# Patient Record
Sex: MALE | Race: WHITE | URBAN - METROPOLITAN AREA
[De-identification: names, ages, dates, MRNs, and addresses within clinical notes are randomized per-mention and may not be internally consistent; named-entity substitution may affect disease eponyms.]

---

## 2017-02-24 ENCOUNTER — HOSPITAL ENCOUNTER (OUTPATIENT)
Dept: NON INVASIVE DIAGNOSTICS | Age: 62
Discharge: HOME | End: 2017-02-24
Attending: INTERNAL MEDICINE | Admitting: INTERNAL MEDICINE

## 2017-02-24 DIAGNOSIS — R94.31 ABNORMAL ELECTROCARDIOGRAM: ICD-10-CM

## 2017-02-24 LAB
LEFT VENTRICULAR EJECTION FRACTION HIGH VALUE: 55 %
LEFT VENTRICULAR EJECTION FRACTION MODE: NORMAL
LV EF: 55 %
LVEF MODALITY: NORMAL

## 2023-02-04 ENCOUNTER — APPOINTMENT (OUTPATIENT)
Dept: CT IMAGING | Age: 68
DRG: 065 | End: 2023-02-04
Payer: MEDICARE

## 2023-02-04 ENCOUNTER — APPOINTMENT (OUTPATIENT)
Dept: GENERAL RADIOLOGY | Age: 68
DRG: 065 | End: 2023-02-04
Payer: MEDICARE

## 2023-02-04 ENCOUNTER — HOSPITAL ENCOUNTER (INPATIENT)
Age: 68
LOS: 3 days | Discharge: NURSING, REHAB OR LONG-TERM CARE | DRG: 065 | End: 2023-02-07
Attending: EMERGENCY MEDICINE | Admitting: INTERNAL MEDICINE
Payer: MEDICARE

## 2023-02-04 DIAGNOSIS — I63.9 ACUTE CVA (CEREBROVASCULAR ACCIDENT) (HCC): Primary | ICD-10-CM

## 2023-02-04 LAB
A/G RATIO: 1.6 (ref 1.1–2.2)
ALBUMIN SERPL-MCNC: 4.4 G/DL (ref 3.4–5)
ALP BLD-CCNC: 95 U/L (ref 40–129)
ALT SERPL-CCNC: 18 U/L (ref 10–40)
ANION GAP SERPL CALCULATED.3IONS-SCNC: 6 MMOL/L (ref 3–16)
APTT: 27.6 SEC (ref 23–34.3)
AST SERPL-CCNC: 24 U/L (ref 15–37)
BASOPHILS ABSOLUTE: 0.1 K/UL (ref 0–0.2)
BASOPHILS RELATIVE PERCENT: 0.6 %
BILIRUB SERPL-MCNC: 0.6 MG/DL (ref 0–1)
BILIRUBIN URINE: NEGATIVE
BLOOD, URINE: NEGATIVE
BUN BLDV-MCNC: 23 MG/DL (ref 7–20)
CALCIUM SERPL-MCNC: 9.8 MG/DL (ref 8.3–10.6)
CHLORIDE BLD-SCNC: 103 MMOL/L (ref 99–110)
CLARITY: CLEAR
CO2: 28 MMOL/L (ref 21–32)
COLOR: YELLOW
CREAT SERPL-MCNC: 0.8 MG/DL (ref 0.8–1.3)
EKG ATRIAL RATE: 62 BPM
EKG DIAGNOSIS: NORMAL
EKG P AXIS: 38 DEGREES
EKG P-R INTERVAL: 202 MS
EKG Q-T INTERVAL: 386 MS
EKG QRS DURATION: 94 MS
EKG QTC CALCULATION (BAZETT): 391 MS
EKG R AXIS: -48 DEGREES
EKG T AXIS: 27 DEGREES
EKG VENTRICULAR RATE: 62 BPM
EOSINOPHILS ABSOLUTE: 0.1 K/UL (ref 0–0.6)
EOSINOPHILS RELATIVE PERCENT: 0.5 %
GFR SERPL CREATININE-BSD FRML MDRD: >60 ML/MIN/{1.73_M2}
GLUCOSE BLD-MCNC: 119 MG/DL (ref 70–99)
GLUCOSE BLD-MCNC: 94 MG/DL (ref 70–99)
GLUCOSE URINE: NEGATIVE MG/DL
HCT VFR BLD CALC: 46.4 % (ref 40.5–52.5)
HEMOGLOBIN: 15.8 G/DL (ref 13.5–17.5)
INR BLD: 0.95 (ref 0.87–1.14)
KETONES, URINE: NEGATIVE MG/DL
LEUKOCYTE ESTERASE, URINE: NEGATIVE
LYMPHOCYTES ABSOLUTE: 2.3 K/UL (ref 1–5.1)
LYMPHOCYTES RELATIVE PERCENT: 23.6 %
MCH RBC QN AUTO: 34.3 PG (ref 26–34)
MCHC RBC AUTO-ENTMCNC: 34.1 G/DL (ref 31–36)
MCV RBC AUTO: 100.7 FL (ref 80–100)
MICROSCOPIC EXAMINATION: NORMAL
MONOCYTES ABSOLUTE: 0.7 K/UL (ref 0–1.3)
MONOCYTES RELATIVE PERCENT: 7.7 %
NEUTROPHILS ABSOLUTE: 6.6 K/UL (ref 1.7–7.7)
NEUTROPHILS RELATIVE PERCENT: 67.6 %
NITRITE, URINE: NEGATIVE
PDW BLD-RTO: 13.8 % (ref 12.4–15.4)
PERFORMED ON: NORMAL
PH UA: 7 (ref 5–8)
PLATELET # BLD: 273 K/UL (ref 135–450)
PMV BLD AUTO: 8.2 FL (ref 5–10.5)
POTASSIUM SERPL-SCNC: 4.3 MMOL/L (ref 3.5–5.1)
PRO-BNP: 65 PG/ML (ref 0–124)
PROTEIN UA: NEGATIVE MG/DL
PROTHROMBIN TIME: 12.6 SEC (ref 11.7–14.5)
RBC # BLD: 4.61 M/UL (ref 4.2–5.9)
SODIUM BLD-SCNC: 137 MMOL/L (ref 136–145)
SPECIFIC GRAVITY UA: >=1.03 (ref 1–1.03)
TOTAL PROTEIN: 7.1 G/DL (ref 6.4–8.2)
TROPONIN: <0.01 NG/ML
URINE REFLEX TO CULTURE: NORMAL
URINE TYPE: NORMAL
UROBILINOGEN, URINE: 0.2 E.U./DL
WBC # BLD: 9.7 K/UL (ref 4–11)

## 2023-02-04 PROCEDURE — 93010 ELECTROCARDIOGRAM REPORT: CPT | Performed by: INTERNAL MEDICINE

## 2023-02-04 PROCEDURE — 85025 COMPLETE CBC W/AUTO DIFF WBC: CPT

## 2023-02-04 PROCEDURE — 85610 PROTHROMBIN TIME: CPT

## 2023-02-04 PROCEDURE — 2060000000 HC ICU INTERMEDIATE R&B

## 2023-02-04 PROCEDURE — 71045 X-RAY EXAM CHEST 1 VIEW: CPT

## 2023-02-04 PROCEDURE — 84484 ASSAY OF TROPONIN QUANT: CPT

## 2023-02-04 PROCEDURE — 6360000004 HC RX CONTRAST MEDICATION: Performed by: PHYSICIAN ASSISTANT

## 2023-02-04 PROCEDURE — 93005 ELECTROCARDIOGRAM TRACING: CPT | Performed by: PHYSICIAN ASSISTANT

## 2023-02-04 PROCEDURE — 36415 COLL VENOUS BLD VENIPUNCTURE: CPT

## 2023-02-04 PROCEDURE — 70498 CT ANGIOGRAPHY NECK: CPT

## 2023-02-04 PROCEDURE — 85730 THROMBOPLASTIN TIME PARTIAL: CPT

## 2023-02-04 PROCEDURE — 6370000000 HC RX 637 (ALT 250 FOR IP): Performed by: INTERNAL MEDICINE

## 2023-02-04 PROCEDURE — 70450 CT HEAD/BRAIN W/O DYE: CPT

## 2023-02-04 PROCEDURE — 99285 EMERGENCY DEPT VISIT HI MDM: CPT

## 2023-02-04 PROCEDURE — 4A03X5D MEASUREMENT OF ARTERIAL FLOW, INTRACRANIAL, EXTERNAL APPROACH: ICD-10-PCS | Performed by: EMERGENCY MEDICINE

## 2023-02-04 PROCEDURE — 80053 COMPREHEN METABOLIC PANEL: CPT

## 2023-02-04 PROCEDURE — 83880 ASSAY OF NATRIURETIC PEPTIDE: CPT

## 2023-02-04 PROCEDURE — 81003 URINALYSIS AUTO W/O SCOPE: CPT

## 2023-02-04 PROCEDURE — 6360000002 HC RX W HCPCS: Performed by: INTERNAL MEDICINE

## 2023-02-04 RX ORDER — ASPIRIN 81 MG/1
81 TABLET, CHEWABLE ORAL DAILY
Status: DISCONTINUED | OUTPATIENT
Start: 2023-02-04 | End: 2023-02-07 | Stop reason: HOSPADM

## 2023-02-04 RX ORDER — ENOXAPARIN SODIUM 100 MG/ML
40 INJECTION SUBCUTANEOUS DAILY
Status: DISCONTINUED | OUTPATIENT
Start: 2023-02-04 | End: 2023-02-07 | Stop reason: HOSPADM

## 2023-02-04 RX ORDER — ATORVASTATIN CALCIUM 40 MG/1
40 TABLET, FILM COATED ORAL NIGHTLY
Status: DISCONTINUED | OUTPATIENT
Start: 2023-02-04 | End: 2023-02-07 | Stop reason: HOSPADM

## 2023-02-04 RX ADMIN — IOPAMIDOL 75 ML: 755 INJECTION, SOLUTION INTRAVENOUS at 09:21

## 2023-02-04 RX ADMIN — ENOXAPARIN SODIUM 40 MG: 100 INJECTION SUBCUTANEOUS at 16:20

## 2023-02-04 RX ADMIN — ATORVASTATIN CALCIUM 40 MG: 40 TABLET, FILM COATED ORAL at 21:49

## 2023-02-04 RX ADMIN — ASPIRIN 81 MG: 81 TABLET, CHEWABLE ORAL at 16:20

## 2023-02-04 SDOH — ECONOMIC STABILITY: TRANSPORTATION INSECURITY
IN THE PAST 12 MONTHS, HAS THE LACK OF TRANSPORTATION KEPT YOU FROM MEDICAL APPOINTMENTS OR FROM GETTING MEDICATIONS?: NO

## 2023-02-04 SDOH — HEALTH STABILITY: PHYSICAL HEALTH: ON AVERAGE, HOW MANY DAYS PER WEEK DO YOU ENGAGE IN MODERATE TO STRENUOUS EXERCISE (LIKE A BRISK WALK)?: 0 DAYS

## 2023-02-04 SDOH — ECONOMIC STABILITY: TRANSPORTATION INSECURITY
IN THE PAST 12 MONTHS, HAS LACK OF TRANSPORTATION KEPT YOU FROM MEETINGS, WORK, OR FROM GETTING THINGS NEEDED FOR DAILY LIVING?: NO

## 2023-02-04 SDOH — ECONOMIC STABILITY: INCOME INSECURITY: IN THE LAST 12 MONTHS, WAS THERE A TIME WHEN YOU WERE NOT ABLE TO PAY THE MORTGAGE OR RENT ON TIME?: NO

## 2023-02-04 SDOH — ECONOMIC STABILITY: HOUSING INSECURITY: IN THE LAST 12 MONTHS, HOW MANY PLACES HAVE YOU LIVED?: 1

## 2023-02-04 SDOH — HEALTH STABILITY: PHYSICAL HEALTH: ON AVERAGE, HOW MANY MINUTES DO YOU ENGAGE IN EXERCISE AT THIS LEVEL?: 0 MIN

## 2023-02-04 SDOH — ECONOMIC STABILITY: FOOD INSECURITY: WITHIN THE PAST 12 MONTHS, THE FOOD YOU BOUGHT JUST DIDN'T LAST AND YOU DIDN'T HAVE MONEY TO GET MORE.: NEVER TRUE

## 2023-02-04 SDOH — ECONOMIC STABILITY: FOOD INSECURITY: WITHIN THE PAST 12 MONTHS, YOU WORRIED THAT YOUR FOOD WOULD RUN OUT BEFORE YOU GOT MONEY TO BUY MORE.: NEVER TRUE

## 2023-02-04 SDOH — ECONOMIC STABILITY: HOUSING INSECURITY
IN THE LAST 12 MONTHS, WAS THERE A TIME WHEN YOU DID NOT HAVE A STEADY PLACE TO SLEEP OR SLEPT IN A SHELTER (INCLUDING NOW)?: NO

## 2023-02-04 ASSESSMENT — PATIENT HEALTH QUESTIONNAIRE - PHQ9
2. FEELING DOWN, DEPRESSED OR HOPELESS: NOT AT ALL
SUM OF ALL RESPONSES TO PHQ9 QUESTIONS 1 & 2: 0
1. LITTLE INTEREST OR PLEASURE IN DOING THINGS: NOT AT ALL

## 2023-02-04 ASSESSMENT — ENCOUNTER SYMPTOMS
BACK PAIN: 0
NAUSEA: 0
COUGH: 0
DIARRHEA: 0
ABDOMINAL PAIN: 0
VOMITING: 0
SHORTNESS OF BREATH: 0
CHEST TIGHTNESS: 0

## 2023-02-04 ASSESSMENT — PAIN SCALES - GENERAL
PAINLEVEL_OUTOF10: 0
PAINLEVEL_OUTOF10: 0

## 2023-02-04 ASSESSMENT — LIFESTYLE VARIABLES
HOW OFTEN DO YOU HAVE A DRINK CONTAINING ALCOHOL: 4 OR MORE TIMES A WEEK
HOW MANY STANDARD DRINKS CONTAINING ALCOHOL DO YOU HAVE ON A TYPICAL DAY: 1 OR 2

## 2023-02-04 ASSESSMENT — SOCIAL DETERMINANTS OF HEALTH (SDOH)
WITHIN THE LAST YEAR, HAVE TO BEEN RAPED OR FORCED TO HAVE ANY KIND OF SEXUAL ACTIVITY BY YOUR PARTNER OR EX-PARTNER?: NO
HOW OFTEN DO YOU ATTEND CHURCH OR RELIGIOUS SERVICES?: NEVER
WITHIN THE LAST YEAR, HAVE YOU BEEN KICKED, HIT, SLAPPED, OR OTHERWISE PHYSICALLY HURT BY YOUR PARTNER OR EX-PARTNER?: NO
HOW HARD IS IT FOR YOU TO PAY FOR THE VERY BASICS LIKE FOOD, HOUSING, MEDICAL CARE, AND HEATING?: NOT HARD AT ALL
WITHIN THE LAST YEAR, HAVE YOU BEEN HUMILIATED OR EMOTIONALLY ABUSED IN OTHER WAYS BY YOUR PARTNER OR EX-PARTNER?: NO
HOW OFTEN DO YOU ATTENT MEETINGS OF THE CLUB OR ORGANIZATION YOU BELONG TO?: NEVER
DO YOU BELONG TO ANY CLUBS OR ORGANIZATIONS SUCH AS CHURCH GROUPS UNIONS, FRATERNAL OR ATHLETIC GROUPS, OR SCHOOL GROUPS?: NO
HOW OFTEN DO YOU GET TOGETHER WITH FRIENDS OR RELATIVES?: MORE THAN THREE TIMES A WEEK
IN A TYPICAL WEEK, HOW MANY TIMES DO YOU TALK ON THE PHONE WITH FAMILY, FRIENDS, OR NEIGHBORS?: MORE THAN THREE TIMES A WEEK
ARE YOU MARRIED, WIDOWED, DIVORCED, SEPARATED, NEVER MARRIED, OR LIVING WITH A PARTNER?: NEVER MARRIED
WITHIN THE LAST YEAR, HAVE YOU BEEN AFRAID OF YOUR PARTNER OR EX-PARTNER?: NO

## 2023-02-04 NOTE — ED PROVIDER NOTES
In addition to the advanced practice provider, I personally saw Justo Galeas and performed a substantive portion of the visit including all aspects of the medical decision making. Medical Decision Making  Patient presented to the ED with slurred speech and left facial droop of sudden onset. Symptoms started yesterday at 7 AM and have been persistent ever since. In addition to the weakness in his left arm and face, patient has had word finding difficulty. We called a code stroke and discussed this with  stroke team.  However, as the patient is well out of the window for treatment with thrombectomy or thrombolysis, neither was indciated. CTA showed 50% stenosis in the right cervical ICA, but this is not clinically significant to his current presentation. It showed no evidence of LVO. Noncontrast CT of the head showed no evidence of hemorrhage or other abnormality. We admitted him to the hospital for further evaluation, including MRI as well as evaluation by PT and OT. I personally saw the patient and independently provided 10 minutes of non-concurrent critical care out of the total shared critical care time provided. EKG  The Ekg interpreted by me in the absence of a cardiologist shows. normal sinus rhythm with a rate of 62  Axis is   Left axis deviation  QTc is  normal  LAFB   No specific ST-T wave changes appreciated. No evidence of acute ischemia. No previous EKGs available for comparison      Screenings  NIH Stroke Scale  Interval: Baseline  Level of Consciousness (1a): Alert  LOC Questions (1b): Answers both correctly  LOC Commands (1c): Performs both tasks correctly  Best Gaze (2): Normal  Visual (3): No visual loss  Facial Palsy (4): (!) Minor paralysis (L side)  Motor Arm, Left (5a): Drift, but does not hit bed  Motor Arm, Right (5b): No drift  Motor Leg, Left (6a): Drift, but does not hit bed  Motor Leg, Right (6b):  No drift  Limb Ataxia (7): (!) Present in one limb (L side)  Sensory (8): Normal  Best Language (9): No aphasia  Dysarthria (10): Mild to moderate, slurs some words  Extinction and Inattention (11): No abnormality  Total: 5      FINAL IMPRESSION  1. Acute CVA (cerebrovascular accident) ([facility] [address]        Blood pressure (!) 156/76, pulse 78, temperature 97.6 °F (36.4 °C), temperature source Oral, resp. rate 23, height 5' 9\" (1.753 m), weight 155 lb (70.3 kg), SpO2 96 %.      For further details of [facility] emergency department encounter, please see documentation by advanced practice provider, NI Aguillon.        Satinder Larose MD  02/08/23 3384

## 2023-02-04 NOTE — ED NOTES
Bedside report given to 3T RN, verbalized understanding. This RN offered to do bedside NIH handoff, receiving 3T RN assuming care stated \"don't worry, I'll do one upstairs\".       Kevin Darling, RN  02/04/23 80175 Luis Wright, RODY  02/04/23 5533

## 2023-02-04 NOTE — ACP (ADVANCE CARE PLANNING)
Advance Care Planning     Advance Care Planning Inpatient Note  [facility] Department    Today's Date: 2/4/2023  Unit: [facility]    Received request from IDT Member, Yumiko Son RN. Upon review of chart and communication with care team, patient's decision making abilities are not in question. . Patient was/were present in the room during visit. Goals of ACP Conversation:  Discuss advance care planning documents    Health Care Decision Makers:       Primary Decision Maker: Stefan Leong - Brother/Sister - [phone]    Secondary Decision Maker: Shlomo Salcedo - [phone]  Summary:  No Decision Maker named by patient at this time    Advance Care Planning Documents (Patient Wishes):  None     Assessment:  Patient requested to review AD documents. Pt was given blank copies of POA and LW documents to reviw and complete when appropriate. Pt was instructed to notify the nurse to contact [address] to witness pt sign completed POA/LW documents. Provided spiritual and emotional support through active listen, affirmed feelings, thoughts, and concerns. Patient expressed hope and gratitude. Interventions:  Provided education on documents for clarity and greater understanding  Discussed and provided education on state decision maker hierarchy  Encouraged ongoing ACP conversation with future decision makers and loved ones    Care Preferences Communicated:   No    Outcomes/Plan:  ACP Discussion: Postponed. Pt will notify the nurse when appropriate.     Electronically signed by Ginny Montes De Oca on 2/4/2023 at 4:07 PM

## 2023-02-04 NOTE — ED PROVIDER NOTES
[facility]        Pt Name: Emilio Romero  MRN: [id number]  [place] 1955  Date of evaluation: 2/4/2023  Provider: Alyce Jones PA-C  PCP: Nataliia Prince III, DO  Note Started: 10:42 AM EST 2/4/23       I have seen and evaluated this patient with my supervising physician Anu Gutierrez MD.      CHIEF COMPLAINT       Chief Complaint   Patient presents with    Extremity Weakness     Patient brought in by squad from home with complaints of slurred speech, left sided facial droop since yesterday, 7am.     Facial Droop    Aphasia           Fall     Yesterday        HISTORY OF PRESENT ILLNESS: 1 or more Elements     History from : Patient    Limitations to history : None    Emilio Romero is a 69 y.o. male with a history of current tobacco abuse who presents to the emergency department today via ambulance from home with patient stating, \"I believe I have had a stroke\". Patient states he does not have a family doctor and states he has never been to a doctor. He is unsure what medical problems he may have. He does still currently smoke. He lives at home with his brother. Patient states yesterday he feels like he began having left-sided facial drooping. He states he began feeling very unstable when walking and actually fell several times at home yesterday. He also feels he began having weakness in his left arm yesterday. He thought the symptoms would pass and therefore did not seek any medical treatment. He is here with his brother who states he came downstairs this morning and noticed the patient sitting in front of the TV. The patient had change the input on the TV and did not remember how to fix it. Brother states this is very abnormal.  Brother also states that patient has had difficulty word finding. Patient is alert and oriented x3 with GCS 15 on arrival.  He is noted to have left facial drooping with smiling. He also has a pronator drift on the left side. A code stroke was called and he was taken for CT scan. Patient denies having headache, lightheadedness or dizziness. He denies chest pain or shortness of breath. He has no GI complaints    Nursing Notes were all reviewed and agreed with or any disagreements were addressed in the HPI. REVIEW OF SYSTEMS :      Review of Systems   Constitutional:  Negative for chills and fever. Respiratory:  Negative for cough, chest tightness and shortness of breath. Cardiovascular:  Negative for chest pain, palpitations and leg swelling. Gastrointestinal:  Negative for abdominal pain, diarrhea, nausea and vomiting. Genitourinary:  Negative for difficulty urinating, dysuria, flank pain, frequency, hematuria and urgency. Musculoskeletal:  Negative for arthralgias, back pain, neck pain and neck stiffness. Neurological:  Positive for tremors, facial asymmetry, speech difficulty and weakness. Negative for dizziness, seizures, syncope, light-headedness, numbness and headaches. Psychiatric/Behavioral:  Positive for confusion. All other systems reviewed and are negative. Positives and Pertinent negatives as per HPI. SURGICAL HISTORY     Past Surgical History:   Procedure Laterality Date    FINGER SURGERY Right     HERNIA REPAIR         CURRENTMEDICATIONS       Previous Medications    No medications on file       ALLERGIES     Patient has no known allergies. FAMILYHISTORY     History reviewed. No pertinent family history. SOCIAL HISTORY       Social History     Tobacco Use    Smoking status: Every Day     Packs/day: 1.00     Types: Cigarettes    Smokeless tobacco: Never   Substance Use Topics    Alcohol use:  Yes     Alcohol/week: 14.0 standard drinks     Types: 14 Standard drinks or equivalent per week     Comment: 2 oz bourban/day    Drug use: Not Currently       SCREENINGS   NIH Stroke Scale  Interval: Baseline  Level of Consciousness (1a): Alert  LOC Questions (1b): Answers both correctly  LOC Commands (1c): Performs both tasks correctly  Best Gaze (2): Normal  Visual (3): No visual loss  Facial Palsy (4): (!) Partial paralysis  Motor Arm, Left (5a): Drift, but does not hit bed  Motor Arm, Right (5b): No drift  Motor Leg, Left (6a): No drift  Motor Leg, Right (6b): No drift  Limb Ataxia (7): (!) Present in two limbs  Sensory (8): Normal  Best Language (9): Mild to moderate aphasia  Dysarthria (10): Mild to moderate, slurs some words  Extinction and Inattention (11): No abnormality  Total: 7    Deb Coma Scale  Eye Opening: Spontaneous  Best Verbal Response: Oriented  Best Motor Response: Obeys commands  [place] Coma Scale Score: 15                CIWA Assessment  BP: (!) 155/81  Heart Rate: 60           PHYSICAL EXAM  1 or more Elements     ED Triage Vitals   BP Temp Temp Source Heart Rate Resp SpO2 Height Weight   02/04/23 0930 02/04/23 0930 02/04/23 0930 02/04/23 0930 02/04/23 0930 02/04/23 0930 02/04/23 0944 02/04/23 0930   (!) 159/70 97.6 °F (36.4 °C) Oral 73 14 98 % 5' 9\" (1.753 m) 155 lb (70.3 kg)       Physical Exam  Vitals and nursing note reviewed. Constitutional:       Appearance: He is well-developed. He is not diaphoretic. HENT:      Head: Normocephalic and atraumatic. Right Ear: Tympanic membrane, ear canal and external ear normal.      Left Ear: Tympanic membrane, ear canal and external ear normal.      Nose: Nose normal.      Mouth/Throat:      Mouth: Mucous membranes are moist.   Eyes:      General:         Right eye: No discharge. Left eye: No discharge. Extraocular Movements: Extraocular movements intact. Conjunctiva/sclera: Conjunctivae normal.      Pupils: Pupils are equal, round, and reactive to light. Cardiovascular:      Rate and Rhythm: Normal rate and regular rhythm. Pulses: Normal pulses. Heart sounds: Normal heart sounds.    Pulmonary:      Effort: Pulmonary effort is normal. No respiratory distress. Breath sounds: Normal breath sounds. Abdominal:      General: Abdomen is flat. Bowel sounds are normal.      Palpations: Abdomen is soft. Musculoskeletal:         General: Normal range of motion. Cervical back: Normal range of motion and neck supple. Skin:     General: Skin is warm and dry. Capillary Refill: Capillary refill takes less than 2 seconds. Coloration: Skin is not pale. Neurological:      Mental Status: He is alert and oriented to person, place, and time. GCS: GCS eye subscore is 4. GCS verbal subscore is 5. GCS motor subscore is 6. Cranial Nerves: Facial asymmetry present. Sensory: Sensation is intact. Motor: Weakness and tremor present. Coordination: Finger-Nose-Finger Test abnormal.   Psychiatric:         Behavior: Behavior normal.           DIAGNOSTIC RESULTS   LABS:    Labs Reviewed   CBC WITH AUTO DIFFERENTIAL - Abnormal; Notable for the following components:       Result Value    .7 (*)     MCH 34.3 (*)     All other components within normal limits   COMPREHENSIVE METABOLIC PANEL - Abnormal; Notable for the following components:    Glucose 119 (*)     BUN 23 (*)     All other components within normal limits   URINALYSIS WITH REFLEX TO CULTURE   TROPONIN   APTT   PROTIME-INR   BRAIN NATRIURETIC PEPTIDE       When ordered only abnormal lab results are displayed. All other labs were within normal range or not returned as of this dictation. EKG: When ordered, EKG's are interpreted by the Emergency Department Physician in the absence of a cardiologist.  Please see their note for interpretation of EKG.     RADIOLOGY:   Non-plain film images such as CT, Ultrasound and MRI are read by the radiologist. Plain radiographic images are visualized and preliminarily interpreted by the ED Provider with the below findings:        Interpretation per the Radiologist below, if available at the time of this note:    XR CHEST PORTABLE   Final Result No acute process. CTA HEAD NECK W CONTRAST   Final Result   1. There is predominately noncalcified atherosclerosis, which contributes to   less than 50% stenosis of the proximal right cervical ICA by NASCET criteria. No flow limiting stenosis seen of the cervical carotid/vertebral arteries. 2. No significant stenosis or large vessel occlusion of the [id number]. CT HEAD WO CONTRAST   Preliminary Result   1. No acute intracranial process identified. 2. Ventriculomegaly out of proportion to the degree of volume loss suggesting   normal pressure hydrocephalus in the appropriate clinical setting. The findings were sent to the Radiology Results [address] at 9:37   am on 2/4/2023 to be communicated to a licensed caregiver. CT HEAD WO CONTRAST    Result Date: 2/4/2023  1. No acute intracranial process identified. 2. Ventriculomegaly out of proportion to the degree of volume loss suggesting normal pressure hydrocephalus in the appropriate clinical setting. The findings were sent to the Radiology Results [address] at 9:37 am on 2/4/2023 to be communicated to a licensed caregiver. XR CHEST PORTABLE    Result Date: 2/4/2023  EXAMINATION: ONE XRAY VIEW OF THE CHEST 2/4/2023 9:21 am COMPARISON: None. HISTORY: ORDERING SYSTEM PROVIDED HISTORY: Weakness TECHNOLOGIST PROVIDED HISTORY: Reason for exam:->Weakness Reason for Exam: sroke alert FINDINGS: The lungs are without acute focal process. There is no effusion or pneumothorax. The cardiomediastinal silhouette is without acute process. The osseous structures are without acute process. No acute process. CTA HEAD NECK W CONTRAST    Result Date: 2/4/2023  EXAMINATION: CTA OF THE HEAD AND NECK WITH CONTRAST 2/4/2023 9:21 am: TECHNIQUE: CTA of the head and neck was performed with the administration of intravenous contrast. Multiplanar reformatted images are provided for review.   MIP images are provided for review. Stenosis of the internal carotid arteries measured using NASCET criteria. Automated exposure control, iterative reconstruction, and/or weight based adjustment of the mA/kV was utilized to reduce the radiation dose to as low as reasonably achievable. 3D reconstructed images were performed on a separate workstation and provided for review. This scan was analyzed using Viz. ai contact LVO. Identification of suspected findings is not for diagnostic use beyond notification. Viz LVO is limited to analysis of imaging data and should not be used in-lieu of full patient evaluation or relied upon to make or confirm diagnosis. COMPARISON: None. HISTORY: ORDERING SYSTEM PROVIDED HISTORY: Weakness TECHNOLOGIST PROVIDED HISTORY: Reason for exam:->Weakness Has a \"code stroke\" or \"stroke alert\" been called? ->No Decision Support Exception - unselect if not a suspected or confirmed emergency medical condition->Emergency Medical Condition (MA) Reason for Exam: stroke Initial evaluation. FINDINGS: CTA NECK: AORTIC ARCH/ARCH VESSELS: Minimal atherosclerosis of the aortic arch. No evidence of a flow limiting stenosis or dissection of the innominate or subclavian arteries. CAROTID ARTERIES: No significant stenosis of the common carotid arteries. There appears to be atherosclerosis involving the proximal cervical ICAs bilaterally including noncalcified atherosclerosis involving the proximal right cervical ICA. There is less than 50% stenosis of the proximal right cervical ICA by NASCET criteria. No focal stenosis of the left cervical ICA by NASCET criteria. No evidence of a dissection VERTEBRAL ARTERIES: There is a slightly right dominant vertebral artery. No significant stenosis or dissection is seen of the vertebral arteries. SOFT TISSUES: No focal consolidation within the visualized lung apices. No acute abnormality within the visualized superior mediastinum. BONES: The osseous structures appear osteopenic.   No convincing acute osseous abnormality. Degenerative change of the cervical spine most prominent at C5-C6 where there is loss of disc space height, endplate irregularity and sclerosis. CTA HEAD: ANTERIOR CIRCULATION: No significant stenosis of the intracranial internal carotid, anterior cerebral, or middle cerebral arteries. No aneurysm. Scattered atherosclerosis of the internal carotid arteries. POSTERIOR CIRCULATION: No significant stenosis of the vertebral, basilar, or posterior cerebral arteries. No aneurysm. OTHER: No dural venous sinus thrombosis on this non-dedicated study. BRAIN: No evidence of mass effect or midline shift. The size of the ventricles appears prominent out of proportion to the cortical sulci. 1. There is predominately noncalcified atherosclerosis, which contributes to less than 50% stenosis of the proximal right cervical ICA by NASCET criteria. No flow limiting stenosis seen of the cervical carotid/vertebral arteries. 2. No significant stenosis or large vessel occlusion of the etynph-ck-Ypagai. PROCEDURES   Unless otherwise noted below, none     Procedures    CRITICAL CARE TIME (.cctime)   There was a high probability of life-threatening clinical deterioration in the patient's condition requiring my urgent intervention. I personally saw the patient and independently provided 45 minutes of non-concurrent critical care out of the total shared critical care time provided, excluding separately reportable procedures. PAST MEDICAL HISTORY      has no past medical history on file.      Chronic Conditions affecting Care: None    EMERGENCY DEPARTMENT COURSE and DIFFERENTIAL DIAGNOSIS/MDM:   Vitals:    Vitals:    02/04/23 1100 02/04/23 1131 02/04/23 1145 02/04/23 1200   BP: (!) 140/77 (!) 156/99 (!) 141/100 (!) 155/81   Pulse: 62 66 61 60   Resp: 16 16 17 17   Temp:       TempSrc:       SpO2: 94% 96% 96% 95%   Weight:       Height:           Patient was given the following medications:  Medications   iopamidol (ISOVUE-370) 76 % injection 75 mL (75 mLs IntraVENous Given 2/4/23 0921)             Is this patient to be included in the SEP-1 Core Measure due to severe sepsis or septic shock? No   Exclusion criteria - the patient is NOT to be included for SEP-1 Core Measure due to: Infection is not suspected    CONSULTS: (Who and What was discussed)  IP CONSULT TO NEUROSURGERY  IP CONSULT TO INTERNAL MEDICINE  Discussion with Other Profesionals : Consultant . Stroke team as well as neurology from [address] [place] [address] Determinants : None    Records Reviewed : None    CC/HPI Summary, DDx, ED Course, and Reassessment: Patient presented to the emergency department today with his brother, whom he lives with. He came in via ambulance. He states, \"I feel like I have had a stroke\". He states starting yesterday around 7 AM he started feeling like he was having left-sided facial drooping as well as some weakness on his left side. He states he has felt very unstable with walking and has actually fallen several times. His brother states he first noticed it this morning as patient did seem confused. At the time of my initial exam, he does have left-sided facial drooping, decreased coordination with the left arm, left pronator drift, and some difficulty word finding. Code stroke was called. CT and CTA showed no acute intracranial process however there is ventriculomegaly suggesting normal pressure hydrocephalus. Patient denies having any chest pain or shortness of breath. Troponin is normal.  proBNP is only 65 and chest x-ray shows no acute cardiopulmonary disease. CBC, BMP, LFTs and urine analysis are unremarkable. Coags normal.    Disposition Considerations (include 1 Tests not done, Shared Decision Making, Pt Expectation of Test or Tx.): I had a lengthy discussion with the patient regarding testing completed in the emergency department today as well as the results.   After speaking with neurology at [facility] it was recommended he be admitted here at [facility]. Patient is agreeable with this. Dr. Karel Regalado is consulted who is agreeable with admission at this time. Admission      I am the Primary Clinician of Record. FINAL IMPRESSION      1. Acute CVA (cerebrovascular accident) [facility])          DISPOSITION/PLAN     DISPOSITION Decision To Admit 02/04/2023 12:16:07 PM      PATIENT REFERRED TO:  No follow-up provider specified.     DISCHARGE MEDICATIONS:  New Prescriptions    No medications on file       DISCONTINUED MEDICATIONS:  Discontinued Medications    No medications on file              (Please note that portions of this note were completed with a voice recognition program.  Efforts were made to edit the dictations but occasionally words are mis-transcribed.)    Rocael Chen PA-C (electronically signed)           Rocael Chen PA-C  02/04/23 [id number]

## 2023-02-05 ENCOUNTER — APPOINTMENT (OUTPATIENT)
Dept: CT IMAGING | Age: 68
DRG: 065 | End: 2023-02-05
Payer: MEDICARE

## 2023-02-05 ENCOUNTER — APPOINTMENT (OUTPATIENT)
Dept: MRI IMAGING | Age: 68
DRG: 065 | End: 2023-02-05
Payer: MEDICARE

## 2023-02-05 PROBLEM — R47.01 EXPRESSIVE APHASIA: Status: ACTIVE | Noted: 2023-02-05

## 2023-02-05 PROBLEM — R47.1 DYSARTHRIA: Status: ACTIVE | Noted: 2023-02-05

## 2023-02-05 PROBLEM — G81.94 LEFT HEMIPARESIS (HCC): Status: ACTIVE | Noted: 2023-02-05

## 2023-02-05 PROBLEM — I10 PRIMARY HYPERTENSION: Status: ACTIVE | Noted: 2023-02-05

## 2023-02-05 PROBLEM — R27.0 ATAXIA: Status: ACTIVE | Noted: 2023-02-05

## 2023-02-05 PROBLEM — Z91.199 NONCOMPLIANCE: Status: ACTIVE | Noted: 2023-02-05

## 2023-02-05 PROBLEM — Z72.0 TOBACCO ABUSE: Status: ACTIVE | Noted: 2023-02-05

## 2023-02-05 LAB
GLUCOSE BLD-MCNC: 105 MG/DL (ref 70–99)
PERFORMED ON: ABNORMAL

## 2023-02-05 PROCEDURE — 6360000002 HC RX W HCPCS: Performed by: INTERNAL MEDICINE

## 2023-02-05 PROCEDURE — 97112 NEUROMUSCULAR REEDUCATION: CPT

## 2023-02-05 PROCEDURE — 97163 PT EVAL HIGH COMPLEX 45 MIN: CPT

## 2023-02-05 PROCEDURE — 6370000000 HC RX 637 (ALT 250 FOR IP): Performed by: NEUROMUSCULOSKELETAL MEDICINE & OMM

## 2023-02-05 PROCEDURE — 97167 OT EVAL HIGH COMPLEX 60 MIN: CPT

## 2023-02-05 PROCEDURE — 97116 GAIT TRAINING THERAPY: CPT

## 2023-02-05 PROCEDURE — 6360000002 HC RX W HCPCS: Performed by: NEUROMUSCULOSKELETAL MEDICINE & OMM

## 2023-02-05 PROCEDURE — 2060000000 HC ICU INTERMEDIATE R&B

## 2023-02-05 PROCEDURE — 92610 EVALUATE SWALLOWING FUNCTION: CPT

## 2023-02-05 PROCEDURE — 92523 SPEECH SOUND LANG COMPREHEN: CPT

## 2023-02-05 PROCEDURE — 70450 CT HEAD/BRAIN W/O DYE: CPT

## 2023-02-05 PROCEDURE — 99222 1ST HOSP IP/OBS MODERATE 55: CPT | Performed by: NEUROMUSCULOSKELETAL MEDICINE & OMM

## 2023-02-05 PROCEDURE — 6370000000 HC RX 637 (ALT 250 FOR IP): Performed by: INTERNAL MEDICINE

## 2023-02-05 PROCEDURE — 97535 SELF CARE MNGMENT TRAINING: CPT

## 2023-02-05 PROCEDURE — 97530 THERAPEUTIC ACTIVITIES: CPT

## 2023-02-05 PROCEDURE — 97129 THER IVNTJ 1ST 15 MIN: CPT

## 2023-02-05 PROCEDURE — 70551 MRI BRAIN STEM W/O DYE: CPT

## 2023-02-05 RX ORDER — CLOPIDOGREL BISULFATE 75 MG/1
75 TABLET ORAL DAILY
Status: DISCONTINUED | OUTPATIENT
Start: 2023-02-05 | End: 2023-02-07 | Stop reason: HOSPADM

## 2023-02-05 RX ORDER — THIAMINE HYDROCHLORIDE 100 MG/ML
100 INJECTION, SOLUTION INTRAMUSCULAR; INTRAVENOUS DAILY
Status: COMPLETED | OUTPATIENT
Start: 2023-02-05 | End: 2023-02-07

## 2023-02-05 RX ADMIN — ATORVASTATIN CALCIUM 40 MG: 40 TABLET, FILM COATED ORAL at 22:06

## 2023-02-05 RX ADMIN — CLOPIDOGREL BISULFATE 75 MG: 75 TABLET ORAL at 14:08

## 2023-02-05 RX ADMIN — ASPIRIN 81 MG: 81 TABLET, CHEWABLE ORAL at 09:28

## 2023-02-05 RX ADMIN — THIAMINE HYDROCHLORIDE 100 MG: 100 INJECTION, SOLUTION INTRAMUSCULAR; INTRAVENOUS at 14:08

## 2023-02-05 RX ADMIN — ENOXAPARIN SODIUM 40 MG: 100 INJECTION SUBCUTANEOUS at 09:28

## 2023-02-05 ASSESSMENT — PAIN SCALES - GENERAL
PAINLEVEL_OUTOF10: 0
PAINLEVEL_OUTOF10: 0

## 2023-02-05 NOTE — H&P
[facility] [address]                     [facility], [address]                              HISTORY AND PHYSICAL    PATIENT NAME: Liane Dakin                :        1955  MED REC NO:   [id number]                          ROOM:       [id number]  ACCOUNT NO:   [de-identified]                           ADMIT DATE: 2023  PROVIDER:     Meghan Barcenas MD    HISTORY OF PRESENT ILLNESS:  The patient is a 73-year-old man, who came  to the emergency room with some confusion, slurred speech, and  left-sided weakness, especially upper extremity with a left-sided facial  droop. The patient was earlier aphasic and now his speech has been  improving. The patient has obviously stroke. He ignored those symptoms  for two and half days. Before he decided to come to the emergency room,  he was definitely out of any range for thrombolytic therapy. The  patient stated that he does not have a family doctor and stated he has  never been to a doctor. He is unsure what the medical problem he may  have. He is a moderately heavy smoker. Apparently, there is a doctor  listed, Dr. Ramiro Ibanez and Dr. Gonzalez Fairly to admit for, and I was asked to  take care of this patient. The patient is feeling very unstable while  walking and actually fell several times at home. He also has been  having weakness in his left arm. No loss of consciousness. No  convulsions. No genitourinary complaint. The patient had changed the  input on his TV and did not remember how to fix it, brother states this  is very abnormal according to the brother who lives with him. The  patient also had difficulty finding words. His [place] coma scale was  15 on arrival.  The patient also had a pronator drift on the left side. A code stroke was called. The patient was taken to CT scan. There is  no chest pain or shortness of breath. PAST MEDICAL HISTORY: Pertinent for hypertension.     PAST SURGICAL HISTORY: Pertinent for finger surgery, hernia repair,  fracture surgery. FAMILY HISTORY:  Both his parents are . Mother had stroke and  colon cancer. Father had atherosclerotic heart disease and prostate  cancer. MEDICATIONS:  The patient is on no medications. SOCIAL HISTORY:  The patient is a single man without any children. He  smokes three fourth of a pack per day, has been smoking all his adult  life. The patient also had moderate alcohol use. 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 He worked as a   there, but he is retired now. No history of substance abuse. REVIEW OF SYSTEM:  Negative for loss of consciousness. No convulsions. The patient does have speech disturbance, dysarthria, expressive  aphasia. There is no dysphagia. Overall average nourished man with a  BMI of 20.9. Denies angina pectoris. Does have exertional shortness of  breath. Does have left-sided weakness. No abdominal pain. No  hematemesis. No melena, no genitourinary complaint. Does have  unsteadiness while ambulating. No chronic pain. PHYSICAL EXAMINATION:  GENERAL:  Alert, awake, oriented x2. Moderately confused 64-year-old  white man, who also has disorientation and difficulty finding words. VITAL SIGNS:  His temperature is 97.8, blood pressure 176/80,  respirations 18, heart rate 63. O2 sat 97% on room air. Oral mucosa  dry. Skin is warm and dry. HEENT:  Neck is supple. Faint carotid bruit. No jugular venous  distention. No lymphadenopathy. No thyromegaly. LUNGS:  Bronchovesicular breathing pattern. Prolonged exhalation. No  wheezing. HEART:  Regular rate and rhythm. S1, S2 without any S3-S4 gallop. ABDOMEN:  Soft, nontender. Bowel sounds present. EXTREMITIES:  Shows left upper extremity somewhat spastic and having  pronator drift and general weakness.   Babinski is present on the left  side. LABORATORY EVALUATION:  Sodium 137, potassium 4.3, chloride 103, CO2 28,  BUN 23, creatinine 0.8, blood glucose is 105. Calcium is 9.8. ProBNP  level 65. Troponin less than 0.01. Blood glucose is 119. White blood  cell count 9.7, hemoglobin/hematocrit is 15.8 and 46.4, platelet count  is 677,604. Urinalysis negative for UTI. Head CT shows no acute  intracranial process. Ventriculomegaly out of proportion to the degree  of volume loss suggesting normal pressure hydrocephalus. While I am  dictating this, even an MRI of the brain has been done that shows  subacute ischemia in the right basal ganglia and adjacent  periventricular white matter disease. Extensive underlying chronic  microvascular disease with _____ prominence of the ventricular system  that is nonspecific in correlation with symptomatology of normal  pressure hydrocephalus is needed. EKG normal sinus rhythm, left  anterior fascicular block, moderate voltage criteria for LVH, maybe  normal variant. ASSESSMENT:  Acute cerebral infarction, left-sided hemiparesis,  dysarthria, expressive aphasia, hypertension, tobacco abuse,  noncompliance with medical care. Plan:  Get him admitted to neuro checks. Transthoracic echocardiogram.   The patient also had a CTA of the head and neck that did not reveal any  critical intra or extracranial blockages. No significant stenosis or  large vessel occlusion of the [place]. We will also get a  Neurology consultation per  _____.         Ochoa Rodas MD    D: 02/05/2023 11:28:22       T: 02/05/2023 11:31:37     [email or web address]  Job#: [id number]     Doc#: [id number]    CC:  Eddy Conroy Iii, DO

## 2023-02-05 NOTE — PLAN OF CARE
Problem: Discharge Planning  Goal: Discharge to home or other facility with appropriate resources  Outcome: Progressing  Flowsheets (Taken 2/5/2023 0857)  Discharge to home or other facility with appropriate resources:   Identify barriers to discharge with patient and caregiver   Arrange for needed discharge resources and transportation as appropriate   Identify discharge learning needs (meds, wound care, etc)   Refer to discharge planning if patient needs post-hospital services based on physician order or complex needs related to functional status, cognitive ability or social support system     Problem: Safety - Adult  Goal: Free from fall injury  Outcome: Progressing     Problem: Pain  Goal: Verbalizes/displays adequate comfort level or baseline comfort level  Outcome: Progressing     Problem: Neurosensory - Adult  Goal: Achieves stable or improved neurological status  Outcome: Progressing     Problem: Cardiovascular - Adult  Goal: Absence of cardiac dysrhythmias or at baseline  Outcome: Progressing     Problem: Musculoskeletal - Adult  Goal: Return mobility to safest level of function  Outcome: Progressing  Flowsheets (Taken 2/5/2023 0857)  Return Mobility to Safest Level of Function:   Assess patient stability and activity tolerance for standing, transferring and ambulating with or without assistive devices   Assist with transfers and ambulation using safe patient handling equipment as needed   Ensure adequate protection for wounds/incisions during mobilization   Obtain physical therapy/occupational therapy consults as needed   Instruct patient/family in ordered activity level   Apply continuous passive motion per provider or physical therapy orders to increase flexion toward goal     Problem: Chronic Conditions and Co-morbidities  Goal: Patient's chronic conditions and co-morbidity symptoms are monitored and maintained or improved  Outcome: Progressing

## 2023-02-05 NOTE — PROGRESS NOTES
PT report of patient with worsening symptoms. Assessment done. NIHSS at shift change was 4. NIHSS increased to 10. Code stroke called. MRI team called in for stat MRI. Attending physician notified.

## 2023-02-05 NOTE — PROGRESS NOTES
Speech Language Pathology  Facility/Department: [address]  SLP Clinical Swallow Evaluation and Speech Language Cognitive Assessment     Patient: Mita Leal   : 1955   MRN: [id number]      Evaluation Date: 2023      Admitting Dx: Acute ischemic stroke [facility]) [I63.9]  Acute CVA (cerebrovascular accident) [facility]) [I63.9]   has no past medical history on Epic chart file. Onset 2023    Pain: Denies                                  ED H&P:   Mita Leal is a 69 y.o. male with a history of current tobacco abuse who presents to the emergency department today via ambulance from home with patient stating, \"I believe I have had a stroke\". Patient states he does not have a family doctor and states he has never been to a doctor. He is unsure what medical problems he may have. He does still currently smoke. He lives at home with his brother. Patient states yesterday he feels like he began having left-sided facial drooping. He states he began feeling very unstable when walking and actually fell several times at home yesterday. He also feels he began having weakness in his left arm yesterday. He thought the symptoms would pass and therefore did not seek any medical treatment. He is here with his brother who states he came downstairs this morning and noticed the patient sitting in front of the TV. The patient had change the input on the TV and did not remember how to fix it. Brother states this is very abnormal.  Brother also states that patient has had difficulty word finding. Patient is alert and oriented x3 with GCS 15 on arrival.  He is noted to have left facial drooping with smiling. He also has a pronator drift on the left side. A code stroke was called and he was taken for CT scan. Patient denies having headache, lightheadedness or dizziness. He denies chest pain or shortness of breath.   He has no GI complaints    Rapid Response/ Code [place] 2023 ~9:00 to 930 am     Imaging:  Chest X-ray:  2/4/2023  Impression   No acute process. MRI: 2/5/2023  Impression   Subacute ischemia in the right basal ganglia and adjacent periventricular   white matter. Extensive underlying chronic microvascular disease. Symmetric prominence of ventricular system that is nonspecific and   correlation with symptomatology for normal pressure hydrocephalus is needed. History/Prior Level of Function:   Living Status: Lives with Brother  Prior Dysphagia History: Pt denies swallowing problems. Pt reports chewing problems due to missing teeth/gaps in teeth  Prior Speech History: Pt self reports problems with reading and speech while in school . Reports he went to speech and reading therapy. He report Dyslexia. Left hand dominance. Reason for referral: SLP evaluation orders received due to Stroke workup for left sided weakness; left facial droop; slurred speech and speech disturbance-aphasia. DYSPHAGIA BEDSIDE SWALLOW EVALUATION   Dysphagia Impressions/Dysphagia Diagnosis: Oropharyngeal Dysphagia   1. Pt was awake in bed. Pt was receptive to assessment. Pt oriented to place/month/year and partially oriented to situation    2. Oropharyngeal Dysphagia characterized by left om muscular weakness impacting mastication, bolus formation/control and rate of A-P oral transit; delayed initiation of the swallow and potential reduced laryngeal rom/pharyngeal clearance  No anterior loss of any liquid or food this date/time  Prolonged munching mastication pattern of textured foods.  Pt reports more due to gaps in teeth than any status change  No post swallow overt clinical s/s of aspiration   Post swallow oral pocketing left ; which pt was able to clear with clearance swallow  Recommend   Downgrade to easy to chew food consistencies with close monitor  Continue thin liquids with close monitor  Meds with johanne  SLP therapy for Oropharyngeal Dysphagia  ARU referral consideration  Discussed with RN    Recommended Diet and Intervention:  Diet Solids Recommendation:  Easy to chew with close monitor; discontinue if any status changes Liquid Consistency Recommendation: Thin liquids with close monitor; discontinue or downgrade to thickened liquids if any status change Recommended form of Meds: Meds in puree         Dysphagia Therapeutic Intervention:  Bolus Control Exercise, Oral Care, Laryngeal Exercises , Diet Tolerance Monitoring , Oral Motor Exercises , Patient/Family Education , Therapeutic Trials with SLP , To be determined     Compensatory Swallowing Strategies:  Check for pocketing of food L, Upright as possible with all PO intake , Small bites/sips , Swallow 2 times per bite , Lingual Sweeps , Eat/feed slowly, Remain upright 30-45 min     Oral Mechanism Exam:  Left labial/buccal weakness; reduced rom and strength. Reduced lingual coordination. Slight asym velum during phonation of /a/. Diminished gag. Adequate labial seal for tsp/cup/straw         SHORT TERM DYSPHAGIA GOALS  Pt will functionally tolerate recommended diet with no overt clinical s/s of aspiration   Pt will demonstrate understanding of aspiration risk and precautions via education/demonstration with occasional prompting  Pt will advance to least restrictive diet as indicated   Any clinical status change or new s/s of aspiration/penetration , Pt will participate in Modified Barium Swallow Study     Patient Positioning: Upright in bed . Total assist warranted to achieve upright /midline positioning (use of pillows supporting left side)      SPEECH LANGUAGE COGNITIVE ASSESSMENT:     Speech Diagnosis: MS change; left facial droop with slurred speech; speech difficulties-aphasia      Assessment Impressions:   1. Pt was awake in bed and was receptive to assessment. Pt reports baseline of Dyslexia and problems with orientation and memory. Pt reports some changes recently in balance; speech with more memory issues and left weakness    2. Moderate to marked Dysarthria characterized by reduced intelligibility in connected speech 2/2 to phonemic distortions/blended words/articulatory imprecision 2/2 to left om motor muscular weakness. Will need SLP therapy    3. Aphasia characterized by verbal expressive language deficits during naming tasks; [address]? And concept explanation with episodes of telegraphic speech and word retrieval and though organization deficits. Ongoing assessment /treatment as oral motor speech improves. 4. Cognitive - Linguistic deficits in domains of spatial concepts and attention left side of body; working memory and STM; insight and VPS/PS and reasoning; attention; and numeric reasoning. Therapy recommended. COMPREHENSION  Auditory Comprehension: Breakdown with spatial body scheme and psychomotor commands  Functional Ability to Comprehend Basic Commands/Questions   Impaired Complex/Abstract commands  Impaired L/R discrimination     EXPRESSION  Verbal Expression: Dysphasia features; ongoing assessment as intelligibility of oral motor speech improves  Impaired Confrontational Naming  Impaired Convergent Naming  Impaired Divergent Naming  Impaired Conversation  Impaired Thought Organization      Pragmatics/Social Functioning: Labile  Emotionally labile      MOTOR SPEECH  Motor Speech:  Moderate  and Severe   Dysarthria: Blended word boundaries  and Imprecise articulation    Decreased speech intelligibility     VOICE  Voice:   Weak   Vocal Intensity:     COGNITION    Overall Orientation :  Impaired  Disoriented to situation   Oriented to self  Oriented to place  Oriented to time     Attention: Impaired   Impaired Alternating Attention  Impaired Divided Attention  Impaired attention left    Memory: Impaired   Impaired Short-term Memory  Impaired Recall of New Learning   Impaired Immediate/working Memory    Problem Solving: Impaired   Impaired Simple Functional Tasks  Impaired Verbal Reasoning    Safety/Judgement: Impaired   Unable to self-monitor and self correct consistently   Impaired Insight  Impulsive   Impaired Flexibility of thought    GOALS:  Short Term Speech/Language/Cognitive Goals:   Pt will improve speech intelligibility in connected speech via graded tasks to 80%   Pt will improve orientation and short term recall via graded tasks to 80%  Pt will improve verbal expression for functional expression via graded tasks to 80%  Pt will participate in ongoing cognitive assessment with goals to be established as indicated   Pt with improve functional verbal problem solving via graded tasks to 80% min cues     Plan of care: 3-5 times per week during acute care stay. Discharge Recommendations:  Recommend ongoing SLP for speech and dysphagia therapy upon discharge from hospital   Recommend ARU referral    EDUCATION:   Provided education regarding role of SLP, results of assessment, recommendations and general speech pathology plan of care. [] Pt verbalized understanding and agreement   [x] Pt requires ongoing learning   [] No evidence of comprehension     If patient discharges prior to next visit, this note will serve as discharge. Treatment time:  Timed Code Treatment Minutes: 10  Total Treatment time: 55 minutes    Electronically signed by: Joao Kelley MS,CCC,SLP [id number]  Speech and Language Pathologist  2/5/2023 at 1215 pm

## 2023-02-05 NOTE — PROGRESS NOTES
[address] [facility],6[address] Msb Department   Phone: [phone]    Occupational Therapy    [x] Initial Evaluation            [] Daily Treatment Note         [] Discharge Summary      Patient: Pino Angel   : 1955   MRN: [id number]   Date of Service:  2023      Patient stated \"I am worse\", at end of session spoke to nurse who went into room for nursing assessment and called a STROKE ALERT. Returned to room and assisted patient back to bed with stroke team in room. Admitting Diagnosis:  Acute ischemic stroke [facility])    Current Admission Summary: Pino Angel is a 69 y.o. male with a history of current tobacco abuse who presents to the emergency department today via ambulance from home with patient stating, \"I believe I have had a stroke\". Patient states he does not have a family doctor and states he has never been to a doctor. He is unsure what medical problems he may have. He does still currently smoke. He lives at home with his brother. Patient states yesterday he feels like he began having left-sided facial drooping. He states he began feeling very unstable when walking and actually fell several times at home yesterday. He also feels he began having weakness in his left arm yesterday. He thought the symptoms would pass and therefore did not seek any medical treatment. He is here with his brother who states he came downstairs this morning and noticed the patient sitting in front of the TV. The patient had change the input on the TV and did not remember how to fix it. Brother states this is very abnormal.  Brother also states that patient has had difficulty word finding. Patient is alert and oriented x3 with GCS 15 on arrival.  He is noted to have left facial drooping with smiling. He also has a pronator drift on the left side. A code stroke was called and he was taken for CT scan.   Patient denies having headache, lightheadedness or dizziness. He denies chest pain or shortness of breath. He has no GI complaints     Head CT--   1. No acute intracranial process identified. 2. Ventriculomegaly out of proportion to the degree of volume loss suggesting   normal pressure hydrocephalus in the appropriate clinical setting. Past Medical History:  has no past medical history on file. Past Surgical History:  has a past surgical history that includes Finger surgery (Right); Hernia repair; fracture surgery; and hernia repair. Discharge Recommendations: Pino Angel scored a 13/24 on the AM-PAC ADL Inpatient form. Current research shows that an AM-PAC score of 17 or less is typically not associated with a discharge to the patient's home setting. Based on the patient's AM-PAC score and their current ADL deficits, it is recommended that the patient have 5-7 sessions per week of Occupational Therapy at d/c to increase the patient's independence. At this time, this patient demonstrates complex nursing, medical, and rehabilitative needs, and would benefit from intensive rehabilitation services upon discharge from the Inpatient setting. This patient demonstrates the ability to participate in and benefit from an intensive therapy program with a coordinated interdisciplinary team approach to foster frequent, structured, and documented communication among disciplines, who will work together to establish, prioritize, and achieve treatment goals. Please see assessment section for further patient specific details. If patient discharges prior to next session this note will serve as a discharge summary. Please see below for the latest assessment towards goals.       DME Required For Discharge: DME to be determined at next level of care, DME to be determined pending patient progress    Precautions/Restrictions: high fall risk  Weight Bearing Restrictions: no restrictions  [] Right Upper Extremity  [] Left Upper Extremity [] Right Lower Extremity  [] Left Lower Extremity     Required Braces/Orthotics: no braces required   [] Right  [] Left  Positional Restrictions:no positional restrictions    Pre-Admission Information   Lives With:  brother      Type of Home: house  Home Layout: two level, bedroom in the basement, 10 steps with 1 handrail   Home Access:  2 step to enter without rails   Bathroom Layout: tub/shower unit  Bathroom Equipment: grab bars in shower  Toilet Height: elevated height  Home Equipment: no prior equipment  Transfer Assistance: Independent without use of device  Ambulation Assistance:Independent without use of device  ADL Assistance: independent with all ADL's   IADL Assistance: independent with homemaking tasks  Active :        [x] Yes  [] 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 Occupation: sales  Hobbies: [employer]   Recent Falls: chart reports had several falls that caused this admission (about 5 falls)     Examination   Vision:   Vision Corrective Device: wears glasses at all times  Demo good peripheral vision but appears to have decreased scanning to left. To continue to assess   Hearing:   [facility]  Perception:   Overall Perceptual Status: Impaired on Left  Unilateral Attention: cues to attend (L) visual field, cues to attend (L) side of body  Initiation: cues to initiate tasks  Motor Planning: appears intact  Perseveration: not present  Observation:   General Observation:  left facial droop   Posture:   Significant left leaning sitting EOB and on commode-- min assist and cues   Sensation:   denies numbness and tingling however appears to have mild left inattention on left.    Proprioception:    diminished proprioception in (L) UE, (L) LE  Tone:   Normotonic  Coordination Testing:   Coordination and Movement Description: (L) UE  Finger to Nose: Impaired on Left  Alternating Pronation/Supination: Impaired on Left  Unable to formally test secondary to left hemiplegia      ROM: Right UE WNL  Left UE-- shoulder flexion @ 1/2 range, elbow flexion/extension @ 1/2 range, wrist flexion/extension @ 3/4 range, 4-/5   Strength:   (L) Shoulder: +2     (R) Shoulder: 5  (L) Elbow: +2     (R) Elbow: 5  (L) Wrist: 3     (R) Wrist: 5  (L) Hand: -4      (R) Hand: 5  (L)  Strength: good-    (R)  Strength: 5    Therapist Clinical Decision Making (Complexity): high complexity  Clinical Presentation: evolving      Subjective  General: Patient supine in bed, increased time to answer questions, easily frustrated with questions and with left hand weakness. Patient with expressive aphasia   Pain: 0/10  Pain Interventions: repositioned         Activities of Daily Living  Basic Activities of Daily Living  Feeding: NPO  Grooming: minimal assistance  Upper Extremity Bathing: maximum assistance  Lower Extremity Bathing: dependent   Bathing Comments: max/dep due to impaired sitting balance on commode   Upper Extremity Dressing: maximum assistance  Lower Extremity Dressing: dependent  Toileting: maximum assistance. Toileting Equipment: none  Toileting Comments: max assist to don new pullup due to balance issues on commode   General Comments: increased assist due to balance issues   Instrumental Activities of Daily Living  No IADL completed on this date. Functional Mobility  Bed Mobility  Supine to Sit: moderate assistance  Scooting: minimal assistance  Comments:  Transfers  Sit to stand transfer:minimal assistance  Stand to sit transfer: minimal assistance  Bed / Chair comments: once in standing needed mod assist to walk due to midline balance issues.  See PT for ambulation issues  Toilet transfer: moderate assistance  Toilet transfer equipment: standard toilet, walker  Toilet transfer comments: patient needing multiple cues for midline awareness, kept falling to left whenever not concentrating on just sitting (while bathing and changing gown)  Comments:  Functional Mobility:  Sitting Balance: minimal assistance. Sitting Balance Comment: patient falling to left   Standing Balance: moderate assistance. Standing Balance Comment: see PT for gait analysis   Functional Mobility: .  moderate assistance, due to midline deficits   Functional Mobility Comment: patient needing multiple cues to     Other Therapeutic Interventions-- 10 feet from bed to commode, 15 feet from commode to recliner chair, mod assist with RW. Difficulty with midline standing    Functional Outcomes  AM-PAC Inpatient Daily Activity Raw Score: 13    Cognition  Overall Cognitive Status: Impaired  Arousal/Alterness: appropriate responses to stimuli  Following Commands: follows one step commands with repetition  Attention Span: appears intact  Memory: decreased recall of recent events  Safety Judgement: decreased awareness of need for assistance  Problem Solving: assistance required to generate solutions  Insights: decreased awareness of deficits  Initiation: requires cues for some  Sequencing: requires cues for some  Comments: patient with increased time to answer questions, slurred speech, easily frustrated   Orientation:    alert and oriented x 4  Command Following:   accurately follows one step commands           Education  Barriers To Learning: cognition, emotional, and physical  Patient Education: patient educated on goals, OT role and benefits, plan of care, discharge recommendations  Learning Assessment:  patient verbalizes understanding, would benefit from continued reinforcement    Assessment  Activity Tolerance: Patient admitted for acute stroke/ possible NPH per CT scan. Stroke alert called several minutes after speaking to nurse at end of session when NIHSS scale performed.    Impairments Requiring Therapeutic Intervention: decreased functional mobility, decreased ADL status, decreased ROM, decreased strength, decreased fine motor control, decreased coordination  Prognosis: good and fair  (stroke alert called)  Clinical Assessment: Patient with evolving stroke symptoms.  Please see MD notes and stroke team notes  Safety Interventions: patient left in bed-- placed in chair with chair alarm on, then returned to room to assist patient back to bed, left patient with stroke team.     Plan  Frequency: 5-7 x/week  Current Treatment Recommendations: strengthening, ROM, balance training, functional mobility training, transfer training, endurance training, neuromuscular re-education, ADL/self-care training, IADL training, home exercise program, safety education, equipment evaluation/education, and positioning    Goals  Patient Goals: open for more rehab   Short Term Goals:  Time Frame: until discharge  Patient will complete lower body ADL at moderate assistance   Patient will complete toileting at minimal assistance   Patient will complete functional transfers at minimal assistance   Patient will increase [facility] ADL score = to or > than 16/24    Therapy Session Time     Individual Group Co-treatment   Time In       Time Out    0855   Minutes    41        Timed Code Treatment Minutes:  26    Total Treatment Minutes:  41       Electronically Signed By: OLGA Hughes/MIKE Duff

## 2023-02-05 NOTE — CONSULTS
Neurology Consult      Patient Identification    Pt Name: Jh Saucedo  MRN: [id number]  [place] 1955  Date of evaluation: 2/5/2023  Provider: Jose Lau MD  Requesting provider:  Raz Gar  Reason: CVA        C/C:  left-sided weakness     HPI:  Patient was seen this AM in his room with brother at bedside  A stroke alert was called earlier this AM for worsening left sided weakness and NIHSS score from 4 to 10. He is heading for mri scan at this time    His older brother reports the patient developed weakness of left side for the past 24 hours. It began having left-sided facial drooping. He states he began feeling very unstable when walking and actually fell several times at home yesterday. He also feels he began having weakness in his left arm yesterday  difficulty word finding. Patient denies having headache, lightheadedness or dizziness. He denies chest pain or shortness of breath. He has no GI complaints    There has been some recent changes of his mental status and increasing forgetfulness lately  with occasional mild confusion per brother. No change of gait or urinary incontinence reported. He has been ambulating without problems and driving although the brother has come concerns about his driving abilities    He does not takes any meds and does not follow up with doctors   He has been smoking for the past 50 years , one pack a day and drinks a shot of bourbon  2 oz daily plus a beer or two during the day or when having guests. BP in /70, found to have macrocytosis (.7),     CT and CTA showed no acute intracranial process however there is ventriculomegaly suggesting normal pressure hydrocephalus. Patient denies having any chest pain or shortness of breath. Troponin is normal.  proBNP is only 63 and chest x-ray shows no acute cardiopulmonary disease. CBC, BMP, LFTs and urine analysis are unremarkable.   Coags normal.    PMH;   Patient states he does not have a family doctor and states he has never been to a doctor  tobacco abuse    Meds: None    Allergies:Patient has no known allergies. Investigations:         CTA HEAD NECK W CONTRAST   Final Result   1. There is predominately noncalcified atherosclerosis, which contributes to   less than 50% stenosis of the proximal right cervical ICA by NASCET criteria. No flow limiting stenosis seen of the cervical carotid/vertebral arteries. 2. No significant stenosis or large vessel occlusion of the [id number]. CT HEAD WO CONTRAST   Preliminary Result   1. No acute intracranial process identified. 2. Ventriculomegaly out of proportion to the degree of volume loss suggesting   normal pressure hydrocephalus in the appropriate clinical setting. The findings were sent to the Radiology Results [address] at 9:37   am on 2/4/2023 to be communicated to a licensed caregiver. XR CHEST PORTABLE  No acute process. Brain MRI      MRI OF THE BRAIN WITHOUT CONTRAST  2/5/2023 10:25 am       TECHNIQUE:   Multiplanar multisequence MRI of the brain was performed without the   administration of intravenous contrast.       COMPARISON:   CT brain performed 02/04/2023. HISTORY:   ORDERING SYSTEM PROVIDED HISTORY: stroke   TECHNOLOGIST PROVIDED HISTORY:   Reason for exam:->stroke   What is the sedation requirement?->None   Decision Support Exception - unselect if not a suspected or confirmed   emergency medical condition->Emergency Medical Condition (MA)   Reason for Exam: Left side weakness, slurred speech. Stroke alert earlier   today. FINDINGS:   INTRACRANIAL STRUCTURES/VENTRICLES: The sellar and suprasellar structures,   optic chiasm, corpus callosum, pineal gland, tectum, and midline brainstem   structures are unremarkable. The craniocervical junction is unremarkable. There is no acute hemorrhage, mass effect, or midline shift.   There is   satisfactory overall gray-white matter differentiation. There is chronic   microvascular disease. The ventricular structures are symmetrically   prominent. The infratentorial structures including the cerebellopontine   angles and internal auditory canals are unremarkable. There is restricted   diffusion in the right basal ganglia and adjacent periventricular white   matter. There is no abnormal blooming artifact on susceptibility weighted   imaging. ORBITS: The visualized portion of the orbits demonstrate no acute abnormality. SINUSES: There is fluid in the mastoid air cells. The paranasal sinuses are   normally aerated. BONES/SOFT TISSUES: The bone marrow signal intensity appears normal. The soft   tissues demonstrate no acute abnormality. Impression   Subacute ischemia in the right basal ganglia and adjacent periventricular   white matter. Extensive underlying chronic microvascular disease. Symmetric prominence of ventricular system that is nonspecific and   correlation with symptomatology for normal pressure hydrocephalus is needed. Examination:      He is awake and alert, in NAD, mild dysarthria with some aphasia and word finding difficulty at times. Seem to have poor insight into his neuro deficit  There is left visual field defect and left sided weakness 4/5  Involving the face, arm and leg  Impaired finger to nose on left. Tongue in midline  No ptosis or nystagmus  Left babinski  Gait not test  No carotid bruits  Regular s1 s2  Ext. No edema    A+P:    Acute CVA  Baseline of hydrocephalus r/o NPH  Smoking  ?  ETOH abuse      Urgent mri scan of brain done and reviewed  DATP; Clopidogrel 75 mg a day with baby ASA 81 mg od for 21 days, then continue with ASA only  Statin 40 mg od  2 D echo  B12, Vit D and TSH  Alcohol withdrawal precautions  PT/OT and Speech consults    Once stable he Needs to be evaluated for NPH    Advised brother that patient needs assessment for safe driving  Abilities upon discharge      Maye Hair MD  Neurology  [phone]     Thank you for the consultation

## 2023-02-05 NOTE — PLAN OF CARE
Problem: Safety - Adult  Goal: Free from fall injury  Outcome: Progressing  Note: Safety precautions in place. Bed locked, alarmed, lowest position. Call light in reach, gripper socks on. No falls or physical injury. Problem: Neurosensory - Adult  Goal: Achieves stable or improved neurological status  Outcome: Progressing     Problem: Cardiovascular - Adult  Goal: Absence of cardiac dysrhythmias or at baseline  Outcome: Progressing  Note: Pt sinus rhythm on the telemetry monitor. Problem: Musculoskeletal - Adult  Goal: Return mobility to safest level of function  Outcome: Progressing  Note: Pt weak in LUE and LLE. Using urinal in bed.

## 2023-02-05 NOTE — PROGRESS NOTES
Hospital Problems             Last Modified POA    * (Principal) Acute ischemic stroke ([address] 2/4/2023 Yes    Expressive aphasia 2/5/2023 Yes    Dysarthria 2/5/2023 Yes    Ataxia 2/5/2023 Yes    Primary hypertension 2/5/2023 Yes    Tobacco abuse 2/5/2023 Yes    Noncompliance 2/5/2023 Yes    Left hemiparesis ([address] 2/5/2023 Yes   H&P dictated

## 2023-02-05 NOTE — PROGRESS NOTES
[facility] [address]   Phone: [phone]    Physical Therapy    [x] Initial Evaluation            [] Daily Treatment Note         [] Discharge Summary      Patient: Yash Lockett   : 1955   MRN: [id number]   Date of Service:  2023  Admitting Diagnosis: Acute ischemic stroke [facility])  Current Admission Summary: Yash Lockett is a 69 y.o. male with a history of current tobacco abuse who presents to the emergency department today via ambulance from home with patient stating, \"I believe I have had a stroke\". Patient states he does not have a family doctor and states he has never been to a doctor. He is unsure what medical problems he may have. He does still currently smoke. He lives at home with his brother. Patient states yesterday he feels like he began having left-sided facial drooping. He states he began feeling very unstable when walking and actually fell several times at home yesterday. He also feels he began having weakness in his left arm yesterday. He thought the symptoms would pass and therefore did not seek any medical treatment. He is here with his brother who states he came downstairs this morning and noticed the patient sitting in front of the TV. The patient had change the input on the TV and did not remember how to fix it. Brother states this is very abnormal.  Brother also states that patient has had difficulty word finding. Patient is alert and oriented x3 with GCS 15 on arrival.  He is noted to have left facial drooping with smiling. He also has a pronator drift on the left side. A code stroke was called and he was taken for CT scan. Patient denies having headache, lightheadedness or dizziness. He denies chest pain or shortness of breath. He has no GI complaints     Nursing Notes were all reviewed and agreed with or any disagreements were addressed in the HPI.   Past Medical History:  has no past medical history on file.  Past Surgical History:  has a past surgical history that includes Finger surgery (Right); Hernia repair; fracture surgery; and hernia repair. Discharge Recommendations:Ananda Sheppard scored a 12/24 on the AM-PAC short mobility form. Current research shows that an AM-PAC score of 17 or less is typically not associated with a discharge to the patient's home setting. Based on the patient's AM-PAC score and their current functional mobility deficits, it is recommended that the patient have 5-7 sessions per week of Physical Therapy at d/c to increase the patient's independence. At this time, this patient demonstrates complex nursing, medical, and rehabilitative needs, and would benefit from intensive rehabilitation services upon discharge from the Inpatient setting. This patient demonstrates the ability to participate in and benefit from an intensive therapy program with a coordinated interdisciplinary team approach to foster frequent, structured, and documented communication among disciplines, who will work together to establish, prioritize, and achieve treatment goals. Please see assessment section for further patient specific details. If patient discharges prior to next session this note will serve as a discharge summary. Please see below for the latest assessment towards goals.        DME Required For Discharge: DME to be determined at next level of care  Precautions/Restrictions: high fall risk, currently NPO awaiting speech eval  Weight Bearing Restrictions: no restrictions  [] Right Upper Extremity  [] Left Upper Extremity [] Right Lower Extremity  [] Left Lower Extremity     Required Braces/Orthotics: no braces required   [] Right  [] Left  Positional Restrictions:no positional restrictions    Pre-Admission Information   Lives With:  brother                 Type of Home: house  Home Layout: two level, bedroom in the basement, 10 steps with 1 handrail   Home Access:  2 step to enter without rails Bathroom Layout: tub/shower unit  Bathroom Equipment: grab bars in shower  Toilet Height: elevated height  Home Equipment: no prior equipment  Transfer Assistance: Independent without use of device  Ambulation Assistance:Independent without use of device  ADL Assistance: independent with all ADL's   IADL Assistance: independent with homemaking tasks  Active :        [x] Yes                 [] 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 Occupation: sales  Hobbies: fishing   Recent Falls: chart reports had several falls that caused this admission (about 5 falls)     Examination   Vision:   Vision Corrective Device: wears glasses at all times  Hearing:   [facility]  Observation:   General Observation:  Pt supine in bed, right directed gaze, IV in right UE, telemetry on  Posture: In seated pt with strong left lean, slightly rounded shoulders   Sensation:   WFL  Proprioception:    Unable to formally test secondary to pt tolerance to testing. Pt getting frustrated with questions due to word finding issues. Suspect some impairment due to pt foot positioning in gait  Tone:   Normotonic  Coordination Testing:   Heel to Shin: Impaired on Left    ROM:   (B) LE AROM WFL  Strength:   (B) LE strength grossly WFL Right 5/5, left 4-/5 some difficulty with following MMT  Therapist Clinical Decision Making (Complexity): high complexity  Clinical Presentation: evolving      Subjective  General: Pt agreeable to PT tx. Reports he feels he is worse today than yesterday. States his arm worked better and he wasn't leaning yesterday.  RN made aware of pt's complaints of feeling worse, when she went in to assess pt she determined he was worse and called stroke team. PT/OT assisted pt back to bed for safety while stroke team in to evaluate  Pain: 0/10  Pain Interventions: not applicable       Functional Mobility  Bed Mobility  Supine to Sit: moderate assistance  Scooting: minimal assistance  Comments: Pt getting out of bed to left, requiring moderate assist to complete bed mobility to left. Min assist to keep balance while scooting forward to edge of bed  Transfers  Sit to stand transfer: minimal assistance  Stand to sit transfer: minimal assistance  Comments: Pt min assist for sit<>stand, needs steadying for left cruz lean  Ambulation  Assistive Device: rolling walker  Assistance: moderate assistance  Distance: 10', 15'  Gait Mechanics: Pt with varying foot placement on left LE. On initial gait to bathroom pt with very narrow THERESE, left LE scissor stepping at times. Struggles to keep left UE on RW in place. On second trial pt with wider THERESE but struggled more with coordination of DF to clear foot, left knee buckling slightly during stance. Comments:    Stair Mobility  Stair mobility not completed on this date. Comments:  Wheelchair Mobility:  No w/c mobility completed on this date. Comments:  Balance  Static Sitting Balance: poor (+): requires min (A) to maintain balance  Dynamic Sitting Balance: poor (+): requires min (A) to maintain balance  Static Standing Balance: poor: requires mod (A) to maintain balance  Dynamic Standing Balance: poor: requires mod (A) to maintain balance  Comments: Sitting on EOB  and on toilet pt with lean to left requiring min assist to maintain balance. Pt moderate assist to keep balance due to left lean in stand. Pt able to correct lean with cues but only maintains for a few seconds before collapsing back to left side. Other Therapeutic Interventions  Pt performing ADL tasks seated on toilet and standing at sink.    Functional Outcomes  AM-PAC Inpatient Mobility Raw Score : 15              Cognition  WFL, pt with some communication deficits making it difficult to assess but appears intact  Orientation:    alert and oriented x 4  Command Following:   [facility]    Education  Barriers To Learning: language  Patient Education: patient educated on goals, PT role and benefits, plan of care, general safety, functional mobility training, adaptive device training, orientation, transfer training, discharge recommendations  Learning Assessment:  patient verbalizes understanding, would benefit from continued reinforcement    Assessment  Activity Tolerance: good  Impairments Requiring Therapeutic Intervention: decreased functional mobility, decreased ADL status, decreased strength, decreased safety awareness, decreased endurance, decreased balance, decreased fine motor control, decreased coordination, decreased posture  Prognosis: good  Clinical Assessment: Pt presents with hemiparesis and decreased motor control on left side, dec attention to left side and decreased balance which put him at a significant risk for falls and a decreased ability to return to home at current level. Pt would benefit from further inpatient skilled therapy upon discharge from hospital. Pt is highly motivated and had a high prior level of functional, feel he would be best served at a more intensive therapy setting such as an ARU. Will continue to work with pt while in hospital to progress his mobility as he tolerates.    Safety Interventions: patient left in bed, gait belt, patient at risk for falls, and nurse notified originally in chair with alarm but returned to bed once RN did assessment    Plan  Frequency: 5-7 x/week  Current Treatment Recommendations: strengthening, balance training, functional mobility training, transfer training, gait training, stair training, endurance training, neuromuscular re-education, modalities, patient/caregiver education, ADL/self-care training, home management training, home exercise program, safety education, equipment evaluation/education, and positioning    Goals  Patient Goals: to move better   Short Term Goals:  Time Frame: upon discharge  Patient will complete bed mobility at minimal assistance   Patient will complete transfers at minimal assistance   Patient will ambulate 50 ft with use of LRAD at minimal assistance  Patient will ascend/descend 4 stairs with (R) ascending handrail at minimal assistance    Therapy Session Time      Individual Group Co-treatment   Time In     0814   Time Out     0855   Minutes     41     Timed Code Treatment Minutes:  26 Minutes  Total Treatment Minutes:  41       Electronically Signed By: Chaim Rodriguez, [address] [facility]

## 2023-02-05 NOTE — SIGNIFICANT EVENT
SIGNIFICANT EVENT:  RAPID RESPONSE    Name:  Bakari Kim    /Age/Sex: 1955  (69 y.o. male)  MRN & CSN:  [id number] & [id number]    SUBJECTIVE:     RAPID RESPONSE was called for Bakari Kim in regards to stroke alert. Patient is a 69 y.o. male admitted at [facility] for Acute ischemic stroke [facility]). Patient seen and examined in [id number]. Patient exhibiting worsening signs of stroke. Came in with a stroke and was determined to have NIH score of 4. Patient reexamined by nursing and found to have NIH score of 10-12. Patient exhibiting some signs of aphasia. Significant left-sided weakness and sensation loss. Patient denied pain. OBJECTIVE:     VITALS  Vitals:    23 2149 23 0437 23 0655 23 0912   BP: 134/85 (!) 149/77 132/75 (!) 140/84   Pulse:  61 59 68   Resp:  16 17 18   Temp:  99.1 °F (37.3 °C) 97.9 °F (36.6 °C) 98 °F (36.7 °C)   TempSrc:  Oral Oral Oral   SpO2:  96% 95% 95%   Weight:       Height:                     PHYSICAL EXAM   GEN Awake male, no apparent distress. EYES PERRLA. No scleral erythema or discharge. HENT Mucous membranes are moist. Pharynx without exudates or thrush. NECK Supple, no apparent thyromegaly or masses. RESP CTAB. Symmetric chest movement. CARDIO/VASC S1/S2. RRR. No JVD. Pulses equal & b/l. No peripheral edema. GI Soft. No TTP in all quadrants. BS +. Rectal exam deferred.  No costovertebral tenderness. HEME/LYMPH No bruising, lymphadenopathy, or hepatosplenomagly. MSK Strength 5/5 right upper and lower extremities. Strength 3/5 left upper and lower extremities. SKIN Normal coloration, warm, & dry. NEURO CNs grossly intact. Slight aphasia. Diminished sensation left upper and lower extremity. PSYCH Awake, alert, oriented x 4. Affect appropriate. ASSESSMENT/PLAN:    1.   Stroke  -Urgent MRI head ordered and performed      Dr. Aly Pineda, attending physician, notified by nursing      Patient has a high probability of imminent or life-threatening deterioration requiring close monitoring, and highly complex decision-making and/or interventions of high intensity to assess, manipulate, and support his critical organ systems to prevent decline. I personally spent 33 minutes in providing critical care. Total critical care time includes caring for direct patient contact, management of life support systems, review of data including imaging and labs, discussions with other team members and physicians, but excludes procedures.       Flor Roach DO 2/5/2023 9:29 AM

## 2023-02-06 LAB
LV EF: 58 %
LVEF MODALITY: NORMAL

## 2023-02-06 PROCEDURE — 2060000000 HC ICU INTERMEDIATE R&B

## 2023-02-06 PROCEDURE — 93306 TTE W/DOPPLER COMPLETE: CPT

## 2023-02-06 PROCEDURE — 6370000000 HC RX 637 (ALT 250 FOR IP): Performed by: INTERNAL MEDICINE

## 2023-02-06 PROCEDURE — 97530 THERAPEUTIC ACTIVITIES: CPT

## 2023-02-06 PROCEDURE — 6370000000 HC RX 637 (ALT 250 FOR IP): Performed by: NEUROMUSCULOSKELETAL MEDICINE & OMM

## 2023-02-06 PROCEDURE — 99233 SBSQ HOSP IP/OBS HIGH 50: CPT | Performed by: PSYCHIATRY & NEUROLOGY

## 2023-02-06 PROCEDURE — 6360000002 HC RX W HCPCS: Performed by: NEUROMUSCULOSKELETAL MEDICINE & OMM

## 2023-02-06 PROCEDURE — 6360000002 HC RX W HCPCS: Performed by: INTERNAL MEDICINE

## 2023-02-06 RX ORDER — 0.9 % SODIUM CHLORIDE 0.9 %
500 INTRAVENOUS SOLUTION INTRAVENOUS PRN
Status: DISCONTINUED | OUTPATIENT
Start: 2023-02-06 | End: 2023-02-07 | Stop reason: HOSPADM

## 2023-02-06 RX ORDER — POTASSIUM CHLORIDE 7.45 MG/ML
10 INJECTION INTRAVENOUS PRN
Status: DISCONTINUED | OUTPATIENT
Start: 2023-02-06 | End: 2023-02-07 | Stop reason: HOSPADM

## 2023-02-06 RX ORDER — POTASSIUM CHLORIDE 20 MEQ/1
40 TABLET, EXTENDED RELEASE ORAL PRN
Status: DISCONTINUED | OUTPATIENT
Start: 2023-02-06 | End: 2023-02-07 | Stop reason: HOSPADM

## 2023-02-06 RX ORDER — HYDRALAZINE HYDROCHLORIDE 20 MG/ML
10 INJECTION INTRAMUSCULAR; INTRAVENOUS EVERY 6 HOURS PRN
Status: DISCONTINUED | OUTPATIENT
Start: 2023-02-06 | End: 2023-02-07 | Stop reason: HOSPADM

## 2023-02-06 RX ADMIN — ENOXAPARIN SODIUM 40 MG: 100 INJECTION SUBCUTANEOUS at 10:11

## 2023-02-06 RX ADMIN — ATORVASTATIN CALCIUM 40 MG: 40 TABLET, FILM COATED ORAL at 20:05

## 2023-02-06 RX ADMIN — ASPIRIN 81 MG: 81 TABLET, CHEWABLE ORAL at 10:10

## 2023-02-06 RX ADMIN — CLOPIDOGREL BISULFATE 75 MG: 75 TABLET ORAL at 10:10

## 2023-02-06 RX ADMIN — THIAMINE HYDROCHLORIDE 100 MG: 100 INJECTION, SOLUTION INTRAMUSCULAR; INTRAVENOUS at 10:10

## 2023-02-06 NOTE — CONSULTS
Patient: Andrea Owens  [id number]  Date: 2/6/2023      Chief Complaint: Left-sided weakness    History of Present Illness/Hospital Course:  20-year-old male who was admitted on 2/4 with left-sided weakness for greater than 2 days. CT of the head with suggestion of possible NPH. CTA with less than 50% stenosis of the right ICA. MRI of the brain revealed subacute ischemia on the right basal ganglia. Echo is currently pending. Neurology evaluated and suggested aspirin and Plavix for 21 days then aspirin only in addition to Lipitor. He was evaluated by therapy and suggested to continue in an inpatient setting prior to returning home. He and his brother are requesting we sign a notebook piece of paper for his advanced directives. They are wanting to discuss with neurology again today his prognosis and recovery potential prior to selecting ARU vs SNF disposition. Prior Level of Function:  Independent    Current Level of Function: Mod A     has a past medical history of CVA (cerebral vascular accident) ([facility] [address] has a past surgical history that includes Finger surgery (Right); Hernia repair; fracture surgery; and hernia repair. reports that he has been smoking cigarettes. He has a 50.00 pack-year smoking history. He has never used smokeless tobacco. He reports current alcohol use of about 14.0 standard drinks per week. He reports that he does not currently use drugs. family history includes Colon Cancer in his mother; Diabetes Type 1  in his brother; Heart Attack in his father; Prostate Cancer in his father; Stroke in his mother. REVIEW OF SYSTEMS:   CONSTITUTIONAL: negative for fevers, chills, diaphoresis, appetite change, night sweats and unexpected weight change. HEENT: negative for hearing loss, tinnitus, ear drainage, sinus pressure, nasal congestion, epistaxis and snoring. RESPIRATORY: Negative for hemoptysis, cough, sputum production.    CARDIOVASCULAR: negative for chest pain, palpitations, exertional chest pressure/discomfort, edema, syncope. GASTROINTESTINAL: negative for nausea, vomiting, diarrhea, constipation, blood in stool and abdominal pain. GENITOURINARY: negative for frequency, dysuria, urinary incontinence, decreased urine volume, and hematuria. HEMATOLOGIC/LYMPHATIC: negative for easy bruising, bleeding and lymphadenopathy. ALLERGIC/IMMUNOLOGIC: negative for recurrent infections, angioedema, anaphylaxis and drug reactions. ENDOCRINE: negative for weight changes and diabetic symptoms including polyuria, polydipsia and polyphagia. MUSCULOSKELETAL: negative for pain, joint swelling, decreased range of motion and muscle weakness. NEUROLOGICAL: negative for headaches. Positive slurred speech, unilateral weakness. PSYCHIATRIC/BEHAVIORAL: negative for hallucinations, behavioral problems, confusion and agitation. All pertinent positives are noted in the HPI. Physical Examination:  Vitals: Patient Vitals for the past 24 hrs:   BP Temp Temp src Pulse Resp SpO2 Weight   02/06/23 0652 (!) 155/99 97.4 °F (36.3 °C) Oral 75 18 95 % --   02/06/23 0403 (!) 145/78 97.8 °F (36.6 °C) Oral 74 20 97 % 152 lb 3.2 oz (69 kg)   02/06/23 0205 -- -- -- 70 -- -- --   02/05/23 2319 (!) 161/92 98.1 °F (36.7 °C) Oral 61 20 95 % --   02/05/23 2156 (!) 169/80 98 °F (36.7 °C) Oral 65 20 94 % --   02/05/23 2037 -- -- -- 72 -- -- --   02/05/23 1944 (!) 158/82 98.1 °F (36.7 °C) Oral 64 19 95 % --   02/05/23 1600 (!) 150/79 97.9 °F (36.6 °C) Oral 70 18 94 % --   02/05/23 1200 (!) 141/80 98 °F (36.7 °C) Oral 68 18 98 % --     Psych: Stable mood, normal judgement, normal affect. Const: No distress  Eyes: Conjunctiva noninjected, no icterus noted; pupils equal, round. HENT: Atraumatic, normocephalic; Oral mucosa moist  Neck: Trachea midline, neck supple. No thyromegaly noted.   CV: No audible murmurs  Resp: No increased WOB, no audible wheezing   GI: Nondistended   Neuro: Alert, oriented, appropriate. Left sided facial droop. Mild flaccid dysarthria with aphasia  Skin: No visible abnormalities  MSK: No joint abnormalities noted. Ext: No significant edema appreciated. No varicosities. Lab Results   Component Value Date    WBC 9.7 02/04/2023    HGB 15.8 02/04/2023    HCT 46.4 02/04/2023    .7 (H) 02/04/2023     02/04/2023     Lab Results   Component Value Date    INR 0.95 02/04/2023    PROTIME 12.6 02/04/2023     Lab Results   Component Value Date    CREATININE 0.8 02/04/2023    BUN 23 (H) 02/04/2023     02/04/2023    K 4.3 02/04/2023     02/04/2023    CO2 28 02/04/2023     Lab Results   Component Value Date    ALT 18 02/04/2023    AST 24 02/04/2023    ALKPHOS 95 02/04/2023    BILITOT 0.6 02/04/2023       Most recent imaging studies revealed   EXAMINATION:   MRI OF THE BRAIN WITHOUT CONTRAST  2/5/2023 10:25 am       TECHNIQUE:   Multiplanar multisequence MRI of the brain was performed without the   administration of intravenous contrast.       COMPARISON:   CT brain performed 02/04/2023. HISTORY:   ORDERING SYSTEM PROVIDED HISTORY: stroke   TECHNOLOGIST PROVIDED HISTORY:   Reason for exam:->stroke   What is the sedation requirement?->None   Decision Support Exception - unselect if not a suspected or confirmed   emergency medical condition->Emergency Medical Condition (MA)   Reason for Exam: Left side weakness, slurred speech. Stroke alert earlier   today. FINDINGS:   INTRACRANIAL STRUCTURES/VENTRICLES: The sellar and suprasellar structures,   optic chiasm, corpus callosum, pineal gland, tectum, and midline brainstem   structures are unremarkable. The craniocervical junction is unremarkable. There is no acute hemorrhage, mass effect, or midline shift. There is   satisfactory overall gray-white matter differentiation. There is chronic   microvascular disease. The ventricular structures are symmetrically   prominent.   The infratentorial structures including the cerebellopontine   angles and internal auditory canals are unremarkable. There is restricted   diffusion in the right basal ganglia and adjacent periventricular white   matter. There is no abnormal blooming artifact on susceptibility weighted   imaging. ORBITS: The visualized portion of the orbits demonstrate no acute abnormality. SINUSES: There is fluid in the mastoid air cells. The paranasal sinuses are   normally aerated. BONES/SOFT TISSUES: The bone marrow signal intensity appears normal. The soft   tissues demonstrate no acute abnormality. Impression   Subacute ischemia in the right basal ganglia and adjacent periventricular   white matter. Extensive underlying chronic microvascular disease. Symmetric prominence of ventricular system that is nonspecific and   correlation with symptomatology for normal pressure hydrocephalus is needed. The above laboratory data have been reviewed. The above imaging data have been reviewed. The above medical testing have been reviewed. Body mass index is 22.48 kg/m². Assessment and Plan:  Acute right basal ganglia infarct: DAPT for 21 days, Lipitor. PT/OT/SLP. - echo pending  Dysphagia: Dysphagia diet, SLP  HTN  Tobacco use    Dispo: Patient is an appropriate ARU candidate. Able to tolerate the required 3 hours of therapy in an ARU setting. Patient wanting to discuss prognosis for recovery with Neurology again today prior to making a decision on ARU vs SNF. Spiritual care consult for advance directives. He will require precert for ARU. We will continue to follow. Thank you for the consultation. Mehul Galvan MD 2/6/2023, 10:30 AM     * This document was created using dictation software. While all precautions were taken to ensure accuracy, errors may have occurred. Please disregard any typographical errors.

## 2023-02-06 NOTE — PROGRESS NOTES
Department of Internal Medicine  General Internal Medicine   Progress Note      SUBJECTIVE: confused  speech slurred but appears reasonably oriented  and follow commands had a rapid response on stroke recurrence this am     History obtained from chart review, the patient, and nursing staff   General ROS: positive for  - fatigue, malaise, and weight loss  negative for - fatigue or fever  Psychological ROS: positive for - disorientation, irritability, and memory difficulties  negative for - behavioral disorder, hallucinations, or hostility  Ophthalmic ROS: negative  Respiratory ROS: no cough, shortness of breath, or wheezing  Cardiovascular ROS: positive for - dyspnea on exertion  negative for - chest pain  Gastrointestinal ROS: no abdominal pain, change in bowel habits, or black or bloody stools  Genito-Urinary ROS: no dysuria, trouble voiding, or hematuria  Musculoskeletal ROS: negative  Neurological ROS: no TIA or stroke symptoms  Dermatological ROS: negative    OBJECTIVE      Medications      Current Facility-Administered Medications: clopidogrel (PLAVIX) tablet 75 mg, 75 mg, Oral, Daily  thiamine (B-1) injection 100 mg, 100 mg, IntraVENous, Daily  aspirin chewable tablet 81 mg, 81 mg, Oral, Daily  atorvastatin (LIPITOR) tablet 40 mg, 40 mg, Oral, Nightly  enoxaparin (LOVENOX) injection 40 mg, 40 mg, SubCUTAneous, Daily  perflutren lipid microspheres (DEFINITY) injection 1.5 mL, 1.5 mL, IntraVENous, ONCE PRN    Physical      Vitals: BP (!) 161/92   Pulse 61   Temp 98.1 °F (36.7 °C) (Oral)   Resp 20   Ht 5' 9\" (1.753 m)   Wt 155 lb (70.3 kg)   SpO2 95%   BMI 22.89 kg/m²   Temp: Temp: 98.1 °F (36.7 °C)  Max: Temp  Av.1 °F (36.7 °C)  Min: 97.9 °F (36.6 °C)  Max: 99.1 °F (37.3 °C)  Respiration range:  Resp  Av  Min: 16  Max: 20  Pulse Range:  Pulse  Av.3  Min: 59  Max: 72  Blood pressure range:  Systolic (63FKB), ORV:290 , Min:132 , EFA:678   , Diastolic (17IZL), JXM:79, Min:75, Max:92    SpO2 Av.3 %  Min: 94 %  Max: 98 %    Intake/Output Summary (Last 24 hours) at 2023 0019  Last data filed at 2023 1300  Gross per 24 hour   Intake 240 ml   Output 300 ml   Net -60 ml       Vent settings:  Pulse  Av.7  Min: 61  Max: 78  Resp  Av  Min: 13  Max: 23  SpO2  Av.6 %  Min: 93 %  Max: 99 %    CONSTITUTIONAL:  fatigued, somnolent, cooperative, mild distress, appears stated age, and thin  EYES:  lids and lashes normal, pupils equal, round and reactive to light, extra-ocular muscles intact, sclera clear, and conjunctiva normal  NECK:  no JVD faint bruit   BACK:  symmetric and no curvature  LUNGS:  No increased work of breathing, good air exchange, clear to auscultation bilaterally, no crackles or wheezing  CARDIOVASCULAR:  Normal apical impulse, regular rate and rhythm, normal S1 and S2, no S3 or S4, and no murmur noted  ABDOMEN:  No scars, normal bowel sounds, soft, non-distended, non-tender, no masses palpated, no hepatosplenomegally  MUSCULOSKELETAL:  no distal edema   NEUROLOGIC:  left hemiparesis  positive babinski   SKIN:  warm dry  and no bruising or bleeding    Data      Recent Results (from the past 96 hour(s))   CBC with Auto Differential    Collection Time: 23  9:22 AM   Result Value Ref Range    WBC 9.7 4.0 - 11.0 K/uL    RBC 4.61 4.20 - 5.90 M/uL    Hemoglobin 15.8 13.5 - 17.5 g/dL    Hematocrit 46.4 40.5 - 52.5 %    .7 (H) 80.0 - 100.0 fL    MCH 34.3 (H) 26.0 - 34.0 pg    MCHC 34.1 31.0 - 36.0 g/dL    RDW 13.8 12.4 - 15.4 %    Platelets 397 938 - 451 K/uL    MPV 8.2 5.0 - 10.5 fL    Neutrophils % 67.6 %    Lymphocytes % 23.6 %    Monocytes % 7.7 %    Eosinophils % 0.5 %    Basophils % 0.6 %    Neutrophils Absolute 6.6 1.7 - 7.7 K/uL    Lymphocytes Absolute 2.3 1.0 - 5.1 K/uL    Monocytes Absolute 0.7 0.0 - 1.3 K/uL    Eosinophils Absolute 0.1 0.0 - 0.6 K/uL    Basophils Absolute 0.1 0.0 - 0.2 K/uL   Comprehensive Metabolic Panel    Collection Time: 23  9:22 AM   Result Value Ref Range    Sodium 137 136 - 145 mmol/L    Potassium 4.3 3.5 - 5.1 mmol/L    Chloride 103 99 - 110 mmol/L    CO2 28 21 - 32 mmol/L    Anion Gap 6 3 - 16    Glucose 119 (H) 70 - 99 mg/dL    BUN 23 (H) 7 - 20 mg/dL    Creatinine 0.8 0.8 - 1.3 mg/dL    Est, Glom Filt Rate >60 >60    Calcium 9.8 8.3 - 10.6 mg/dL    Total Protein 7.1 6.4 - 8.2 g/dL    Albumin 4.4 3.4 - 5.0 g/dL    Albumin/Globulin Ratio 1.6 1.1 - 2.2    Total Bilirubin 0.6 0.0 - 1.0 mg/dL    Alkaline Phosphatase 95 40 - 129 U/L    ALT 18 10 - 40 U/L    AST 24 15 - 37 U/L   Troponin    Collection Time: 02/04/23  9:22 AM   Result Value Ref Range    Troponin <0.01 <0.01 ng/mL   APTT    Collection Time: 02/04/23  9:22 AM   Result Value Ref Range    aPTT 27.6 23.0 - 34.3 sec   Protime-INR    Collection Time: 02/04/23  9:22 AM   Result Value Ref Range    Protime 12.6 11.7 - 14.5 sec    INR 0.95 0.87 - 1.14   Brain Natriuretic Peptide    Collection Time: 02/04/23  9:22 AM   Result Value Ref Range    Pro-BNP 65 0 - 124 pg/mL   EKG 12 Lead    Collection Time: 02/04/23 10:23 AM   Result Value Ref Range    Ventricular Rate 62 BPM    Atrial Rate 62 BPM    P-R Interval 202 ms    QRS Duration 94 ms    Q-T Interval 386 ms    QTc Calculation (Bazett) 391 ms    P Axis 38 degrees    R Axis -48 degrees    T Axis 27 degrees    Diagnosis       Normal sinus rhythmLeft anterior fascicular blockModerate voltage criteria for LVH, may be normal variantAbnormal ECGConfirmed by Rondell Kawasaki ([id number]) on 2/4/2023 2:04:35 PM   Urinalysis with Reflex to Culture    Collection Time: 02/04/23 11:21 AM    Specimen: Urine   Result Value Ref Range    Color, UA Yellow Straw/Yellow    Clarity, UA Clear Clear    Glucose, Ur Negative Negative mg/dL    Bilirubin Urine Negative Negative    Ketones, Urine Negative Negative mg/dL    Specific Gravity, UA >=1.030 1.005 - 1.030    Blood, Urine Negative Negative    pH, UA 7.0 5.0 - 8.0    Protein, UA Negative Negative mg/dL Urobilinogen, Urine 0.2 <2.0 E.U./dL    Nitrite, Urine Negative Negative    Leukocyte Esterase, Urine Negative Negative    Microscopic Examination Not Indicated     Urine Type NotGiven     Urine Reflex to Culture Not Indicated    POCT Glucose    Collection Time: 02/04/23  8:33 PM   Result Value Ref Range    POC Glucose 94 70 - 99 mg/dl    Performed on ACCU-CHEK    POCT Glucose    Collection Time: 02/05/23  9:14 AM   Result Value Ref Range    POC Glucose 105 (H) 70 - 99 mg/dl    Performed on [facility] Problems             Last Modified POA    * (Principal) Acute ischemic stroke ([address] 2/4/2023 Yes    Expressive aphasia 2/5/2023 Yes    Dysarthria 2/5/2023 Yes    Ataxia 2/5/2023 Yes    Primary hypertension 2/5/2023 Yes    Tobacco abuse 2/5/2023 Yes    Noncompliance 2/5/2023 Yes    Left hemiparesis ([address] 2/5/2023 Yes     PT OT SLP    MRI brain  reviewed and it did confirm acute right hemispheric basal ganglia infarction    Smoking cessation emphasized   Optimize BP control

## 2023-02-06 NOTE — PROGRESS NOTES
Progress Note - Dr. Ana Cristina Easton - Internal Medicine  PCP: Duane Cramp, DO [place] / [place] [id number] Day: 2  Code Status: Full Code  Current Diet: ADULT DIET; Easy to Chew        CC: follow up on medical issues    Subjective:   Danny Tolbert is a 69 y.o. male. Pt seen and examined  Chart reviewed since last visit, labs and imaging below      Notes reviewed  Case d/w dr Ravinder Chase  Some expressive aphasia      Review of Systems: (1 system for EPF, 2-9 for detailed, 10+ for comprehensive)  Constitutional: Negative for chills and fever. HENT: Negative for dental problem, nosebleeds and rhinorrhea. Eyes: Negative for photophobia and visual disturbance. Respiratory: Negative for cough, chest tightness and shortness of breath. Cardiovascular: Negative for chest pain and leg swelling. Gastrointestinal: Negative for diarrhea, nausea and vomiting. Endocrine: Negative for polydipsia and polyphagia. Genitourinary: Negative for frequency, hematuria and urgency. Musculoskeletal: Negative for back pain and myalgias. Skin: Negative for rash. Allergic/Immunologic: Negative for food allergies. Neurological: Negative for dizziness, seizures, syncope and facial asymmetry. Hematological: Negative for adenopathy. Psychiatric/Behavioral: Negative for dysphoric mood. The patient is not nervous/anxious. I have reviewed the patient's medical and social history in detail and updated the computerized patient record. To recap: He  has a past medical history of CVA (cerebral vascular accident) ([address] . He  has a past surgical history that includes Finger surgery (Right); Hernia repair; fracture surgery; and hernia repair. Gab Francisco He  reports that he has been smoking cigarettes. He has a 50.00 pack-year smoking history. He has never used smokeless tobacco. He reports current alcohol use of about 14.0 standard drinks per week.  He reports that he does not currently use drugs. .        Active Hospital Problems    Diagnosis Date Noted    Expressive aphasia [R47.01] 02/05/2023     Priority: Medium    Dysarthria [R47.1] 02/05/2023     Priority: Medium    Ataxia [R27.0] 02/05/2023     Priority: Medium    Primary hypertension [I10] 02/05/2023     Priority: Medium    Tobacco abuse [Z72.0] 02/05/2023     Priority: Medium    Noncompliance [Z91.199] 02/05/2023     Priority: Medium    Left hemiparesis ([facility] [address] [G81.94] 02/05/2023     Priority: Medium    Acute ischemic stroke ([facility] [address] [I63.9] 02/04/2023     Priority: Medium       Current Facility-Administered Medications: clopidogrel (PLAVIX) tablet 75 mg, 75 mg, Oral, Daily  thiamine (B-1) injection 100 mg, 100 mg, IntraVENous, Daily  aspirin chewable tablet 81 mg, 81 mg, Oral, Daily  atorvastatin (LIPITOR) tablet 40 mg, 40 mg, Oral, Nightly  enoxaparin (LOVENOX) injection 40 mg, 40 mg, SubCUTAneous, Daily  perflutren lipid microspheres (DEFINITY) injection 1.5 mL, 1.5 mL, IntraVENous, ONCE PRN         Objective:  BP (!) 155/99   Pulse 75   Temp 97.4 °F (36.3 °C) (Oral)   Resp 18   Ht 5' 9\" (1.753 m)   Wt 152 lb 3.2 oz (69 kg)   SpO2 95%   BMI 22.48 kg/m²      Patient Vitals for the past 24 hrs:   BP Temp Temp src Pulse Resp SpO2 Weight   02/06/23 0652 (!) 155/99 97.4 °F (36.3 °C) Oral 75 18 95 % --   02/06/23 0403 (!) 145/78 97.8 °F (36.6 °C) Oral 74 20 97 % 152 lb 3.2 oz (69 kg)   02/06/23 0205 -- -- -- 70 -- -- --   02/05/23 2319 (!) 161/92 98.1 °F (36.7 °C) Oral 61 20 95 % --   02/05/23 2156 (!) 169/80 98 °F (36.7 °C) Oral 65 20 94 % --   02/05/23 2037 -- -- -- 72 -- -- --   02/05/23 1944 (!) 158/82 98.1 °F (36.7 °C) Oral 64 19 95 % --   02/05/23 1600 (!) 150/79 97.9 °F (36.6 °C) Oral 70 18 94 % --   02/05/23 1200 (!) 141/80 98 °F (36.7 °C) Oral 68 18 98 % --   02/05/23 0912 (!) 140/84 98 °F (36.7 °C) Oral 68 18 95 % --     Patient Vitals for the past 96 hrs (Last 3 readings):   Weight   02/06/23 0403 152 lb 3.2 oz (69 kg) 02/04/23 0930 155 lb (70.3 kg)           Intake/Output Summary (Last 24 hours) at 2/6/2023 0756  Last data filed at 2/5/2023 1300  Gross per 24 hour   Intake 240 ml   Output 300 ml   Net -60 ml         Physical Exam: (2-7 system for EPF/Detailed, ?8 for Comprehensive)  BP (!) 155/99   Pulse 75   Temp 97.4 °F (36.3 °C) (Oral)   Resp 18   Ht 5' 9\" (1.753 m)   Wt 152 lb 3.2 oz (69 kg)   SpO2 95%   BMI 22.48 kg/m²   Constitutional: vitals as above: alert, appears stated age and cooperative    Psychiatric: normal insight and judgment, oriented to person, place, time, and general circumstances    Head: Normocephalic, without obvious abnormality, atraumatic    Eyes:lids and lashes normal, conjunctivae and sclerae normal and pupils equal, round, reactive to light and accomodation    EMNT: external ears normal, nares midline    Neck: no carotid bruit, supple, symmetrical, trachea midline and thyroid not enlarged, symmetric, no tenderness/mass/nodules     Respiratory: clear to auscultation and percussion bilaterally with normal respiratory effort    Cardiovascular: normal rate, regular rhythm, normal S1 and S2 and no murmurs    Gastrointestinal: soft, non-tender, non-distended, normal bowel sounds, no masses or organomegaly    Extremities: no clubbing, no edema    Skin:No rashes or nodules noted.      Neurologic: +aphasia       Labs:  Lab Results   Component Value Date    WBC 9.7 02/04/2023    HGB 15.8 02/04/2023    HCT 46.4 02/04/2023     02/04/2023    ALT 18 02/04/2023    AST 24 02/04/2023     02/04/2023    K 4.3 02/04/2023     02/04/2023    CREATININE 0.8 02/04/2023    BUN 23 (H) 02/04/2023    CO2 28 02/04/2023    INR 0.95 02/04/2023    LABMICR Not Indicated 02/04/2023     Lab Results   Component Value Date    TROPONINI <0.01 02/04/2023       Recent Imaging Results are Reviewed:  CT HEAD WO CONTRAST    Result Date: 2/6/2023  EXAMINATION: CT OF THE HEAD WITHOUT CONTRAST  2/5/2023 9:21 pm TECHNIQUE: CT of the head was performed without the administration of intravenous contrast. Automated exposure control, iterative reconstruction, and/or weight based adjustment of the mA/kV was utilized to reduce the radiation dose to as low as reasonably achievable. COMPARISON: MRI of brain on 02/05/2023 at 1033 hours. CT scan of brain without contrast on 02/04/2023 at 0930 hours. HISTORY: ORDERING SYSTEM PROVIDED HISTORY: fall TECHNOLOGIST PROVIDED HISTORY: Reason for exam:->fall Has a \"code stroke\" or \"stroke alert\" been called? ->No FINDINGS: BRAIN/VENTRICLES: On MRI of brain on 02/05/2023 at 1033 hours, there is evidence of acute infarction with restricted diffusion in the lower central portion of the right corona radiata, extended to the upper aspect of the posterior part of right lentiform nucleus. On the current CT scan there is focal hypodensity corresponding to the acute infarction in the lower central portion of the right corona radiata and extended to the upper aspect of right lentiform nucleus. In the rest of brain, there is no other new or acute abnormality identified. Evidence of mild cortical atrophy changes in bilateral frontal lobes and parietal lobes. Again note is made of moderate to markedly enlarged cerebral lateral ventricles and moderately enlarged cerebral 3rd ventricle without midline shift. Evidence of moderate to marked chronic microvascular ischemic/aging changes in the periventricular and central white matter and to some extent subcortical white matter of both cerebral hemispheres. Redemonstration of small cortical-subcortical infarction in the upper lateral portion of the left frontal lobe, as also noted on the MRI study. No evidence of intracranial hemorrhage. No evidence of intracranial mass, subdural or epidural hematoma or subdural hygroma. No focal abnormality in bilateral thalami, brainstem or in cerebellum.  ORBITS: The visualized portion of the orbits demonstrate no acute abnormality. SINUSES: The visualized paranasal sinuses and mastoid air cells demonstrate no acute abnormality. SOFT TISSUES/SKULL:  No acute abnormality of the visualized skull or soft tissues. There is no demonstrable interval change on CT scan of brain as compared to previous MRI of brain on 02/05/2023 at 1033 hours. Redemonstration of focal acute infarction with mild hypodensity involving lower central portion of the right corona radiata extended to upper aspect of right lentiform nucleus. There is no new or acute additional infarction in brain identified on this noncontrast CT scan. No evidence of intracranial hemorrhage. Redemonstration of mild cortical atrophy changes and disproportionate enlargement of cerebral lateral ventricles. Normal pressure hydrocephalus may not be excluded. No evidence of intracranial mass, subdural or epidural hematoma or subdural hygroma. CT HEAD WO CONTRAST    Result Date: 2/4/2023  EXAMINATION: CT OF THE HEAD WITHOUT CONTRAST,  2/4/2023 9:20 am TECHNIQUE: CT of the head was performed without the administration of intravenous contrast. Automated exposure control, iterative reconstruction, and/or weight based adjustment of the mA/kV was utilized to reduce the radiation dose to as low as reasonably achievable. COMPARISON: None HISTORY: ORDERING SYSTEM PROVIDED HISTORY: Weakness TECHNOLOGIST PROVIDED HISTORY: If patient is on cardiac monitor and/or pulse ox, they may be taken off cardiac monitor and pulse ox, left on O2 if currently on. All monitors reattached when patient returns to room. Has a \"code stroke\" or \"stroke alert\" been called? No Reason for exam:->Weakness Reason for Exam: Stroke FINDINGS: BRAIN/VENTRICLES: There is no acute infarct or acute intracranial hemorrhage present. There is no mass effect or midline shift present. There is ventriculomegaly out of proportion to the degree of volume loss. There is no abnormal extra-axial fluid collection identified. Periventricular hypoattenuation is present. ORBITS: Limited evaluation of the orbits is unremarkable. SINUSES: There is trace fluid within the left mastoid air cells. The paranasal sinuses and right mastoid air cells are otherwise clear. SOFT TISSUES/SKULL:  No lytic or blastic osseous lesions are identified. 1. No acute intracranial process identified. 2. Ventriculomegaly out of proportion to the degree of volume loss suggesting normal pressure hydrocephalus in the appropriate clinical setting. The findings were sent to the Radiology Results [address] at 9:37 am on 2/4/2023 to be communicated to a licensed caregiver. XR CHEST PORTABLE    Result Date: 2/4/2023  EXAMINATION: ONE XRAY VIEW OF THE CHEST 2/4/2023 9:21 am COMPARISON: None. HISTORY: ORDERING SYSTEM PROVIDED HISTORY: Weakness TECHNOLOGIST PROVIDED HISTORY: Reason for exam:->Weakness Reason for Exam: sroke alert FINDINGS: The lungs are without acute focal process. There is no effusion or pneumothorax. The cardiomediastinal silhouette is without acute process. The osseous structures are without acute process. No acute process. CTA HEAD NECK W CONTRAST    Result Date: 2/4/2023  EXAMINATION: CTA OF THE HEAD AND NECK WITH CONTRAST 2/4/2023 9:21 am: TECHNIQUE: CTA of the head and neck was performed with the administration of intravenous contrast. Multiplanar reformatted images are provided for review. MIP images are provided for review. Stenosis of the internal carotid arteries measured using NASCET criteria. Automated exposure control, iterative reconstruction, and/or weight based adjustment of the mA/kV was utilized to reduce the radiation dose to as low as reasonably achievable. 3D reconstructed images were performed on a separate workstation and provided for review. This scan was analyzed using Viz. ai contact LVO. Identification of suspected findings is not for diagnostic use beyond notification.  Viz LVO is limited to analysis of imaging data and should not be used in-lieu of full patient evaluation or relied upon to make or confirm diagnosis. COMPARISON: None. HISTORY: ORDERING SYSTEM PROVIDED HISTORY: Weakness TECHNOLOGIST PROVIDED HISTORY: Reason for exam:->Weakness Has a \"code stroke\" or \"stroke alert\" been called? ->No Decision Support Exception - unselect if not a suspected or confirmed emergency medical condition->Emergency Medical Condition (MA) Reason for Exam: stroke Initial evaluation. FINDINGS: CTA NECK: AORTIC ARCH/ARCH VESSELS: Minimal atherosclerosis of the aortic arch. No evidence of a flow limiting stenosis or dissection of the innominate or subclavian arteries. CAROTID ARTERIES: No significant stenosis of the common carotid arteries. There appears to be atherosclerosis involving the proximal cervical ICAs bilaterally including noncalcified atherosclerosis involving the proximal right cervical ICA. There is less than 50% stenosis of the proximal right cervical ICA by NASCET criteria. No focal stenosis of the left cervical ICA by NASCET criteria. No evidence of a dissection VERTEBRAL ARTERIES: There is a slightly right dominant vertebral artery. No significant stenosis or dissection is seen of the vertebral arteries. SOFT TISSUES: No focal consolidation within the visualized lung apices. No acute abnormality within the visualized superior mediastinum. BONES: The osseous structures appear osteopenic. No convincing acute osseous abnormality. Degenerative change of the cervical spine most prominent at C5-C6 where there is loss of disc space height, endplate irregularity and sclerosis. CTA HEAD: ANTERIOR CIRCULATION: No significant stenosis of the intracranial internal carotid, anterior cerebral, or middle cerebral arteries. No aneurysm. Scattered atherosclerosis of the internal carotid arteries. POSTERIOR CIRCULATION: No significant stenosis of the vertebral, basilar, or posterior cerebral arteries. No aneurysm.  OTHER: No dural venous sinus thrombosis on this non-dedicated study. BRAIN: No evidence of mass effect or midline shift. The size of the ventricles appears prominent out of proportion to the cortical sulci. 1. There is predominately noncalcified atherosclerosis, which contributes to less than 50% stenosis of the proximal right cervical ICA by NASCET criteria. No flow limiting stenosis seen of the cervical carotid/vertebral arteries. 2. No significant stenosis or large vessel occlusion of the [id number]. MRI BRAIN WO CONTRAST    Result Date: 2/5/2023  EXAMINATION: MRI OF THE BRAIN WITHOUT CONTRAST  2/5/2023 10:25 am TECHNIQUE: Multiplanar multisequence MRI of the brain was performed without the administration of intravenous contrast. COMPARISON: CT brain performed 02/04/2023. HISTORY: ORDERING SYSTEM PROVIDED HISTORY: stroke TECHNOLOGIST PROVIDED HISTORY: Reason for exam:->stroke What is the sedation requirement?->None Decision Support Exception - unselect if not a suspected or confirmed emergency medical condition->Emergency Medical Condition (MA) Reason for Exam: Left side weakness, slurred speech. Stroke alert earlier today. FINDINGS: INTRACRANIAL STRUCTURES/VENTRICLES: The sellar and suprasellar structures, optic chiasm, corpus callosum, pineal gland, tectum, and midline brainstem structures are unremarkable. The craniocervical junction is unremarkable. There is no acute hemorrhage, mass effect, or midline shift. There is satisfactory overall gray-white matter differentiation. There is chronic microvascular disease. The ventricular structures are symmetrically prominent. The infratentorial structures including the cerebellopontine angles and internal auditory canals are unremarkable. There is restricted diffusion in the right basal ganglia and adjacent periventricular white matter. There is no abnormal blooming artifact on susceptibility weighted imaging.  ORBITS: The visualized portion of the orbits demonstrate no acute abnormality. SINUSES: There is fluid in the mastoid air cells. The paranasal sinuses are normally aerated. BONES/SOFT TISSUES: The bone marrow signal intensity appears normal. The soft tissues demonstrate no acute abnormality. Subacute ischemia in the right basal ganglia and adjacent periventricular white matter. Extensive underlying chronic microvascular disease. Symmetric prominence of ventricular system that is nonspecific and correlation with symptomatology for normal pressure hydrocephalus is needed. Lab Results   Component Value Date/Time    GLUCOSE 119 02/04/2023 09:22 AM     Lab Results   Component Value Date/Time    POCGLU 105 02/05/2023 09:14 AM     BP (!) 155/99   Pulse 75   Temp 97.4 °F (36.3 °C) (Oral)   Resp 18   Ht 5' 9\" (1.753 m)   Wt 152 lb 3.2 oz (69 kg)   SpO2 95%   BMI 22.48 kg/m²     Assessment and Plan:  Principal Problem:    Acute ischemic stroke ([address] -.Established problem. Stable. Plan: workup in process. Aru eval in process  Active Problems:    Expressive aphasia - Established problem. Stable. Plan: for speech tx to see    Dysarthria    Ataxia  Plan: will likely need ARU    Primary hypertension -Established problem. Stable. 155/99  Plan: Continue present orders/plan. Tobacco abuse    Noncompliance  Plan: Continue present orders/plan. Left hemiparesis ([address] -Established problem. Stable.           Case discussed with: neuro  Tests ordered/reviewed: cbc, bmp, ct head          (Please note that portions of this note were completed with a voice recognition program.  Efforts were made to edit the dictations but occasionally words are mis-transcribed.)        Giselle Jeffery MD  2/6/2023

## 2023-02-06 NOTE — PROGRESS NOTES
[facility] [address] Department   Phone: [phone]    Physical Therapy    [] Initial Evaluation            [x] Daily Treatment Note         [] Discharge Summary      Patient: Rabia Victoria   : 1955   MRN: [id number]   Date of Service:  2023  Admitting Diagnosis: Acute ischemic stroke [facility])  Current Admission Summary: Rabia Victoria is a 69 y.o. male with a history of current tobacco abuse who presents to the emergency department today via ambulance from home with patient stating, \"I believe I have had a stroke\". Patient states he does not have a family doctor and states he has never been to a doctor. He is unsure what medical problems he may have. He does still currently smoke. He lives at home with his brother. Patient states yesterday he feels like he began having left-sided facial drooping. He states he began feeling very unstable when walking and actually fell several times at home yesterday. He also feels he began having weakness in his left arm yesterday. He thought the symptoms would pass and therefore did not seek any medical treatment. He is here with his brother who states he came downstairs this morning and noticed the patient sitting in front of the TV. The patient had change the input on the TV and did not remember how to fix it. Brother states this is very abnormal.  Brother also states that patient has had difficulty word finding. Patient is alert and oriented x3 with GCS 15 on arrival.  He is noted to have left facial drooping with smiling. He also has a pronator drift on the left side. A code stroke was called and he was taken for CT scan. Patient denies having headache, lightheadedness or dizziness. He denies chest pain or shortness of breath. He has no GI complaints     Nursing Notes were all reviewed and agreed with or any disagreements were addressed in the HPI.   Past Medical History:  has a past medical history of CVA (cerebral vascular accident) ([facility] [address] Past Surgical History:  has a past surgical history that includes Finger surgery (Right); Hernia repair; fracture surgery; and hernia repair. Discharge Recommendations:Ananda Sheppard scored a 12/24 on the AM-PAC short mobility form. Current research shows that an AM-PAC score of 17 or less is typically not associated with a discharge to the patient's home setting. Based on the patient's AM-PAC score and their current functional mobility deficits, it is recommended that the patient have 5-7 sessions per week of Physical Therapy at d/c to increase the patient's independence. At this time, this patient demonstrates complex nursing, medical, and rehabilitative needs, and would benefit from intensive rehabilitation services upon discharge from the Inpatient setting. This patient demonstrates the ability to participate in and benefit from an intensive therapy program with a coordinated interdisciplinary team approach to foster frequent, structured, and documented communication among disciplines, who will work together to establish, prioritize, and achieve treatment goals. Please see assessment section for further patient specific details. If patient discharges prior to next session this note will serve as a discharge summary. Please see below for the latest assessment towards goals.        DME Required For Discharge: DME to be determined at next level of care  Precautions/Restrictions: high fall risk, currently NPO awaiting speech eval  Weight Bearing Restrictions: no restrictions  [] Right Upper Extremity  [] Left Upper Extremity [] Right Lower Extremity  [] Left Lower Extremity     Required Braces/Orthotics: no braces required   [] Right  [] Left  Positional Restrictions:no positional restrictions    Pre-Admission Information   Lives With:  brother                 Type of Home: house  Home Layout: two level, bedroom in the basement, 10 steps with 1 handrail   Home Access: 2 step to enter without rails   Bathroom Layout: tub/shower unit  Bathroom Equipment: grab bars in shower  Toilet Height: elevated height  Home Equipment: no prior equipment  Transfer Assistance: Independent without use of device  Ambulation Assistance:Independent without use of device  ADL Assistance: independent with all ADL's   IADL Assistance: independent with homemaking tasks  Active :        [x] Yes                 [] 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 Occupation: sales  Hobbies: [employer]   Recent Falls: chart reports had several falls that caused this admission (about 5 falls)       Subjective  General: Pt agreeable to PT tx. Pt supine in bed upon arrival. Appears mildly frustrated at continued difficulty completing mobility and current expressive aphasia. Pt continues with R-side directed gaze. Pain: 0/10  Pain Interventions: not applicable       Functional Mobility  Bed Mobility  Supine to Sit: moderate assistance  Scooting: minimal assistance  Comments: Pt getting out of bed to left, requiring moderate assist to complete bed mobility to left. Pulling self up on handrail with RUE, no active   Transfers  Sit to stand transfer: minimal assistance  Stand to sit transfer: moderate assistance  Bed to chair transfer: moderate assistance  Comments: Pt coming to stand with moderate posterior lean, requiring mod A to max A to maintain upright, no significant lateral lean this date, though decreased weightbearing noted through LLE. Decreased LLE foot clearance, no LOB. Ambulation  Ambulation not tested on this date secondary to heavy posterior lean. Distance:   Gait Mechanics:  Comments:    Stair Mobility  Stair mobility not completed on this date. Comments:  Wheelchair Mobility:  No w/c mobility completed on this date.   Comments:  Balance  Static Sitting Balance: poor (+): requires min (A) to maintain balance  Dynamic Sitting Balance: poor (+): requires min (A) to maintain balance  Static Standing Balance: poor: requires mod (A) to maintain balance  Dynamic Standing Balance: poor: requires mod (A) to maintain balance  Comments: While sitting at EOB, noting moderate posterior lean while seated at EOB. Pt able to correct with cues, but unable to maintain >5 seconds before returning to posterior positioning. Other Therapeutic Interventions  Pt performing ADL tasks seated on toilet and standing at sink. Second Session:   Nursing requesting assistance with transferring pt back to bed. Pt sitting in recliner upon arrival, denies pain, agreeable to PT/OT assistance back to bed. Max A x1 stand pivot transfer from recliner>EOB. Min A x1 for sit>supine. Max A x2 for scooting pt towards [address]. Pt performs supine (B) LE exercises of SLR x10, heel slides x10, abduction slides x10, ankle pumps x15. Difficulty with initiation of L LE with exercise. Pt left supine in bed at EOS, bed alarm on, call light within reach, nursing notified.      Functional Outcomes  AM-PAC Inpatient Mobility Raw Score : 15              Cognition  WFL, pt with some communication deficits making it difficult to assess but appears intact  Orientation:    alert and oriented x 4  Command Following:   [facility]    Education  Barriers To Learning: language  Patient Education: patient educated on goals, PT role and benefits, plan of care, general safety, functional mobility training, adaptive device training, orientation, transfer training, discharge recommendations  Learning Assessment:  patient verbalizes understanding, would benefit from continued reinforcement    Assessment  Activity Tolerance: good  Impairments Requiring Therapeutic Intervention: decreased functional mobility, decreased ADL status, decreased strength, decreased safety awareness, decreased endurance, decreased balance, decreased fine motor control, decreased coordination, decreased posture  Prognosis: good  Clinical Assessment: Pt presents with hemiparesis and decreased motor control on left side, dec attention to left side and decreased balance which put him at a significant risk for falls and a decreased ability to return to home at current level. Pt would benefit from further inpatient skilled therapy upon discharge from hospital. Pt is highly motivated and had a high prior level of functional, feel he would be best served at a more intensive therapy setting such as an ARU. Pt demonstrates decreased awareness of overall posture, requiring frequent cues for posture and maintaining balance. Will continue to work with pt while in hospital to progress his mobility as he tolerates.    Safety Interventions: patient left in bed, gait belt, patient at risk for falls, and nurse notified originally in chair with alarm but returned to bed once RN did assessment    Plan  Frequency: 5-7 x/week  Current Treatment Recommendations: strengthening, balance training, functional mobility training, transfer training, gait training, stair training, endurance training, neuromuscular re-education, modalities, patient/caregiver education, ADL/self-care training, home management training, home exercise program, safety education, equipment evaluation/education, and positioning    Goals  Patient Goals: to move better   Short Term Goals:  Time Frame: upon discharge  Patient will complete bed mobility at minimal assistance   Patient will complete transfers at minimal assistance   Patient will ambulate 50 ft with use of LRAD at minimal assistance  Patient will ascend/descend 4 stairs with (R) ascending handrail at minimal assistance  **none met    Therapy Session Time      Individual Group Co-treatment   Time In 1149       Time Out 1228       Minutes 39          Individual Group Co-treatment   Time In     1330   Time Out     1349   Minutes     19       Timed Code Treatment Minutes:   39+19  Total Treatment Minutes:  58       Electronically Signed By: Kailey Terrell, SANDRA Terrell, [place], DPT, [id number]      Second Session completed by Nitin Roa, DPT [id number]

## 2023-02-06 NOTE — PROGRESS NOTES
Shanda Meza  Neurology Follow-up  [facility] Neurology    Date of Service: 2/6/2023    Subjective:   CC: Follow up today regarding: Acute ischemic stroke    This is my first encounter with the patient. The patient was seen by  [facility]. I was able to review the patient's record, imaging, notes from different physicians and recent events. The patient is a 69y.o.  years old male who was admitted over the weekend with acute slurred speech and left-sided weakness. Degree was severe persistent. He lives with his son but has not been seen by physician for quite some time. He does smoke. Over the weekend and yesterday, his symptoms worsened and became more weaker on his left side more on the leg. MRI of the brain showed right basal ganglia stroke with extensive leukoencephalopathy and concern for hydrocephalus. Today denies any other new symptoms. On review of system was unremarkable. He is on aspirin and Plavix. Family denies any shuffling feet at home or difficulties with walking or recurrent falling. Does have intermittent bladder incontinence and mild cognitive impairment. ROS:   A 10-12 system obtained and updated today and is unremarkable except as mentioned in my HPI    family history includes Colon Cancer in his mother; Diabetes Type 1  in his brother; Heart Attack in his father; Prostate Cancer in his father; Stroke in his mother.   Past Medical History:   Diagnosis Date    CVA (cerebral vascular accident) ([facility] [address] 02/04/2023     Current Facility-Administered Medications   Medication Dose Route Frequency Provider Last Rate Last Admin    hydrALAZINE (APRESOLINE) injection 10 mg  10 mg IntraVENous Q6H PRN Giselle Jeffery MD        0.9 % sodium chloride bolus  500 mL IntraVENous PRN Giselle Jeffery MD        potassium chloride (KLOR-CON M) extended release tablet 40 mEq  40 mEq Oral PRN Giselle Jeffery MD        Or    potassium bicarb-citric acid (EFFER-K) effervescent tablet 40 mEq 40 mEq Oral PRN Larry Carr MD        Or    potassium chloride 10 mEq/100 mL IVPB (Peripheral Line)  10 mEq IntraVENous PRN Larry Carr MD        clopidogrel (PLAVIX) tablet 75 mg  75 mg Oral Daily Clementina Stiles MD   75 mg at 02/06/23 1010    thiamine (B-1) injection 100 mg  100 mg IntraVENous Daily Clementina Stiles MD   100 mg at 02/06/23 1010    aspirin chewable tablet 81 mg  81 mg Oral Daily Irma Solano MD   81 mg at 02/06/23 1010    atorvastatin (LIPITOR) tablet 40 mg  40 mg Oral Nightly Irma Solano MD   40 mg at 02/05/23 2206    enoxaparin (LOVENOX) injection 40 mg  40 mg SubCUTAneous Daily Irma Solano MD   40 mg at 02/06/23 1011    perflutren lipid microspheres (DEFINITY) injection 1.5 mL  1.5 mL IntraVENous ONCE PRN Irma Solano MD         No Known Allergies   reports that he has been smoking cigarettes. He has a 50.00 pack-year smoking history. He has never used smokeless tobacco. He reports current alcohol use of about 14.0 standard drinks per week. He reports that he does not currently use drugs. Objective:  Exam:  Constitutional:   Vitals:    02/05/23 2319 02/06/23 0205 02/06/23 0403 02/06/23 0652   BP: (!) 161/92  (!) 145/78 (!) 155/99   Pulse: 61 70 74 75   Resp: 20  20 18   Temp: 98.1 °F (36.7 °C)  97.8 °F (36.6 °C) 97.4 °F (36.3 °C)   TempSrc: Oral  Oral Oral   SpO2: 95%  97% 95%   Weight:   152 lb 3.2 oz (69 kg)    Height:         General appearance:  Normal development and appear in no acute distress. Eye: No icterus. Fundus: Cannot be examined due to recent COVID-19 restrictions  Neck: supple  Cardiovascular:  No lower leg edema with good pulsation. Mental Status:   Oriented to person, place, problem, and time. Memory: Good immediate recall. Intact remote memory  Normal attention span and concentration. Language: Dysarthric speech  Good fund of Knowledge. Cranial Nerves:   II: Visual fields: Full.    Pupils: equal, round, reactive to light  III,IV,VI: Extra Ocular Movements are intact. No nystagmus  V: Facial sensation is intact  VII: Facial strength and movements: Left facial asymmetry  IX: Palate elevation is symmetric  XI: Shoulder shrug is intact  XII: Tongue movements are normal  Musculoskeletal: 5/5 in right side. Left arm flaccid and left leg 3/5 . Extensor plantar on the left  Tone: Normal tone. Reflexes: Symmetric 2+ in both arms and legs on the left and 2 on the right  Coordination: No tremors or dysmetria  Sensation: normal to all modalities in both arms and legs. Gait/Posture: unsteady gait      Data:  LABS:   Lab Results   Component Value Date/Time     02/04/2023 09:22 AM    K 4.3 02/04/2023 09:22 AM     02/04/2023 09:22 AM    CO2 28 02/04/2023 09:22 AM    BUN 23 02/04/2023 09:22 AM    CREATININE 0.8 02/04/2023 09:22 AM    LABGLOM >60 02/04/2023 09:22 AM    GLUCOSE 119 02/04/2023 09:22 AM    CALCIUM 9.8 02/04/2023 09:22 AM     Lab Results   Component Value Date/Time    WBC 9.7 02/04/2023 09:22 AM    RBC 4.61 02/04/2023 09:22 AM    HGB 15.8 02/04/2023 09:22 AM    HCT 46.4 02/04/2023 09:22 AM    .7 02/04/2023 09:22 AM    RDW 13.8 02/04/2023 09:22 AM     02/04/2023 09:22 AM     Lab Results   Component Value Date    INR 0.95 02/04/2023    PROTIME 12.6 02/04/2023     Neuroimaging was independently reviewed by me and discussed results with the patient   I reviewed blood testing and other test results and discussed results with the patient  Reviewed notes from different physicians. Impression:  Acute right ischemic MCA stroke, severe. Thromboembolic versus cardioembolic. Poor compliance  Hypertension  Hyperlipidemia  Smoker  Hydrocephalus secondary to atrophy and less likely NPH. Further work-up can be performed in outpatient setting if clinically indicated.     Recommendation  Stroke education and second prevention discussed today with the patient and family  Agree with DAPT for 21 days and then monotherapy  Statin  DT precautions  PT and OT  Speech  Nicotine patch  Blood pressure monitor and likely would need blood pressure medication  Inpatient rehab  Echo  Follow-up with PCP after discharge for further stroke prevention  Follow A1c and lipid panel  Follow-up with neurology outpatient in 2 months if concern for hydrocephalus although less likely NPH. Yovanny Flores MD   [phone]      This dictation was generated by voice recognition computer software. Although all attempts are made to edit the dictation for accuracy, there may be errors in the transcription that are not intended.

## 2023-02-06 NOTE — PROGRESS NOTES
[address] [facility],6[address] Msb Department   Phone: [phone]    Occupational Therapy    [] Initial Evaluation            [x] Daily Treatment Note         [] Discharge Summary      Patient: Justo Galeas   : 1955   MRN: [id number]   Date of Service:  2023-- Patient stated \"I am worse\", at end of session spoke to nurse who went into room for nursing assessment and called a STROKE ALERT. Returned to room and assisted patient back to bed with stroke team in room. 23-- patient is positive for confirmed stroke   Impression:       Subacute ischemia in the right basal ganglia and adjacent periventricular   white matter. Extensive underlying chronic microvascular disease. Symmetric prominence of ventricular system that is nonspecific and   correlation with symptomatology for normal pressure hydrocephalus is needed. Admitting Diagnosis:  Acute ischemic stroke [facility])    Current Admission Summary: Justo Galeas is a 69 y.o. male with a history of current tobacco abuse who presents to the emergency department today via ambulance from home with patient stating, \"I believe I have had a stroke\". Patient states he does not have a family doctor and states he has never been to a doctor. He is unsure what medical problems he may have. He does still currently smoke. He lives at home with his brother. Patient states yesterday he feels like he began having left-sided facial drooping. He states he began feeling very unstable when walking and actually fell several times at home yesterday. He also feels he began having weakness in his left arm yesterday. He thought the symptoms would pass and therefore did not seek any medical treatment. He is here with his brother who states he came downstairs this morning and noticed the patient sitting in front of the TV.   The patient had change the input on the TV and did not remember how to fix it.  Brother states this is very abnormal.  Brother also states that patient has had difficulty word finding. Patient is alert and oriented x3 with GCS 15 on arrival.  He is noted to have left facial drooping with smiling. He also has a pronator drift on the left side. A code stroke was called and he was taken for CT scan. Patient denies having headache, lightheadedness or dizziness. He denies chest pain or shortness of breath. He has no GI complaints     Head CT--   1. No acute intracranial process identified. 2. Ventriculomegaly out of proportion to the degree of volume loss suggesting   normal pressure hydrocephalus in the appropriate clinical setting. CC: Follow up today regarding: Acute ischemic stroke     2/6/23-- The patient is a 69y.o.  years old male who was admitted over the weekend with acute slurred speech and left-sided weakness. Degree was severe persistent. He lives with his son but has not been seen by physician for quite some time. He does smoke. Over the weekend and yesterday, his symptoms worsened and became more weaker on his left side more on the leg. MRI of the brain showed right basal ganglia stroke with extensive leukoencephalopathy and concern for hydrocephalus. Today denies any other new symptoms. On review of system was unremarkable. He is on aspirin and Plavix. Family denies any shuffling feet at home or difficulties with walking or recurrent falling. Does have intermittent bladder incontinence and mild cognitive impairment. Past Medical History:  has a past medical history of CVA (cerebral vascular accident) ([facility] [address] Past Surgical History:  has a past surgical history that includes Finger surgery (Right); Hernia repair; fracture surgery; and hernia repair. Discharge Recommendations: Michelle Terrell scored a 13/24 on the AM-PAC ADL Inpatient form.  Current research shows that an AM-PAC score of 17 or less is typically not associated with a discharge to the patient's home setting. Based on the patient's AM-PAC score and their current ADL deficits, it is recommended that the patient have 5-7 sessions per week of Occupational Therapy at d/c to increase the patient's independence. At this time, this patient demonstrates complex nursing, medical, and rehabilitative needs, and would benefit from intensive rehabilitation services upon discharge from the Inpatient setting. This patient demonstrates the ability to participate in and benefit from an intensive therapy program with a coordinated interdisciplinary team approach to foster frequent, structured, and documented communication among disciplines, who will work together to establish, prioritize, and achieve treatment goals. Please see assessment section for further patient specific details. If patient discharges prior to next session this note will serve as a discharge summary. Please see below for the latest assessment towards goals.       DME Required For Discharge: DME to be determined at next level of care, DME to be determined pending patient progress    Precautions/Restrictions: high fall risk  Weight Bearing Restrictions: no restrictions  [] Right Upper Extremity  [] Left Upper Extremity [] Right Lower Extremity  [] Left Lower Extremity     Required Braces/Orthotics: no braces required   [] Right  [] Left  Positional Restrictions:no positional restrictions    Pre-Admission Information   Lives With:  brother      Type of Home: house  Home Layout: two level, bedroom in the basement, 10 steps with 1 handrail   Home Access:  2 step to enter without rails   Bathroom Layout: tub/shower unit  Bathroom Equipment: grab bars in shower  Toilet Height: elevated height  Home Equipment: no prior equipment  Transfer Assistance: Independent without use of device  Ambulation Assistance:Independent without use of device  ADL Assistance: independent with all ADL's   IADL Assistance: independent with homemaking tasks  Active :        [x] Yes  [] 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 Occupation: sales  Hobbies: fishing   Recent Falls: chart reports had several falls that caused this admission (about 5 falls)     Examination   Vision:   Vision Corrective Device: wears glasses at all times  Demo good peripheral vision but appears to have decreased scanning to left. To continue to assess   Hearing:   [facility]  Perception:   Overall Perceptual Status: Impaired on Left  Unilateral Attention: cues to attend (L) visual field, cues to attend (L) side of body  Initiation: cues to initiate tasks  Motor Planning: appears intact  Perseveration: not present  Observation:   General Observation:  left facial droop, slurred speech    Posture:   Cues for upright posture when standing   Sensation:   denies numbness and tingling however appears to have mild left inattention on left. Proprioception:    diminished proprioception in (L) UE, (L) LE  Tone:   Normotonic  Coordination Testing:   Coordination and Movement Description: (L) UE  Finger to Nose: Impaired on Left  Alternating Pronation/Supination: Impaired on Left  Unable to formally test secondary to left hemiplegia       ROM: Patient now with increased tone when yawning with left UE. Weak  on left. Shoulder flexion @ 1/4 range, elbow 1/2 range, limited wrist flexion/extension    Strength: Not formally tested on 2/6/23  (L) Shoulder: +2     (R) Shoulder: 5  (L) Elbow: +2     (R) Elbow: 5  (L) Wrist: 3     (R) Wrist: 5  (L) Hand: -4      (R) Hand: 5  (L)  Strength: good-    (R)  Strength: 5    Therapist Clinical Decision Making (Complexity): high complexity  Clinical Presentation: evolving      Subjective  General: Patient sitting in chair, nurse requested PT and OT to help get patient back to bed due to having a test. Patient sitting in chair, pleasant and cooperative.    Pain: 0/10  Pain Interventions: repositioned         Activities of Daily Living  Basic Activities of Daily Living  General Comments: declined need for ADLs this date. Instrumental Activities of Daily Living  No IADL completed on this date. Functional Mobility  Bed Mobility  Sit to Supine: minimal assistance  Comments:  Transfers  Sit to stand transfer:moderate assistance  Stand to sit transfer: maximum assistance  Bed / Chair comments: max assist stand step t/f bed to chair, decreased awareness of midline and body in space, appears apraxic  Comments:  Functional Mobility:  Sitting Balance: minimal assistance.     Sitting Balance Comment: patient falling to left   Standing Balance Comment: max assist     Functional Outcomes  -PAC Inpatient Daily Activity Raw Score: 13    Cognition  Overall Cognitive Status: Impaired  Arousal/Alterness: appropriate responses to stimuli  Following Commands: follows one step commands with repetition  Attention Span: appears intact  Memory: decreased recall of recent events  Safety Judgement: decreased awareness of need for assistance  Problem Solving: assistance required to generate solutions  Insights: decreased awareness of deficits  Initiation: requires cues for some  Sequencing: requires cues for some  Comments: patient with increased time to answer questions, slurred speech, easily frustrated   Orientation:    oriented to person, oriented to situation, disoriented to place, and disoriented to time   Command Following:   accurately follows one step commands           Education  Barriers To Learning: cognition, emotional, and physical  Patient Education: patient educated on goals, OT role and benefits, plan of care, discharge recommendations  Learning Assessment:  patient verbalizes understanding, would benefit from continued reinforcement    Assessment  Activity Tolerance: Patient with confirmed stroke, limited activity this date due to needing to get back to bed per nurse request to leave to a test.     Impairments Requiring Therapeutic Intervention: decreased functional mobility, decreased ADL status, decreased ROM, decreased strength, decreased fine motor control, decreased coordination  Prognosis: good and fair    Clinical Assessment: Patient with evolving stroke symptoms.  Please see MD notes and stroke team notes, recommend ARU to maximize mobility     Safety Interventions: patient left in bed, bed alarm in place, patient at risk for falls, and nurse notified     Plan  Frequency: 5-7 x/week  Current Treatment Recommendations: strengthening, ROM, balance training, functional mobility training, transfer training, endurance training, neuromuscular re-education, ADL/self-care training, IADL training, home exercise program, safety education, equipment evaluation/education, and positioning    Goals  Patient Goals: open for more rehab   Short Term Goals:  Time Frame: until discharge-- NO GOAL MET 2/6/23  Patient will complete lower body ADL at moderate assistance   Patient will complete toileting at minimal assistance   Patient will complete functional transfers at minimal assistance   Patient will increase [facility] ADL score = to or > than 16/24    Therapy Session Time     Individual Group Co-treatment   Time In    1330   Time Out    1349   Minutes    19        Timed Code Treatment Minutes:  19    Total Treatment Minutes:  19       Electronically Signed By: Jasper Goldberg, OTR/MIKE Duff

## 2023-02-06 NOTE — PLAN OF CARE
Pt found on the floor beside the bed. He denies any pain or hitting head. Neuro check and NIHSS unchanged. Dr. Keturah Marrufo and Priya Adair NP notified. BP elevated but all other vital signs stable. Stat CT head done. Pt has bed alarm on, call light within reach, low bed in place, and pt moved closer to nurses station for pt's safety.

## 2023-02-07 ENCOUNTER — HOSPITAL ENCOUNTER (INPATIENT)
Age: 68
DRG: 057 | End: 2023-02-07
Attending: PHYSICAL MEDICINE & REHABILITATION | Admitting: PHYSICAL MEDICINE & REHABILITATION
Payer: MEDICARE

## 2023-02-07 ENCOUNTER — APPOINTMENT (OUTPATIENT)
Dept: CT IMAGING | Age: 68
DRG: 065 | End: 2023-02-07
Payer: MEDICARE

## 2023-02-07 VITALS
BODY MASS INDEX: 22.04 KG/M2 | HEIGHT: 69 IN | DIASTOLIC BLOOD PRESSURE: 71 MMHG | OXYGEN SATURATION: 96 % | TEMPERATURE: 97.9 F | HEART RATE: 64 BPM | SYSTOLIC BLOOD PRESSURE: 124 MMHG | RESPIRATION RATE: 20 BRPM | WEIGHT: 148.8 LBS

## 2023-02-07 LAB
ANION GAP SERPL CALCULATED.3IONS-SCNC: 8 MMOL/L (ref 3–16)
BASOPHILS ABSOLUTE: 0.1 K/UL (ref 0–0.2)
BASOPHILS RELATIVE PERCENT: 0.7 %
BUN BLDV-MCNC: 25 MG/DL (ref 7–20)
CALCIUM SERPL-MCNC: 9.6 MG/DL (ref 8.3–10.6)
CHLORIDE BLD-SCNC: 103 MMOL/L (ref 99–110)
CO2: 27 MMOL/L (ref 21–32)
CREAT SERPL-MCNC: 0.9 MG/DL (ref 0.8–1.3)
EOSINOPHILS ABSOLUTE: 0.1 K/UL (ref 0–0.6)
EOSINOPHILS RELATIVE PERCENT: 0.8 %
GFR SERPL CREATININE-BSD FRML MDRD: >60 ML/MIN/{1.73_M2}
GLUCOSE BLD-MCNC: 93 MG/DL (ref 70–99)
HCT VFR BLD CALC: 46.9 % (ref 40.5–52.5)
HEMOGLOBIN: 16.3 G/DL (ref 13.5–17.5)
LYMPHOCYTES ABSOLUTE: 2.4 K/UL (ref 1–5.1)
LYMPHOCYTES RELATIVE PERCENT: 28.1 %
MCH RBC QN AUTO: 34.7 PG (ref 26–34)
MCHC RBC AUTO-ENTMCNC: 34.8 G/DL (ref 31–36)
MCV RBC AUTO: 99.8 FL (ref 80–100)
MONOCYTES ABSOLUTE: 0.9 K/UL (ref 0–1.3)
MONOCYTES RELATIVE PERCENT: 10.8 %
NEUTROPHILS ABSOLUTE: 5.1 K/UL (ref 1.7–7.7)
NEUTROPHILS RELATIVE PERCENT: 59.6 %
PDW BLD-RTO: 13.6 % (ref 12.4–15.4)
PLATELET # BLD: 261 K/UL (ref 135–450)
PMV BLD AUTO: 8.2 FL (ref 5–10.5)
POTASSIUM SERPL-SCNC: 4.1 MMOL/L (ref 3.5–5.1)
RBC # BLD: 4.7 M/UL (ref 4.2–5.9)
SODIUM BLD-SCNC: 138 MMOL/L (ref 136–145)
WBC # BLD: 8.5 K/UL (ref 4–11)

## 2023-02-07 PROCEDURE — 99232 SBSQ HOSP IP/OBS MODERATE 35: CPT | Performed by: PSYCHIATRY & NEUROLOGY

## 2023-02-07 PROCEDURE — 97530 THERAPEUTIC ACTIVITIES: CPT

## 2023-02-07 PROCEDURE — 6360000002 HC RX W HCPCS: Performed by: NEUROMUSCULOSKELETAL MEDICINE & OMM

## 2023-02-07 PROCEDURE — 97129 THER IVNTJ 1ST 15 MIN: CPT

## 2023-02-07 PROCEDURE — 36415 COLL VENOUS BLD VENIPUNCTURE: CPT

## 2023-02-07 PROCEDURE — 6360000002 HC RX W HCPCS: Performed by: INTERNAL MEDICINE

## 2023-02-07 PROCEDURE — 70450 CT HEAD/BRAIN W/O DYE: CPT

## 2023-02-07 PROCEDURE — 85025 COMPLETE CBC W/AUTO DIFF WBC: CPT

## 2023-02-07 PROCEDURE — 6370000000 HC RX 637 (ALT 250 FOR IP): Performed by: INTERNAL MEDICINE

## 2023-02-07 PROCEDURE — 92526 ORAL FUNCTION THERAPY: CPT

## 2023-02-07 PROCEDURE — 6370000000 HC RX 637 (ALT 250 FOR IP): Performed by: NEUROMUSCULOSKELETAL MEDICINE & OMM

## 2023-02-07 PROCEDURE — 6370000000 HC RX 637 (ALT 250 FOR IP): Performed by: PHYSICAL MEDICINE & REHABILITATION

## 2023-02-07 PROCEDURE — 80048 BASIC METABOLIC PNL TOTAL CA: CPT

## 2023-02-07 PROCEDURE — 92507 TX SP LANG VOICE COMM INDIV: CPT

## 2023-02-07 PROCEDURE — 97535 SELF CARE MNGMENT TRAINING: CPT

## 2023-02-07 PROCEDURE — 1280000000 HC REHAB R&B

## 2023-02-07 RX ORDER — DIPHENHYDRAMINE HCL 25 MG
25 TABLET ORAL EVERY 6 HOURS PRN
Status: CANCELLED | OUTPATIENT
Start: 2023-02-07

## 2023-02-07 RX ORDER — ASPIRIN 81 MG/1
81 TABLET, CHEWABLE ORAL DAILY
Status: COMPLETED | OUTPATIENT
Start: 2023-02-08 | End: 2023-02-24

## 2023-02-07 RX ORDER — ONDANSETRON 4 MG/1
4 TABLET, ORALLY DISINTEGRATING ORAL EVERY 8 HOURS PRN
Status: CANCELLED | OUTPATIENT
Start: 2023-02-07

## 2023-02-07 RX ORDER — ENOXAPARIN SODIUM 100 MG/ML
40 INJECTION SUBCUTANEOUS DAILY
Status: DISCONTINUED | OUTPATIENT
Start: 2023-02-08 | End: 2023-03-02 | Stop reason: HOSPADM

## 2023-02-07 RX ORDER — CLOPIDOGREL BISULFATE 75 MG/1
75 TABLET ORAL DAILY
Status: DISCONTINUED | OUTPATIENT
Start: 2023-02-08 | End: 2023-03-02 | Stop reason: HOSPADM

## 2023-02-07 RX ORDER — TRAMADOL HYDROCHLORIDE 50 MG/1
50 TABLET ORAL EVERY 6 HOURS PRN
Status: CANCELLED | OUTPATIENT
Start: 2023-02-07

## 2023-02-07 RX ORDER — TRAZODONE HYDROCHLORIDE 50 MG/1
50 TABLET ORAL NIGHTLY PRN
Status: DISCONTINUED | OUTPATIENT
Start: 2023-02-07 | End: 2023-02-16

## 2023-02-07 RX ORDER — HYDRALAZINE HYDROCHLORIDE 25 MG/1
50 TABLET, FILM COATED ORAL EVERY 8 HOURS PRN
Status: CANCELLED | OUTPATIENT
Start: 2023-02-07

## 2023-02-07 RX ORDER — ATORVASTATIN CALCIUM 40 MG/1
40 TABLET, FILM COATED ORAL NIGHTLY
Status: CANCELLED | OUTPATIENT
Start: 2023-02-07

## 2023-02-07 RX ORDER — ACETAMINOPHEN 325 MG/1
650 TABLET ORAL EVERY 4 HOURS PRN
Status: DISCONTINUED | OUTPATIENT
Start: 2023-02-07 | End: 2023-03-02 | Stop reason: HOSPADM

## 2023-02-07 RX ORDER — TRAMADOL HYDROCHLORIDE 50 MG/1
50 TABLET ORAL EVERY 6 HOURS PRN
Status: DISCONTINUED | OUTPATIENT
Start: 2023-02-07 | End: 2023-03-02 | Stop reason: HOSPADM

## 2023-02-07 RX ORDER — ATORVASTATIN CALCIUM 40 MG/1
40 TABLET, FILM COATED ORAL NIGHTLY
Status: DISCONTINUED | OUTPATIENT
Start: 2023-02-07 | End: 2023-03-02 | Stop reason: HOSPADM

## 2023-02-07 RX ORDER — CLOPIDOGREL BISULFATE 75 MG/1
75 TABLET ORAL DAILY
Status: CANCELLED | OUTPATIENT
Start: 2023-02-08

## 2023-02-07 RX ORDER — TRAZODONE HYDROCHLORIDE 50 MG/1
50 TABLET ORAL NIGHTLY PRN
Status: CANCELLED | OUTPATIENT
Start: 2023-02-07

## 2023-02-07 RX ORDER — ONDANSETRON 4 MG/1
4 TABLET, ORALLY DISINTEGRATING ORAL EVERY 8 HOURS PRN
Status: DISCONTINUED | OUTPATIENT
Start: 2023-02-07 | End: 2023-03-02 | Stop reason: HOSPADM

## 2023-02-07 RX ORDER — ATORVASTATIN CALCIUM 40 MG/1
40 TABLET, FILM COATED ORAL NIGHTLY
Qty: 30 TABLET | Refills: 3 | Status: ON HOLD
Start: 2023-02-07

## 2023-02-07 RX ORDER — CLOPIDOGREL BISULFATE 75 MG/1
75 TABLET ORAL DAILY
Qty: 30 TABLET | Refills: 3 | Status: ON HOLD
Start: 2023-02-07

## 2023-02-07 RX ORDER — DIPHENHYDRAMINE HCL 25 MG
25 TABLET ORAL EVERY 6 HOURS PRN
Status: DISCONTINUED | OUTPATIENT
Start: 2023-02-07 | End: 2023-03-02 | Stop reason: HOSPADM

## 2023-02-07 RX ORDER — ASPIRIN 81 MG/1
81 TABLET, CHEWABLE ORAL DAILY
Qty: 30 TABLET | Refills: 3 | Status: ON HOLD
Start: 2023-02-07

## 2023-02-07 RX ORDER — ENOXAPARIN SODIUM 100 MG/ML
40 INJECTION SUBCUTANEOUS DAILY
Status: CANCELLED | OUTPATIENT
Start: 2023-02-08

## 2023-02-07 RX ORDER — ASPIRIN 81 MG/1
81 TABLET, CHEWABLE ORAL DAILY
Status: CANCELLED | OUTPATIENT
Start: 2023-02-08

## 2023-02-07 RX ORDER — HYDRALAZINE HYDROCHLORIDE 25 MG/1
50 TABLET, FILM COATED ORAL EVERY 8 HOURS PRN
Status: DISCONTINUED | OUTPATIENT
Start: 2023-02-07 | End: 2023-03-02 | Stop reason: HOSPADM

## 2023-02-07 RX ORDER — ACETAMINOPHEN 325 MG/1
650 TABLET ORAL EVERY 4 HOURS PRN
Status: CANCELLED | OUTPATIENT
Start: 2023-02-07

## 2023-02-07 RX ADMIN — THIAMINE HYDROCHLORIDE 100 MG: 100 INJECTION, SOLUTION INTRAMUSCULAR; INTRAVENOUS at 09:31

## 2023-02-07 RX ADMIN — CLOPIDOGREL BISULFATE 75 MG: 75 TABLET ORAL at 09:30

## 2023-02-07 RX ADMIN — ENOXAPARIN SODIUM 40 MG: 100 INJECTION SUBCUTANEOUS at 09:31

## 2023-02-07 RX ADMIN — ASPIRIN 81 MG: 81 TABLET, CHEWABLE ORAL at 09:30

## 2023-02-07 RX ADMIN — ATORVASTATIN CALCIUM 40 MG: 40 TABLET, FILM COATED ORAL at 22:10

## 2023-02-07 RX ADMIN — ACETAMINOPHEN 650 MG: 325 TABLET ORAL at 22:10

## 2023-02-07 ASSESSMENT — PAIN - FUNCTIONAL ASSESSMENT: PAIN_FUNCTIONAL_ASSESSMENT: ACTIVITIES ARE NOT PREVENTED

## 2023-02-07 ASSESSMENT — PAIN SCALES - GENERAL
PAINLEVEL_OUTOF10: 2
PAINLEVEL_OUTOF10: 0
PAINLEVEL_OUTOF10: 0

## 2023-02-07 ASSESSMENT — PAIN DESCRIPTION - LOCATION: LOCATION: GENERALIZED

## 2023-02-07 ASSESSMENT — PAIN DESCRIPTION - DESCRIPTORS: DESCRIPTORS: ACHING;SORE

## 2023-02-07 NOTE — PROGRESS NOTES
[place]  2/7/2023  [id number]    Chief Complaint: Acute ischemic stroke [facility])    Subjective:   No overnight events. No current complaints. [employer] approved ARU within 24 hrs of request.     ROS: No CP, SOB, dyspnea    Objective:  Patient Vitals for the past 24 hrs:   BP Temp Temp src Pulse Resp SpO2 Weight   02/07/23 0813 -- -- -- -- -- -- 148 lb 12.8 oz (67.5 kg)   02/07/23 0745 125/67 97.7 °F (36.5 °C) Oral 58 -- -- --   02/07/23 0453 (!) 142/88 97.8 °F (36.6 °C) Oral 75 20 -- --   02/06/23 1815 (!) 159/85 98 °F (36.7 °C) Axillary 65 18 97 % --   02/06/23 1630 137/86 97.7 °F (36.5 °C) Oral 63 18 97 % --   02/06/23 1130 (!) 145/73 98 °F (36.7 °C) Oral 72 18 95 % --     Gen: No distress, pleasant. HEENT: Normocephalic, atraumatic. CV: No audible murmurs, well perfused extremities  Resp: No respiratory distress. No increased WOB  Abd: Soft, nontender nondistended  Ext: No edema. Neuro: Alert, oriented, appropriately interactive. L hemiparesis    Laboratory data: Available via EMR. Therapy progress:    PT    Rolling: Level of difficulty - A Little   Sit to Stand from a Chair: Level of difficulty - A Lot  Supine to Sit: Level of difficulty - A Lot     Bed to Chair: Physical Assistance Required - A Lot  Ambulate Across Room: Physical Assistance Required - A Lot  Ascend 3-5 Steps With HR: Physical Assistance Required - A Lot    OT    Eating: Physical Assistance Required - A Little  Grooming: Physical Assistance Required - A Little  LB Dressing: Physical Assistance Required - Total  UB Dressing: Physical Assistance Required - A Lot  Bathing: Physical Assistance Required - A Lot  Toileting: Physical Assistance Required - A Lot    SLP         Body mass index is 21.97 kg/m².     Assessment:  Patient Active Problem List   Diagnosis    Acute ischemic stroke [facility])    Expressive aphasia    Dysarthria    Ataxia    Primary hypertension    Tobacco abuse    Noncompliance    Left hemiparesis ([address]       Plan: Acute right basal ganglia infarct: DAPT for 21 days, Lipitor. PT/OT/SLP. Dysphagia: Dysphagia diet, SLP  HTN  Tobacco use     Dispo: Patient is an appropriate ARU candidate. Able to tolerate the required 3 hours of therapy in an ARU setting. Precert approved. Able to admit today. Orders placed for transfer. Brandon Novak MD 2/7/2023, 10:42 AM    * This document was created using dictation software. While all precautions were taken to ensure accuracy, errors may have occurred. Please disregard any typographical errors.

## 2023-02-07 NOTE — PROGRESS NOTES
Sebas Shah  Neurology Follow-up  [facility]    Date of Service: 2/7/2023    Subjective:   CC: Follow up today regarding: Acute ischemic stroke. Chart and lab reviewed. He denies any new symptoms today. Same left-sided weakness. No headache, dysphagia, worsening dysarthria. Other review system was unremarkable. ROS:   A 10-12 system obtained and updated today and is unremarkable except as mentioned in my HPI    family history includes Colon Cancer in his mother; Diabetes Type 1  in his brother; Heart Attack in his father; Prostate Cancer in his father; Stroke in his mother. Past Medical History:   Diagnosis Date    CVA (cerebral vascular accident) ([facility] [address] 02/04/2023     Current Facility-Administered Medications   Medication Dose Route Frequency Provider Last Rate Last Admin    hydrALAZINE (APRESOLINE) injection 10 mg  10 mg IntraVENous Q6H PRN Jason Mcgovern MD        0.9 % sodium chloride bolus  500 mL IntraVENous PRN Jason Mcgovern MD        potassium chloride (KLOR-CON M) extended release tablet 40 mEq  40 mEq Oral PRN Jason Mcgovern MD        Or    potassium bicarb-citric acid (EFFER-K) effervescent tablet 40 mEq  40 mEq Oral PRN Jason Mcgovern MD        Or    potassium chloride 10 mEq/100 mL IVPB (Peripheral Line)  10 mEq IntraVENous PRN Jason Mcgovern MD        clopidogrel (PLAVIX) tablet 75 mg  75 mg Oral Daily Emanuel Christiansen MD   75 mg at 02/07/23 0930    aspirin chewable tablet 81 mg  81 mg Oral Daily Alistair Miller MD   81 mg at 02/07/23 0930    atorvastatin (LIPITOR) tablet 40 mg  40 mg Oral Nightly Alistair Miller MD   40 mg at 02/06/23 2005    enoxaparin (LOVENOX) injection 40 mg  40 mg SubCUTAneous Daily Alistair Miller MD   40 mg at 02/07/23 0931    perflutren lipid microspheres (DEFINITY) injection 1.5 mL  1.5 mL IntraVENous ONCE PRN Alistair Miller MD         No Known Allergies   reports that he has been smoking cigarettes.  He has a 50.00 pack-year smoking history. He has never used smokeless tobacco. He reports current alcohol use of about 14.0 standard drinks per week. He reports that he does not currently use drugs. Objective:  Exam:  Constitutional:   Vitals:    02/06/23 1815 02/07/23 0453 02/07/23 0745 02/07/23 0813   BP: (!) 159/85 (!) 142/88 125/67    Pulse: 65 75 58    Resp: 18 20     Temp: 98 °F (36.7 °C) 97.8 °F (36.6 °C) 97.7 °F (36.5 °C)    TempSrc: Axillary Oral Oral    SpO2: 97%      Weight:    148 lb 12.8 oz (67.5 kg)   Height:         General appearance:  Normal development and appear in no acute distress. Eye: No icterus. Fundus: Cannot be examined due to recent COVID-19 restrictions  Neck: supple  Cardiovascular:  No lower leg edema with good pulsation. Mental Status:   Oriented to person, place, problem, and time. Memory: Good immediate recall. Intact remote memory  Normal attention span and concentration. Language: Dysarthric speech  Good fund of Knowledge. Cranial Nerves:   II: Visual fields: Full. Pupils: equal, round, reactive to light  III,IV,VI: Extra Ocular Movements are intact. No nystagmus  V: Facial sensation is intact  VII: Facial strength and movements: Left facial asymmetry  IX: Palate elevation is symmetric  XI: Shoulder shrug is intact  XII: Tongue movements are normal  Musculoskeletal: 5/5 in right side. Left arm flaccid and left leg 3/5 . Extensor plantar on the left  Tone: Normal tone. Reflexes: Symmetric 2+ in both arms and legs on the left and 2 on the right  Coordination: No tremors or dysmetria  Sensation: normal to all modalities in both arms and legs. Gait/Posture: unsteady gait  No change in examination today.     Data:  LABS:   Lab Results   Component Value Date/Time     02/07/2023 05:35 AM    K 4.1 02/07/2023 05:35 AM     02/07/2023 05:35 AM    CO2 27 02/07/2023 05:35 AM    BUN 25 02/07/2023 05:35 AM    CREATININE 0.9 02/07/2023 05:35 AM    LABGLOM >60 02/07/2023 05:35 AM GLUCOSE 93 02/07/2023 05:35 AM    CALCIUM 9.6 02/07/2023 05:35 AM     Lab Results   Component Value Date/Time    WBC 8.5 02/07/2023 05:36 AM    RBC 4.70 02/07/2023 05:36 AM    HGB 16.3 02/07/2023 05:36 AM    HCT 46.9 02/07/2023 05:36 AM    MCV 99.8 02/07/2023 05:36 AM    RDW 13.6 02/07/2023 05:36 AM     02/07/2023 05:36 AM     Lab Results   Component Value Date    INR 0.95 02/04/2023    PROTIME 12.6 02/04/2023     Neuroimaging was independently reviewed by me and discussed results with the patient   I reviewed blood testing and other test results and discussed results with the patient  Reviewed notes from different physicians. Impression: No changes  Acute right ischemic MCA stroke, severe. Thromboembolic versus cardioembolic. Poor compliance  Hypertension  Hyperlipidemia  Smoker  Hydrocephalus secondary to atrophy and less likely NPH. Further work-up can be performed in outpatient setting if clinically indicated. Recommendation    Stroke education discussed today  Continue DAPT for 21 days  Statin  Inpatient rehab  Blood pressure monitor  Nicotine patch  DVT and GI prophylaxis  Further stroke prevention with PCP outpatient  No further recommendation  We will sign off           August Howell MD   [phone]      This dictation was generated by voice recognition computer software. Although all attempts are made to edit the dictation for accuracy, there may be errors in the transcription that are not intended.

## 2023-02-07 NOTE — PROGRESS NOTES
[address] [facility],6[address] Msb Department   Phone: [phone]    Occupational Therapy    [] Initial Evaluation            [x] Daily Treatment Note         [] Discharge Summary      Patient: Emilio Romero   : 1955   MRN: [id number]   Date of Service:  2023-- Patient stated \"I am worse\", at end of session spoke to nurse who went into room for nursing assessment and called a STROKE ALERT. Returned to room and assisted patient back to bed with stroke team in room. 23-- patient is positive for confirmed stroke   Impression:       Subacute ischemia in the right basal ganglia and adjacent periventricular   white matter. Extensive underlying chronic microvascular disease. Symmetric prominence of ventricular system that is nonspecific and   correlation with symptomatology for normal pressure hydrocephalus is needed. Admitting Diagnosis:  Acute ischemic stroke [facility])    Current Admission Summary: Emilio Romero is a 69 y.o. male with a history of current tobacco abuse who presents to the emergency department today via ambulance from home with patient stating, \"I believe I have had a stroke\". Patient states he does not have a family doctor and states he has never been to a doctor. He is unsure what medical problems he may have. He does still currently smoke. He lives at home with his brother. Patient states yesterday he feels like he began having left-sided facial drooping. He states he began feeling very unstable when walking and actually fell several times at home yesterday. He also feels he began having weakness in his left arm yesterday. He thought the symptoms would pass and therefore did not seek any medical treatment. He is here with his brother who states he came downstairs this morning and noticed the patient sitting in front of the TV.   The patient had change the input on the TV and did not remember how to fix it.  Brother states this is very abnormal.  Brother also states that patient has had difficulty word finding. Patient is alert and oriented x3 with GCS 15 on arrival.  He is noted to have left facial drooping with smiling. He also has a pronator drift on the left side. A code stroke was called and he was taken for CT scan. Patient denies having headache, lightheadedness or dizziness. He denies chest pain or shortness of breath. He has no GI complaints     Head CT--   1. No acute intracranial process identified. 2. Ventriculomegaly out of proportion to the degree of volume loss suggesting   normal pressure hydrocephalus in the appropriate clinical setting. CC: Follow up today regarding: Acute ischemic stroke     2/6/23-- The patient is a 69y.o.  years old male who was admitted over the weekend with acute slurred speech and left-sided weakness. Degree was severe persistent. He lives with his son but has not been seen by physician for quite some time. He does smoke. Over the weekend and yesterday, his symptoms worsened and became more weaker on his left side more on the leg. MRI of the brain showed right basal ganglia stroke with extensive leukoencephalopathy and concern for hydrocephalus. Today denies any other new symptoms. On review of system was unremarkable. He is on aspirin and Plavix. Family denies any shuffling feet at home or difficulties with walking or recurrent falling. Does have intermittent bladder incontinence and mild cognitive impairment. Past Medical History:  has a past medical history of CVA (cerebral vascular accident) ([facility] [address] Past Surgical History:  has a past surgical history that includes Finger surgery (Right); Hernia repair; fracture surgery; and hernia repair. Discharge Recommendations: Lavon Phelan scored a 12/24 on the AM-PAC ADL Inpatient form.  Current research shows that an AM-PAC score of 17 or less is typically not associated with a discharge to the patient's home setting. Based on the patient's AM-PAC score and their current ADL deficits, it is recommended that the patient have 5-7 sessions per week of Occupational Therapy at d/c to increase the patient's independence. At this time, this patient demonstrates complex nursing, medical, and rehabilitative needs, and would benefit from intensive rehabilitation services upon discharge from the Inpatient setting. This patient demonstrates the ability to participate in and benefit from an intensive therapy program with a coordinated interdisciplinary team approach to foster frequent, structured, and documented communication among disciplines, who will work together to establish, prioritize, and achieve treatment goals. Please see assessment section for further patient specific details. If patient discharges prior to next session this note will serve as a discharge summary. Please see below for the latest assessment towards goals.       DME Required For Discharge: DME to be determined at next level of care, DME to be determined pending patient progress    Precautions/Restrictions: high fall risk  Weight Bearing Restrictions: no restrictions  [] Right Upper Extremity  [] Left Upper Extremity [] Right Lower Extremity  [] Left Lower Extremity     Required Braces/Orthotics: no braces required   [] Right  [] Left  Positional Restrictions:no positional restrictions    Pre-Admission Information   Lives With:  brother      Type of Home: house  Home Layout: two level, bedroom in the basement, 10 steps with 1 handrail   Home Access:  2 step to enter without rails   Bathroom Layout: tub/shower unit  Bathroom Equipment: grab bars in shower  Toilet Height: elevated height  Home Equipment: no prior equipment  Transfer Assistance: Independent without use of device  Ambulation Assistance:Independent without use of device  ADL Assistance: independent with all ADL's   IADL Assistance: independent with homemaking tasks  Active :        [x] Yes  [] 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 Occupation: sales  Hobbies: fishing   Recent Falls: chart reports had several falls that caused this admission (about 5 falls)     Examination   Vision:   Vision Corrective Device: wears glasses at all times  Demo good peripheral vision but appears to have decreased scanning to left. To continue to assess   Hearing:   [facility]  Perception:   Overall Perceptual Status: Impaired on Left  Unilateral Attention: cues to attend (L) visual field, cues to attend (L) side of body  Initiation: cues to initiate tasks  Motor Planning: appears intact  Perseveration: not present  Observation:   General Observation:  left facial droop, slurred speech    Posture:   Cues for upright posture when standing   Sensation:   denies numbness and tingling however appears to have mild left inattention on left. Proprioception:    diminished proprioception in (L) UE, (L) LE  Tone:   Normotonic  Coordination Testing:   Coordination and Movement Description: (L) UE  Finger to Nose: Impaired on Left  Alternating Pronation/Supination: Impaired on Left  Unable to formally test secondary to left hemiplegia       ROM: Patient now with increased tone when yawning with left UE. Weak  on left. Shoulder flexion @ 1/4 range, elbow 1/2 range, limited wrist flexion/extension    Strength: Not formally tested on 2/6/23  (L) Shoulder: +2     (R) Shoulder: 5  (L) Elbow: +2     (R) Elbow: 5  (L) Wrist: 3     (R) Wrist: 5  (L) Hand: -4      (R) Hand: 5  (L)  Strength: good-    (R)  Strength: 5  2/7/2023--pt performed MMT/AROM while seated EOB specifically focusing on LUE--noted muscle contraction felt with L shoulder elevation, elbow extension, and with supination/pronation.     Therapist Clinical Decision Making (Complexity): high complexity  Clinical Presentation: evolving      Subjective  General: Patient supine in bed on arrival and agreeable for session. Cotx with PT to maximize function and safety. Sister present. Pain: 0/10  Pain Interventions: repositioned         Activities of Daily Living  Basic Activities of Daily Living  Toileting: Total Assistance  Comments: Male external catheter in place on arrival and exit; Maximum assistance for pericare and clothing mgmt secondary to urinary incontinence . Oral care performed while pt seated in chair and supported for upright posture. Pt with progressive fatigue during session and requiring increased assistance with increasing fatigue. Instrumental Activities of Daily Living  No IADL completed on this date. Functional Mobility  Bed Mobility  Sit to Supine: 2 person assistance of minimal assistance  Scootin person assistance of moderate assistance  Comments: Pt tolerated sitting EOB with c/o mild dizziness, which resolved with increased time. Transfers  Sit to stand transfer:2 person assistance of Moderate assistance  Stand to sit transfer: 2 person assistance of Moderate/Minimal Assistance  Toilet transfer: Francisco Schmitz utilized; 2 person assistance of Moderate assistance (on/off toilet)  Comments:Verbal cues required for sequencing and hand placement for safety. Functional Mobility:  Sitting Balance: Maximum assistance    Sitting Balance Comment: Inattention to L side of body and posterior lean noted EOB. Standing Balance Comment: Jovanny BARNEY  No functional mobility assessed this date.      Functional Outcomes  -PAC Inpatient Daily Activity Raw Score: 12    Cognition  Overall Cognitive Status: Impaired  Arousal/Alterness: appropriate responses to stimuli  Following Commands: follows one step commands with repetition  Attention Span: appears intact  Memory: decreased recall of recent events  Safety Judgement: decreased awareness of need for assistance  Problem Solving: assistance required to generate solutions  Insights: decreased awareness of deficits  Initiation: requires cues for some  Sequencing: requires cues for some  Comments: patient with increased time to answer questions, slurred speech, easily frustrated   Orientation:    oriented to person, oriented to situation, disoriented to place, and disoriented to time   Command Following:   accurately follows one step commands       Education  Barriers To Learning: cognition, emotional, and physical  Patient Education: patient educated on goals, OT role and benefits, plan of care, discharge recommendations  Learning Assessment:  patient verbalizes understanding, would benefit from continued reinforcement    Assessment  Activity Tolerance: Fair +  Impairments Requiring Therapeutic Intervention: decreased functional mobility, decreased ADL status, decreased ROM, decreased strength, decreased fine motor control, decreased coordination  Prognosis: good and fair    Clinical Assessment: Patient presents with the above deficits impacting daily occupational performance and would benefit from continued skilled OT services. Pt continues to require 2 person assistance with bed mobility and ADL transfers. Improvement noted to Left sided attention with and without cueing. Pt motivated to participate with OT and PT. Continue with present POC.      Safety Interventions: patient left in chair, chair alarm in place, patient at risk for falls, family present, and nurse notified     Plan  Frequency: 5-7 x/week  Current Treatment Recommendations: strengthening, ROM, balance training, functional mobility training, transfer training, endurance training, neuromuscular re-education, ADL/self-care training, IADL training, home exercise program, safety education, equipment evaluation/education, and positioning    Goals  Patient Goals: open for more rehab   Short Term Goals:  Time Frame: until discharge-- NO GOAL MET 2/7/23  Patient will complete lower body ADL at moderate assistance   Patient will complete toileting at minimal assistance   Patient will complete functional transfers at minimal assistance   Patient will increase AMPAC ADL score = to or > than 16/24    Therapy Session Time     Individual Group Co-treatment   Time In    1119   Time Out    1218   Minutes    59        Timed Code Treatment Minutes:  59 minutes  Total Treatment Minutes:  59 minutes       Electronically Signed By: Fadumo NOGUERA/MIKE [id number]

## 2023-02-07 NOTE — PLAN OF CARE
Problem: Discharge Planning  Goal: Discharge to home or other facility with appropriate resources  Outcome: Progressing  Flowsheets (Taken 2/7/2023 1100)  Discharge to home or other facility with appropriate resources: Identify barriers to discharge with patient and caregiver     Problem: Safety - Adult  Goal: Free from fall injury  Outcome: Progressing  Flowsheets (Taken 2/7/2023 1147)  Free From Fall Injury: Instruct family/caregiver on patient safety     Problem: Pain  Goal: Verbalizes/displays adequate comfort level or baseline comfort level  Outcome: Progressing     Problem: Neurosensory - Adult  Goal: Achieves stable or improved neurological status  Recent Flowsheet Documentation  Taken 2/7/2023 0745 by Elvis Vázquez RN  Achieves stable or improved neurological status: Assess for and report changes in neurological status

## 2023-02-07 NOTE — DISCHARGE SUMMARY
[facility] -- Physician Discharge Summary     Jaimee Valdez  1955  MRN: [id number]    Admit Date: 2/4/2023  Discharge Date: No discharge date for patient encounter. Attending MD: Chris Li MD  Discharging MD: Chris Li MD  PCP: Navjot Jiang DO [facility] Abraham Sharmad [phone]    Admission Diagnosis: Acute ischemic stroke [facility]) [I63.9]  Acute CVA (cerebrovascular accident) [facility]) [I63.9]  DISCHARGE DIAGNOSIS: same    Full Hospital Problem List:  Active Hospital Problems    Diagnosis Date Noted    Expressive aphasia [R47.01] 02/05/2023     Priority: Medium    Dysarthria [R47.1] 02/05/2023     Priority: Medium    Ataxia [R27.0] 02/05/2023     Priority: Medium    Primary hypertension [I10] 02/05/2023     Priority: Medium    Tobacco abuse [Z72.0] 02/05/2023     Priority: Medium    Noncompliance [Z91.199] 02/05/2023     Priority: Medium    Left hemiparesis ([facility] [address] [G81.94] 02/05/2023     Priority: Medium    Acute ischemic stroke ([facility] [address] [I63.9] 02/04/2023     Priority: Medium           Hospital Course:  69 y.o. male with a history of current tobacco abuse who presents to the emergency department today via ambulance from home with patient stating, \"I believe I have had a stroke\". Patient states he does not have a family doctor and states he has never been to a doctor. He is unsure what medical problems he may have. He does still currently smoke. He lives at home with his brother. Patient states yesterday he feels like he began having left-sided facial drooping. He states he began feeling very unstable when walking and actually fell several times at home yesterday. He also feels he began having weakness in his left arm yesterday. He thought the symptoms would pass and therefore did not seek any medical treatment. He is here with his brother who states he came downstairs this morning and noticed the patient sitting in front of the TV.   The patient had change the input on the TV and did not remember how to fix it. Brother states this is very abnormal.  Brother also states that patient has had difficulty word finding. Patient is alert and oriented x3 with GCS 15 on arrival.  He is noted to have left facial drooping with smiling. He also has a pronator drift on the left side. A code stroke was called and he was taken for CT scan. CT HEAD WO CONTRAST   Preliminary Result   1. No acute intracranial process identified. 2. Ventriculomegaly out of proportion to the degree of volume loss suggesting   normal pressure hydrocephalus in the appropriate clinical setting. Pt admitted, seen by Neuro    MRI brain then done   Impression:       Subacute ischemia in the right basal ganglia and adjacent periventricular   white matter. Extensive underlying chronic microvascular disease. Symmetric prominence of ventricular system that is nonspecific and   correlation with symptomatology for normal pressure hydrocephalus is needed. Recs -   [facility], acute rehab  When recovered, outpt workup for NPH recommended    Pt is evaluated by pt/ot  ARU is recommended  Pt is accepted to [id number] [facility] ARU by Dr Jessi Dubose and transfer to that unit is set up    Consults made during Hospitalization:  IP CONSULT TO NEUROSURGERY  IP CONSULT TO INTERNAL MEDICINE  IP CONSULT TO NEUROLOGY  IP CONSULT TO [address] [place] [address]    Treatment team at time of Discharge: Treatment Team: Attending Provider: Bebo Barron MD; Consulting Physician: Mela Canseco MD; Utilization Reviewer: Manuelito Toledo LPN; Consulting Physician:  Noe Huynh MD; Consulting Physician: Bebo Barron MD; Consulting Physician: Warren Steele MD; Respiratory Therapist (Night): Noel Grimaldo RCP; Utilization Reviewer: Vasquez Chaney RN; Registered Nurse: Mike Dent RN; Speech Language Pathologist: Aravind Hong, DELMER    Imaging Results:  CT HEAD WO CONTRAST    Result Date: 2/6/2023  EXAMINATION: CT OF THE HEAD WITHOUT CONTRAST  2/5/2023 9:21 pm TECHNIQUE: CT of the head was performed without the administration of intravenous contrast. Automated exposure control, iterative reconstruction, and/or weight based adjustment of the mA/kV was utilized to reduce the radiation dose to as low as reasonably achievable. COMPARISON: MRI of brain on 02/05/2023 at 1033 hours. CT scan of brain without contrast on 02/04/2023 at 0930 hours. HISTORY: ORDERING SYSTEM PROVIDED HISTORY: fall TECHNOLOGIST PROVIDED HISTORY: Reason for exam:->fall Has a \"code stroke\" or \"stroke alert\" been called? ->No FINDINGS: BRAIN/VENTRICLES: On MRI of brain on 02/05/2023 at 1033 hours, there is evidence of acute infarction with restricted diffusion in the lower central portion of the right corona radiata, extended to the upper aspect of the posterior part of right lentiform nucleus. On the current CT scan there is focal hypodensity corresponding to the acute infarction in the lower central portion of the right corona radiata and extended to the upper aspect of right lentiform nucleus. In the rest of brain, there is no other new or acute abnormality identified. Evidence of mild cortical atrophy changes in bilateral frontal lobes and parietal lobes. Again note is made of moderate to markedly enlarged cerebral lateral ventricles and moderately enlarged cerebral 3rd ventricle without midline shift. Evidence of moderate to marked chronic microvascular ischemic/aging changes in the periventricular and central white matter and to some extent subcortical white matter of both cerebral hemispheres. Redemonstration of small cortical-subcortical infarction in the upper lateral portion of the left frontal lobe, as also noted on the MRI study. No evidence of intracranial hemorrhage. No evidence of intracranial mass, subdural or epidural hematoma or subdural hygroma.  No focal abnormality in bilateral thalami, brainstem or in cerebellum. ORBITS: The visualized portion of the orbits demonstrate no acute abnormality. SINUSES: The visualized paranasal sinuses and mastoid air cells demonstrate no acute abnormality. SOFT TISSUES/SKULL:  No acute abnormality of the visualized skull or soft tissues. There is no demonstrable interval change on CT scan of brain as compared to previous MRI of brain on 02/05/2023 at 1033 hours. Redemonstration of focal acute infarction with mild hypodensity involving lower central portion of the right corona radiata extended to upper aspect of right lentiform nucleus. There is no new or acute additional infarction in brain identified on this noncontrast CT scan. No evidence of intracranial hemorrhage. Redemonstration of mild cortical atrophy changes and disproportionate enlargement of cerebral lateral ventricles. Normal pressure hydrocephalus may not be excluded. No evidence of intracranial mass, subdural or epidural hematoma or subdural hygroma. CT HEAD WO CONTRAST    Result Date: 2/4/2023  EXAMINATION: CT OF THE HEAD WITHOUT CONTRAST,  2/4/2023 9:20 am TECHNIQUE: CT of the head was performed without the administration of intravenous contrast. Automated exposure control, iterative reconstruction, and/or weight based adjustment of the mA/kV was utilized to reduce the radiation dose to as low as reasonably achievable. COMPARISON: None HISTORY: ORDERING SYSTEM PROVIDED HISTORY: Weakness TECHNOLOGIST PROVIDED HISTORY: If patient is on cardiac monitor and/or pulse ox, they may be taken off cardiac monitor and pulse ox, left on O2 if currently on. All monitors reattached when patient returns to room. Has a \"code stroke\" or \"stroke alert\" been called? No Reason for exam:->Weakness Reason for Exam: Stroke FINDINGS: BRAIN/VENTRICLES: There is no acute infarct or acute intracranial hemorrhage present. There is no mass effect or midline shift present.  There is ventriculomegaly out of proportion to the degree of volume loss. There is no abnormal extra-axial fluid collection identified. Periventricular hypoattenuation is present. ORBITS: Limited evaluation of the orbits is unremarkable. SINUSES: There is trace fluid within the left mastoid air cells. The paranasal sinuses and right mastoid air cells are otherwise clear. SOFT TISSUES/SKULL:  No lytic or blastic osseous lesions are identified. 1. No acute intracranial process identified. 2. Ventriculomegaly out of proportion to the degree of volume loss suggesting normal pressure hydrocephalus in the appropriate clinical setting. The findings were sent to the Radiology Results [address] at 9:37 am on 2/4/2023 to be communicated to a licensed caregiver. XR CHEST PORTABLE    Result Date: 2/4/2023  EXAMINATION: ONE XRAY VIEW OF THE CHEST 2/4/2023 9:21 am COMPARISON: None. HISTORY: ORDERING SYSTEM PROVIDED HISTORY: Weakness TECHNOLOGIST PROVIDED HISTORY: Reason for exam:->Weakness Reason for Exam: sroke alert FINDINGS: The lungs are without acute focal process. There is no effusion or pneumothorax. The cardiomediastinal silhouette is without acute process. The osseous structures are without acute process. No acute process. CTA HEAD NECK W CONTRAST    Result Date: 2/4/2023  EXAMINATION: CTA OF THE HEAD AND NECK WITH CONTRAST 2/4/2023 9:21 am: TECHNIQUE: CTA of the head and neck was performed with the administration of intravenous contrast. Multiplanar reformatted images are provided for review. MIP images are provided for review. Stenosis of the internal carotid arteries measured using NASCET criteria. Automated exposure control, iterative reconstruction, and/or weight based adjustment of the mA/kV was utilized to reduce the radiation dose to as low as reasonably achievable. 3D reconstructed images were performed on a separate workstation and provided for review. This scan was analyzed using Viz. ai contact LVO. Identification of suspected findings is not for diagnostic use beyond notification. Viz LVO is limited to analysis of imaging data and should not be used in-lieu of full patient evaluation or relied upon to make or confirm diagnosis. COMPARISON: None. HISTORY: ORDERING SYSTEM PROVIDED HISTORY: Weakness TECHNOLOGIST PROVIDED HISTORY: Reason for exam:->Weakness Has a \"code stroke\" or \"stroke alert\" been called?->No Decision Support Exception - unselect if not a suspected or confirmed emergency medical condition->Emergency Medical Condition (MA) Reason for Exam: stroke Initial evaluation. FINDINGS: CTA NECK: AORTIC ARCH/ARCH VESSELS: Minimal atherosclerosis of the aortic arch.  No evidence of a flow limiting stenosis or dissection of the innominate or subclavian arteries. CAROTID ARTERIES: No significant stenosis of the common carotid arteries. There appears to be atherosclerosis involving the proximal cervical ICAs bilaterally including noncalcified atherosclerosis involving the proximal right cervical ICA.  There is less than 50% stenosis of the proximal right cervical ICA by NASCET criteria.  No focal stenosis of the left cervical ICA by NASCET criteria.  No evidence of a dissection VERTEBRAL ARTERIES: There is a slightly right dominant vertebral artery.  No significant stenosis or dissection is seen of the vertebral arteries. SOFT TISSUES: No focal consolidation within the visualized lung apices.  No acute abnormality within the visualized superior mediastinum. BONES: The osseous structures appear osteopenic.  No convincing acute osseous abnormality.  Degenerative change of the cervical spine most prominent at C5-C6 where there is loss of disc space height, endplate irregularity and sclerosis. CTA HEAD: ANTERIOR CIRCULATION: No significant stenosis of the intracranial internal carotid, anterior cerebral, or middle cerebral arteries. No aneurysm. Scattered atherosclerosis of the internal carotid arteries. POSTERIOR  CIRCULATION: No significant stenosis of the vertebral, basilar, or posterior cerebral arteries. No aneurysm. OTHER: No dural venous sinus thrombosis on this non-dedicated study. BRAIN: No evidence of mass effect or midline shift. The size of the ventricles appears prominent out of proportion to the cortical sulci. 1. There is predominately noncalcified atherosclerosis, which contributes to less than 50% stenosis of the proximal right cervical ICA by NASCET criteria. No flow limiting stenosis seen of the cervical carotid/vertebral arteries. 2. No significant stenosis or large vessel occlusion of the [id number]. MRI BRAIN WO CONTRAST    Result Date: 2/5/2023  EXAMINATION: MRI OF THE BRAIN WITHOUT CONTRAST  2/5/2023 10:25 am TECHNIQUE: Multiplanar multisequence MRI of the brain was performed without the administration of intravenous contrast. COMPARISON: CT brain performed 02/04/2023. HISTORY: ORDERING SYSTEM PROVIDED HISTORY: stroke TECHNOLOGIST PROVIDED HISTORY: Reason for exam:->stroke What is the sedation requirement?->None Decision Support Exception - unselect if not a suspected or confirmed emergency medical condition->Emergency Medical Condition (MA) Reason for Exam: Left side weakness, slurred speech. Stroke alert earlier today. FINDINGS: INTRACRANIAL STRUCTURES/VENTRICLES: The sellar and suprasellar structures, optic chiasm, corpus callosum, pineal gland, tectum, and midline brainstem structures are unremarkable. The craniocervical junction is unremarkable. There is no acute hemorrhage, mass effect, or midline shift. There is satisfactory overall gray-white matter differentiation. There is chronic microvascular disease. The ventricular structures are symmetrically prominent. The infratentorial structures including the cerebellopontine angles and internal auditory canals are unremarkable. There is restricted diffusion in the right basal ganglia and adjacent periventricular white matter. There is no abnormal blooming artifact on susceptibility weighted imaging. ORBITS: The visualized portion of the orbits demonstrate no acute abnormality. SINUSES: There is fluid in the mastoid air cells. The paranasal sinuses are normally aerated. BONES/SOFT TISSUES: The bone marrow signal intensity appears normal. The soft tissues demonstrate no acute abnormality. Subacute ischemia in the right basal ganglia and adjacent periventricular white matter. Extensive underlying chronic microvascular disease. Symmetric prominence of ventricular system that is nonspecific and correlation with symptomatology for normal pressure hydrocephalus is needed. Discharge Exam:  See progress note from day of d/c    Disposition: ARU    Condition: stable    Discharge Medications:     Medication List        START taking these medications      aspirin 81 MG chewable tablet  Take 1 tablet by mouth daily     atorvastatin 40 MG tablet  Commonly known as: LIPITOR  Take 1 tablet by mouth nightly     clopidogrel 75 MG tablet  Commonly known as: PLAVIX  Take 1 tablet by mouth daily               Where to Get Your Medications        Information about where to get these medications is not yet available    Ask your nurse or doctor about these medications  aspirin 81 MG chewable tablet  atorvastatin 40 MG tablet  clopidogrel 75 MG tablet            Allergies:  No Known Allergies    Follow up Instructions: Follow-up with PCP: Jenna Dougalss III, DO in 2 wk .   Needs outpatient workup for NPH    Total time spent on day of discharge including face-to-face visit, examination, documentation, counseling, preparation of discharge plans and followup, and discharge medicine reconciliation and presciptions is 33 minutes    Signed:  Cornelia oCrdon MD  2/7/2023

## 2023-02-07 NOTE — PROGRESS NOTES
Progress Note - Dr. Eleanor Vaz - Internal Medicine  PCP: Julia Rivero DO [facility] [place] [phone] Day: 3  Code Status: Full Code  Current Diet: ADULT DIET; Easy to Chew        CC: follow up on medical issues    Subjective:   Td Blackwell is a 69 y.o. male. Pt seen and examined  Chart reviewed since last visit, labs and imaging below      Notes reviewed  Case d/w neuro  Some expressive aphasia persists    Working on [employer] precert      Review of Systems: (1 system for EPF, 2-9 for detailed, 10+ for comprehensive)  Constitutional: Negative for chills and fever. HENT: Negative for dental problem, nosebleeds and rhinorrhea. Eyes: Negative for photophobia and visual disturbance. Respiratory: Negative for cough, chest tightness and shortness of breath. Cardiovascular: Negative for chest pain and leg swelling. Gastrointestinal: Negative for diarrhea, nausea and vomiting. Endocrine: Negative for polydipsia and polyphagia. Genitourinary: Negative for frequency, hematuria and urgency. Musculoskeletal: Negative for back pain and myalgias. Skin: Negative for rash. Allergic/Immunologic: Negative for food allergies. Neurological: Negative for dizziness, seizures, syncope and facial asymmetry. Hematological: Negative for adenopathy. Psychiatric/Behavioral: Negative for dysphoric mood. The patient is not nervous/anxious. I have reviewed the patient's medical and social history in detail and updated the computerized patient record. To recap: He  has a past medical history of CVA (cerebral vascular accident) ([facility] [address] . He  has a past surgical history that includes Finger surgery (Right); Hernia repair; fracture surgery; and hernia repair. Yue Killian He  reports that he has been smoking cigarettes. He has a 50.00 pack-year smoking history.  He has never used smokeless tobacco. He reports current alcohol use of about 14.0 standard drinks per week. He reports that he does not currently use drugs. .        Active Hospital Problems    Diagnosis Date Noted    Expressive aphasia [R47.01] 02/05/2023     Priority: Medium    Dysarthria [R47.1] 02/05/2023     Priority: Medium    Ataxia [R27.0] 02/05/2023     Priority: Medium    Primary hypertension [I10] 02/05/2023     Priority: Medium    Tobacco abuse [Z72.0] 02/05/2023     Priority: Medium    Noncompliance [Z91.199] 02/05/2023     Priority: Medium    Left hemiparesis ([facility] [address] [G81.94] 02/05/2023     Priority: Medium    Acute ischemic stroke ([facility] [address] [I63.9] 02/04/2023     Priority: Medium       Current Facility-Administered Medications: hydrALAZINE (APRESOLINE) injection 10 mg, 10 mg, IntraVENous, Q6H PRN  0.9 % sodium chloride bolus, 500 mL, IntraVENous, PRN  potassium chloride (KLOR-CON M) extended release tablet 40 mEq, 40 mEq, Oral, PRN **OR** potassium bicarb-citric acid (EFFER-K) effervescent tablet 40 mEq, 40 mEq, Oral, PRN **OR** potassium chloride 10 mEq/100 mL IVPB (Peripheral Line), 10 mEq, IntraVENous, PRN  clopidogrel (PLAVIX) tablet 75 mg, 75 mg, Oral, Daily  thiamine (B-1) injection 100 mg, 100 mg, IntraVENous, Daily  aspirin chewable tablet 81 mg, 81 mg, Oral, Daily  atorvastatin (LIPITOR) tablet 40 mg, 40 mg, Oral, Nightly  enoxaparin (LOVENOX) injection 40 mg, 40 mg, SubCUTAneous, Daily  perflutren lipid microspheres (DEFINITY) injection 1.5 mL, 1.5 mL, IntraVENous, ONCE PRN         Objective:  /67   Pulse 58   Temp 97.7 °F (36.5 °C) (Oral)   Resp 20   Ht 5' 9\" (1.753 m)   Wt 148 lb 12.8 oz (67.5 kg)   SpO2 97%   BMI 21.97 kg/m²      Patient Vitals for the past 24 hrs:   BP Temp Temp src Pulse Resp SpO2 Weight   02/07/23 0813 -- -- -- -- -- -- 148 lb 12.8 oz (67.5 kg)   02/07/23 0745 125/67 97.7 °F (36.5 °C) Oral 58 -- -- --   02/07/23 0453 (!) 142/88 97.8 °F (36.6 °C) Oral 75 20 -- --   02/06/23 1815 (!) 159/85 98 °F (36.7 °C) Axillary 65 18 97 % --   02/06/23 1630 137/86 97.7 °F (36.5 °C) Oral 63 18 97 % --   02/06/23 1130 (!) 145/73 98 °F (36.7 °C) Oral 72 18 95 % --       Patient Vitals for the past 96 hrs (Last 3 readings):   Weight   02/07/23 0813 148 lb 12.8 oz (67.5 kg)   02/06/23 0403 152 lb 3.2 oz (69 kg)   02/04/23 0930 155 lb (70.3 kg)           No intake or output data in the 24 hours ending 02/07/23 0840        Physical Exam: (2-7 system for EPF/Detailed, ?8 for Comprehensive)  /67   Pulse 58   Temp 97.7 °F (36.5 °C) (Oral)   Resp 20   Ht 5' 9\" (1.753 m)   Wt 148 lb 12.8 oz (67.5 kg)   SpO2 97%   BMI 21.97 kg/m²   Constitutional: vitals as above: alert, appears stated age and cooperative    Psychiatric: normal insight and judgment, oriented to person, place, time, and general circumstances    Head: Normocephalic, without obvious abnormality, atraumatic    Eyes:lids and lashes normal, conjunctivae and sclerae normal and pupils equal, round, reactive to light and accomodation    EMNT: external ears normal, nares midline    Neck: no carotid bruit, supple, symmetrical, trachea midline and thyroid not enlarged, symmetric, no tenderness/mass/nodules     Respiratory: clear to auscultation and percussion bilaterally with normal respiratory effort    Cardiovascular: normal rate, regular rhythm, normal S1 and S2 and no murmurs    Gastrointestinal: soft, non-tender, non-distended, normal bowel sounds, no masses or organomegaly    Extremities: no clubbing, no edema    Skin:No rashes or nodules noted.      Neurologic: +aphasia       Labs:  Lab Results   Component Value Date    WBC 8.5 02/07/2023    HGB 16.3 02/07/2023    HCT 46.9 02/07/2023     02/07/2023    ALT 18 02/04/2023    AST 24 02/04/2023     02/07/2023    K 4.1 02/07/2023     02/07/2023    CREATININE 0.9 02/07/2023    BUN 25 (H) 02/07/2023    CO2 27 02/07/2023    INR 0.95 02/04/2023    LABMICR Not Indicated 02/04/2023     Lab Results   Component Value Date    TROPONINI <0.01 02/04/2023       Recent Imaging Results are Reviewed:  CT HEAD WO CONTRAST    Result Date: 2/6/2023  EXAMINATION: CT OF THE HEAD WITHOUT CONTRAST  2/5/2023 9:21 pm TECHNIQUE: CT of the head was performed without the administration of intravenous contrast. Automated exposure control, iterative reconstruction, and/or weight based adjustment of the mA/kV was utilized to reduce the radiation dose to as low as reasonably achievable. COMPARISON: MRI of brain on 02/05/2023 at 1033 hours. CT scan of brain without contrast on 02/04/2023 at 0930 hours. HISTORY: ORDERING SYSTEM PROVIDED HISTORY: fall TECHNOLOGIST PROVIDED HISTORY: Reason for exam:->fall Has a \"code stroke\" or \"stroke alert\" been called? ->No FINDINGS: BRAIN/VENTRICLES: On MRI of brain on 02/05/2023 at 1033 hours, there is evidence of acute infarction with restricted diffusion in the lower central portion of the right corona radiata, extended to the upper aspect of the posterior part of right lentiform nucleus. On the current CT scan there is focal hypodensity corresponding to the acute infarction in the lower central portion of the right corona radiata and extended to the upper aspect of right lentiform nucleus. In the rest of brain, there is no other new or acute abnormality identified. Evidence of mild cortical atrophy changes in bilateral frontal lobes and parietal lobes. Again note is made of moderate to markedly enlarged cerebral lateral ventricles and moderately enlarged cerebral 3rd ventricle without midline shift. Evidence of moderate to marked chronic microvascular ischemic/aging changes in the periventricular and central white matter and to some extent subcortical white matter of both cerebral hemispheres. Redemonstration of small cortical-subcortical infarction in the upper lateral portion of the left frontal lobe, as also noted on the MRI study. No evidence of intracranial hemorrhage. No evidence of intracranial mass, subdural or epidural hematoma or subdural hygroma.  No focal abnormality in bilateral thalami, brainstem or in cerebellum. ORBITS: The visualized portion of the orbits demonstrate no acute abnormality. SINUSES: The visualized paranasal sinuses and mastoid air cells demonstrate no acute abnormality. SOFT TISSUES/SKULL:  No acute abnormality of the visualized skull or soft tissues. There is no demonstrable interval change on CT scan of brain as compared to previous MRI of brain on 02/05/2023 at 1033 hours. Redemonstration of focal acute infarction with mild hypodensity involving lower central portion of the right corona radiata extended to upper aspect of right lentiform nucleus. There is no new or acute additional infarction in brain identified on this noncontrast CT scan. No evidence of intracranial hemorrhage. Redemonstration of mild cortical atrophy changes and disproportionate enlargement of cerebral lateral ventricles. Normal pressure hydrocephalus may not be excluded. No evidence of intracranial mass, subdural or epidural hematoma or subdural hygroma. CT HEAD WO CONTRAST    Result Date: 2/4/2023  EXAMINATION: CT OF THE HEAD WITHOUT CONTRAST,  2/4/2023 9:20 am TECHNIQUE: CT of the head was performed without the administration of intravenous contrast. Automated exposure control, iterative reconstruction, and/or weight based adjustment of the mA/kV was utilized to reduce the radiation dose to as low as reasonably achievable. COMPARISON: None HISTORY: ORDERING SYSTEM PROVIDED HISTORY: Weakness TECHNOLOGIST PROVIDED HISTORY: If patient is on cardiac monitor and/or pulse ox, they may be taken off cardiac monitor and pulse ox, left on O2 if currently on. All monitors reattached when patient returns to room. Has a \"code stroke\" or \"stroke alert\" been called? No Reason for exam:->Weakness Reason for Exam: Stroke FINDINGS: BRAIN/VENTRICLES: There is no acute infarct or acute intracranial hemorrhage present. There is no mass effect or midline shift present.  There is ventriculomegaly out of proportion to the degree of volume loss. There is no abnormal extra-axial fluid collection identified. Periventricular hypoattenuation is present. ORBITS: Limited evaluation of the orbits is unremarkable. SINUSES: There is trace fluid within the left mastoid air cells. The paranasal sinuses and right mastoid air cells are otherwise clear. SOFT TISSUES/SKULL:  No lytic or blastic osseous lesions are identified. 1. No acute intracranial process identified. 2. Ventriculomegaly out of proportion to the degree of volume loss suggesting normal pressure hydrocephalus in the appropriate clinical setting. The findings were sent to the Radiology Results [address] at 9:37 am on 2/4/2023 to be communicated to a licensed caregiver. XR CHEST PORTABLE    Result Date: 2/4/2023  EXAMINATION: ONE XRAY VIEW OF THE CHEST 2/4/2023 9:21 am COMPARISON: None. HISTORY: ORDERING SYSTEM PROVIDED HISTORY: Weakness TECHNOLOGIST PROVIDED HISTORY: Reason for exam:->Weakness Reason for Exam: sroke alert FINDINGS: The lungs are without acute focal process. There is no effusion or pneumothorax. The cardiomediastinal silhouette is without acute process. The osseous structures are without acute process. No acute process. CTA HEAD NECK W CONTRAST    Result Date: 2/4/2023  EXAMINATION: CTA OF THE HEAD AND NECK WITH CONTRAST 2/4/2023 9:21 am: TECHNIQUE: CTA of the head and neck was performed with the administration of intravenous contrast. Multiplanar reformatted images are provided for review. MIP images are provided for review. Stenosis of the internal carotid arteries measured using NASCET criteria. Automated exposure control, iterative reconstruction, and/or weight based adjustment of the mA/kV was utilized to reduce the radiation dose to as low as reasonably achievable. 3D reconstructed images were performed on a separate workstation and provided for review. This scan was analyzed using [employer] contact LVO. Identification of suspected findings is not for diagnostic use beyond notification. Viz LVO is limited to analysis of imaging data and should not be used in-lieu of full patient evaluation or relied upon to make or confirm diagnosis. COMPARISON: None. HISTORY: ORDERING SYSTEM PROVIDED HISTORY: Weakness TECHNOLOGIST PROVIDED HISTORY: Reason for exam:->Weakness Has a \"code stroke\" or \"stroke alert\" been called? ->No Decision Support Exception - unselect if not a suspected or confirmed emergency medical condition->Emergency Medical Condition (MA) Reason for Exam: stroke Initial evaluation. FINDINGS: CTA NECK: AORTIC ARCH/ARCH VESSELS: Minimal atherosclerosis of the aortic arch. No evidence of a flow limiting stenosis or dissection of the innominate or subclavian arteries. CAROTID ARTERIES: No significant stenosis of the common carotid arteries. There appears to be atherosclerosis involving the proximal cervical ICAs bilaterally including noncalcified atherosclerosis involving the proximal right cervical ICA. There is less than 50% stenosis of the proximal right cervical ICA by NASCET criteria. No focal stenosis of the left cervical ICA by NASCET criteria. No evidence of a dissection VERTEBRAL ARTERIES: There is a slightly right dominant vertebral artery. No significant stenosis or dissection is seen of the vertebral arteries. SOFT TISSUES: No focal consolidation within the visualized lung apices. No acute abnormality within the visualized superior mediastinum. BONES: The osseous structures appear osteopenic. No convincing acute osseous abnormality. Degenerative change of the cervical spine most prominent at C5-C6 where there is loss of disc space height, endplate irregularity and sclerosis. CTA HEAD: ANTERIOR CIRCULATION: No significant stenosis of the intracranial internal carotid, anterior cerebral, or middle cerebral arteries. No aneurysm.  Scattered atherosclerosis of the internal carotid arteries. POSTERIOR CIRCULATION: No significant stenosis of the vertebral, basilar, or posterior cerebral arteries. No aneurysm. OTHER: No dural venous sinus thrombosis on this non-dedicated study. BRAIN: No evidence of mass effect or midline shift. The size of the ventricles appears prominent out of proportion to the cortical sulci. 1. There is predominately noncalcified atherosclerosis, which contributes to less than 50% stenosis of the proximal right cervical ICA by NASCET criteria. No flow limiting stenosis seen of the cervical carotid/vertebral arteries. 2. No significant stenosis or large vessel occlusion of the [id number]. MRI BRAIN WO CONTRAST    Result Date: 2/5/2023  EXAMINATION: MRI OF THE BRAIN WITHOUT CONTRAST  2/5/2023 10:25 am TECHNIQUE: Multiplanar multisequence MRI of the brain was performed without the administration of intravenous contrast. COMPARISON: CT brain performed 02/04/2023. HISTORY: ORDERING SYSTEM PROVIDED HISTORY: stroke TECHNOLOGIST PROVIDED HISTORY: Reason for exam:->stroke What is the sedation requirement?->None Decision Support Exception - unselect if not a suspected or confirmed emergency medical condition->Emergency Medical Condition (MA) Reason for Exam: Left side weakness, slurred speech. Stroke alert earlier today. FINDINGS: INTRACRANIAL STRUCTURES/VENTRICLES: The sellar and suprasellar structures, optic chiasm, corpus callosum, pineal gland, tectum, and midline brainstem structures are unremarkable. The craniocervical junction is unremarkable. There is no acute hemorrhage, mass effect, or midline shift. There is satisfactory overall gray-white matter differentiation. There is chronic microvascular disease. The ventricular structures are symmetrically prominent. The infratentorial structures including the cerebellopontine angles and internal auditory canals are unremarkable.   There is restricted diffusion in the right basal ganglia and adjacent periventricular white matter. There is no abnormal blooming artifact on susceptibility weighted imaging. ORBITS: The visualized portion of the orbits demonstrate no acute abnormality. SINUSES: There is fluid in the mastoid air cells. The paranasal sinuses are normally aerated. BONES/SOFT TISSUES: The bone marrow signal intensity appears normal. The soft tissues demonstrate no acute abnormality. Subacute ischemia in the right basal ganglia and adjacent periventricular white matter. Extensive underlying chronic microvascular disease. Symmetric prominence of ventricular system that is nonspecific and correlation with symptomatology for normal pressure hydrocephalus is needed. Lab Results   Component Value Date/Time    GLUCOSE 93 02/07/2023 05:35 AM     Lab Results   Component Value Date/Time    POCGLU 105 02/05/2023 09:14 AM     /67   Pulse 58   Temp 97.7 °F (36.5 °C) (Oral)   Resp 20   Ht 5' 9\" (1.753 m)   Wt 148 lb 12.8 oz (67.5 kg)   SpO2 97%   BMI 21.97 kg/m²     Assessment and Plan:  Principal Problem:    Acute ischemic stroke ([address] -.Established problem. Stable. Plan: workup in process. [employer] precert in process  Active Problems:    Expressive aphasia - Established problem. Stable. Plan: for speech tx to see    Dysarthria    Ataxia  Plan: will likely need ARU    Primary hypertension -Established problem. Stable. 135/67  Plan: Continue present orders/plan. Tobacco abuse    Noncompliance  Plan: Continue present orders/plan. Left hemiparesis ([address] -Established problem. Stable.           Case discussed with: neuro  Tests ordered/reviewed: cbc, bmp, tele      Disp - ARU when set up        (Please note that portions of this note were completed with a voice recognition program.  Efforts were made to edit the dictations but occasionally words are mis-transcribed.)        Taylor Rajan MD  2/7/2023

## 2023-02-07 NOTE — PROGRESS NOTES
Facility/Department: [address]  Speech Language Pathology   Dysphagia and Speech Language/Cognitive Treatment Note    Patient: Sheree Gonzalez   : 1955   MRN: [id number]      Evaluation Date: 2023      Admitting Dx: Acute ischemic stroke [facility]) [I63.9]  Acute CVA (cerebrovascular accident) ([facility] [address] [I63.9]  Treatment Diagnosis: Expressive Aphasia ,  Dysarthria , Cognitive-Linguistic Deficits , Oropharyngeal Dysphagia   Pain: Denies                                                Subjective:  Pt awake and alert for session. Demonstrates some impaired insight into current situation. Rehab MD arrived during session and reported pt was accepted to acute rehab unit. Dysphagia Treatment:   Diet and Treatment Recommendations 2023:  Diet Solids Recommendation:  Easy to chew  Liquid Consistency Recommendation: Thin liquids  Recommended form of Meds: Meds in puree         Compensatory strategies: Alternate solids/liquids , Upright as possible with all PO intake , Eat/feed slowly, Remain upright 30-45 min     Assessment of Texture Tolerance:  Diet level prior to treatment: Easy to chew , Thin liquids   Tolerance of Current Diet Level:Per chart, no noted difficulty with current diet level . RN did report some coughing with meds with water      -Impressions: Pt was positioned Upright in bed , awake and alert. Currently on room air. Left side labial asymmetry/decreased ROM is noted. He denied any difficulty with current diet level. States due to missing teeth he has difficulty chewing hard solids. He was agreeable to thin liquids only at this time. With multiple successive drinks of thin liquids pt demonstrated intermittent throat clearing post swallows. Throat clearing appeared effective in clearing any material. With small sips of thin liquids no overt clinical s/s of aspiration/penetration were assessed.  Education was provided re; strokes and dysphagia, risk for aspiration, safe swallow strategies and s/s of aspiration. Suspect pt will need ongoing education and follow-up. Dysphagia Goals:  Pt will functionally tolerate recommended diet with no overt clinical s/s of aspiration (Ongoing 02/07/23)  Pt will demonstrate understanding of aspiration risk and precautions via education/demonstration with occasional prompting (Ongoing 02/07/23)  Pt will advance to least restrictive diet as indicated (Ongoing 02/07/23)  If clinical s/s of aspiration/penetration continue to be noted, Pt will participate in Modified Barium Swallow Study (Ongoing 02/07/23)    Speech Language/Cognitive Treatment:  Impressions: This date goals were targeted via open ended verbal expression, confrontational naming, simple reading, problem solving, short term recall and diadokinetic tasks/articulatory precision. Pt demonstrating improved skills in the areas of expressive language. Pt continues to demonstrate deficits in the areas of speech intelligibility, insight, awareness, short term recall. Pt completed confrontational naming of pictures with 100% accuracy, able to expand to state various uses for objects with min cues. He was able to read single words with 100% accuracy. Given increased time he was able to participate in simple conversation. Completed verbal problem solving for various situations with 95% accuracy. He continues to demonstrated dysarthria in connected with approx 60% intelligible. Pt benefited from cues to over articulate and increase his volume but carryover was poor. Short term recall was impaired, after rehab Md left room pt was unable to recall conversation or plan for transfer to ARU, following education and 5 minute delay pt remained unable to recall details of  discharge stating \"they are kicking me out of here. \" He/she appeared to benefit from verbal cues. Based on today's session recommend ongoing speech therapy to address communicative and cognitive deficits.      Speech Language Short Term Goals:  Pt will improve speech intelligibility in connected speech via graded tasks to 80% (Ongoing 02/07/23)  Pt will improve orientation and short term recall via graded tasks to 80% (Ongoing 02/07/23)  Pt will improve verbal expression for functional expression via graded tasks to 80% (Ongoing 02/07/23)  Pt will participate in ongoing cognitive assessment with goals to be established as indicated (Ongoing 02/07/23)  Pt with improve functional verbal problem solving via graded tasks to 80% min cues (Ongoing 02/07/23)      Assessment: Patient progressing toward goals    Plan: 3-5 times per week during acute care stay. Patient/Family Education:  Provided education regarding role of SLP, recommendations and general speech pathology plan of care. [] Pt verbalized understanding and agreement   [x] Pt requires ongoing learning   [] No evidence of comprehension     Discharge Recommendations:  Recommend ongoing SLP for speech and dysphagia therapy upon discharge from hospital     Treatment time  Timed Code Treatment Minutes: 10 minutes   Total Treatment time: 38 minutes     If patient discharges prior to next session this note will serve as a discharge summary.       Signature: Tee Steele, [address]  Speech Language Pathologist    -

## 2023-02-07 NOTE — PROGRESS NOTES
[facility] Department   Phone: [phone]    Physical Therapy    [] Initial Evaluation            [x] Daily Treatment Note         [] Discharge Summary      Patient: Yue Daniel   : 1955   MRN: [id number]   Date of Service:  2023  Admitting Diagnosis: Acute ischemic stroke [facility])  Current Admission Summary: Per ED Provider notes on , \"Ananda Marie is a 69 y.o. male with a history of current tobacco abuse who presents to the emergency department today via ambulance from home with patient stating, \"I believe I have had a stroke\". Patient states he does not have a family doctor and states he has never been to a doctor. He is unsure what medical problems he may have. He does still currently smoke. He lives at home with his brother. Patient states yesterday he feels like he began having left-sided facial drooping. He states he began feeling very unstable when walking and actually fell several times at home yesterday. He also feels he began having weakness in his left arm yesterday. He thought the symptoms would pass and therefore did not seek any medical treatment. He is here with his brother who states he came downstairs this morning and noticed the patient sitting in front of the TV. The patient had change the input on the TV and did not remember how to fix it. Brother states this is very abnormal.  Brother also states that patient has had difficulty word finding. Patient is alert and oriented x3 with GCS 15 on arrival.  He is noted to have left facial drooping with smiling. He also has a pronator drift on the left side. A code stroke was called and he was taken for CT scan. Patient denies having headache, lightheadedness or dizziness. He denies chest pain or shortness of breath. He has no GI complaints. \"     - MRI revealed acute infarction with restricted diffusion in the lower central portion of the right corona radiata, extended to the upper aspect of the posterior part of right lentiform nucleus    Past Medical History:  has a past medical history of CVA (cerebral vascular accident) ([address] Past Surgical History:  has a past surgical history that includes Finger surgery (Right); Hernia repair; fracture surgery; and hernia repair. Discharge Recommendations:Ananda Sheppard scored a 10/24 on the AM-PAC short mobility form. Current research shows that an AM-PAC score of 17 or less is typically not associated with a discharge to the patient's home setting. Based on the patient's AM-PAC score and their current functional mobility deficits, it is recommended that the patient have 5-7 sessions per week of Physical Therapy at d/c to increase the patient's independence. At this time, this patient demonstrates complex nursing, medical, and rehabilitative needs, and would benefit from intensive rehabilitation services upon discharge from the Inpatient setting. This patient demonstrates the ability to participate in and benefit from an intensive therapy program with a coordinated interdisciplinary team approach to foster frequent, structured, and documented communication among disciplines, who will work together to establish, prioritize, and achieve treatment goals. Please see assessment section for further patient specific details. If patient discharges prior to next session this note will serve as a discharge summary. Please see below for the latest assessment towards goals.        DME Required For Discharge: DME to be determined at next level of care  Precautions/Restrictions: high fall risk, easy to chew diet  Weight Bearing Restrictions: no restrictions     Required Braces/Orthotics: no braces required  Positional Restrictions:no positional restrictions    Pre-Admission Information   Lives With: brother Deonte Fitch)                Type of Home: house  Home Layout: two level, bedroom in the basement, 10 steps with 1 handrail   Home Access:  2 step to enter without rails   Bathroom Layout: tub/shower unit  Bathroom Equipment: grab bars in shower  Toilet Height: elevated height  Home Equipment: no prior equipment  Transfer Assistance: Independent without use of device  Ambulation Assistance:Independent without use of device  ADL Assistance: independent with all ADL's   IADL Assistance: independent with homemaking tasks  Active :        [x] Yes                 [] 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 Occupation: sales  Hobbies: HeTexted   Recent Falls: chart reports had several falls that caused this admission (about 5 falls)     Subjective  General: Pt semi-reclined in bed upon therapist arrival. Pt agreeable to PT/OT treatment. Sister present for duration of session. Pain: 0/10  Pain Interventions: not applicable     Functional Mobility  Bed Mobility  Supine to Sit: 2 person assistance with Yuliet   Scootin person assistance with maxA   Comments: Pt getting out of bed to right, requiring Yuliet of 2 at trunk to prevent L roll toward supine position. Pt required maxA of 2 to twist from short sitting at ~45° to bed to perpendicular. Transfers  Sit to stand transfer: 2 person assistance with modA   Stand to sit transfer: 2 person assistance with modA   Toilet transfer: 2 person assistance with modA + Yuliet   Comments:   - Pt coming to stand with moderate left lean, requiring mod A to max A to maintain midline orientation. No significant posterior lean in standing this date, though decreased weightbearing noted through LLE. Decreased LLE foot clearance, no LOB. - Utilized the [facility]  Ambulation  Ambulation not tested on this date secondary to heavy posterior lean and safety concerns. Distance:   Gait Mechanics:  Comments:    Stair Mobility  Stair mobility not completed on this date.   Comments:  Wheelchair Mobility:  No w/c mobility completed on this date.  Comments:  Balance  Static Sitting Balance: poor (-): requires max (A) to maintain balance  Dynamic Sitting Balance: poor (-): requires max (A) to maintain balance  Static Standing Balance: poor: requires mod (A) to maintain balance  Dynamic Standing Balance: poor (-): requires max (A) to maintain balance  Comments:   - While sitting at EOB, noting heavy posterior lean, with intermittent episodes of CGA. Pt able to correct with cues, but unable to maintain >3 seconds before returning to posterior positioning. Pt sat EOB for ~15 minutes   - Heavy left lateral lean noted in mini-squat position in Jose (pt would not allow knees to bear weight against block) that progressively worsened as session continued. Other Therapeutic Interventions  Pt performing ADL tasks seated in recliner. See OT note for details.      Functional Outcomes  AM-PAC Inpatient Mobility Raw Score : 10              Cognition  WFL, mild expressive aphasia complicating communication  Orientation:    alert and oriented x 4  Command Following:   [facility]    Education  Barriers To Learning: language  Patient Education: patient educated on goals, PT role and benefits, plan of care, weight-bearing education, HEP, general safety, functional mobility training, adaptive device training, orientation, family education, disease specific education, transfer training, discharge recommendations  Learning Assessment:  patient verbalizes understanding, would benefit from continued reinforcement    Assessment  Activity Tolerance: Fair; pt showing marked fatigue after sitting EOB ~15 min, and then ~10 min on toilet  Impairments Requiring Therapeutic Intervention: decreased functional mobility, decreased ADL status, decreased strength, decreased safety awareness, decreased endurance, decreased balance, decreased fine motor control, decreased coordination, decreased posture  Prognosis: good  Clinical Assessment: Pt presents with hemiparesis and decreased motor control on left side, dec attention to left side and decreased balance which put him at a significant risk for falls and a decreased ability to return to home at current level. Pt experiencing some emotional distress this session during OT examination of LUE AROM and strength; comfort provided. Pt utilized Stedy to transfer to toilet and recliner, but required maxA. Pt's function appears to continue to decline slightly, which may be attributed to continued excitotoxicity in areas surrounding infarct. Pt continues to perform well below baseline and would benefit from intensive, continued skilled PT in order to assist in return to PLOF, increase independence, and facilitate return to prior living arrangement. Safety Interventions: patient left in chair, chair alarm in place, call light within reach, gait belt, patient at risk for falls, and family/caregiver present    Plan  Frequency: 5-7 x/week  Current Treatment Recommendations: strengthening, balance training, functional mobility training, transfer training, gait training, stair training, endurance training, neuromuscular re-education, modalities, patient/caregiver education, ADL/self-care training, home management training, home exercise program, safety education, equipment evaluation/education, and positioning    Goals  Patient Goals: to move better   Short Term Goals:  Time Frame: upon discharge  Patient will complete bed mobility at minimal assistance   Patient will complete transfers at minimal assistance   Patient will ambulate 50 ft with use of LRAD at minimal assistance  Patient will ascend/descend 4 stairs with (R) ascending handrail at minimal assistance    No goals met 2/7.     Therapy Session Time      Individual Group Co-treatment   Time In     1071   Time Out     1218   Minutes     59     Timed Code Treatment Minutes:  59 Minutes  Total Treatment Minutes:  59 minutes     LINA Clifton  PT providing direct supervision during session and assisting in making skilled judgements throughout session.     Electronically Signed By: SANDRA Radford PT, DPT, [id number]

## 2023-02-08 PROCEDURE — 51798 US URINE CAPACITY MEASURE: CPT

## 2023-02-08 PROCEDURE — 6370000000 HC RX 637 (ALT 250 FOR IP): Performed by: PHYSICAL MEDICINE & REHABILITATION

## 2023-02-08 PROCEDURE — 97535 SELF CARE MNGMENT TRAINING: CPT

## 2023-02-08 PROCEDURE — 6360000002 HC RX W HCPCS: Performed by: PHYSICAL MEDICINE & REHABILITATION

## 2023-02-08 PROCEDURE — 92523 SPEECH SOUND LANG COMPREHEN: CPT

## 2023-02-08 PROCEDURE — 1280000000 HC REHAB R&B

## 2023-02-08 PROCEDURE — 97167 OT EVAL HIGH COMPLEX 60 MIN: CPT

## 2023-02-08 PROCEDURE — 97530 THERAPEUTIC ACTIVITIES: CPT

## 2023-02-08 PROCEDURE — 97162 PT EVAL MOD COMPLEX 30 MIN: CPT

## 2023-02-08 PROCEDURE — 92610 EVALUATE SWALLOWING FUNCTION: CPT

## 2023-02-08 PROCEDURE — 97165 OT EVAL LOW COMPLEX 30 MIN: CPT

## 2023-02-08 RX ORDER — SENNA AND DOCUSATE SODIUM 50; 8.6 MG/1; MG/1
2 TABLET, FILM COATED ORAL 2 TIMES DAILY
Status: DISCONTINUED | OUTPATIENT
Start: 2023-02-08 | End: 2023-03-02 | Stop reason: HOSPADM

## 2023-02-08 RX ORDER — POLYETHYLENE GLYCOL 3350 17 G/17G
17 POWDER, FOR SOLUTION ORAL DAILY PRN
Status: DISCONTINUED | OUTPATIENT
Start: 2023-02-08 | End: 2023-03-02 | Stop reason: HOSPADM

## 2023-02-08 RX ORDER — POLYETHYLENE GLYCOL 3350 17 G/17G
17 POWDER, FOR SOLUTION ORAL DAILY
Status: DISCONTINUED | OUTPATIENT
Start: 2023-02-08 | End: 2023-02-08

## 2023-02-08 RX ORDER — LISINOPRIL 5 MG/1
5 TABLET ORAL DAILY
Status: DISCONTINUED | OUTPATIENT
Start: 2023-02-08 | End: 2023-03-02 | Stop reason: HOSPADM

## 2023-02-08 RX ADMIN — ENOXAPARIN SODIUM 40 MG: 100 INJECTION SUBCUTANEOUS at 10:04

## 2023-02-08 RX ADMIN — ATORVASTATIN CALCIUM 40 MG: 40 TABLET, FILM COATED ORAL at 21:10

## 2023-02-08 RX ADMIN — CLOPIDOGREL BISULFATE 75 MG: 75 TABLET ORAL at 10:05

## 2023-02-08 RX ADMIN — LISINOPRIL 5 MG: 5 TABLET ORAL at 12:42

## 2023-02-08 RX ADMIN — ACETAMINOPHEN 650 MG: 325 TABLET ORAL at 21:10

## 2023-02-08 RX ADMIN — ASPIRIN 81 MG 81 MG: 81 TABLET ORAL at 10:05

## 2023-02-08 ASSESSMENT — PAIN SCALES - GENERAL
PAINLEVEL_OUTOF10: 1
PAINLEVEL_OUTOF10: 0
PAINLEVEL_OUTOF10: 0

## 2023-02-08 ASSESSMENT — PAIN - FUNCTIONAL ASSESSMENT: PAIN_FUNCTIONAL_ASSESSMENT: ACTIVITIES ARE NOT PREVENTED

## 2023-02-08 ASSESSMENT — PAIN DESCRIPTION - LOCATION: LOCATION: GENERALIZED

## 2023-02-08 ASSESSMENT — PAIN DESCRIPTION - FREQUENCY: FREQUENCY: INTERMITTENT

## 2023-02-08 ASSESSMENT — PAIN DESCRIPTION - DESCRIPTORS: DESCRIPTORS: ACHING

## 2023-02-08 ASSESSMENT — PAIN DESCRIPTION - ONSET: ONSET: GRADUAL

## 2023-02-08 ASSESSMENT — PAIN DESCRIPTION - PAIN TYPE: TYPE: ACUTE PAIN

## 2023-02-08 NOTE — PLAN OF CARE
[address]      Justo Galeas    : 1955  Acct #: [de-identified]  MRN: [id number]  PHYSICIAN:  Keesha Davila MD  Primary Problem    Patient Active Problem List   Diagnosis    Acute ischemic stroke [facility])    Expressive aphasia    Dysarthria    Ataxia    Primary hypertension    Tobacco abuse    Noncompliance    Left hemiparesis ([facility] [address]       Rehabilitation Diagnosis:      Acute right basal ganglia infarct: DAPT for 21 days () , Lipitor. PT/OT/SLP. Discussed SSRI, Pt wanting to discuss with brother     Dysphagia: Dysphagia diet, SLP     HTN: - start lisinopril 5     Tobacco use: education provided. No supplement currently    ADMIT DATE:2023    Patient Goals: To return home and get LUE to move better. Admitting Impairments: Stroke - 1.1 Left Body Involvement (Right Brain)  Activities: Impaired Eating, Hygiene, Toileting, Bathing, Dressing, Bed Mobility, Transfers, Ambulation, Stairs, and Endurance. Participation: Prior to admission patient was living at home with brother, was independent with all mobility and activities, was an active .      CARE PLAN     NURSING:  Justo Galeas while on this unit will:  [] Be continent of bowel and bladder     [x] Have an adequate number of bowel movements  [x] Urinate with no urinary retention >300ml in bladder  [] Complete bladder protocol with haney removal  [] Maintain O2 SATs at ___%  [] Have pain managed while on ARU       [] Be pain free by discharge   [x] Have no skin breakdown while on ARU  [] Have improved skin integrity via wound measurements  [] Have no signs/symptoms of infection at the wound site  [x] Be free from injury during hospitalization   [x] Complete education with patient/family with understanding demonstrated for:  [] Adjustment   [] Other:     Nursing Interventions will include:  [x] bowel/bladder training   [x] education for medical assistive devices   [x] medication education   [] O2 saturation management   [x] energy conservation   [x] stress management techniques   [x] fall prevention   [x] alarms protocol   [x] seating and positioning   [x] skin/wound care   [x] pressure relief instruction   [x] dressing changes     [x] infection protection   [x] DVT prophylaxis  [x] assistance with in room safety with transfers to bed, toilet, wheelchair, shower   [x] bathroom activities and hygiene  [] Other:    Patient/Caregiver Education for:  [x] Disease/sustained injury/management     [x] Medication Use  [] Surgical intervention  [x] Safety  [x] Body mechanics and or joint protection  [x] Health maintenance     [] Other:     PHYSICAL THERAPY:  Goals:                   Patient Goals: To return home and be able to move his (L) UE better  Short Term Goals:  Time Frame: 1-2 weeks  Patient will complete bed mobility at [facility]   Patient will complete transfers at mod (A) of 1  Patient will ambulate 25 ft with use of LRAD at mod (A) of 1  Patient will ascend/descend 1 stairs with (B) handrail at mod (A) of 1  Patient will complete manual w/c propulsion 50 ft at A  Long Term Goals:  Time Frame: 3 weeks  Patient will complete bed mobility at modified independent   Patient will complete transfers at [facility]   Patient will ambulate 50 ft with use of LRAD at [facility]  Patient will ascend/descend 10 stairs with (L) ascending handrail at [facility]  Patient will complete car transfer at [facility]                These goals were reviewed with this patient at the time of assessment and Justo Galeas is in agreement. Plan of Care: Pt to be seen 5 out of 7 days per week per ARU protocol ( 60 minutes with PT)                     OCCUPATIONAL THERAPY:  Goals: Short Term Goals:  Time Frame: in 21 days  Patient will complete upper body bathing at supervision while seated.   Patient will complete lower body bathing at supervision while seated and standing as needed. Patient will complete upper body dressing at set-up assistance. Patient will complete lower body dressing at supervision using AE as needed. Patient will complete toileting at supervision with GB as needed. Patient will complete grooming at supervision while in stance. Patient will complete functional transfers at supervision using LRAD. Patient will complete functional mobility at supervision using LRAD. Patient will increase functional sitting balance to mod I for improved ADL completion. Patient will increase functional standing balance to SUP for improved ADL completion. These goals were reviewed with this patient at the time of assessment and Td Blackwell is in agreement    Plan of Care:  Pt to be seen 5 out of 7 days per week per ARU protocol ( 60 minutes with OT)     SPEECH THERAPY: Goals will be left blank if speech is not following this patient. Goals: Time Frame: 14-21 days  Pt will tolerate recommended diet and advanced trials with no overt s/s of aspiration. Pt will improve oral motor strength and ROM for coordinated and alternating motions, via graded tasks to improve speech back to baseline level as subjectively rated by SLP/pt and family. Pt will improve executive function for multifactor task completion via graded tasks to 80% accuracy  Patient will complete functional problem-solving tasks for daily situations with 80% accuracy or given min cues.   Pt will improve attention to detail for graded complex information to 80%, for improved recall and retention of newly learned information      Plan of Care:  Pt to be seen 5 out of 7 days per week per ARU protocol ( 60 minutes with SLP)    Therapy Treatments will include:  [x]  therapeutic exercises    [x]  gait training     [x]  neuromuscular re-ed                            [x]  transfer training             [] community reintegration    [x] bed mobility                          [x]  w/c mobility and training  [x]  self care    [x]home mgmt    [x]  cognitive training            [x]  energy conservation        [x]  dysphagia tx    [x]  speech/language/communication therapy   [x]  group therapy    [x]  patient/family education    [] Other:    CASE MANAGEMENT:  Goals:  Assist patient/family with discharge planning, patient/family counseling, and coordination with insurance during ARU stay. Melita Grullon will be seen a minimum of 3 hours of therapy per day, a minimum of 5 out of 7 days per week  (please see above for specific treatment plan per PT/OT/SLP). [] In this rare instance due to the nature of this patient's medical involvement, this patient will be seen 15 hours per week (900 minutes within a 7 day period). In addition, dietician/nutritionist may monitor calorie count as well as intake and collaboratively work with SLP on dietary upgrades. Neuropsychology/Psychology may evaluate and provide necessary support. Medical issues being managed closely and that require 24 hour availability of a physician:  [x] Swallowing Precautions  [x] Bowel/Bladder Fx  [] Weight bearing precautions  [] Wound Care    [x] Pain Mgmt   [] Infection Protection  [x] DVT Prophylaxis   [x] Fall Precautions  [x] Fluid/Electrolyte/Nutrition Balance  [x] Voice Protection   [x] Respiratory  [] Other:    Medical Prognosis: [] Good  [x] Fair    [] Guarded   Total expected IRF days: 14  Anticipated discharge destination: Home  [] Home Independently   [] Home with supervision    []SNF     [x] Other - Home with assist as needed for stairs and supervision                                          Physician anticipated functional outcomes:  By discharge, patient will progress to being supervision - independent with all mobility and activities except requiring CGA for stairs. IPOC brief synthesis: 20-year-old male who was admitted on 2/4 with left-sided weakness for greater than 2 days.   CT of the head with suggestion of possible NPH. CTA with less than 50% stenosis of the right ICA. MRI of the brain revealed subacute ischemia on the right basal ganglia. Echo with normal EF and negative bubble study. Neurology evaluated and suggested aspirin and Plavix for 21 days then aspirin only in addition to Lipitor. He was evaluated by therapy and suggested to continue in an inpatient setting prior to returning home.      I have reviewed this initial plan of care and agree with its contents:    Title   Name    Date    Time    Physician: Electronically signed by Terri Beck MD on 2/10/2023 at 3:39 PM      Case Mgmt: Jacquie Rhodes, MSW, LSW 2/8/2023 @ 8:41    OT: Coleen Encinas OTR/L, [id number] on 2/8/23 at 24-20-52-61    PT:  Chet Ibrahim, ARAT - [id number], 2/10/2023 12:09 PM    RN: Electronically signed by Nori Valles RN on 2/7/2023 at 11:12 PM    ST: Kanika Lancaster, [address] [place] [address] Road 2/8/2023 [facility] 18    :  Nelda Major PT, DPT [id number]  2/10/23  1:46 PM

## 2023-02-08 NOTE — PROGRESS NOTES
Patient incontinent of urine upon arrival to unit. No urinary output noted for several hours. Bladder scan reveals 150 ml. Patient denies need/urge to urinate. Urinal placed at bedside. Patient verbalizes he will use urinal if need arises.

## 2023-02-08 NOTE — PROGRESS NOTES
[facility] [address]   Phone: [phone]    Occupational Therapy    [x] Initial Evaluation            [] Daily Treatment Note         [] Discharge Summary      Patient: Bernard Rosen   : 1955   MRN: [id number]   Date of Service:  2023    Admitting Diagnosis:  Acute ischemic stroke [facility])  Current Admission Summary: 61-year-old male who was admitted on  with left-sided weakness for greater than 2 days. CT of the head with suggestion of possible NPH. CTA with less than 50% stenosis of the right ICA. MRI of the brain revealed subacute ischemia on the right basal ganglia. Echo with normal EF and negative bubble study. Neurology evaluated and suggested aspirin and Plavix for 21 days then aspirin only in addition to Lipitor. He was evaluated by therapy and suggested to continue in an inpatient setting prior to returning home. He reports no BM for multiple days. Past Medical History:  has a past medical history of CVA (cerebral vascular accident) ([address] Past Surgical History:  has a past surgical history that includes Finger surgery (Right); Hernia repair; fracture surgery; and hernia repair.     Discharge Recommendations: Discharge recommendations depending pt progress    DME Required For Discharge: DME to be determined pending patient progress    Precautions/Restrictions: high fall risk, up as tolerated,  Dysphagia - Soft and Bite Sized, Left inattention, Pusher syndrome    Pre-Admission Information   Lives With: brother Kemi Vu)                Type of Home: house  Home Layout: two level, bedroom in the basement, 10 steps with 1 handrail on the L  Home Access:  2 step to enter without rails   Bathroom Layout: tub/shower unit  Bathroom Equipment: grab bars in shower  Toilet Height: elevated height  Home Equipment: no prior equipment  Transfer Assistance: Independent without use of device  Ambulation Assistance:Independent without use of device  ADL Assistance: independent with all ADL's   IADL Assistance: independent with homemaking tasks  Active :        [x] Yes                 [] 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 Occupation: sales  Hobbies: fishing   Recent Falls: chart reports had several falls that caused this admission (about 5 falls)     Examination   Vision:   Vision Gross Assessment: WFL and Vision Corrective Device: wears glasses at all times  Comment: please continue to assess vision  Hearing:   [facility]  Observation:   General Observation:  Scattered bruising on LLE on lateral side of leg, small laceration on LUE, L facial droop   Posture: Forward head, rounded shoulders   L lateral lean  Sensation:   denies numbness and tingling, reduced light touch to (L) UE, and reduced hot/cold discrimination to (L) UE  Proprioception:    diminished proprioception in (L) UE, (L) LE  Tone:   Hypotonic in (L) UE  Normotonic in (R) UE  Coordination Testing:   Coordination and Movement Description: (L) UE, (L) LE, fine motor impairments, gross motor impairments, decreased speed, decreased accuracy  Finger/Thumb Opposition: Impaired on Left  Heel to Lopez: Impaired on Left  Alternating Toe Tapping: Impaired on Left    ROM:   (L) Shoulder: 0     (R) Shoulder: WFL  (L) Elbow: 0     (R) Elbow: WFL  (B) UE PROM WFL  Strength:   (L) Shoulder: 0     (R) Shoulder: 5  (L) Elbow: 0     (R) Elbow: 5  (L) Wrist: 0     (R) Wrist: 5  (L) Hand: 0      (R) Hand: 5  (L)  Strength: none    (R)  Strength: WFL  Therapist Clinical Decision Making (Complexity): high complexity  Clinical Presentation: stable    Subjective  General: Pt seated upright in recliner, agreeable to OT/PT cotx and evaluation. Pt requires co-tx secondary to complexity of condition, decreased activity tolerance and increased assistance for ADL/mobility.  Pt benefits from x 2 skilled hands to provide increased cues/feedback and promote improved safety and performance with ADLs. Pain: Patient does not rate upon questioning  Pain Interventions: not applicable     Activities of Daily Living  Basic Activities of Daily Living  Feeding Comments: Pt is on a modified diet. Grooming: setup assistance  Grooming Comments: Seated in wheelchair for oral care  Upper Extremity Bathing: dependent requires verbal cueing Increased time to complete task Comment: CGA-min A x1 for sitting balance with another assist for positioning LUE, and washing RUE  Lower Extremity Bathing: dependent requires verbal cueing Increased time to complete task Comment: seated on BSC in shower with hospital socks (did not remove socks d/t impulsivity and decreased safety awareness), dep for buttocks,  CGA-min A x1 and min A x1 for LB bathing while seated   Bathing Comments: left lateral lean  Upper Extremity Dressing: maximum assistance requires verbal cueing Increased time to complete task Comment: assistance for threading BUE, able to bring shirt up over head, assistance for pulling shirt down back  Lower Extremity Dressing: dependent requires verbal cueing Increased time to complete task  Dressing Comments: max Ax1 and assistance of another for pants, max A for donning/doffing socks  Toileting Comments: politely declined  General Comments: 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   Functional Mobility  Bed Mobility  Supine to Sit: moderate assistance  Sit to Supine: 2 person assistance with min A x2   Rolling Right: minimal assistance  Scooting: contact guard assistance, scooting shoulders laterally  Comments:requires verbal cues for thoroughness  Transfers  Sit to stand transfer:dependent assistance, 2 person assistance with max A X2   Stand to sit transfer: dependent assistance, 2 person assistance with max A X2   Stand pivot transfer: dependent assistance, 2 person assistance with max A X2   Squat pivot transfer: dependent assistance, 2 person assistance with max A X2   Bed / Chair transfer: stedy utilized requiring dependent assistance . Bed / Chair equipment: no device  Bed / Chair comments: sit>stand from elevated bed to stedy: min Ax2, sit<> krys stedy with hand support: min A x1, stand>sit in stedy to recliner chair: mod A+min A   Toilet transfer: dependent assistance, 2 person assistance with max A X2   Toilet transfer equipment: bariatric bedside commode, grab bars, transfers from w/c  Toilet transfer comments: max verbal cues for sequencing, L lateral lean/pusher syndrome, pt is impulsive at times, cues for safety  Shower transfer: dependent assistance, 2 person assistance with max A X2   Shower transfer equipment: bedside commode, grab bars, transfers from w/c  Shower transfer comments: max verbal cues for safety and sequencing  Comments: pt with L lean/Pusher's syndrome to the left, poor insight into deficits and impaired safety awareness. Functional Mobility:  Sitting Balance: contact guard assistance, minimal assistance, moderate assistance, maximum assistance, stedy utilized requiring min-max A . Sitting Balance Comment: fluctuating assistance levels depending on task demands and supporting surface, pusher's syndrome  Standing Balance: dependent assistance, 2 person assistance with max A X2 .     Standing Balance Comment: hand over hand asssitance for LUE on stabilizing surface, max verbal cues for pt to complete hand over hand for positioning LUE in stance  Functional Mobility: .  dependent assistance, 2 person assistance with max Ax2 , JACINTO walker, ~15 ft  Functional Mobility Activity: to bathroom  Functional Mobility Device Use: jacinto walker  Functional Mobility Comment: poor safety awareness, L lateral lean    Cognition  Overall Cognitive Status: WFL  Arousal/Alterness: appropriate responses to stimuli  Following Commands: follows one step commands with repetition  Attention Span: attends with cues to redirect  Memory: decreased recall of precautions, decreased recall of recent events  Safety Judgement: decreased awareness of need for assistance, decreased awareness of need for safety  Problem Solving: assistance required to generate solutions, assistance required to implement solutions, decreased awareness of errors, assistance required to identify errors made, assistance required to correct errors made  Insights: not aware of deficits  Initiation: requires cues for some  Sequencing: requires cues for some  Comments: impulsive at times  Orientation:    oriented to person, oriented to time, and oriented to situation  Comments: pt stated he was in the Reading unit in [place]" however unable to state [facility]  Command Following:   impaired  Follows one step directions with repetition  Education  Barriers To Learning: cognition and language  Patient Education: patient educated on goals, OT role and benefits, plan of care, precautions, ADL adaptive strategies, IADL safety, proper use of assistive device/equipment, adaptive device training, energy conservation, orientation, family education, pressure relief, transfer training, discharge recommendations  Learning Assessment:  patient verbalizes understanding, would benefit from continued reinforcement, patient will require reinforcement due to cognitive deficits  Assessment  Activity Tolerance: Fair with no c/o pain or fatigue  Impairments Requiring Therapeutic Intervention: decreased functional mobility, decreased ADL status, decreased ROM, decreased strength, decreased safety awareness, decreased cognition, decreased endurance, decreased sensation, decreased balance, decreased IADL, decreased fine motor control, decreased coordination, increased pain, decreased posture  Prognosis: good  Clinical Assessment: Pt is a 68 yo M who presents at [facility] in ARU s/p subacute ischemia on the right basal ganglia on 2/4.  PTA, pt was independent with ADLs, iADLs, transfers, and functional mobility with no AE. Currently, pt is functioning below baseline as he requires above assistance levels for ADLs, functional tranfers, and functional mobility. Pt is limited be deficits in sitting balance, standing balance, safety awareness, LUE ROM, LUE strength, LUE sensitivity, and proprioception. Skilled intensive OT in ARU warranted to improve safety and independence with all occupational pursuits. Recommend continued co-tx for therapy session. Pt benefits from x 2 skilled hands to provide increased cues/feedback and promote improved safety and performance with ADLs. Safety Interventions: patient left in chair, chair alarm in place, call light within reach, patient at risk for falls, telesitter in use, and fall mats at bedside    Plan  Frequency: 5 x/week, 60 min/day  Current Treatment Recommendations: strengthening, ROM, balance training, functional mobility training, transfer training, stair training, endurance training, neuromuscular re-education, wheelchair mobility training, modalities, patient/caregiver education, ADL/self-care training, IADL training, home management training, cognitive reorientation, home exercise program, safety education, equipment evaluation/education, and positioning  Goals  Patient Goals: \"to get this working\" in re to LUE   Short Term Goals:  Time Frame: in 10 days  Patient will complete upper body bathing at min A while seated. Patient will complete lower body bathing at min A while seated and standing as needed. Patient will complete upper body dressing at min A. Patient will complete lower body dressing at mod A using AE as needed. Patient will complete toileting at min A with GB as needed. Patient will complete a toilet transfer with min A with GB as needed. Patient will complete a visual assessment to complete occupational profile.    Long Term Goals:  Time Frame: in 21 days  Patient will complete upper body bathing at supervision while seated. Patient will complete lower body bathing at supervision while seated and standing as needed. Patient will complete upper body dressing at set-up assistance. Patient will complete lower body dressing at supervision using AE as needed. Patient will complete toileting at supervision with GB as needed. Patient will complete grooming at supervision while in stance. Patient will complete functional transfers at supervision using LRAD. Patient will complete functional mobility at supervision using LRAD. Patient will increase functional sitting balance to mod I for improved ADL completion. Patient will increase functional standing balance to SUP for improved ADL completion.     Therapy Session Time     Individual Group Co-treatment   Time In    2123   Time Out    1505   Minutes    73        Timed Code Treatment Minutes:  Timed Code Treatment Minutes: 58 Minutes    Total Treatment Minutes:  73       Electronically Signed By: SORIN Carroll OTR/L, [id number]

## 2023-02-08 NOTE — PROGRESS NOTES
Admission Period/Goal QM Codes for [facility]. QM Admit Code Goal Code   Eating 5 6   Oral Hygiene 4 4   Toileting Hygiene 1 6   Shower/Bathing 1 4   UB Dressing 2 6   LB Dressing 1 6   Putting on/off Footwear 2 5   Rolling Left and Right 1 6   Sit To Lying 1 6   Lying to Sitting on Bedside 2 6   Sit to Stand 2 6   Chair/Bed to Chair Transfer 1 6   Toilet Transfers 1 4   Car Transfers 88 6   Walk 10 Feet 1 6   Walk 50 Feet with Two Turns 88 6   Walk 150 Feet 88 6   Walk 10 Feet on Uneven Surfaces 88 6   1 Step (Curb) 88 4   4 Steps 88 4   12 Steps 88 4   Picking up Object from Floor 88 6   Wheel 50 Feet with 2 Turns 9 -   Type - -   Wheel 150 Feet 9 -   Type - -       The above codes were determined by the treatment team to be the patient's accurate admission assessment codes based on assessment performed soon after the patient's admission and prior to the benefit of services provided by staff, or if appropriate, the patient's usual performance at admission.     OT:  OLGA Elaine/MIKE, [id number] 2/11/23     PT:       RN:  CHUCKY Ross, CRRN 2/10/23 1800     ST:       :

## 2023-02-08 NOTE — PROGRESS NOTES
[facility] Department   Phone: [phone]    Physical Therapy    [x] Initial Evaluation            [] Daily Treatment Note         [] Discharge Summary      Patient: Maxime Ross   : 1955   MRN: [id number]   Date of Service:  2023  Admitting Diagnosis: Acute ischemic stroke [facility])  Current Admission Summary: 64-year-old male who was admitted on  with left-sided weakness for greater than 2 days. CT of the head with suggestion of possible NPH. CTA with less than 50% stenosis of the right ICA. MRI of the brain revealed subacute ischemia on the right basal ganglia. Echo with normal EF and negative bubble study. Neurology evaluated and suggested aspirin and Plavix for 21 days then aspirin only in addition to Lipitor. He was evaluated by therapy and suggested to continue in an inpatient setting prior to returning home. He reports no BM for multiple days. Past Medical History:  has a past medical history of CVA (cerebral vascular accident) ([address] Past Surgical History:  has a past surgical history that includes Finger surgery (Right); Hernia repair; fracture surgery; and hernia repair.   Discharge Recommendations: TBD pending progress  DME Required For Discharge: DME to be determined pending patient progress  Precautions/Restrictions: high fall risk, (L) hemiparetic shoulder  Weight Bearing Restrictions: no restrictions  [] Right Upper Extremity  [] Left Upper Extremity [] Right Lower Extremity  [] Left Lower Extremity     Required Braces/Orthotics: no braces required   [] Right  [] Left  Positional Restrictions:no positional restrictions    Pre-Admission Information   Lives With: brother Cornelius Neighbors)                Type of Home: house  Home Layout: two level, bedroom in the basement, 10 steps with 1 handrail on the L  Home Access:  2 step to enter without rails   Bathroom Layout: tub/shower unit  Bathroom Equipment: grab bars in shower  Toilet Height: elevated height  Home Equipment: no prior equipment  Transfer Assistance: Independent without use of device  Ambulation Assistance:Independent without use of device  ADL Assistance: independent with all ADL's   IADL Assistance: independent with homemaking tasks  Active :        [x] Yes                 [] 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 Occupation: sales  Hobbies: fishing   Recent Falls: chart reports had several falls that caused this admission (about 5 falls)     Examination   Vision:   Vision Corrective Device: wears glasses for distance  Hearing:   WFL  Observation:   General Observation:  IV on (L) forearm  Posture:   Significant (L) lateral lean in all positions requiring max VC and physical assist for correction  Sensation:   accurately detects gross touch to all extremities and denies numbness and tingling - unable to detect two vs one finger on his (L) UE  Tone:   Normotonic  Coordination Testing:   Heel to Shin: WFL  Alternating Toe Tapping: Impaired on Left (delayed)   ROM:   (B) LE AROM WFL  Strength:   Unable to test secondary to significant (L) lateral lean in sitting   Therapist Clinical Decision Making (Complexity): medium complexity  Clinical Presentation: evolving      Subjective  General: Patient seated in recliner upon arrival, agreeable to therapy. Pain: 0/10  Pain Interventions: not applicable       Functional Mobility  Bed Mobility  Supine to Sit: moderate assistance  Sit to Supine: 2 person assistance with min A x 2   Rolling Left: not attempted, secondary to (L) hemiparetic shoulder  Rolling Right: minimal assistance  Scooting: contact guard assistance, (scooting (L) <=> (R) with VC for positioning)  Comments: All performed on flat hospital bed. HR utilized for rolling.    Transfers  Sit to stand transfer: stedy utilized requiring min A x 2 from bed => recliner , 2 person assistance with max A x 2   Stand to sit transfer: stedy utilized requiring mod A + min A from bed => recliner , 2 person assistance with max A x 2   Stand pivot transfer: 2 person assistance with max A x 2 from w/c => shower chair   Squat pivot transfer: 2 person assistance with max A x 2 from shower chair => w/c   Comments: Patient presents with significant (L) lateral lean with all transfers requiring max assist and VC to correct upright posture. Patient also required max VC for sequencing and proper hand placement. Requires therapist assist for placement and maintenance of (L) UE during all transfers. Ambulation  Surface:level surface  Assistive Device: jacinto walker  Assistance: 2 person assistance with max A x 2   Distance: 10 ft  Gait Mechanics: Ambulates with (L) lateral lean, narrow and inconsistent THERESE, and decreased step length (B). Increased knee flexion noted (B) but sustains without knee buckling. Comments:  Max VC for sequencing and AD management. Physical assist provided to (L) UE to maintain arm placement on the walker. Stair Mobility  Stair mobility not completed on this date. Comments: Unsafe to attempt secondary to poor posture and extensive assist to maintain standing position  Wheelchair Mobility:  No w/c mobility completed on this date. Comments:  Balance  Static Sitting Balance: poor (+): requires min (A) to maintain balance  Dynamic Sitting Balance: poor: requires mod (A) to maintain balance  Static Standing Balance: poor (-): requires max (A) to maintain balance  Dynamic Standing Balance: poor (-): requires max (A) to maintain balance  Patient completes object retrieval from floor in standing position at not attempted, Unsafe to attempt secondary to poor posture and extensive assist to maintain standing position  Comments:    Other Therapeutic Interventions   See functional mobility. Shower completed with OT team member to maximize patient performance and maintain patient safety.   Pt requires max A x 2 assist to complete stand pivot transfer from w/c => shower seat surface and squat pivot transfer from shower seat => w/c. Pt requires CGA to min assist to maintain static supported sitting balance during shower completion in addition to OT assist with ADL task completion. Pt requires CGA to min assist to maintain dynamic supported sitting balance and promote neutral upright posture to maximize self-participation during shower completion in addition to OT assist with ADL task completion. LE clothing management dependently performed by OT member in addition to physical assistance to achieve and maintain standing. See OT note for further details. Functional Outcomes                 Cognition  Overall Cognitive Status: Impaired  Arousal/Alterness: appropriate responses to stimuli  Following Commands: follows one step commands with repetition, follows one step commands with increased time  Attention Span: difficulty attending to directions  Memory: decreased recall of biographical information, decreased short term memory  Safety Judgement: decreased awareness of need for safety  Problem Solving: assistance required to generate solutions, assistance required to implement solutions  Insights: decreased awareness of deficits  Initiation: requires cues for all  Sequencing: requires cues for all  Orientation:    oriented to person, oriented to place, oriented to situation, and disoriented to time   Command Following:   impaired    Education  Barriers To Learning: cognition, emotional, and physical  Patient Education: patient educated on goals, PT role and benefits, plan of care, general safety, functional mobility training, proper use of assistive device/equipment, disease specific education, transfer training  Learning Assessment:  patient verbalizes understanding, would benefit from continued reinforcement    Assessment  Activity Tolerance: Good.  Patient emotional during today's session and required reassurance and education on process of stroke recovery. Impairments Requiring Therapeutic Intervention: decreased functional mobility, decreased ADL status, decreased ROM, decreased strength, decreased safety awareness, decreased cognition, decreased endurance, decreased balance, decreased coordination, decreased posture  Prognosis: fair  Clinical Assessment: Patient is a 70 y/o male who presents with (L) hemiparesis and impaired posture impacting functional mobility secondary to subacute ischemia on the right basal ganglia on 2/4. Prior to admission, patient was independent without a device for all mobility. At this time, patient presents with significant (L) lateral lean with all mobility requiring max assist of 2 for transfers and short distance ambulation with TERESITA walker. Patient would benefit from skilled therapy services in order to promote functional independence with all mobility and reduce risk of falls at home and in the community. Safety Interventions: patient left in chair, chair alarm in place, call light within reach, gait belt, patient at risk for falls, telesitter in use, and fall mats at bedside    Plan  Frequency: 5 x/week, 60 min/day  Current Treatment Recommendations: strengthening, ROM, balance training, functional mobility training, transfer training, gait training, stair training, endurance training, neuromuscular re-education, wheelchair mobility training, modalities, patient/caregiver education, ADL/self-care training, cognitive/perceptual training, and safety education    Goals  Patient Goals:  To return home and be able to move his (L) UE better  Short Term Goals:  Time Frame: 1-2 weeks  Patient will complete bed mobility at [facility]   Patient will complete transfers at mod (A) of 1  Patient will ambulate 25 ft with use of LRAD at mod (A) of 1  Patient will ascend/descend 1 stairs with (B) handrail at mod (A) of 1  Patient will complete manual w/c propulsion 50 ft at [facility]  Long Term Goals:  Time Frame: 3 weeks  Patient will complete bed mobility at modified independent   Patient will complete transfers at [facility]   Patient will ambulate 50 ft with use of LRAD at [facility]  Patient will ascend/descend 10 stairs with (L) ascending handrail at [facility]  Patient will complete car transfer at [facility]     Therapy Session Time      Individual Group Co-treatment   Time In     1352   Time Out     1505   Minutes     73     Timed Code Treatment Minutes:   58 minutes  Total Treatment Minutes:  73 minutes       Electronically Signed By: Sarahy Benítez SPT

## 2023-02-08 NOTE — PROGRESS NOTES
[facility]   Phone: [phone]    Speech Therapy    [x] Initial Evaluation            [] Daily Treatment Note         [] Discharge Summary      Patient: Priyank Coates   : 1955   MRN: [id number]   Date of Service:  2023  Admitting Diagnosis: Acute ischemic stroke [facility])  Current Admission Summary: 49-year-old male who was admitted on  with left-sided weakness for greater than 2 days. CT of the head with suggestion of possible NPH. CTA with less than 50% stenosis of the right ICA. MRI of the brain revealed subacute ischemia on the right basal ganglia. Echo with normal EF and negative bubble study. Neurology evaluated and suggested aspirin and Plavix for 21 days then aspirin only in addition to Lipitor. He was evaluated by therapy and suggested to continue in an inpatient setting prior to returning home. He reports no BM for multiple days. Past Medical History:  has a past medical history of CVA (cerebral vascular accident) ([address] Past Surgical History:  has a past surgical history that includes Finger surgery (Right); Hernia repair; fracture surgery; and hernia repair. Recent Chest xray: 2023  No acute process. Recent MRI Brain 2023  Subacute ischemia in the right basal ganglia and adjacent periventricular   white matter. Extensive underlying chronic microvascular disease. Symmetric prominence of ventricular system that is nonspecific and   correlation with symptomatology for normal pressure hydrocephalus is needed. Precautions/Restrictions: high fall risk, modified diet      Pre-Admission Information   Living Status: Pt lives with his brother, independent prior to admission. Occupation/School: Retired last year.  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   Medication Management: :  [x]Primary   []Secondary []No  Finance Management: [x]Primary   []Secondary []No  Active :   [x]Yes         []No  Hearing:    WFL  Vision:    Vision Corrective Device: wears glasses at all times      Subjective  General: Pt alert and cooperative throughout session. Emotional lability at the end of the session   Pain: Did not state    Safety Interventions: patient left in chair, chair alarm in place, call light within reach, and patient at risk for falls      Dysphagia Bedside Swallow Evaluation     Prior Dysphagia History: no known dysphagia history   Patient Complaint: Pt reports having no difficulty swallowing but at times he finds it difficult to chew. Patient Positioning:   Upright in chair  Respiratory Status:   Room air  Pre-Evaluation Consistency Recommendation:   Easy to chew  with Thin liquids    Dentition:   Some missing teeth   Oral Hygiene:   Clean   Baseline Vocal Quality:   hoarse  and weak   Volitional Cough:   adequate   Volitional Swallow:   Delayed   Oral Mechanism Exam:   Impairment Severity:Mild   Labial Symmetry , Lingual Symmetry   Oral Phase:   Impairment Severity:Mild   Impaired mastication , Anterior spillage: , and Suspected premature bolus loss   Pharyngeal Phase:   Impairment Severity:Mild  and Moderate   Delayed swallow initiation , Altered vocal quality , Throat Clearing, and Cough  Eating Assistance:   Partial or moderate assistance       Clinical Dysphagia Assessment: Pt presents with mild-moderate oropharyngeal dysphagia due to reduced L side sensation and perioral weakness resulting in anterior bolus loss, impaired mastication, premature bolus loss, delayed swallow initiation, altered vocal quality with a delayed cough. Pt reports not having as much as an appetite as well as lack of dentition creating difficulty with mastication. Pt reports chewing is where his difficulty is and not with swallowing.  Pt may benefit from instrumental assessment due to evidence of reduced sensation and intermittent clinical signs of penetration/aspiration; will recommend when appropriate. Recommend dysphagia diet (soft and bite sized diet) with dysphagia intervention and implementation of safe swallow strategies to minimize aspiration risk. Speech Language Cognitive Evaluation     Patient complaint:   Oral Mechanism Exam:    Impairment Severity:Mild   Labial Symmetry , Lingual Symmetry   Comprehension  Auditory Comprehension:   Impairment Severity:Mild   Impaired Complex/Abstract commands  Reading Comprehension:   Impairment Severity: To be assessed   Expression  Primary Mode of Expression:   Verbal  Verbal Expression:   Impairment Severity:Mild   Impaired Thought Organization   Written Expression:   Impairment Severity: To be assessed    Pragmatics/Social Functioning:   Impairment Severity:Within functional limits      Motor Speech  Impairment Severity:Mild  and Moderate   Decreased speech intelligibility   Voice  Impairment Severity:Mild  and Moderate   Hoarse  Weak   Cognition   [] Unable to be assessed secondary to Aphasia     Overall Orientation:   Impairment Severity:Mild          Disoriented to year, month, day of week  Attention:   Impairment Severity:Mild  and Moderate   Impaired Selective Attention  Impaired Alternating Attention  Memory:   Impairment Severity:Mild   Impaired Short-term Memory  Impaired Immediate/working Memory  Problem Solving:   Impairment Severity: To be assessed      Safety/Judgement:   Impairment Severity:Mild        Additional Assessment   Pt was assessed using the Brief Cognitive Assessment Tool (BCAT).      COGNITIVE DOMAIN Pt Score Points Possible  COMMENTS   ORIENTATION  3 6 Disoriented to year, month, day of week   IMMEDIATE VERBAL RECALL 4 4    VISUAL RECOGNITION/NAMING 3 3    ATTENTION 4 7 Delayed tapping on letter task    ABSTRACTION 2 3    LANGUAGE 2 3 Named 8 names in a minute, looking for 15   EXECUTIVE  1 4 Difficulty with cognitive shifting from number to letter    VISUOSPATIAL  0 4    DELAYED VERBAL RECALL 0 4 Given cue increased to 2/4 IMMEDIATE STORY RECALL 0 2 Deflections, did not attempt   DELAYED VISUAL MEMORY  0 3    DELAYED STORY RECALL 0 2 Deflections, did not attempt   STORY RECOGNITION  0 5 Deflections, did not attempt   SUM 19 50 Score does not reflect impairment level due to deflections at end of task       BCAT CROSSWALK TO FUNCTIONAL STATUS:   Cumulative score does not reflect patient's abilities due to frequent deflections. Education  Barriers To Learning: cognition and hearing  Patient Education: Provided education regarding role of SLP, results of assessment, recommendations and general speech pathology plan of care. Learning Assessment: Pt requires ongoing learning     Assessment  Impairments Requiring Therapeutic Intervention: Cognitive-Linguistic Deficits  and Oropharyngeal Dysphagia   Prognosis: good    Clinical Assessment:  Pt presents with moderate dysarthria characterized by L perioral weakness resulting in reduced articulation, precision, and reduced rate of speech. Pt self-rated speech as 5% being far from baseline. Pt presents with mild-moderate cognitive-linguistic deficits characterized by deficits in orientation, attention, executive functioning, short-term memory, and visuospatial functioning. Errors consisted of many deflections and patient attributing to baseline dyslexia. Required mod cues and repetition throughout task. Pt demonstrated appropriate insight into deficits which resulted in emotional liability towards the end of the assessment. Recommend SLP intervention for safe return to prior level of independence.      Diet Solids Recommendation:   Liquid Consistency Recommendation:   Recommended Form of Meds:   Dysphagia III Soft and bite sized     Thin liquids     Meds whole with water or Meds in puree        Recommended Compensatory Swallowing Strategies: Upright as possible with all PO intake , Small bites/sips , Eat/feed slowly      Plan  Frequency: 60 minutes/day; 5 days per week, as tolerated, until goals met, or discharged from ARU. Therapeutic Interventions: Diet Tolerance Monitoring , Patient/Family Education , Instrumental assessment of swallow function (Modified Barium Swallow Study)  Speech / Motor Planning / Voice intervention , Cognitive-Linguistic intervention , and Patient/ Family education   Discharge Recommendations: Home with assistance and TBD   Continued SLP at Discharge: Yes     Goals  Patient Goals: Pt reports his goal is to get home. Time Frame: 14-21 days    Pt will tolerate recommended diet and advanced trials with no overt s/s of aspiration. Pt will improve oral motor strength and ROM for coordinated and alternating motions, via graded tasks to improve speech back to baseline level as subjectively rated by SLP/pt and family. Pt will improve executive function for multifactor task completion via graded tasks to 80% accuracy  Patient will complete functional problem-solving tasks for daily situations with 80% accuracy or given min cues. Pt will improve attention to detail for graded complex information to 80%, for improved recall and retention of newly learned information       Therapy Session Time      Session 1 Session 2   Time In 0800    Time Out 0900    Time Code Minutes 0    Individual Minutes 60      Timed Code Treatment Minutes:  0  Total Treatment Minutes:  61    Electronically Signed By:   Wang Calvo M.A. CCC-SLP AUDELIA [id number]  Speech-Language Pathologist   2/8/2023 8:13 AM     The speech-language pathologist was present, directed the patient's care, made skilled judgment and was responsible for assessment and treatment.     Lazara Abraham,  Speech-Language Pathology

## 2023-02-08 NOTE — PROGRESS NOTES
[facility] Admission Assessment    Ethnicity  \"Are you of , /a, or Ukrainian origin? \"  Check all that apply:  [x] A. No, not of , /a, or [place] Origin  [] B.  Yes, [place] American, Chicano/a  [] C.  Yes, [address] [place] [address]  [] D.  Yes, [place]  [] E.  Yes, another , , or Ukrainian origin  [] X. Patient unable to respond  [] Y. Patient declines to respond    Race  \"What is your race? \"  Check all that apply:  [x] A. White  [] B. Black or   [] C. American [place] [facility] or [place] Native  [] D.  [place] [facility]  [] E. [place]  [] F. Danish  [] G. [place]  [] [place]  [] I. [place]  [] J.  Other   [] K.   [] L. [place] or Jean  [] M. Northern Irish  [] N. Other [place]  [] X. Patient unable to respond  [] Y. Patient declines to respond  [] Z. None of the above    Language  A. \"What is your preferred language? \"   English    B. \"Do you need or want an  to communicate with a doctor or health care staff? \"  Check only one:  [x] 0. No  [] 1. Yes  [] 9. Unable to determine    Transportation  \"Has lack of transportation kept you from medical appointments, meetings, work, or from getting things needed for daily living? \"Check all that apply:  [] A.  Yes, it has kept me from medical appointments or from getting my medications  [] B.  Yes, it has kept me from non-medical meetings, appointments, work, or from getting things that I need  [x] C.  No  [] X. Patient unable to respond  [] Y. Patient declines to respond    Hearing  Ability to hear (with hearing aid or hearing appliances if normally used)  [x]  0. Adequate - no difficulty in normal conversation, social interaction, listening to TV  []  1. Minimal difficulty - difficulty in some environments (e.g. when person speaks softly or setting is noisy)  []  2. Moderate difficulty - speaker has to increase volume and speak distinctly   []  3.   Highly impaired - absence of useful hearing    Vision  Ability to see in adequate light (with glasses or other visual appliances)  [x]  0. Adequate - sees fine detail, such as regular print in newspapers/books  []  1. Impaired - sees large print, but not regular print in newspapers/books  []  2. Moderately impaired - limited vision; not able to see newspaper headlines but can identify objects  []  3. Highly impaired - object identification in question, but eyes appear to follow objects  []  4. Severely impaired - no vision or sees only light, colors, or shapes; eyes do not appear to follow objects    Health Literacy  \"How often do you need to have someone help you when you read instructions, pamphlets, or other written material from your doctor or pharmacy? \"  []  0. Never  [x]  1. Rarely  []  2. Sometimes  []  3. Often  []  4. Always  []  8. Patient unable to respond    BIMS - **Must be completed in the flowsheet at admission prior to proceeding with Delirium Assessment**  [x] BIMS completed in flowsheet at admission  [] BIMS not completed due to patient unable to give appropriate related answers, see Staff Assessment in flowsheet    Signs and Symptoms of Delirium  A. Acute Onset Mental Status Change - Is there evidence of an acute change in mental status from the patient's baseline? [x] 0. No  [] 1. Yes    B. Inattention - Did the patient have difficulty focusing attention, for example being easily distractible or having difficulty keeping track of what was being said?  []  0. Behavior not present  []  1. Behavior continuously present, does not fluctuate  [x]  2. Behavior present, fluctuates (comes and goes, changes in severity)    C. Disorganized thinking - Was the patient's thinking disorganized or incoherent (rambling or irrelevant conversation, unclear or illogical flow of ideas, or unpredictable switching from subject to subject)? []  0. Behavior not present  []  1.   Behavior continuously present, does not fluctuate  [x]  2. Behavior present, fluctuates (comes and goes, changes in severity)    D. Altered level of consciousness - Did the patient have altered level of consciousness as indicated by any of the following criteria? Vigilant - startled easily to any sound or touch  Lethargic - repeatedly dozed off while being asked questions, but responded to voice or touch  Stuporous - very difficulty to arouse and keep aroused for the interview  Comatose - could not be aroused  [x]  0. Behavior not present  []  1. Behavior continuously present, does not fluctuate  []  2. Behavior present, fluctuates (comes and goes, changes in severity)    Mood    \"Over the last 2 weeks, have you been bothered by any of the following problems?\" 1. Symptom Presence    0 = No  1 = Yes  9 = No Response 2. Symptom Frequency    0 = Never or 1 day  1 = 2-6 days (several days)  2 = 7-11 days (half or more of the days)  3 = 12-14 days (nearly every day)  **Leave blank if 'No Reponse'**      Enter scores in boxes    Column 1 Column 2   Little interest or pleasure in doing things   0 0   Feeling down, depressed, or hopeless   0 0   **If either A or B in column 2 is coded 2 or 3, CONTINUE asking the questions below. If not, END the interview. **     Trouble falling or staying asleep, or sleeping too much       Feeling tired or having little energy       Poor appetite or overeating       Feeling bad about yourself - or that you are a failure or have let yourself or your family down       Trouble concentrating on things, such as reading the newspaper or watching television       Moving or speaking so slowly that other people could have noticed. Or the opposite- being so fidgety or restless that you have been moving around a lot more than usual.       Thoughts that you would be better off dead, or of hurting yourself in some way.        Total Severity: Add scores for all frequency responses in column 2 (possible score 0-27, or enter 99 if unable to complete (if symptom frequency (column 2) is blank for 3 or more items). 0       Social Isolation  \"How often do you feel lonely or isolated from those around you? \"  [x] 0. Never  [] 1. Rarely  [] 2. Sometimes  [] 3. Often  [] 4. Always  [] 7. Patient declines to respond  [] 8. Patient unable to respond    Pain Effect on Sleep  \"Over the past 5 days, how much of the time has pain made it hard for you to sleep at night? \"  []  0. Does not apply - I have not had any pain or hurting in the past 5 days  [x]  1. Rarely or not at all  []  2. Occasionally  []  3. Frequently  []  4. Almost constantly  []  8. Unable to answer    **If the patient answers \"0. Does not apply\" to this question, skip the next two \"Pain Effect. Graceann Schuster Graceann Schusetr \" questions**    Pain Interference with Therapy Activities  \"Over the past 5 days, how often have you limited your participation in rehabilitation therapy sessions due to pain? \"  []  0. Does not apply - I have not received rehabilitation therapy in the past 5 days  [x]  1. Rarely or not at all  []  2. Occasionally  []  3. Frequently  []  4. Almost constantly  []  8. Unable to answer    Pain Interference with Day-to-Day Activities: \"Over the past 5 days, how often have you limited your day-to-day activities (excluding rehabilitation therapy session)? \"  [x]  1. Rarely or not at all  []  2. Occasionally  []  3. Frequently  []  4. Almost constantly  []  8. Unable to answer    Nutritional Approaches  Check all of the following nutritional approaches that apply on admission:  []  A. Parenteral/IV feeding (including IV fluids if needed for hydration, but not as part of dialysis/chemo)  []  B. Feeding tube (e.g., nasogastric or abdominal (PEG))  [x]  C. Mechanically altered diet - requires change in texture of food or liquids (e.g., pureed food, thickened liquids)  [x]  D. Therapeutic diet (e.g., low salt, diabetic, low cholesterol)  []  Z.   None of the above    High Risk Drug Classes:  Use and Indication    Is taking: Check if the pt is taking any medications by pharmacological classification, not how it is used, in the following classes  Indication noted: If column 1 is checked, check if there is an indication noted for all meds in the drug class Is taking  (check all that apply) Indication noted (check all that apply)   Antipsychotic [] []   Anticoagulant [x] [x]   Antibiotic [] []   Opioid [x] [x]   Antiplatelet [x] [x]   Hypoglycemic (including insulin) [] []   None of the above []     Special Treatments, Procedures, and Programs    Check all of the following treatments, procedures, and programs that apply on admission. On admission (check all that apply)   Cancer Treatments   A1. Chemotherapy []           A2. IV []           A3. Oral []           A10. Other []   B1. Radiation []   Respiratory Therapies   C1. Oxygen Therapy []           C2. Continuous (continuously for at least 14 hours per day) []           C3. Intermittent []           C4. High-concentration []   D1. Suctioning (Does not include oral suctioning) []           D2. Scheduled []           D3. As needed []   E1. Tracheostomy Care []   F1. Invasive Mechanical Ventilator (ventilator or respirator) []   G1. Non-invasive Mechanical Ventilator []           G2. BiPAP []           G3. CPAP []   Other   H1. IV Medications (Do not include sub Q pumps, flushes, Dextrose 50% or lactated ringers) []           H2. Vasoactive medications []           H3. Antibiotics []           H4. Anticoagulation []           H10. Other []   I1. Transfusions []   J1. Dialysis []           J2. Hemodialysis []           J3. Peritoneal dialysis []   O1. IV access (including a catheter in a vein) []           O2. Peripheral [x]           O3. Midline []           O4.  Central (PICC, tunneled, port) []      None of the above (select if no Cancer, Respiratory, or Other boxes are checked) []     The above items have been reviewed and updated as necessary, and are accurate for the admission assessment period. Assessing/Reviewing RN:Electronically signed by Gillian Ryan RN on 2/7/2023 at 11:10 PM      Assessing/Reviewing RN: Frank Cuadra, 2/10/2023, 1143.

## 2023-02-08 NOTE — PROGRESS NOTES
4 Eyes Skin Assessment     NAME:  Magdaleno Boles  YOB: 1955  MEDICAL RECORD NUMBER:  [id number]    The patient is being assessed for  Admission    I agree that One RN have performed a thorough Head to Toe Skin Assessment on the patient. ALL assessment sites listed below have been assessed. Areas assessed by both nurses:    Head, Face, Ears, Shoulders, Back, Chest, Arms, Elbows, Hands, Sacrum. Buttock, Coccyx, Ischium, and Legs. Feet and Heels        Does the Patient have a Wound? Other Skin tear to left hand.  Bruise to left outer knee       Christian Prevention initiated by RN: Yes   Wound Care Orders initiated by RN: NA    Pressure Injury (Stage 3,4, Unstageable, DTI, NWPT, and Complex wounds) if present place referral order by RN under : No    New and Established Ostomies, if present place, referral order under : NA      Nurse 1 eSignature: Electronically signed by Iliana Carter RN on 2/7/23 at 10:58 PM EST    **SHARE this note so that the co-signing nurse is able to place an eSignature**    Nurse 2 eSignature: {Esignature:[id number]}

## 2023-02-08 NOTE — CARE COORDINATION
Social Work Admission Assessment    Objective:  Met with pt and his family to complete initial assessment and review role of  in rehab process. Pt oriented to unit. Pt states understanding of this. Current Home Situation:  Patient lives at home in his parents home. Patient's brother lives with him and is able to provide patient support/supervision as needed. They reside in a 2 story home that has been updated with handicap accessible amenities. Pt's plans re:  Return to work/school/volunteer:  Patient is retired. Patient was independent with ADLs and IADLs prior to admission. Used no equipment. Patient was an active . Patient reports that he enjoys fishing and working on the house in his spare time. Accessibility to community resources/transportation:  None/Family will provide transportation @ discharge. Has pt experienced a recent loss or signigicant life event that would impact their care or ability to participate? _x_No  __Yes - Explain    Has pt ever been treated for emotional disorders? _x_No  __Yes--How does that affect current situation:    How does pt and family cope with stressful events and this hospitalization? Patient stated that \"I cope with stressful events by drinking\"    Special Problem Areas:  Has had several falls prior to admission    Discharge Plan: To be determined with progess. Impression/Plan: Cedric Jaramillo (patient )is a 69year old male that has been admitted to ARU. Provided patient/family with this SW's contact information to contact as needed. SW also informed patient/family about the team conference to be held on 2/9/2023 @ 11:00 AM. Will continue to follow for support and discharge planning.         Electronically signed by MILEY Poole on 2/8/2023 at 4:57 PM

## 2023-02-08 NOTE — PROGRESS NOTES
Nutrition Note    RECOMMENDATIONS  PO Diet: consistency per SLP  ONS: Ensure BID     NUTRITION ASSESSMENT   Pt is s/p ischemic stroke. Pt reports decreased appetite, but has never been a big eater. Also reports being very constipated which is affecting appetite as well. Pt reports UBW was 160 lb prior to stroke; if accurate, pt has lost 12 lb within past few weeks. Pt ate 1-25% x first meal in ARU. Encouraged ONS to aid in healing & strengthening during rehabilitation. Pt agrees to try Ensure BID. Will monitor for acceptance. Nutrition Related Findings: No edema ; labs reviewed; LBM 2/5  Wounds: None  Nutrition Education:  Education not indicated   Nutrition Goals: PO intake 50% or greater     MALNUTRITION ASSESSMENT   Acute Illness  Malnutrition Status: At risk for malnutrition (Comment)  Findings of the 6 clinical characteristics of malnutrition:  Energy Intake:  Mild decrease in energy intake (Comment)  Weight Loss:  Greater than 5% over 1 month     Body Fat Loss:  Mild body fat loss Buccal region   Muscle Mass Loss:  Mild muscle mass loss Temples (temporalis), Clavicles (pectoralis & deltoids)  Fluid Accumulation:  No significant fluid accumulation     Strength:  Not Performed      NUTRITION DIAGNOSIS   Inadequate oral intake related to inadequate protein-energy intake, acute injury/trauma as evidenced by constipation, weight loss, intake 0-25%, mild loss of subcutaneous fat, mild muscle loss      CURRENT NUTRITION THERAPIES  ADULT DIET; Dysphagia - Soft and Bite Sized     PO Intake: 1-25%   PO Supplement Intake:None Ordered    ANTHROPOMETRICS  Current Height: 5' 9\" (175.3 cm)  Current Weight: 148 lb 4.8 oz (67.3 kg)    Ideal Body Weight (IBW): 160 lbs  (73 kg)    Usual Bodyweight 160 lb (72.6 kg)       BMI: 21.8      COMPARATIVE STANDARDS  Energy (kcal):  1675- 2010     Protein (g):  82- 136g       Fluid (mL/day):  1 ml/kcal    The patient will be monitored per nutrition standards of care.  Consult dietitian if additional nutrition interventions are needed prior to RD reassessment.      Bridger Alexandra RD, LD    Contact: [phone]

## 2023-02-08 NOTE — PLAN OF CARE
Problem: Discharge Planning  Goal: Discharge to home or other facility with appropriate resources  Outcome: Progressing  Flowsheets (Taken 2/7/2023 2020)  Discharge to home or other facility with appropriate resources: Identify barriers to discharge with patient and caregiver     Problem: Skin/Tissue Integrity  Goal: Absence of new skin breakdown  Description: 1. Monitor for areas of redness and/or skin breakdown  2. Assess vascular access sites hourly  3. Every 4-6 hours minimum:  Change oxygen saturation probe site  4. Every 4-6 hours:  If on nasal continuous positive airway pressure, respiratory therapy assess nares and determine need for appliance change or resting period.   Outcome: Progressing     Problem: Safety - Adult  Goal: Free from fall injury  Outcome: Progressing  Flowsheets (Taken 2/7/2023 2236)  Free From Fall Injury: Instruct family/caregiver on patient safety English

## 2023-02-08 NOTE — PROGRESS NOTES
Patient was admitted to room 4903 at [address] [place] [address]. Patient was oriented to the Call Light, Phone, TV, Thermostat, Bed Controls, Bathroom and Emergency Cord. Patient verbalized and demonstrated understanding of all. Patient was also given an over view of Unit Routines for Acute Rehab. Patient states that their normal bowel regime is every 3 to 4 days. Meal times were explained, including how to order food. The white board, (which is posted on the wall by the door is used for communication) has the Therapy Scheduled that is posted each day along with the name of your doctor, nurse, and therapist for your convenience. We recommend any family that will be care givers or any care givers the patient has, take part in therapy. We have no set visiting hours, we suggest non-caregiver friends and family visitors come after therapy (at 4 PM or later) to allow patient to rest in between sessions.       In conjunction with the patient and patients family, this nurse worked to establish a tailored Fall TIPS plan to ensure patient safety and compliance:    Falls TIPS Completion    Patient identified as increased risk for harm if fall:  [x] Yes     Fall Risks  History of Falls:    [x] Yes   Medication Side Effects:   [x] Yes   Walking Aid:    [x] Yes   IV Pole or Equipment:   [] Yes   Unsteady Walk:     [] Yes   May Forget or Choose Not to Call: [x] Yes     Fall Interventions   Communicate Recent Fall and/or Risk of Harm: [x] Yes   Walking Aids:  Crutches: [] Yes   Cane: [] Yes   Walker: [] Yes   IV Assistance When Walking: [] Yes   Toileting Schedule: Every 2   Hours  Bedpan:   [] Yes   Assist to Commode: [] Yes   Assist to Bathroom: [] Yes   Bed Alarm On: [x] Yes   Assistance Out of Bed:  Bedrest: [] Yes   1 Person: [] Yes   2 People: [x] Yes

## 2023-02-08 NOTE — H&P
Patient: Barney Dunn  [id number]  Date: 2/8/2023      Chief Complaint: Left-sided weakness     History of Present Illness/Hospital Course:  64-year-old male who was admitted on 2/4 with left-sided weakness for greater than 2 days. CT of the head with suggestion of possible NPH. CTA with less than 50% stenosis of the right ICA. MRI of the brain revealed subacute ischemia on the right basal ganglia. Echo with normal EF and negative bubble study. Neurology evaluated and suggested aspirin and Plavix for 21 days then aspirin only in addition to Lipitor. He was evaluated by therapy and suggested to continue in an inpatient setting prior to returning home. He reports no BM for multiple days. Prior Level of Function:  Independent     Current Level of Function: Mod A     has a past medical history of CVA (cerebral vascular accident) ([address] has a past surgical history that includes Finger surgery (Right); Hernia repair; fracture surgery; and hernia repair. reports that he quit smoking about 6 weeks ago. His smoking use included cigarettes. He has a 50.00 pack-year smoking history. He has never used smokeless tobacco. He reports current alcohol use of about 14.0 standard drinks per week. He reports that he does not currently use drugs. family history includes Colon Cancer in his mother; Diabetes Type 1  in his brother; Heart Attack in his father; Prostate Cancer in his father; Stroke in his mother. Allergies: Patient has no known allergies.     Current Facility-Administered Medications   Medication Dose Route Frequency Provider Last Rate Last Admin    aspirin chewable tablet 81 mg  81 mg Oral Daily Dina Morrison MD        atorvastatin (LIPITOR) tablet 40 mg  40 mg Oral Nightly Dina Morrison MD   40 mg at 02/07/23 2210    enoxaparin (LOVENOX) injection 40 mg  40 mg SubCUTAneous Daily Dina Morrison MD        clopidogrel (PLAVIX) tablet 75 mg  75 mg Oral Daily Dina Morrison MD acetaminophen (TYLENOL) tablet 650 mg  650 mg Oral Q4H PRN Bridgette Zarate MD   650 mg at 02/07/23 2210    diphenhydrAMINE (BENADRYL) tablet 25 mg  25 mg Oral Q6H PRN Bridgette Zarate MD        hydrALAZINE (APRESOLINE) tablet 50 mg  50 mg Oral Q8H PRN Bridgette Zarate MD        ondansetron (ZOFRAN-ODT) disintegrating tablet 4 mg  4 mg Oral Q8H PRN Bridgette Zarate MD        traZODone (DESYREL) tablet 50 mg  50 mg Oral Nightly PRN Bridgette Zarate MD        traMADol James Mae) tablet 50 mg  50 mg Oral Q6H PRN Bridgette Zarate MD           REVIEW OF SYSTEMS:   CONSTITUTIONAL: negative for fevers, chills, diaphoresis, appetite change, night sweats and unexpected weight change. HEENT: negative for hearing loss, tinnitus, ear drainage, sinus pressure, nasal congestion, epistaxis and snoring. RESPIRATORY: Negative for hemoptysis, cough, sputum production. CARDIOVASCULAR: negative for chest pain, palpitations, exertional chest pressure/discomfort, edema, syncope. GASTROINTESTINAL: negative for nausea, vomiting, diarrhea, constipation, blood in stool and abdominal pain. GENITOURINARY: negative for frequency, dysuria, urinary incontinence, decreased urine volume, and hematuria. HEMATOLOGIC/LYMPHATIC: negative for easy bruising, bleeding and lymphadenopathy. ALLERGIC/IMMUNOLOGIC: negative for recurrent infections, angioedema, anaphylaxis and drug reactions. ENDOCRINE: negative for weight changes and diabetic symptoms including polyuria, polydipsia and polyphagia. MUSCULOSKELETAL: negative for pain, joint swelling, decreased range of motion and muscle weakness. NEUROLOGICAL: negative for headaches. Positive slurred speech, unilateral weakness. PSYCHIATRIC/BEHAVIORAL: negative for hallucinations, behavioral problems, confusion and agitation. All pertinent positives are noted in the HPI.     Physical Examination:  Vitals: Patient Vitals for the past 24 hrs:   BP Temp Temp src Pulse Resp SpO2 Height Weight 02/08/23 0105 -- -- -- -- -- -- 5' 9\" (1.753 m) 148 lb 4.8 oz (67.3 kg)   02/07/23 2020 (!) 141/73 97.9 °F (36.6 °C) Oral 67 18 95 % -- --     Psych: Stable mood, normal judgement, normal affect   Const: No distress  Eyes: Conjunctiva noninjected, no icterus noted; pupils equal, round. HENT: Atraumatic, normocephalic; Oral mucosa moist  Neck: Trachea midline, neck supple. No thyromegaly noted. CV: Regular rate and rhythm, no murmur rub or gallop noted  Resp: Lungs clear to auscultation bilaterally, no rales wheezes or ronchi, no retractions. Respirations unlabored. GI: Soft, nontender, nondistended. Normal bowel sounds. No palpable masses. Neuro: Alert, oriented, appropriate. Left sided facial droop. Mild flaccid dysarthria with aphasia. No other cranial nerve deficits appreciated. Sensation intact to light touch. Motor examination reveals: RUE/RLE 5/5 LUE 0/5 diffusely LLE 3/5 HF/KE 4-/5 DF/PF. Reflexes absent on left. Skin: Normal temperature and turgor  MSK: No joint abnormalities noted. Ext: No significant edema appreciated. No varicosities. Lab Results   Component Value Date    WBC 8.5 02/07/2023    HGB 16.3 02/07/2023    HCT 46.9 02/07/2023    MCV 99.8 02/07/2023     02/07/2023     Lab Results   Component Value Date    INR 0.95 02/04/2023    PROTIME 12.6 02/04/2023     Lab Results   Component Value Date    CREATININE 0.9 02/07/2023    BUN 25 (H) 02/07/2023     02/07/2023    K 4.1 02/07/2023     02/07/2023    CO2 27 02/07/2023     Lab Results   Component Value Date    ALT 18 02/04/2023    AST 24 02/04/2023    ALKPHOS 95 02/04/2023    BILITOT 0.6 02/04/2023       Most recent echocardiogram revealed   Procedure     Type of Study      TTE procedure:ECHOCARDIOGRAM COMPLETE 2D W DOPPLER W COLOR. Procedure Date  Date: 02/06/2023 Start: 02:13 PM     Study Location: [facility]  Technical Quality: Limited visualization due to lung interface. Indications:TIA and CVA. Patient Status: Routine     Contrast Medium: Bubble Study. Height: 69 inches Weight: 152 pounds BSA: 1.84 m2 BMI: 22.45 kg/m2     BP: 145/78 mmHg      Conclusions      Summary   Technically difficult exam due to patient positioning and lung interference. Normal left ventricle size, wall thickness, and systolic function with an   estimated ejection fraction of 55-60%. No regional wall motion abnormalities are seen. Normal diastolic filling pattern for age. Avg E/e' estimated to be 7.6. The right ventricle is mildly enlarged with normal function. A bubble study was performed and fails to show evidence of shunting. Most recent imaging studies revealed   EXAMINATION:   MRI OF THE BRAIN WITHOUT CONTRAST  2/5/2023 10:25 am       TECHNIQUE:   Multiplanar multisequence MRI of the brain was performed without the   administration of intravenous contrast.       COMPARISON:   CT brain performed 02/04/2023. HISTORY:   ORDERING SYSTEM PROVIDED HISTORY: stroke   TECHNOLOGIST PROVIDED HISTORY:   Reason for exam:->stroke   What is the sedation requirement?->None   Decision Support Exception - unselect if not a suspected or confirmed   emergency medical condition->Emergency Medical Condition (MA)   Reason for Exam: Left side weakness, slurred speech. Stroke alert earlier   today. FINDINGS:   INTRACRANIAL STRUCTURES/VENTRICLES: The sellar and suprasellar structures,   optic chiasm, corpus callosum, pineal gland, tectum, and midline brainstem   structures are unremarkable. The craniocervical junction is unremarkable. There is no acute hemorrhage, mass effect, or midline shift. There is   satisfactory overall gray-white matter differentiation. There is chronic   microvascular disease. The ventricular structures are symmetrically   prominent. The infratentorial structures including the cerebellopontine   angles and internal auditory canals are unremarkable.   There is restricted   diffusion in the right basal ganglia and adjacent periventricular white   matter. There is no abnormal blooming artifact on susceptibility weighted   imaging. ORBITS: The visualized portion of the orbits demonstrate no acute abnormality. SINUSES: There is fluid in the mastoid air cells. The paranasal sinuses are   normally aerated. BONES/SOFT TISSUES: The bone marrow signal intensity appears normal. The soft   tissues demonstrate no acute abnormality. Impression   Subacute ischemia in the right basal ganglia and adjacent periventricular   white matter. Extensive underlying chronic microvascular disease. Symmetric prominence of ventricular system that is nonspecific and   correlation with symptomatology for normal pressure hydrocephalus is needed. The above laboratory data have been reviewed. The above imaging data have been reviewed. The above medical testing have been reviewed. Body mass index is 21.9 kg/m². Barriers to Discharge: Hemiparesis, decreased safety, decreased endurance, ADLs    Disposition: Home    Prognosis: Fair    Rehabilitation goals: To return patient to home setting at their prior level of function. POST ADMISSION PHYSICIAN EVALUATION  The patient has agreed to being admitted to our comprehensive inpatient  rehabilitation facility consisting of at least 180 minutes of therapy a day,  5 out of 7 days a week. The patient/family has a good understanding of our discharge process. The  patient has potential to make improvement and is in need of at least two of  the following multidisciplinary therapies including but not limited to  physical, occupational, respiratory, and speech, nutritional services, wound care, and prosthetics and orthotics.  Given the patients complex condition  and risk of further medical complications, rehabilitation services cannot be  safely provided at a lower level of care such as a skilled nursing facility. I have compared the patients medical and functional status at the time of the  preadmission screening and the same on this date, and there are no significant changes except as documented below in the assessment and plan. By signing this document, I acknowledge that I have personally performed a  full physical examination on this patient within 24 hours of admission to  this inpatient rehabilitation facility and have determined the patient to be  able to tolerate the above course of treatment at an intensive level for a  reasonable period of time. I will be completing a detailed individualized  Plan of Care for this patient by day four of the patients stay based upon the  Preadmission Screen, this Post-Admission Evaluation, and the therapy  evaluations. Assessment and Plan:  Acute right basal ganglia infarct: DAPT for 21 days (2/25) , Lipitor. PT/OT/SLP. Discussed SSRI, Pt wanting to discuss with brother    Dysphagia: Dysphagia diet, SLP    HTN: - start lisinopril 5    Tobacco use: education provided. No supplement currently    Bowels: Per protocol  Bladder: Per protocol   Sleep: Trazodone provided prn. Pain: Tylenol, tramadol as needed  DVT PPx: Lovenox  ELOS: 17-21    Rick Sandoval MD 2/8/2023, 9:57 AM     * This document was created using dictation software. While all precautions were taken to ensure accuracy, errors may have occurred. Please disregard any typographical errors.

## 2023-02-08 NOTE — PATIENT CARE CONFERENCE
[facility]  Inpatient Rehabilitation  Weekly Team Conference Note    Patient Name: Tianna Mcfarland        MRN: [id number]    : 1955  (69 y.o.)  Gender: male           The team conference for this patient was held on 2023 at 11:00am and led by:  Bridgette Zarate MD    CASE MANAGEMENT:  Assessment:   Patient is a 69year old male who admitted to ARU on 2023 with the admitting diagnosis of  Acute ischemic stroke ([facility] [address] Patient resides at home with his brother who is able to provide patient support and supervision. Patient is a retired. Patient was independent with ADLs and IADLs prior to admission. Used no equipment. Patient was an active . Patient reports that he enjoys fishing and working on the house in his spare time. Patient would benefit from skilled ARU PT/OT/SLP to promote increased safety and independence in order to return to his prior level of functioning. Patient discharge plan to be determined with patient's progress. PHYSICAL THERAPY:    Bed Mobility  Supine to Sit: moderate assistance  Sit to Supine: 2 person assistance with min A x 2   Rolling Left: not attempted, secondary to (L) hemiparetic shoulder  Rolling Right: minimal assistance  Scooting: contact guard assistance, (scooting (L) <=> (R) with VC for positioning)  Comments: All performed on flat hospital bed. HR utilized for rolling. Transfers  Sit to stand transfer: stedy utilized requiring min A x 2 from bed => recliner , 2 person assistance with max A x 2   Stand to sit transfer: stedy utilized requiring mod A + min A from bed => recliner , 2 person assistance with max A x 2   Stand pivot transfer: 2 person assistance with max A x 2 from w/c => shower chair   Squat pivot transfer: 2 person assistance with max A x 2 from shower chair => w/c   Comments: Patient presents with significant (L) lateral lean with all transfers requiring max assist and VC to correct upright posture.  Patient also required max VC for sequencing and proper hand placement. Requires therapist assist for placement and maintenance of (L) UE during all transfers. Ambulation  Surface:level surface  Assistive Device: jacinto walker  Assistance: 2 person assistance with max A x 2   Distance: 10 ft  Gait Mechanics: Ambulates with (L) lateral lean, narrow and inconsistent THERESE, and decreased step length (B). Increased knee flexion noted (B) but sustains without knee buckling. Comments:  Max VC for sequencing and AD management. Physical assist provided to (L) UE to maintain arm placement on the walker. Stair Mobility  Stair mobility not completed on this date.   Comments: Unsafe to attempt secondary to poor posture and extensive assist to maintain standing position    QM:  Roll Left and Right  Assistance Needed: Dependent  Comment: Unable to roll (L) secondary to (L) hemiparetic shoulder with poor awareness  CARE Score: 1  Discharge Goal: Independent  Sit to Lying  Assistance Needed: Dependent  CARE Score: 1  Discharge Goal: Independent  Lying to Sitting on Side of Bed  Assistance Needed: Substantial/maximal assistance  CARE Score: 2  Discharge Goal: Independent  Sit to Stand  Assistance Needed: Substantial/maximal assistance  CARE Score: 2  Discharge Goal: Independent  Chair/Bed-to-Chair Transfer  Assistance Needed: Dependent  CARE Score: 1  Discharge Goal: Independent  Car Transfer  Reason if not Attempted: Not attempted due to medical condition or safety concerns  CARE Score: 88  Discharge Goal: Independent  Walk 10 Feet  Assistance Needed: Dependent  CARE Score: 1  Discharge Goal: Independent  Walk 50 Feet with Two Turns  Reason if not Attempted: Not attempted due to medical condition or safety concerns  CARE Score: 88  Discharge Goal: Independent  Walk 150 Feet  Reason if not Attempted: Not attempted due to medical condition or safety concerns  CARE Score: 88  Discharge Goal: Independent  Walking 10 Feet on Uneven Surfaces  Reason if not Attempted: Not attempted due to medical condition or safety concerns  CARE Score: 88  Discharge Goal: Independent  1 Step (Curb)  Reason if not Attempted: Not attempted due to medical condition or safety concerns  CARE Score: 88  Discharge Goal: Supervision or touching assistance  4 Steps  Reason if not Attempted: Not attempted due to medical condition or safety concerns  CARE Score: 88  Discharge Goal: Supervision or touching assistance  12 Steps  Reason if not Attempted: Not attempted due to medical condition or safety concerns  CARE Score: 88  Discharge Goal: Supervision or touching assistance  Picking Up Object  Reason if not Attempted: Not attempted due to medical condition or safety concerns  CARE Score: 88  Discharge Goal: Independent  Wheelchair Ability  Uses a Wheelchair and/or Scooter?: Yes    Goals:                   Patient Goals: To return home and be able to move his (L) UE better  Short Term Goals:  Time Frame: 1-2 weeks  [facility]   Patient will complete transfers at mod (A) of 1  Patient will ambulate 25 ft with use of LRAD at mod (A) of 1  Patient will ascend/descend 1 stairs with (B) handrail at mod (A) of 1  Patient will complete manual w/c propulsion 50 ft at SBA  Long Term Goals:  Time Frame: 3 weeks  Patient will complete bed mobility at modified independent   Patient will complete transfers at modified independent   Patient will ambulate 50 ft with use of LRAD at modified independent  Patient will ascend/descend 10 stairs with (L) ascending handrail at [facility]  Patient will complete car transfer at modified independent                These goals were reviewed with this patient at the time of assessment and Mary Wells is in agreement. Plan of Care: Pt to be seen 5 out of 7 days per week per ARU protocol ( 60 minutes with PT)                     SPEECH THERAPY:    Diet Level:ADULT DIET;  Dysphagia - Soft and Bite Sized  ADULT ORAL NUTRITION SUPPLEMENT; Breakfast, Dinner; Standard High Calorie/High Protein Oral Supplement    Assessment: Impressions  Diagnosis: Pt presents with mild-moderate oropharyngeal dysphagia due to reduced L side sensation and perioral weakness resulting in anterior bolus loss, impaired mastication, premature bolus loss, delayed swallow initiation, altered vocal quality with a delayed cough. Pt reports not having as much as an appetite as well as lack of dentition creating difficulty with mastication. Pt reports chewing is where his difficulty is and not with swallowing. Pt may benefit from instrumental assessment due to evidence of reduced sensation and intermittent clinical signs of penetration/aspiration; will recommend when appropriate. Recommend dysphagia diet (soft and bite sized diet) with dysphagia intervention and implementation of safe swallow strategies to minimize aspiration risk. Pt presents with moderate dysarthria characterized by L perioral weakness resulting in reduced articulation, precision, and reduced rate of speech. Pt self-rated speech as 5% being far from baseline. Pt presents with mild-moderate cognitive-linguistic deficits characterized by deficits in orientation, attention, executive functioning, short-term memory, and visuospatial functioning. Errors consisted of many deflections and patient attributing to baseline dyslexia. Required mod cues and repetition throughout task. Pt demonstrated appropriate insight into deficits which resulted in emotional liability towards the end of the assessment. Recommend SLP intervention for safe return to prior level of independence. Pt presents with mild-moderate oropharyngeal dysphagia due to reduced L side sensation and perioral weakness resulting in anterior bolus loss, impaired mastication, premature bolus loss, delayed swallow initiation, altered vocal quality with a delayed cough.  Pt reports not having as much as an appetite as well as lack of dentition creating difficulty with mastication. Pt reports chewing is where his difficulty is and not with swallowing. Pt may benefit from instrumental assessment due to evidence of reduced sensation and intermittent clinical signs of penetration/aspiration; will recommend when appropriate. Recommend dysphagia diet (soft and bite sized diet) with dysphagia intervention and implementation of safe swallow strategies to minimize aspiration risk. Goals:  Pt will tolerate recommended diet and advanced trials with no overt s/s of aspiration. NEW GOAL   Pt will improve oral motor strength and ROM for coordinated and alternating motions, via graded tasks to improve speech back to baseline level as subjectively rated by SLP/pt and family. NEW GOAL   Pt will improve executive function for multifactor task completion via graded tasks to 80% accuracy. NEW GOAL   Patient will complete functional problem-solving tasks for daily situations with 80% accuracy or given min cues. NEW GOAL   Pt will improve attention to detail for graded complex information to 80%, for improved recall and retention of newly learned information. NEW GOAL     Plan of Care:  Pt to be seen 5 out of 7 days per week per ARU protocol ( 60 minutes with SLP)    OCCUPATIONAL THERAPY:    ADL:    Feeding Comments: Pt is on a modified diet.    Grooming: setup assistance  Grooming Comments: Seated in wheelchair for oral care  Upper Extremity Bathing: dependent requires verbal cueing Increased time to complete task Comment: CGA-min A x1 for sitting balance with another assist for positioning LUE, and washing RUE  Lower Extremity Bathing: dependent requires verbal cueing Increased time to complete task Comment: seated on BSC in shower with hospital socks (did not remove socks d/t impulsivity and decreased safety awareness), dep for buttocks,  CGA-min A x1 and min A x1 for LB bathing while seated   Bathing Comments: left lateral lean  Upper Extremity Dressing: maximum assistance requires verbal cueing Increased time to complete task Comment: assistance for threading BUE, able to bring shirt up over head, assistance for pulling shirt down back  Lower Extremity Dressing: dependent requires verbal cueing Increased time to complete task  Dressing Comments: max Ax1 and assistance of another for pants, max A for donning/doffing socks  General Comments: 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, verbal cues for positioning and posture throughout    Toilet Transfers:  TD - MaxA of 2     Tub/ShowerTransfers:  TD- MaxA of 2     QM:  Eating  Assistance Needed: Setup or clean-up assistance  CARE Score: 5  Discharge Goal: Independent  Oral Hygiene  Assistance Needed: Partial/moderate assistance  Comment: seated in wheelchair  CARE Score: 3  Discharge Goal: Supervision or touching assistance  [address] needed: Dependent  Reason if not Attempted: Patient refused  CARE Score: 1  Discharge Goal: Independent  Toilet Transfer  Assistance needed: Dependent  CARE Score: 1  Discharge Goal: Supervision or touching assistance  Shower/Bathe Self  Assistance Needed: Dependent  CARE Score: 1  Discharge Goal: Supervision or touching assistance  Upper Body Dressing  Assistance Needed: Substantial/maximal assistance  CARE Score: 2  Discharge Goal: Independent  Lower Body Dressing  Assistance Needed: Dependent  CARE Score: 1  Discharge Goal: Independent  Putting On/Taking Off Footwear  Assistance Needed: Substantial/maximal assistance  CARE Score: 2  Discharge Goal: Set-up or clean-up assistance    Goals:               :Short Term Goals:  Time Frame: in 10 days  Patient will complete upper body bathing at min A while seated. Patient will complete lower body bathing at min A while seated and standing as needed. Patient will complete upper body dressing at min A. Patient will complete lower body dressing at mod A using AE as needed. Patient will complete toileting at min A with GB as needed.   Patient will complete a toilet transfer with min A with GB as needed. Patient will complete a visual assessment to complete occupational profile. Long Term Goals:  Time Frame: in 21 days  Patient will complete upper body bathing at supervision while seated. Patient will complete lower body bathing at supervision while seated and standing as needed. Patient will complete upper body dressing at set-up assistance. Patient will complete lower body dressing at supervision using AE as needed. Patient will complete toileting at supervision with GB as needed. Patient will complete grooming at supervision while in stance. Patient will complete functional transfers at supervision using LRAD. Patient will complete functional mobility at supervision using LRAD. Patient will increase functional sitting balance to mod I for improved ADL completion. Patient will increase functional standing balance to SUP for improved ADL completion.    :  These goals were reviewed with this patient at the time of assessment and Yash Lockett is in agreement    Plan of Care:  Pt to be seen 5 out of 7 days per week per ARU protocol ( 60 minutes with OT)     NUTRITION:  Weight: 148 lb 4.8 oz (67.3 kg) / Body mass index is 21.9 kg/m². Diet Order: Dysphagia Soft & bite sized    Supplements:Ensure BID    Pt reports decreased appetite, but has never been a big eater. Also reports being very constipated which is affecting appetite as well. Reports UBW was 160 lb prior to stroke; if accurate, pt has lost 12 lb within past few weeks. Pt ate 1-25% x first meal in ARU. Encouraged ONS to aid in healing & strengthening during rehabilitation. Pt agrees to try Ensure BID. Please see nutrition note for details.     NURSING:    Risk for Readmission: 12%    Good Fall Risk Score: 95  Wounds/Incisions/Ulcers: Skin tear left hand  Medication Review: reviewed daily with patient and family  Pain: managed with pharmacological and non pharmacological interventions  Consultations/Labs/X-rays: BMP, CBC every Monday and Thursday. Patient/Family Education provided by team:    Discharge Plan   Estimated Length of Stay:21 days  Destination: Home  Pass:No  Services at Discharge: Ongoing OT/PT - location TBD  Equipment at Discharge: TBD pending progress  Factors facilitating achievement of predicted outcomes:  PLOF, cooperative with POC  Barriers to the achievement of predicted outcomes: extensive physical assist levels, decreased insight and cognition, limited family support    Patient Goals: To return home and be able to move his (L) UE better. Interdisciplinary Individualized Plan of Care Review of Previous Week:    Medical and functional progress towards goals:  Medically doing ok, right where he should be, needs family support, family educated on need to maintain pt's high spirits. LLE not buckling when positioned by therapists, cotx of PT/OT still required due to severity of deficits, pt with heaving lean. LUE without activation, but transfers improving, initiating gait training. Swallowing impaired, on soft and bite sized diet, will trial regular food, may do MBS, cognition still impaired. Barriers towards progress:  Severity of deficits, L hemiparesis, swallowing impaired  Interventions to address Barriers:  Continue cotx with PT/OT, Focus on left neuro re-ed, SLP trialing food, may do MBS  Goals still appropriate:  Yes  Modifications to goals: None  Continue Current Plan of Care:  Yes  Modifications to Plan of Care: None    Rehab Team Members in attendance for Team Conference:  Dominick Hussein, MSW, LSW    Rosalie Cheung, JERRELL, LD    Delio Clark, OTR/L    Mil Zamora, PT, DPT    Moisés Latham M.A., CCC-SLP    CHUCKY Bell, RN, [facility] PT, DPT,     I approve the established interdisciplinary plan of care as documented within the medical record of Ira Phelps.     Natalie Ha MD   Electronically signed by Phill Deutsch MD on 2/9/2023 at 11:28 AM

## 2023-02-09 LAB
ANION GAP SERPL CALCULATED.3IONS-SCNC: 8 MMOL/L (ref 3–16)
BACTERIA: ABNORMAL /HPF
BILIRUBIN URINE: ABNORMAL
BLOOD, URINE: ABNORMAL
BUN BLDV-MCNC: 28 MG/DL (ref 7–20)
CALCIUM SERPL-MCNC: 9.7 MG/DL (ref 8.3–10.6)
CHLORIDE BLD-SCNC: 105 MMOL/L (ref 99–110)
CLARITY: ABNORMAL
CO2: 25 MMOL/L (ref 21–32)
COLOR: ABNORMAL
CREAT SERPL-MCNC: 0.9 MG/DL (ref 0.8–1.3)
EPITHELIAL CELLS, UA: 1 /HPF (ref 0–5)
GFR SERPL CREATININE-BSD FRML MDRD: >60 ML/MIN/{1.73_M2}
GLUCOSE BLD-MCNC: 99 MG/DL (ref 70–99)
GLUCOSE URINE: NEGATIVE MG/DL
HCT VFR BLD CALC: 45.1 % (ref 40.5–52.5)
HEMOGLOBIN: 15.2 G/DL (ref 13.5–17.5)
HYALINE CASTS: 3 /LPF (ref 0–8)
KETONES, URINE: ABNORMAL MG/DL
LEUKOCYTE ESTERASE, URINE: ABNORMAL
MCH RBC QN AUTO: 33.4 PG (ref 26–34)
MCHC RBC AUTO-ENTMCNC: 33.7 G/DL (ref 31–36)
MCV RBC AUTO: 99.1 FL (ref 80–100)
MICROSCOPIC EXAMINATION: YES
NITRITE, URINE: NEGATIVE
PDW BLD-RTO: 13.4 % (ref 12.4–15.4)
PH UA: 5 (ref 5–8)
PLATELET # BLD: 230 K/UL (ref 135–450)
PMV BLD AUTO: 8 FL (ref 5–10.5)
POTASSIUM SERPL-SCNC: 4.1 MMOL/L (ref 3.5–5.1)
PROTEIN UA: 30 MG/DL
RBC # BLD: 4.55 M/UL (ref 4.2–5.9)
RBC UA: 2005 /HPF (ref 0–4)
SODIUM BLD-SCNC: 138 MMOL/L (ref 136–145)
SPECIFIC GRAVITY UA: 1.02 (ref 1–1.03)
URINE REFLEX TO CULTURE: ABNORMAL
URINE TYPE: ABNORMAL
UROBILINOGEN, URINE: 1 E.U./DL
WBC # BLD: 7.7 K/UL (ref 4–11)
WBC UA: 5 /HPF (ref 0–5)

## 2023-02-09 PROCEDURE — 97530 THERAPEUTIC ACTIVITIES: CPT

## 2023-02-09 PROCEDURE — 97116 GAIT TRAINING THERAPY: CPT

## 2023-02-09 PROCEDURE — 97112 NEUROMUSCULAR REEDUCATION: CPT

## 2023-02-09 PROCEDURE — 1280000000 HC REHAB R&B

## 2023-02-09 PROCEDURE — 85027 COMPLETE CBC AUTOMATED: CPT

## 2023-02-09 PROCEDURE — 92526 ORAL FUNCTION THERAPY: CPT

## 2023-02-09 PROCEDURE — 36415 COLL VENOUS BLD VENIPUNCTURE: CPT

## 2023-02-09 PROCEDURE — 81001 URINALYSIS AUTO W/SCOPE: CPT

## 2023-02-09 PROCEDURE — 92507 TX SP LANG VOICE COMM INDIV: CPT

## 2023-02-09 PROCEDURE — 80048 BASIC METABOLIC PNL TOTAL CA: CPT

## 2023-02-09 PROCEDURE — 97129 THER IVNTJ 1ST 15 MIN: CPT

## 2023-02-09 PROCEDURE — 6370000000 HC RX 637 (ALT 250 FOR IP): Performed by: PHYSICAL MEDICINE & REHABILITATION

## 2023-02-09 PROCEDURE — 97535 SELF CARE MNGMENT TRAINING: CPT

## 2023-02-09 RX ADMIN — STANDARDIZED SENNA CONCENTRATE AND DOCUSATE SODIUM 2 TABLET: 8.6; 5 TABLET ORAL at 22:52

## 2023-02-09 RX ADMIN — ASPIRIN 81 MG 81 MG: 81 TABLET ORAL at 09:06

## 2023-02-09 RX ADMIN — STANDARDIZED SENNA CONCENTRATE AND DOCUSATE SODIUM 2 TABLET: 8.6; 5 TABLET ORAL at 13:11

## 2023-02-09 RX ADMIN — POLYETHYLENE GLYCOL 3350 17 G: 17 POWDER, FOR SOLUTION ORAL at 09:03

## 2023-02-09 RX ADMIN — LISINOPRIL 5 MG: 5 TABLET ORAL at 09:04

## 2023-02-09 RX ADMIN — ATORVASTATIN CALCIUM 40 MG: 40 TABLET, FILM COATED ORAL at 22:52

## 2023-02-09 RX ADMIN — CLOPIDOGREL BISULFATE 75 MG: 75 TABLET ORAL at 09:06

## 2023-02-09 ASSESSMENT — PAIN SCALES - GENERAL
PAINLEVEL_OUTOF10: 0

## 2023-02-09 NOTE — PROGRESS NOTES
Urine sent per order after pts urine was noted to be red, clear during initial assessment. Latest Reference Range & Units 2/9/23 09:57   Color, UA Straw/Yellow  ORANGE ! Clarity, UA Clear  CLOUDY ! Glucose, UA Negative mg/dL Negative   Bilirubin, Urine Negative  SMALL ! Ketones, Urine Negative mg/dL TRACE ! Specific Gravity, UA 1.005 - 1.030  1.020   Blood, Urine Negative  LARGE !   pH, UA 5.0 - 8.0  5.0   Protein, UA Negative mg/dL 30 ! Urobilinogen, Urine <2.0 E.U./dL 1.0   Nitrite, Urine Negative  Negative   Leukocyte Esterase, Urine Negative  SMALL ! Urine Type  NotGiven   !: Data is abnormal    Provider aware of results voicing in team mtg, no concern or planned treatment at this time.     Jesus Dee BSN, RN   [phone]

## 2023-02-09 NOTE — PROGRESS NOTES
[facility] Department   Phone: [phone]    Speech Therapy    [] Initial Evaluation            [x] Daily Treatment Note         [] Discharge Summary      Patient: Bakari Kim   : 1955   MRN: [id number]   Date of Service:  2023  Admitting Diagnosis: Acute ischemic stroke [facility])  Current Admission Summary: 68-year-old male who was admitted on  with left-sided weakness for greater than 2 days. CT of the head with suggestion of possible NPH. CTA with less than 50% stenosis of the right ICA. MRI of the brain revealed subacute ischemia on the right basal ganglia. Echo with normal EF and negative bubble study. Neurology evaluated and suggested aspirin and Plavix for 21 days then aspirin only in addition to Lipitor. He was evaluated by therapy and suggested to continue in an inpatient setting prior to returning home. He reports no BM for multiple days. Past Medical History:  has a past medical history of CVA (cerebral vascular accident) ([address] and HTN (hypertension). Past Surgical History:  has a past surgical history that includes Finger surgery (Right); Hernia repair; fracture surgery; and hernia repair. Precautions/Restrictions: high fall risk, modified diet      Pre-Admission Information   Living Status: Pt lives with his brother, independent prior to admission. Occupation/School: Retired last year. 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   Medication Management: :  [x]Primary   []Secondary []No  Finance Management: [x]Primary   []Secondary []No  Active :   [x]Yes         []No  H[facility]:    [facility]  Vision:    Vision Corrective Device: wears glasses at all times      Subjective  General: Pt alert and cooperative throughout sessions. Family present at bedside during first session.   Pain: Did not state    Safety Interventions: patient left in chair, chair alarm in place, call light within reach, and patient at risk for falls      Therapeutic Interventions:     Session 1: Laura Sherwood MA, CF-SLP    Dysphagia: Severity: Mild  and Moderate   Trialed regular solids  - Disorganized mastication with min L anterior loss; pt unaware of loss  - Coughing post swallow with all trials  - No overt s/s associated with aspiration of thin liquids  - RN reports pt did well with meds whole with water; advanced meds to whole with water  - Recommend FEES to further assess swallow function     Comprehension: Severity: Mild   Goal not directly targeted. Pt with reduced processing and comprehension throughout session. Expressive Language: Severity: Mild   Goal not directly targeted. Motor Speech: Severity: Mild  and Moderate   OMEs completed  - Alternating smile/ pucker x10 reps x3 sets  - Slurred speech noted this session which negatively impacts speech comprehensibility    Voice: Severity: Mild  and Moderate   Goal not directly targeted. Reduced vocal intensity noted. Pt cued to reduce rate of speech, over articulate words, and talk louder. Pt may benefit from FEES to further assess vocal fold function. Cognition: Severity: Mild   Immediate recall  - 60% accuracy independently; increasing to 100% accuracy with min cues  - Pt educated on WRAP strategy and implemented throughout task   - Pt benefited from repetition of stimuli    Thought organization (word list retention- category inclusion)  - 60% accuracy independently; increasing to 100% accuracy with min cues  - Pt benefited from repetition of stimuli and semantic cues    Additional Interventions:    Session 2: Allison Spaulding MA, CCC-SLP/ Vazquez Hurley.,  Speech-Language Pathology     Dysphagia: Severity: Mild  and Moderate    Pt with x 1 instance of coughing after drinking water and reported it as atypical.      Discussion had and education provided regarding the use of NMES (via VitalStim) to target enhancing oropharyngeal function.  Procedure, research and contraindications explained. Pt agreeable to participation. Comprehension: Severity: Mild    Pt with x 2 instances of repetition needed throughout explanation of how to complete oral motor exercise. Expressive Language: Severity: Mild   Goal not targeted this session. Motor Speech: Severity: Mild  and Moderate   Oral motor exercises (OME's) completed with pt:   - Bilateral cheek puffs (hold for 5 sec) x5   - Single side cheek puffs (hold each side independently for 5 sec) x5 each  - Alternating cheek puffs x5  - Sustained \"e\" then sustained \"o\" x 5 reps- required mod cues to draw out \"e\" sound   - Sustained \"ah\" then \"o\" x 5 reps- required mod cues to draw out \"ah\" sound   - ah/o quick repetition x5  - Sustained \"e\" then pucker lips (kiss) x 5 reps  - Sustained \"ah\" then pucker lips (kiss) x 5 reps  - Labial retraction (smile) with finger stretch/resistance x5 reps    Labial pull against resistance targeting oropharyngeal strengthening   - pt pulled pudding through straw within 5 pulls (held each pull 5-7 seconds)     Provided introduction to \"SLOB\" speaking strategies  - reviewed use of strategies including: slow, loud, over-exaggerate, breath support  - pt demonstrated use in following trials independently     Voice: Severity: Mild  and Moderate   Pt with reduced and slurred speech throughout session. Approximately 75% intelligible in connected speech. Cognition: Severity: Mild   Not targeted this session. Additional Interventions:      Education  Barriers To Learning: cognition and hearing  Patient Education: Provided education regarding role of SLP, results of assessment, recommendations and general speech pathology plan of care.    Learning Assessment: Pt requires ongoing learning     Assessment  Impairments Requiring Therapeutic Intervention: Cognitive-Linguistic Deficits  and Oropharyngeal Dysphagia   Prognosis: good    Clinical Assessment:  Pt presents with moderate dysarthria characterized by L perioral weakness resulting in reduced articulation, precision, and reduced rate of speech. Pt self-rated speech as 5% being far from baseline. Pt presents with mild-moderate cognitive-linguistic deficits characterized by deficits in orientation, attention, executive functioning, short-term memory, and visuospatial functioning. Errors consisted of many deflections and patient attributing to baseline dyslexia. Required mod cues and repetition throughout task. Pt demonstrated appropriate insight into deficits which resulted in emotional liability towards the end of the assessment. Recommend SLP intervention for safe return to prior level of independence. Pt presents with mild-moderate oropharyngeal dysphagia due to reduced L side sensation and perioral weakness resulting in anterior bolus loss, impaired mastication, premature bolus loss, delayed swallow initiation, altered vocal quality with a delayed cough. Pt reports not having as much as an appetite as well as lack of dentition creating difficulty with mastication. Pt reports chewing is where his difficulty is and not with swallowing. Pt may benefit from instrumental assessment due to evidence of reduced sensation and intermittent clinical signs of penetration/aspiration; will recommend when appropriate. Recommend dysphagia diet (soft and bite sized diet) with dysphagia intervention and implementation of safe swallow strategies to minimize aspiration risk. Diet Solids Recommendation:   Liquid Consistency Recommendation:   Recommended Form of Meds:   Dysphagia III Soft and bite sized     Thin liquids     Meds whole with water       Recommended Compensatory Swallowing Strategies: Upright as possible with all PO intake , Small bites/sips , Eat/feed slowly      Plan  Frequency: 60 minutes/day; 5 days per week, as tolerated, until goals met, or discharged from ARU.   Therapeutic Interventions: Diet Tolerance Monitoring , Patient/Family Education , Instrumental assessment of swallow function (Modified Barium Swallow Study)  Speech / Motor Planning / Voice intervention , Cognitive-Linguistic intervention , and Patient/ Family education   Discharge Recommendations: Home with assistance and TBD   Continued SLP at Discharge: Yes     Goals  Patient Goals: Pt reports his goal is to get home. Time Frame: 14-21 days    Pt will tolerate recommended diet and advanced trials with no overt s/s of aspiration. ongoing  Pt will improve oral motor strength and ROM for coordinated and alternating motions, via graded tasks to improve speech back to baseline level as subjectively rated by SLP/pt and family. ongoing  Pt will improve executive function for multifactor task completion via graded tasks to 80% accuracy ongoing  Patient will complete functional problem-solving tasks for daily situations with 80% accuracy or given min cues. ongoing  Pt will improve attention to detail for graded complex information to 80%, for improved recall and retention of newly learned information  ongoing      Therapy Session Time      Session 1 Session 2   Time In 1130 1345   Time Out 1200 1415   Time Code Minutes 15 0   Individual Minutes 30 30     Timed Code Treatment Minutes:  15  Total Treatment Minutes:  60    Electronically Signed By:     Tx session 1:  Cindy Vergara MA. CF- SLP  Speech Language Pathologist  [id number]    Tx session 2:   Trish Mena M.A., [id number] [id number]  Speech-Language Pathologist    The speech-language pathologist was present, directed the patient's care, made skilled judgment and was responsible for assessment and treatment.     Ashleigh Kennedy.,  Speech-Language Pathology

## 2023-02-09 NOTE — PROGRESS NOTES
[facility] Department   Phone: [phone]    Occupational Therapy    [] Initial Evaluation            [x] Daily Treatment Note         [] Discharge Summary      Patient: Danny Tolbert   : 1955   MRN: [id number]   Date of Service:  2023    Admitting Diagnosis:  Acute ischemic stroke [facility])  Current Admission Summary: 68-year-old male who was admitted on  with left-sided weakness for greater than 2 days. CT of the head with suggestion of possible NPH. CTA with less than 50% stenosis of the right ICA. MRI of the brain revealed subacute ischemia on the right basal ganglia. Echo with normal EF and negative bubble study. Neurology evaluated and suggested aspirin and Plavix for 21 days then aspirin only in addition to Lipitor. He was evaluated by therapy and suggested to continue in an inpatient setting prior to returning home. He reports no BM for multiple days. Past Medical History:  has a past medical history of CVA (cerebral vascular accident) ([address] and HTN (hypertension). Past Surgical History:  has a past surgical history that includes Finger surgery (Right); Hernia repair; fracture surgery; and hernia repair.     Discharge Recommendations: Discharge recommendations depending pt progress    DME Required For Discharge: DME to be determined pending patient progress    Precautions/Restrictions: high fall risk, up as tolerated,  Dysphagia - Soft and Bite Sized, Left inattention, Pusher syndrome    Pre-Admission Information   Lives With: brother Rohan Pickup)                Type of Home: house  Home Layout: two level, bedroom in the basement, 10 steps with 1 handrail on the L  Home Access:  2 step to enter without rails   Bathroom Layout: tub/shower unit  Bathroom Equipment: grab bars in shower  Toilet Height: elevated height  Home Equipment: no prior equipment  Transfer Assistance: Independent without use of device  Ambulation Assistance:Independent without use of device  ADL Assistance: independent with all ADL's   IADL Assistance: independent with homemaking tasks  Active :        [x] Yes                 [] 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 Occupation: sales  Hobbies: Placester   Recent Falls: chart reports had several falls that caused this admission (about 5 falls)       Subjective  General: Pt seated upright in recliner, agreeable to OT/PT cotx. Pt initially reporting pain, when asked for further details he reported \"Nevermind, nothing hurts. \" Pt did not appear to be in pain throughout session and did not report any further pain. Pain: Patient does not rate upon questioning  Pain Interventions: not applicable     Activities of Daily Living  Basic Activities of Daily Living  Feeding Comments: Pt is on a modified diet. Lower Extremity Dressing: dependent requires verbal cueing Increased time to complete task  Dressing Comments: max Ax1 and assistance of another for pants, max A for doff/uziel pullup brief seated on C  Toileting Comments: pt attempted to void BM, unable to complete with increased time. 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 Functional Mobility  Bed Mobility  Bed mobility not completed on this date. Comments: In room chair at THERESE/EOS    Transfers  Sit to stand transfer:2 person assistance with MaxA of 2 from room chair, Trial 1 at ballet bar: CGA x2, Trial 2 at ballet bar: ModA of 2, Trial 3 at ballet bar: Min + CGA   Stand to sit transfer: 2 person assistance with MaxA of 2   Stand pivot transfer: 2 person assistance with MaxA of 2 from w/c > room chair   Squat pivot transfer: 2 person assistance with MaxA of 2 from room chair > w/c   Toilet transfer: 2 person assistance with MaxA of 2 from w/c   Toilet transfer equipment: standard bedside commode, grab bars, transfers from w/c  Toilet transfer comments: max verbal cues for sequencing, L lateral lean/pusher syndrome, pt is impulsive at times, poor insight into deficits and impaired safety awareness  Comments: Pt initially impulsive with sit>stand at ballet bars, VC to correct    Functional Mobility:  Sitting Balance: contact guard assistance, maximum assistance, Varying between CGA > MaxA. Sitting Balance Comment: fluctuating assistance levels depending on task demands and supporting surface, pusher's syndrome, verbal and visual cues required to identify and correct  Standing Balance: 2 person assistance with max A X2 . Standing Balance Comment: Varying between MaxA of 2 >> CGA with visual/verbal cues to correct L lateral lean. Functional Mobility: .  dependent assistance, 2 person assistance with max Ax2 , + w/c follow  Functional Mobility Activity: ~16' in retana with use of grab bar on (R) and UB support on (L)  Functional Mobility Device Use: no device  Functional Mobility Comment:  L lateral lean, VC to identify and correct, physical assist required to facilitate step-to pattern with L LE. Other Therapeutic Activity:  Pt completed w/c mobility in retana, pt required MAX verbal/tactile cues to initiate and sequence B LE to propel w/c forward. Therapist attempted UB propulsion but pt was unable to complete, therapist deferred to LB focus only this date. See PT note for further details.       Cognition  Overall Cognitive Status: WFL  Arousal/Alterness: appropriate responses to stimuli  Following Commands: follows one step commands with repetition  Attention Span: attends with cues to redirect  Memory: decreased recall of precautions, decreased recall of recent events  Safety Judgement: decreased awareness of need for assistance, decreased awareness of need for safety  Problem Solving: assistance required to generate solutions, assistance required to implement solutions, decreased awareness of errors, assistance required to identify errors made, assistance required to correct errors made  Insights: not aware of deficits  Initiation: requires cues for some  Sequencing: requires cues for some  Comments: impulsive at times  Orientation:    oriented to person, oriented to time, and oriented to situation  Comments: pt stated he was in the Reading unit in [place]" however unable to state MHF  Command Following:   impaired  Follows one step directions with repetition and increased time  Education  Barriers To Learning: cognition and language  Patient Education: patient educated on goals, OT role and benefits, plan of care, precautions, ADL adaptive strategies, IADL safety, proper use of assistive device/equipment, adaptive device training, energy conservation, orientation, family education, pressure relief, transfer training, discharge recommendations  Learning Assessment:  patient verbalizes understanding, would benefit from continued reinforcement, patient will require reinforcement due to cognitive deficits  Assessment  Activity Tolerance: Fair with no c/o pain or fatigue  Impairments Requiring Therapeutic Intervention: decreased functional mobility, decreased ADL status, decreased ROM, decreased strength, decreased safety awareness, decreased cognition, decreased endurance, decreased sensation, decreased balance, decreased IADL, decreased fine motor control, decreased coordination, increased pain, decreased posture  Prognosis: good  Clinical Assessment: Pt is a 68 yo M who [facility] at [facility] in ARU s/p subacute ischemia on the right basal ganglia on 2/4. PTA, pt was independent with ADLs, iADLs, transfers, and functional mobility with no AE. Currently, pt is functioning below baseline as he requires above assistance levels for ADLs, functional tranfers, and functional mobility.  Pt is limited be deficits in sitting balance, standing balance, safety awareness, LUE ROM, LUE strength, LUE sensitivity, and proprioception. Skilled intensive OT in ARU warranted to improve safety and independence with all occupational pursuits. Recommend continued co-tx for therapy session. Pt benefits from x 2 skilled hands to provide increased cues/feedback and promote improved safety and performance with ADLs. Safety Interventions: patient left in chair, chair alarm in place, call light within reach, patient at risk for falls, telesitter in use, and family/caregiver present    Plan  Frequency: 5 x/week, 60 min/day  Current Treatment Recommendations: strengthening, ROM, balance training, functional mobility training, transfer training, stair training, endurance training, neuromuscular re-education, wheelchair mobility training, modalities, patient/caregiver education, ADL/self-care training, IADL training, home management training, cognitive reorientation, home exercise program, safety education, equipment evaluation/education, and positioning  Goals  Patient Goals: \"to get this working\" in re to LUE   Short Term Goals:  Time Frame: in 10 days  Patient will complete upper body bathing at min A while seated. Patient will complete lower body bathing at min A while seated and standing as needed. Patient will complete upper body dressing at min A. Patient will complete lower body dressing at mod A using AE as needed. Patient will complete toileting at min A with GB as needed. Patient will complete a toilet transfer with min A with GB as needed. Patient will complete a visual assessment to complete occupational profile. Long Term Goals:  Time Frame: in 21 days  Patient will complete upper body bathing at supervision while seated. Patient will complete lower body bathing at supervision while seated and standing as needed. Patient will complete upper body dressing at set-up assistance. Patient will complete lower body dressing at supervision using AE as needed.    Patient will complete toileting at supervision with GB as needed. Patient will complete grooming at supervision while in stance. Patient will complete functional transfers at supervision using LRAD. Patient will complete functional mobility at supervision using LRAD. Patient will increase functional sitting balance to mod I for improved ADL completion. Patient will increase functional standing balance to SUP for improved ADL completion.     Therapy Session Time     Individual Group Co-treatment   Time In   1000   Time Out   1103   Minutes   63        Timed Code Treatment Minutes:  63    Total Treatment Minutes:  63       Electronically Signed By: MARCOS Lafleur/[id number]

## 2023-02-09 NOTE — PROGRESS NOTES
[facility] Department   Phone: [phone]    Speech Therapy    [] Initial Evaluation            [x] Daily Treatment Note         [] Discharge Summary      Patient: Barney Dunn   : 1955   MRN: [id number]   Date of Service:  2023  Admitting Diagnosis: Acute ischemic stroke [facility])  Current Admission Summary: 64-year-old male who was admitted on  with left-sided weakness for greater than 2 days. CT of the head with suggestion of possible NPH. CTA with less than 50% stenosis of the right ICA. MRI of the brain revealed subacute ischemia on the right basal ganglia. Echo with normal EF and negative bubble study. Neurology evaluated and suggested aspirin and Plavix for 21 days then aspirin only in addition to Lipitor. He was evaluated by therapy and suggested to continue in an inpatient setting prior to returning home. He reports no BM for multiple days. Past Medical History:  has a past medical history of CVA (cerebral vascular accident) ([address] and HTN (hypertension). Past Surgical History:  has a past surgical history that includes Finger surgery (Right); Hernia repair; fracture surgery; and hernia repair. Precautions/Restrictions: high fall risk, modified diet      Pre-Admission Information   Living Status: Pt lives with his brother, independent prior to admission. Occupation/School: Retired last year. 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   Medication Management: :  [x]Primary   []Secondary []No  Finance Management: [x]Primary   []Secondary []No  Active :   [x]Yes         []No  [facility]:    [facility]  Vision:    Vision Corrective Device: wears glasses at all times      Subjective  General: Pt alert and cooperative throughout sessions. Family present at bedside during first session.   Pain: Did not state    Safety Interventions: patient left in chair, chair alarm in place, call light within reach, and patient at risk for falls      Therapeutic Interventions:     Session 1: Lima Miles MA, CF-SLP    Dysphagia: Severity: Mild  and Moderate   Trialed regular solids  - Disorganized mastication with min L anterior loss; pt unaware of loss  - Coughing post swallow with all trials  - No overt s/s associated with aspiration of thin liquids  - RN reports pt did well with meds whole with water; advanced meds to whole with water  - Recommend FEES to further assess swallow function     Comprehension: Severity: Mild   Goal not directly targeted. Pt with reduced processing and comprehension throughout session. Expressive Language: Severity: Mild   Goal not directly targeted. Motor Speech: Severity: Mild  and Moderate   OMEs completed  - Alternating smile/ pucker x10 reps x3 sets  - Slurred speech noted this session which negatively impacts speech comprehensibility    Voice: Severity: Mild  and Moderate   Goal not directly targeted. Reduced vocal intensity noted. Pt cued to reduce rate of speech, over articulate words, and talk louder. Pt may benefit from FEES to further assess vocal fold function. Cognition: Severity: Mild   Immediate recall  - 60% accuracy independently; increasing to 100% accuracy with min cues  - Pt educated on WRAP strategy and implemented throughout task   - Pt benefited from repetition of stimuli    Thought organization (word list retention- category inclusion)  - 60% accuracy independently; increasing to 100% accuracy with min cues  - Pt benefited from repetition of stimuli and semantic cues    Additional Interventions:    Session 2: Josse Nguyen MA, CCC-SLP/ Laurie Boyle,  Speech-Language Pathology     Dysphagia: Severity: Mild  and Moderate    Pt with x 1 instance of coughing after drinking water and reported it as atypical.      Discussion had and education provided regarding the use of NMES (via Vital[employer]im) to target enhancing oropharyngeal function.  Procedure, research and contraindications explained. Pt agreeable to participation. Comprehension: Severity: Mild    Pt with x 2 instances of repetition needed throughout explanation of how to complete oral motor exercise. Expressive Language: Severity: Mild   Goal not targeted this session. Motor Speech: Severity: Mild  and Moderate   Oral motor exercises (OME's) completed with pt:   - Bilateral cheek puffs (hold for 5 sec) x5   - Single side cheek puffs (hold each side independently for 5 sec) x5 each  - Alternating cheek puffs x5  - Sustained \"e\" then sustained \"o\" x 5 reps- required mod cues to draw out \"e\" sound   - Sustained \"ah\" then \"o\" x 5 reps- required mod cues to draw out \"ah\" sound   - ah/o quick repetition x5  - Sustained \"e\" then pucker lips (kiss) x 5 reps  - Sustained \"ah\" then pucker lips (kiss) x 5 reps  - Labial retraction (smile) with finger stretch/resistance x5 reps    Labial pull against resistance targeting oropharyngeal strengthening   - pt pulled pudding through straw within 5 pulls (held each pull 5-7 seconds)     Provided introduction to \"SLOB\" speaking strategies  - reviewed use of strategies including: slow, loud, over-exaggerate, breath support  - pt demonstrated use in following trials independently     Voice: Severity: Mild  and Moderate   Pt with reduced and slurred speech throughout session. Approximately 60-70% intelligible in connected speech. Cognition: Severity: Mild   Not targeted this session. Additional Interventions:      Education  Barriers To Learning: cognition and hearing  Patient Education: Provided education regarding role of SLP, results of assessment, recommendations and general speech pathology plan of care.    Learning Assessment: Pt requires ongoing learning     Assessment  Impairments Requiring Therapeutic Intervention: Cognitive-Linguistic Deficits  and Oropharyngeal Dysphagia   Prognosis: good    Clinical Assessment:  Pt presents with moderate dysarthria characterized by L perioral weakness resulting in reduced articulation, precision, and reduced rate of speech. Pt self-rated speech as 5% being far from baseline. Pt presents with mild-moderate cognitive-linguistic deficits characterized by deficits in orientation, attention, executive functioning, short-term memory, and visuospatial functioning. Errors consisted of many deflections and patient attributing to baseline dyslexia. Required mod cues and repetition throughout task. Pt demonstrated appropriate insight into deficits which resulted in emotional liability towards the end of the assessment. Recommend SLP intervention for safe return to prior level of independence. Pt presents with mild-moderate oropharyngeal dysphagia due to reduced L side sensation and perioral weakness resulting in anterior bolus loss, impaired mastication, premature bolus loss, delayed swallow initiation, altered vocal quality with a delayed cough. Pt reports not having as much as an appetite as well as lack of dentition creating difficulty with mastication. Pt reports chewing is where his difficulty is and not with swallowing. Pt may benefit from instrumental assessment due to evidence of reduced sensation and intermittent clinical signs of penetration/aspiration; will recommend when appropriate. Recommend dysphagia diet (soft and bite sized diet) with dysphagia intervention and implementation of safe swallow strategies to minimize aspiration risk. Diet Solids Recommendation:   Liquid Consistency Recommendation:   Recommended Form of Meds:   Dysphagia III Soft and bite sized     Thin liquids     Meds whole with water       Recommended Compensatory Swallowing Strategies: Upright as possible with all PO intake , Small bites/sips , Eat/feed slowly      Plan  Frequency: 60 minutes/day; 5 days per week, as tolerated, until goals met, or discharged from ARU.   Therapeutic Interventions: Diet Tolerance Monitoring , Patient/Family Education , Instrumental assessment of swallow function (Modified Barium Swallow Study)  Speech / Motor Planning / Voice intervention , Cognitive-Linguistic intervention , and Patient/ Family education   Discharge Recommendations: Home with assistance and TBD   Continued SLP at Discharge: Yes     Goals  Patient Goals: Pt reports his goal is to get home. Time Frame: 14-21 days    Pt will tolerate recommended diet and advanced trials with no overt s/s of aspiration. ongoing  Pt will improve oral motor strength and ROM for coordinated and alternating motions, via graded tasks to improve speech back to baseline level as subjectively rated by SLP/pt and family. ongoing  Pt will improve executive function for multifactor task completion via graded tasks to 80% accuracy ongoing  Patient will complete functional problem-solving tasks for daily situations with 80% accuracy or given min cues. ongoing  Pt will improve attention to detail for graded complex information to 80%, for improved recall and retention of newly learned information  ongoing      Therapy Session Time      Session 1 Session 2   Time In 1130 1345   Time Out 1200 1415   Time Code Minutes 15 0   Individual Minutes 30 30     Timed Code Treatment Minutes:  15  Total Treatment Minutes:  60    Electronically Signed By:     Tx session 1:  Josephine Martins MA. CF- SLP  Speech Language Pathologist  [id number]    Tx session 2:   Opal Lim M.A., [id number] [place], [place] [id number]  Speech-Language Pathologist    The speech-language pathologist was present, directed the patient's care, made skilled judgment and was responsible for assessment and treatment.     Kory Rodriges.,  Speech-Language Pathology

## 2023-02-09 NOTE — CARE COORDINATION
Team conference held today. Spoke with patient and his family (2 brothers and a sister)  to discuss progress with therapy, barriers to discharge, and plans to return home. Estimated discharge date set for 3/2/2023. Patient's discharge plan to be determined with patient progress. Will continue to follow for support and discharge planning.     Electronically signed by MILEY Phipps on 2/9/2023 at 1:50 PM

## 2023-02-09 NOTE — PROGRESS NOTES
[facility] [address]   Phone: [phone]    Physical Therapy    [] Initial Evaluation            [x] Daily Treatment Note         [] Discharge Summary      Patient: Bakari Kim   : 1955   MRN: [id number]   Date of Service:  2023  Admitting Diagnosis: Acute ischemic stroke [facility])  Current Admission Summary: 78-year-old male who was admitted on  with left-sided weakness for greater than 2 days. CT of the head with suggestion of possible NPH. CTA with less than 50% stenosis of the right ICA. MRI of the brain revealed subacute ischemia on the right basal ganglia. Echo with normal EF and negative bubble study. Neurology evaluated and suggested aspirin and Plavix for 21 days then aspirin only in addition to Lipitor. He was evaluated by therapy and suggested to continue in an inpatient setting prior to returning home. He reports no BM for multiple days. Past Medical History:  has a past medical history of CVA (cerebral vascular accident) ([address] and HTN (hypertension). Past Surgical History:  has a past surgical history that includes Finger surgery (Right); Hernia repair; fracture surgery; and hernia repair.   Discharge Recommendations: TBD pending progress  DME Required For Discharge: DME to be determined pending patient progress  Precautions/Restrictions: high fall risk, (L) hemiparetic shoulder  Weight Bearing Restrictions: no restrictions  [] Right Upper Extremity  [] Left Upper Extremity [] Right Lower Extremity  [] Left Lower Extremity     Required Braces/Orthotics: no braces required   [] Right  [] Left  Positional Restrictions:no positional restrictions    Pre-Admission Information   Lives With: brother Deonte Fitch)                Type of Home: house  Home Layout: two level, bedroom in the basement, 10 steps with 1 handrail on the L  Home Access:  2 step to enter without rails   Bathroom Layout: tub/shower unit  Bathroom Equipment: grab bars in shower  Toilet Height: elevated height  Home Equipment: no prior equipment  Transfer Assistance: Independent without use of device  Ambulation Assistance:Independent without use of device  ADL Assistance: independent with all ADL's   IADL Assistance: independent with homemaking tasks  Active :        [x] Yes                 [] 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 Occupation: sales  Hobbies: [employer]   Recent Falls: chart reports had several falls that caused this admission (about 5 falls)     Examination   Vision:   Vision Corrective Device: wears glasses for distance  Hearing:   WFL      Subjective  General: Patient seated in recliner upon arrival, agreeable to therapy. Pain: 0/10  Pain Interventions: not applicable       Functional Mobility  Bed Mobility  Bed mobility not completed on this date. Comments:   Transfers  Sit to stand transfer: minimal assistance, 2 person assistance with max A x 2 , ([place] for correction of lateral lean from ballet bar with (R) UE support on ballet bar)  Stand to sit transfer: minimal assistance, 2 person assistance with max A x 2 , ([place] for correction of lateral lean from ballet bar with (R) UE support on ballet bar)  Stand pivot transfer: 2 person assistance with max A x 2   Squat pivot transfer: 2 person assistance with max A x 2   Toilet transfer: 2 person assistance with max A of 2   Comments: Patient presents with significant (L) lateral lean with all transfers requiring max assist and VC to correct upright posture. Patient also required max VC for sequencing and proper hand placement. Requires therapist assist for placement and maintenance of (L) UE during all transfers.    Ambulation  Surface:level surface  Assistive Device: retana rail  Other Appliance: wheelchair follow  Assistance: 2 person assistance with max A x 2   Distance: 15 ft  Gait Mechanics: Ambulates with (L) lateral lean, narrow and inconsistent THERESE, and decreased step length (B). Increased (L) knee flexion noted but sustains without knee buckling. Comments:  Max VC for sequencing and upright posture. Physical assist provided to (L) UE for support. Stair Mobility  Stair mobility not completed on this date. Comments: Unsafe to attempt secondary to poor posture and extensive assist to maintain standing position  Wheelchair Mobility:  Chair: manual  Surface: level surface  Method: (R) LE and (L) LE  Distance: 20 ft  Assistance: minimal assistance  Comments: Max VC/TC provided for sequencing and advancing (L) LE forward for propulsion. Balance  Static Sitting Balance: poor (+): requires min (A) to maintain balance  Dynamic Sitting Balance: poor: requires mod (A) to maintain balance  Static Standing Balance: poor (-): requires max (A) to maintain balance  Dynamic Standing Balance: poor (-): requires max (A) to maintain balance  Comments:    Other Therapeutic Interventions   See functional mobility. Session began with toilet transfer (see above). LE clothing management and pericare dependently performed by OT member in addition to max A for standing balance provided for PT member. In addition to functional mobility, patient performed static standing at ballet bars with (B) UE reaching for targets at mirror with max assist for reaching with the (L) UE to promote (B) UE use. Max VC provided for upright posture and to prevent (L) lateral lean. Initial stand performed at mod A x 2. Patient required increased assist for stability secondary to (L) LE instability. Second stand performed at min A + CGA with physical assist to promote (L) TKE in standing. Stability improved with locking (L) knee into full extension and providing TC to promote neutral LE alignment.      Functional Outcomes                 Cognition  Overall Cognitive Status: Impaired  Arousal/Alterness: appropriate responses to stimuli  Following Commands: follows one step commands with repetition, follows one step commands with increased time  Attention Span: difficulty attending to directions  Memory: decreased recall of biographical information, decreased short term memory  Safety Judgement: decreased awareness of need for safety  Problem Solving: assistance required to generate solutions, assistance required to implement solutions  Insights: decreased awareness of deficits  Initiation: requires cues for all  Sequencing: requires cues for all  Orientation:    oriented to person, oriented to place, oriented to situation, and disoriented to time   Command Following:   impaired    Education  Barriers To Learning: cognition, emotional, and physical  Patient Education: patient educated on goals, PT role and benefits, plan of care, general safety, functional mobility training, proper use of assistive device/equipment, disease specific education, transfer training  Learning Assessment:  patient verbalizes understanding, would benefit from continued reinforcement    Assessment  Activity Tolerance: Good. Seated rest breaks provided for recovery. Impairments Requiring Therapeutic Intervention: decreased functional mobility, decreased ADL status, decreased ROM, decreased strength, decreased safety awareness, decreased cognition, decreased endurance, decreased balance, decreased coordination, decreased posture  Prognosis: fair  Clinical Assessment: Patient showing improvements in mobility this date as evidenced by ability to ambulate further distances this date. However, patient continues to be limited by significant (L) lateral lean with all mobility requiring max assist of 2 for transfers and short distance ambulation. Patient continues to require max VC for promoting upright posture with all mobility. Patient would benefit from continued skilled therapy services in order to promote functional independence with all mobility.   Safety Interventions: patient left in chair, chair alarm in place, call light within reach, gait belt, patient at risk for falls, telesitter in use, and fall mats at bedside    Plan  Frequency: 5 x/week, 60 min/day  Current Treatment Recommendations: strengthening, ROM, balance training, functional mobility training, transfer training, gait training, stair training, endurance training, neuromuscular re-education, wheelchair mobility training, modalities, patient/caregiver education, ADL/self-care training, cognitive/perceptual training, and safety education    Goals  Patient Goals: To return home and be able to move his (L) UE better  Short Term Goals:  Time Frame: 1-2 weeks  Patient will complete bed mobility at [facility]   Patient will complete transfers at mod (A) of 1  Patient will ambulate 25 ft with use of LRAD at mod (A) of 1  Patient will ascend/descend 1 stairs with (B) handrail at mod (A) of 1  Patient will complete manual w/c propulsion 50 ft at [facility]  Long Term Goals:  Time Frame: 3 weeks  Patient will complete bed mobility at modified independent   Patient will complete transfers at [facility]   Patient will ambulate 50 ft with use of LRAD at [facility]  Patient will ascend/descend 10 stairs with (L) ascending handrail at [facility]  Patient will complete car transfer at [facility]     Therapy Session Time      Individual Group Co-treatment   Time In      1000   Time Out      1103   Minutes      63     Timed Code Treatment Minutes:   63 minutes  Total Treatment Minutes:  63 minutes       Electronically Signed By: LINA Hoffmnan  Therapist observed and directed the patient's plan of care.   Co-signed and supervised by: Chalmer Moritz PT, DPT - [id number]

## 2023-02-09 NOTE — PLAN OF CARE
Problem: Discharge Planning  Goal: Discharge to home or other facility with appropriate resources  Outcome: Progressing     Problem: Skin/Tissue Integrity  Goal: Absence of new skin breakdown  Outcome: Progressing     Problem: Safety - Adult  Goal: Free from fall injury  Outcome: Progressing     Problem: Pain  Goal: Verbalizes/displays adequate comfort level or baseline comfort level  Outcome: Progressing     Problem: Nutrition Deficit:  Goal: Optimize nutritional status  Outcome: Progressing

## 2023-02-09 NOTE — PROGRESS NOTES
Jayden Becerra  2/9/2023  [id number]    Chief Complaint: Acute ischemic stroke [facility])    Subjective:   No overnight events. Unable to have a BM this am. Sustained trauma to his meatus with the urinal.     ROS: No CP, SOB, dyspnea    Objective:  Patient Vitals for the past 24 hrs:   BP Temp Temp src Pulse Resp SpO2 Height   02/08/23 2100 120/76 98.4 °F (36.9 °C) Oral 80 16 95 % --   02/08/23 1318 -- -- -- -- -- -- 5' 9\" (1.753 m)   02/08/23 1000 (!) 160/87 98.1 °F (36.7 °C) Oral 78 18 95 % --     Gen: No distress, pleasant. Resting in bedside chair  HEENT: Normocephalic, atraumatic. CV: Regular rate and rhythm. No MRG   Resp: No respiratory distress. CTAB   Abd: Soft, nontender nondistended  Ext: No edema. Neuro: Alert, oriented, appropriately interactive. Left hemiparesis    Laboratory data: Available via EMR. Therapy progress:    PT    Supine to Sit: Partial/moderate assistance  Sit to Supine: Dependent   Sit to Stand: Dependent  Chair/Bed to Chair Transfer: Dependent  Car Transfer:    Ambulation 10 ft: Dependent  Ambulation 50 ft:    Ambulation 150 ft:    Stairs - 1 Step:    Stairs - 4 Step:    Stairs - 12 Step:      OT    Eating:    Oral Hygiene: Supervision or touching assistance  Bathing: Dependent  Upper Body Dressing: Substantial/maximal assistance  Lower Body Dressing: Dependent  Toilet Transfer: Dependent  Toilet Hygiene:      Speech Therapy    Pt presents with mild-moderate oropharyngeal dysphagia due to reduced L side sensation and perioral weakness resulting in anterior bolus loss, impaired mastication, premature bolus loss, delayed swallow initiation, altered vocal quality with a delayed cough. Pt reports not having as much as an appetite as well as lack of dentition creating difficulty with mastication. Pt reports chewing is where his difficulty is and not with swallowing.  Pt may benefit from instrumental assessment due to evidence of reduced sensation and intermittent clinical signs of penetration/aspiration; will recommend when appropriate. Recommend dysphagia diet (soft and bite sized diet) with dysphagia intervention and implementation of safe swallow strategies to minimize aspiration risk. Pt presents with moderate dysarthria characterized by L perioral weakness resulting in reduced articulation, precision, and reduced rate of speech. Pt self-rated speech as 5% being far from baseline. Pt presents with mild-moderate cognitive-linguistic deficits characterized by deficits in orientation, attention, executive functioning, short-term memory, and visuospatial functioning. Errors consisted of many deflections and patient attributing to baseline dyslexia. Required mod cues and repetition throughout task. Pt demonstrated appropriate insight into deficits which resulted in emotional liability towards the end of the assessment. Recommend SLP intervention for safe return to prior level of independence. Body mass index is 21.9 kg/m². Assessment:  Patient Active Problem List   Diagnosis    Acute ischemic stroke [facility])    Expressive aphasia    Dysarthria    Ataxia    Primary hypertension    Tobacco abuse    Noncompliance    Left hemiparesis ([facility] [address]       Plan:   Acute right basal ganglia infarct: DAPT for 21 days (2/25) , Lipitor. PT/OT/SLP. Discussed SSRI     Dysphagia: Dysphagia diet, SLP     HTN: started lisinopril 5     Tobacco use: education provided. No supplement currently    Meatus trauma: - hold lovenox, monitor. UA without significant findings for infection     Bowels: Per protocol  Bladder: Per protocol   Sleep: Trazodone provided prn. Pain: Tylenol, tramadol as needed  DVT PPx: Lovenox - hold today 2/2 bleeding  SOPHIA: 3/2    Lorena Boyle MD 2/9/2023, 8:24 AM    * This document was created using dictation software. While all precautions were taken to ensure accuracy, errors may have occurred. Please disregard any typographical errors.

## 2023-02-10 PROCEDURE — 92612 ENDOSCOPY SWALLOW (FEES) VID: CPT

## 2023-02-10 PROCEDURE — 97129 THER IVNTJ 1ST 15 MIN: CPT

## 2023-02-10 PROCEDURE — 92507 TX SP LANG VOICE COMM INDIV: CPT

## 2023-02-10 PROCEDURE — 1280000000 HC REHAB R&B

## 2023-02-10 PROCEDURE — 92526 ORAL FUNCTION THERAPY: CPT

## 2023-02-10 PROCEDURE — 97530 THERAPEUTIC ACTIVITIES: CPT

## 2023-02-10 PROCEDURE — 6370000000 HC RX 637 (ALT 250 FOR IP): Performed by: PHYSICAL MEDICINE & REHABILITATION

## 2023-02-10 PROCEDURE — 97116 GAIT TRAINING THERAPY: CPT

## 2023-02-10 PROCEDURE — 51798 US URINE CAPACITY MEASURE: CPT

## 2023-02-10 PROCEDURE — 97535 SELF CARE MNGMENT TRAINING: CPT

## 2023-02-10 PROCEDURE — 6360000002 HC RX W HCPCS: Performed by: PHYSICAL MEDICINE & REHABILITATION

## 2023-02-10 RX ORDER — BISACODYL 10 MG
10 SUPPOSITORY, RECTAL RECTAL ONCE
Status: COMPLETED | OUTPATIENT
Start: 2023-02-10 | End: 2023-02-10

## 2023-02-10 RX ADMIN — ENOXAPARIN SODIUM 40 MG: 100 INJECTION SUBCUTANEOUS at 07:43

## 2023-02-10 RX ADMIN — ASPIRIN 81 MG 81 MG: 81 TABLET ORAL at 07:42

## 2023-02-10 RX ADMIN — STANDARDIZED SENNA CONCENTRATE AND DOCUSATE SODIUM 2 TABLET: 8.6; 5 TABLET ORAL at 20:51

## 2023-02-10 RX ADMIN — ATORVASTATIN CALCIUM 40 MG: 40 TABLET, FILM COATED ORAL at 20:51

## 2023-02-10 RX ADMIN — BISACODYL 10 MG: 10 SUPPOSITORY RECTAL at 13:53

## 2023-02-10 RX ADMIN — CLOPIDOGREL BISULFATE 75 MG: 75 TABLET ORAL at 07:43

## 2023-02-10 RX ADMIN — STANDARDIZED SENNA CONCENTRATE AND DOCUSATE SODIUM 2 TABLET: 8.6; 5 TABLET ORAL at 07:42

## 2023-02-10 RX ADMIN — LISINOPRIL 5 MG: 5 TABLET ORAL at 07:42

## 2023-02-10 ASSESSMENT — PAIN DESCRIPTION - LOCATION: LOCATION: GENERALIZED

## 2023-02-10 ASSESSMENT — PAIN SCALES - GENERAL
PAINLEVEL_OUTOF10: 2
PAINLEVEL_OUTOF10: 0

## 2023-02-10 NOTE — PROGRESS NOTES
[facility]   Phone: [phone]    Physical Therapy    [] Initial Evaluation            [x] Daily Treatment Note         [] Discharge Summary      Patient: Bernard Rosen   : 1955   MRN: [id number]   Date of Service:  2/10/2023  Admitting Diagnosis: Acute ischemic stroke [facility])  Current Admission Summary: 47-year-old male who was admitted on  with left-sided weakness for greater than 2 days. CT of the head with suggestion of possible NPH. CTA with less than 50% stenosis of the right ICA. MRI of the brain revealed subacute ischemia on the right basal ganglia. Echo with normal EF and negative bubble study. Neurology evaluated and suggested aspirin and Plavix for 21 days then aspirin only in addition to Lipitor. He was evaluated by therapy and suggested to continue in an inpatient setting prior to returning home. He reports no BM for multiple days. Past Medical History:  has a past medical history of CVA (cerebral vascular accident) ([address] and HTN (hypertension). Past Surgical History:  has a past surgical history that includes Finger surgery (Right); Hernia repair; fracture surgery; and hernia repair.   Discharge Recommendations: TBD pending progress  DME Required For Discharge: DME to be determined pending patient progress  Precautions/Restrictions: high fall risk, (L) hemiparetic shoulder  Weight Bearing Restrictions: no restrictions  [] Right Upper Extremity  [] Left Upper Extremity [] Right Lower Extremity  [] Left Lower Extremity     Required Braces/Orthotics: no braces required   [] Right  [] Left  Positional Restrictions:no positional restrictions    Pre-Admission Information   Lives With: brother Kemi Vu)                Type of Home: house  Home Layout: two level, bedroom in the basement, 10 steps with 1 handrail on the L  Home Access:  2 step to enter without rails   Bathroom Layout: tub/shower unit  Bathroom Equipment: grab bars in shower  Toilet Height: elevated height  Home Equipment: no prior equipment  Transfer Assistance: Independent without use of device  Ambulation Assistance:Independent without use of device  ADL Assistance: independent with all ADL's   IADL Assistance: independent with homemaking tasks  Active :        [x] Yes                 [] 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 Occupation: sales  Hobbies: fishing   Recent Falls: chart reports had several falls that caused this admission (about 5 falls)     Examination   Vision:   Vision Corrective Device: wears glasses for distance  Hearing:   WFL      Subjective  General: Patient seated in recliner upon arrival, agreeable to therapy. Pain: 0/10  Pain Interventions: not applicable       Functional Mobility  Bed Mobility  Bed mobility not completed on this date. Comments:   Transfers  Sit to stand transfer: 2 person assistance with min A + CGA (with use of grab bar in shower and to unilateral (R) UE support of w/c and retana rail)   Stand to sit transfer: 2 person assistance with min A + CGA   Stand pivot transfer: 2 person assistance with mod A + CGA when transferring to (R) including recliner <=> w/c => shower seat. Requires mod (A) + min (A) when transferring to (L) from shower seat => w/c. Comments: Patient presents with significant (L) lateral lean during initial stance and during transfers requiring max segmental VC and TC for correction of upright posture. Patient also required max VC + assist for (L) UE hand placement and support. Ambulation   Surface:level surface  Assistive Device: retana rail  Other Appliance: wheelchair follow  Assistance: 2 person assistance with mod (A) of 1 for postural correction + min (A) of 1 for (L) UE support and WB facilitation. Distance: 25 ft  Gait Mechanics: Ambulates with (L) lateral lean requiring consistent segmental cueing and sequencing.   Narrow and inconsistent THERESE, and decreased step length (B). Able to self advance (L) LE in response to assisted weight shift however has increased (L) knee flexion during stance but no evidence of knee buckling. Comments:  Max VC for sequencing and upright posture. Physical assist provided to (L) UE for support. Stair Mobility ** Completed during 2nd session **  Number of Steps: 4  Step Height: 4 inch  Hand Rails: requires (B) HR as patient utilizes unilateral (R) side HR while ascending/descending  Assistance: 2 person assistance with mod (A) of 2   Comments:  Step to mechanics with max VC for sequence and segmental correction of (L) lateral lean. Assistance provided for postural correction + support to (L) UE. Wheelchair Mobility:  No w/c mobility completed on this date. Comments:   Balance  Static Sitting Balance: poor (+): requires min (A) to maintain balance  Dynamic Sitting Balance: poor (+): requires min (A) to maintain balance  Static Standing Balance: poor (+): requires min (A) to maintain balance  Dynamic Standing Balance: poor: requires mod (A) to maintain balance  Comments:    Other Therapeutic Interventions   First Session:  See functional mobility. Shower completed with OT team member to maximize patient performance and maintain patient safety. Pt requires mod + CGA to complete stand pivot transfer to (R) from w/c => shower seat surface. Pt requires CGA assist to maintain static supported sitting balance during shower completion in addition to OT assist with ADL task completion. Pt requires min A assist to maintain dynamic sitting balance and promote upright posture to maximize self-participation during shower completion in addition to OT assist with ADL task completion. LE clothing management performed dependently by OT member with min A for standing balance. Stand pivot transfer to (L) from shower seat to w/c at mod (A) of 1 + min (A) of 1.     Second Session:   Patient seated in recliner upon entry, agreeable to therapy. Stand pivot transfer from recliner => w/c performed at mod A + CGA. Patient performed stair navigation (see above for details). In addition to this, (L) LE 4 in step ups performed with (R) UE support on HR at mod A x 2. Max VC provided for sequence and to correct (L) lateral lean. Assistance provided for support to the (L) UE. Upon return to room, patient requesting to use the restroom. Stand step transfer from w/c => toilet chair at mod A x 2 for turning to the (L). Max VC provided for sequence. Stedy utilized for transition from toilet => bed with use of stedy secondary to time constraints, stand achieved at min (A) of 1 within dependent device. Sit => supine transfer completed at mod (A) of 2. Pt in bed with bed alarm activated and call light within reach.     Functional Outcomes                 Cognition  Overall Cognitive Status: Impaired  Arousal/Alterness: appropriate responses to stimuli  Following Commands: follows one step commands with repetition, follows one step commands with increased time  Attention Span: difficulty attending to directions  Memory: decreased recall of biographical information, decreased short term memory  Safety Judgement: decreased awareness of need for safety  Problem Solving: assistance required to generate solutions, assistance required to implement solutions  Insights: decreased awareness of deficits  Initiation: requires cues for all  Sequencing: requires cues for all  Orientation:    oriented to person, oriented to place, oriented to situation, and disoriented to time   Command Following:   impaired    Education  Barriers To Learning: cognition, emotional, and physical  Patient Education: patient educated on goals, PT role and benefits, plan of care, general safety, functional mobility training, proper use of assistive device/equipment, disease specific education, transfer training  Learning Assessment:  patient verbalizes understanding, would benefit from continued reinforcement    Assessment  Activity Tolerance: Good. Seated rest breaks provided for recovery. Impairments Requiring Therapeutic Intervention: decreased functional mobility, decreased ADL status, decreased ROM, decreased strength, decreased safety awareness, decreased cognition, decreased endurance, decreased balance, decreased coordination, decreased posture  Prognosis: fair  Clinical Assessment: Patient showing improvements in mobility this date as evidenced by ability to perform all transfers and ambulation with less assistance and progression to increased ambulation distances and initiation of stair mobility. Patient continues to be limited by significant (L) lateral lean with all mobility however is able to initial correction in response to verbal/tacile cueing. Patient continues to require max VC for promoting upright posture with all mobility and assist of 2 for safe completion of functional mobility. Patient would benefit from continued skilled therapy services in order to promote functional independence with all mobility. Safety Interventions: patient left in chair, chair alarm in place, call light within reach, gait belt, patient at risk for falls, telesitter in use, and fall mats at bedside    Plan  Frequency: 5 x/week, 60 min/day  Current Treatment Recommendations: strengthening, ROM, balance training, functional mobility training, transfer training, gait training, stair training, endurance training, neuromuscular re-education, wheelchair mobility training, modalities, patient/caregiver education, ADL/self-care training, cognitive/perceptual training, and safety education    Goals  Patient Goals:  To return home and be able to move his (L) UE better  Short Term Goals:  Time Frame: 1-2 weeks  Patient will complete bed mobility at [facility]   Patient will complete transfers at mod (A) of 1  Patient will ambulate 25 ft with use of LRAD at mod (A) of 1  Patient will ascend/descend 1 stairs with (B) handrail at mod (A) of 1  Patient will complete manual w/c propulsion 50 ft at A  Long Term Goals:  Time Frame: 3 weeks  Patient will complete bed mobility at modified independent   Patient will complete transfers at [facility]   Patient will ambulate 50 ft with use of LRAD at [facility]  Patient will ascend/descend 10 stairs with (L) ascending handrail at [facility]  Patient will complete car transfer at [facility]     Therapy Session Time      Individual Group Co-treatment   Time In      1007   Time Out      1104   Minutes      57     Second Session:   Individual Group Co-treatment   Time In      1245   Time Out      1320   Minutes      35     Timed Code Treatment Minutes:   57 + 35 minutes  Total Treatment Minutes:  92 minutes       Electronically Signed By: LINA Zepeda  Therapist observed and directed the patient's plan of care.   Co-signed and supervised by: Lulu Tolbert PT, DPT - [id number]

## 2023-02-10 NOTE — PROGRESS NOTES
Nutrition Note    RECOMMENDATIONS  PO Diet: Dysphagia soft & bite sized diet per SLP  ONS: change to Magic cup BID     NUTRITION ASSESSMENT   PO intake 51-75% most meals, per EMR. Pt states appetite remains poor & does not like Ensure. Reluctantly agrees to try magic cup as alternative. Pt remains constipated, which continues to affect appetite as well. Pt reports he is a picky eater as well. Suggested family bring in foods that may be better appealing, as long as they are appropriate for current diet texture (currently on dysphagia soft & bite sized). Will con't to monitor for po & supp intake. Nutrition Related Findings: +BS; LBM 2/5; no edema noted  Wounds: None  Nutrition Education:  Education not indicated   Nutrition Goals: PO intake 50% or greater     MALNUTRITION ASSESSMENT   Acute Illness  Malnutrition Status: At risk for malnutrition (Comment)    NUTRITION DIAGNOSIS   Inadequate oral intake related to inadequate protein-energy intake as evidenced by constipation, other (comment), mild muscle loss, mild loss of subcutaneous fat (pt c/o poor appetite)      CURRENT NUTRITION THERAPIES  ADULT DIET; Dysphagia - Soft and Bite Sized  ADULT ORAL NUTRITION SUPPLEMENT; Dinner, Lunch; Frozen Oral Supplement     PO Intake: 51-75%   PO Supplement Intake:Refusing to take    ANTHROPOMETRICS  Current Height: 5' 9\" (175.3 cm)  Current Weight: 148 lb 4.8 oz (67.3 kg)    Ideal Body Weight (IBW): 160 lbs  (73 kg)    Usual Bodyweight 160 lb (72.6 kg)       BMI: 21.8      COMPARATIVE STANDARDS  Energy (kcal):  1675- 2010     Protein (g):  82- 136g       Fluid (mL/day):  1 ml/kcal    The patient will be monitored per nutrition standards of care. Consult dietitian if additional nutrition interventions are needed prior to RD reassessment.      Henry Jane, JERRELL, LD    Contact: [phone]

## 2023-02-10 NOTE — PLAN OF CARE
Problem: Discharge Planning  Goal: Discharge to home or other facility with appropriate resources  Outcome: Progressing  Flowsheets  Taken 2/9/2023 2215 by Priyank Huber RN  Discharge to home or other facility with appropriate resources: Identify barriers to discharge with patient and caregiver  Taken 2/9/2023 0856 by Nissa Mcdowell RN  Discharge to home or other facility with appropriate resources: Identify barriers to discharge with patient and caregiver     Problem: Skin/Tissue Integrity  Goal: Absence of new skin breakdown  Description: 1. Monitor for areas of redness and/or skin breakdown  2. Assess vascular access sites hourly  3. Every 4-6 hours minimum:  Change oxygen saturation probe site  4. Every 4-6 hours:  If on nasal continuous positive airway pressure, respiratory therapy assess nares and determine need for appliance change or resting period.   Outcome: Progressing     Problem: Safety - Adult  Goal: Free from fall injury  Outcome: Progressing     Problem: Pain  Goal: Verbalizes/displays adequate comfort level or baseline comfort level  Outcome: Progressing  Flowsheets  Taken 2/9/2023 2215 by Priyank Huber RN  Verbalizes/displays adequate comfort level or baseline comfort level: Encourage patient to monitor pain and request assistance  Taken 2/9/2023 0856 by Nissa Mcdowell RN  Verbalizes/displays adequate comfort level or baseline comfort level:   Encourage patient to monitor pain and request assistance   Assess pain using appropriate pain scale     Problem: Nutrition Deficit:  Goal: Optimize nutritional status  Outcome: Progressing

## 2023-02-10 NOTE — PLAN OF CARE
Problem: Discharge Planning  Goal: Discharge to home or other facility with appropriate resources  2/10/2023 0953 by Otto Schulz RN  Outcome: Progressing     Problem: Skin/Tissue Integrity  Goal: Absence of new skin breakdown  Description: 1. Monitor for areas of redness and/or skin breakdown  2. Assess vascular access sites hourly  3. Every 4-6 hours minimum:  Change oxygen saturation probe site  4. Every 4-6 hours:  If on nasal continuous positive airway pressure, respiratory therapy assess nares and determine need for appliance change or resting period.   2/10/2023 0953 by Otto Schulz RN  Outcome: Progressing

## 2023-02-10 NOTE — PROCEDURES
[facility]  Facility/Department: [facility] ACUTE REHAB UNIT  Fiberoptic Endoscopic Evaluation of Swallowing  (FEES)    Patient: Justo Galeas   : 1955   MRN: [id number]    Account #: [de-identified]   Referring Physician: Dr. Aguillon Kidney   Evaluation Date: 2/10/2023   Date of Onset: 2023  Admitting Diagnosis: Acute ischemic stroke ([facility] [address] [I63.9]  Treatment Diagnosis: Dysphagia   Diet prior to assessment:ADULT DIET; Dysphagia - Soft and Bite Sized  ADULT ORAL NUTRITION SUPPLEMENT; Dinner, Lunch; Frozen Oral Supplement  History of Present Illness/Injury: 68-year-old male who was admitted on  with left-sided weakness for greater than 2 days. CT of the head with suggestion of possible NPH. CTA with less than 50% stenosis of the right ICA. MRI of the brain revealed subacute ischemia on the right basal ganglia. Echo with normal EF and negative bubble study. Neurology evaluated and suggested aspirin and Plavix for 21 days then aspirin only in addition to Lipitor. He was evaluated by therapy and suggested to continue in an inpatient setting prior to returning home. He reports no BM for multiple days. Pain: Denies                        Impressions:   Assessment: Patient presents with mild to moderate oropharyngeal dysphagia. Pt demonstrated episodic penetration of thin liquid after the swallow to the level of the vocal folds. It is difficult to definitively rule out eventual aspiration but the liquid appeared to clear the airway and the false folds with use of a secondary swallow. Pt with a delayed cough response to the penetration. Pt demonstrated increased evidence of premature bolus loss to the level of the pyriforms with thin liquids, mostly on the left. Pt's posture (leaning towards the left) likely contributed to some of the asymmetry and pooling toward the left sided pyriform cavity.  Diffuse residue was viewed in the pharyngeal vestibule with thicker textures with use of piecemeal deglutition and secondary swallows that were effective in clearing. Patient is at an increased risk of aspiration after the swallow. Based on today's assessment recommend Dysphagia III Soft and bite sized  with Thin liquids, No straws , Meds whole with water (one at a time) with use of double swallows. Diet and Treatment Recommendations 2/10/2023:  Diet Solids Recommendation:  Dysphagia III Soft and bite sized  Liquid Consistency Recommendation:   Thin liquids, No straws  Recommended form of Meds: Meds whole with water; one at a time    Compensatory strategies:   Upright as possible with all PO intake , No straws , Small bites/sips , Eat/feed slowly  Dysphagia Intervention: Pharyngeal Exercise, Diet Tolerance Monitoring , Patient/Family Education , Therapeutic Trials with SLP     Images:    Vocal fold adduction     Vocal fold abduction     Diffuse residuals of puree textures     Trace penetration above the vocal folds of thin liquid       Premature spillage of thin liquids to the left pyriform cavity     Pre Swallow Assessment:   Nasopharyngoscope: Slim Scope   Alert : Yes  Functional Comm : intact    Voice Quality : Weak    Velopharyngeal Closure: intact    Base of tongue contact with posterior pharyngeal wall: intact    Pharyngeal and longitudinal constrictors oh high pitched /i/: impaired    Left true vocal fold adduction: intact    Right true vocal fold adduction: intact    Interarytenoid/Post-com Edema : No  Arytenoid Edema : No  Arytenoid Erythema : No  Vocal Fold Edema : No  Epiglottic inversion/retrovision on dry swallow:intact    Secretions:   [place] Secretion Scale Kathryn Otoole et al. 2017)    Location  0 (No significant pooled secretions in pyriform fossae or laryngeal vestibule)   Amount in pyriform fossae  0 (No significant pooled secretions in pyriform fossae 0-20%)    Response  0 (Secretions in the pyriform fossae laryngeal vestibule effectively cleared)   Score: (out of 7)  0         Reason for referral:  Pt was referred for a FEES to assess the efficiency of his swallow function, assess for aspiration, make recommendations regarding safe dietary consistency, effective compensatory and safe eating environment. A flexible endoscope was passed transnasally to obtain a superior view of the hypopharynx for the purpose of functional evaluation of the patient's swallowing ability. The purpose and description of the procedure was explained in addition to the risks and options available to the patient and verbal informed consent was obtained prior to initiation of thin exam. The patient tolerated the procedure well and good views were obtained. A gel based topical lubricant was applied to the insertion tube of the endoscope prior to insertion and the scope was advanced through the right nares. Endoscope was removed without incident, no adverse reactions. Swallowing Evaluation:  Testing position :Upright in chair  Consistencies provided: ice chips , thin liquids, puree , soft solids, mixed consistency , and regular solids     Oral Phase:  Decreased bolus control   Premature bolus loss     Pharyngeal Phase:  Pharyngeal pooling prior to swallow initiation   Delayed swallow initiation   Decreased hyolaryngeal excursion   Diminished pharyngeal stripping wave   Decreased laryngeal elevation   Decreased pharyngeal contraction   Pharyngeal residue     Penetration/Aspiration Scores across consistencies (Obey et.  al. Carrie Mosley)    CONSISTENCY  Pen/Asp rating Description    Thin   1) Material does not enter the airway  2) Material enters the airway, remains above the vocal folds, and is ejected from the airway  4) Material enters the airway, contacts the vocal folds, and is ejected from the airway     Mildly (nectar) thick   N/A - consistency not provided     Moderately (honey) thick   N/A - consistency not provided     Puree   1) Material does not enter the airway     Soft Solid   1) Material does not enter the airway Regular Solid   1) Material does not enter the airway            The [place] Pharyngeal Residue Severity Rating Scale (Esther et. al. 2015)     CONSISTENCY  Vallecular residue  Pyriform residue    Thin   Trace (1-5%, Trace coating of mucosa)  Mild (5-25%, Epiglottic ligament visible)  Trace (1-5%, trace coating of mucosa)  Mild (5-25%, Up wall to quarter full)    Mildly (nectar) thick   N/A, not provided   N/A, not provided    Moderately (honey) thick   N/A, not provided   N/A, not provided    Puree   Trace (1-5%, Trace coating of mucosa)  Trace (1-5%, trace coating of mucosa)   Soft Solid   None (0%, No residue)  None (0%, No residue)   Regular Solid   Trace (1-5%, Trace coating of mucosa)  None (0%, No residue)          Increased risk of aspiration due to:  Redcued laryngopharyngeal sensation   Premature spillage of bolus   Decreased airway closure     Dysphagia Treatment Goals  See previously established SLP dysphagia goals. Education:  Results and recommendations discussed with pt using study images/video. Skilled instruction re: aspiration risk, evidence-based methods of decreasing adverse outcomes associated with aspiration, and sequencing of compensatory strategies developed based on instrumental swallow evaluation. Demonstration and training for use of safe swallowing strategies of double swallow, upright posture, avoiding straws; pt demonstrated understanding of strategies with min verbal cues, verbalized understanding. Total Treatment Time / Charges   Time code minutes: 0   Time in: 1115  Time out: 1145  Total treatment time: 30    Signature:  Dominic Velasquez M.A.  CCC-SLP AUDELIA [id number]  Speech-Language Pathologist   2/10/2023 1:13 PM

## 2023-02-10 NOTE — PROGRESS NOTES
Jayden Becerra  2/10/2023  [id number]    Chief Complaint: Acute ischemic stroke [facility])    Subjective:   No overnight events. Still no BM. Feels constipated. ROS: No CP, SOB, dyspnea    Objective:  Patient Vitals for the past 24 hrs:   BP Temp Temp src Pulse Resp SpO2   02/10/23 0740 118/70 97.7 °F (36.5 °C) Oral 60 18 93 %   02/09/23 2215 117/68 97.8 °F (36.6 °C) Oral 75 16 96 %   02/09/23 0856 113/76 98.3 °F (36.8 °C) Oral 74 17 95 %       Gen: No distress, pleasant. Resting in bed  HEENT: Normocephalic, atraumatic. CV: Regular rate and rhythm. No MRG   Resp: No respiratory distress. CTAB   Abd: Soft, nontender nondistended  Ext: No edema. Neuro: Alert, oriented, appropriately interactive. Left hemiparesis    Laboratory data: Available via EMR. Therapy progress:    PT    Supine to Sit: Partial/moderate assistance  Sit to Supine: Dependent   Sit to Stand: Dependent  Chair/Bed to Chair Transfer: Dependent  Car Transfer:    Ambulation 10 ft: Dependent  Ambulation 50 ft:    Ambulation 150 ft:    Stairs - 1 Step:    Stairs - 4 Step:    Stairs - 12 Step:      OT    Eating:    Oral Hygiene: Supervision or touching assistance  Bathing: Dependent  Upper Body Dressing: Substantial/maximal assistance  Lower Body Dressing: Dependent  Toilet Transfer: Dependent  Toilet Hygiene:      Speech Therapy    Pt presents with mild-moderate oropharyngeal dysphagia due to reduced L side sensation and perioral weakness resulting in anterior bolus loss, impaired mastication, premature bolus loss, delayed swallow initiation, altered vocal quality with a delayed cough. Pt reports not having as much as an appetite as well as lack of dentition creating difficulty with mastication. Pt reports chewing is where his difficulty is and not with swallowing. Pt may benefit from instrumental assessment due to evidence of reduced sensation and intermittent clinical signs of penetration/aspiration; will recommend when appropriate. Recommend dysphagia diet (soft and bite sized diet) with dysphagia intervention and implementation of safe swallow strategies to minimize aspiration risk. Pt presents with moderate dysarthria characterized by L perioral weakness resulting in reduced articulation, precision, and reduced rate of speech. Pt self-rated speech as 5% being far from baseline. Pt presents with mild-moderate cognitive-linguistic deficits characterized by deficits in orientation, attention, executive functioning, short-term memory, and visuospatial functioning. Errors consisted of many deflections and patient attributing to baseline dyslexia. Required mod cues and repetition throughout task. Pt demonstrated appropriate insight into deficits which resulted in emotional liability towards the end of the assessment. Recommend SLP intervention for safe return to prior level of independence. Body mass index is 21.9 kg/m². Assessment:  Patient Active Problem List   Diagnosis    Acute ischemic stroke [facility])    Expressive aphasia    Dysarthria    Ataxia    Primary hypertension    Tobacco abuse    Noncompliance    Left hemiparesis ([facility] [address]       Plan:   Acute right basal ganglia infarct: DAPT for 21 days (2/25) , Lipitor. PT/OT/SLP. Discussed SSRI     Dysphagia: Dysphagia diet, SLP     HTN: started lisinopril 5     Tobacco use: education provided. No supplement currently    Meatus trauma: held lovenox, monitor. UA without significant findings for infection     Bowels: Per protocol - suppository today  Bladder: Per protocol   Sleep: Trazodone provided prn. Pain: Tylenol, tramadol as needed  DVT PPx: Lovenox  SOPHIA: 3/2    Shanae Agrawal MD 2/10/2023, 7:53 AM    * This document was created using dictation software. While all precautions were taken to ensure accuracy, errors may have occurred. Please disregard any typographical errors.

## 2023-02-10 NOTE — PROGRESS NOTES
Morning assessment completed, vss, denies pain, schedule meds given, The care plan and education has been reviewed and mutually agreed upon with the patient. Fall precautions in place, hourly rounding, call light and belongings in reach, bed in lowest position, wheels locked in place, side rails up x 2, walkways free of clutter Pt remains free from falls. Fall precautions in place--bed in lowest position, call light within reach, bed alarm in use. Pt aware to call for assistance before getting up.

## 2023-02-10 NOTE — PROGRESS NOTES
[facility] [address]   Phone: [phone]    Speech Therapy    [] Initial Evaluation            [x] Daily Treatment Note         [] Discharge Summary      Patient: Bernard Rosen   : 1955   MRN: [id number]   Date of Service:  2/10/2023  Admitting Diagnosis: Acute ischemic stroke [facility])  Current Admission Summary: 58-year-old male who was admitted on  with left-sided weakness for greater than 2 days. CT of the head with suggestion of possible NPH. CTA with less than 50% stenosis of the right ICA. MRI of the brain revealed subacute ischemia on the right basal ganglia. Echo with normal EF and negative bubble study. Neurology evaluated and suggested aspirin and Plavix for 21 days then aspirin only in addition to Lipitor. He was evaluated by therapy and suggested to continue in an inpatient setting prior to returning home. He reports no BM for multiple days. Past Medical History:  has a past medical history of CVA (cerebral vascular accident) ([address] and HTN (hypertension). Past Surgical History:  has a past surgical history that includes Finger surgery (Right); Hernia repair; fracture surgery; and hernia repair. Precautions/Restrictions: high fall risk, modified diet      Pre-Admission Information   Living Status: Pt lives with his brother, independent prior to admission. Occupation/School: Retired last year. 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   Medication Management: :  [x]Primary   []Secondary []No  Finance Management: [x]Primary   []Secondary []No  Active :   [x]Yes         []No  H[facility]:    [email or web address]  Vision:    Vision Corrective Device: wears glasses at all times      Subjective  General: Pt alert and cooperative throughout sessions. Reported poor sleep last night.    Pain: Did not state    Safety Interventions: patient left in chair, chair alarm in place, call light within reach, and patient at risk for falls      Therapeutic Interventions:     Session 1:     Dysphagia: Severity: Mild  and Moderate   Trial of soft regular solids ( [place] toast)   - pt consumed approximately 50% of trial  - mildly extended mastication and posterior bolus propulsion   - intermittent left bolus loss without awareness   - adequate oral clearance   - no overt s/s of aspiration   Pt reported he feels like he consumed his 'normal' / baseline amount but overall has been eating slightly less than normal; does not feel in relation to endurance rather food choices     Meal tolerance   - thin liquids: intermittent signs of premature bolus loss with an audible delayed swallow; no overt s/s of aspiration   - puree solids: tolerated WFL     Pt required min/mod cues for eating and coordination; demonstrated poor object recognition x1 (drinking from spoon)     Recommend instrumental assessment via FEES due to inconsistent clinical indicators of dysphagia at bedside and to further determine dysphagia plan of care. Provided education regarding procedure and rationale. Pt agreed to proceed with assessment, will plan for completion later this date. Comprehension: Severity: Mild   Goal not directly targeted. Pt with reduced processing and comprehension throughout session. Expressive Language: Severity: Mild   Goal not directly targeted. Motor Speech: Severity: Mild  and Moderate   Completed oral reading \"[place]\"   - moderate dysfluencies related to baseline dyslexia   - mild- moderately reduced vocal intensity impacting intelligibility and articulation precision   - pt rated the reading as \"poor\"   Given mod/max cues able to achieve more intense quality with positive effect on articulation and intelligibility     Speech coordination   - vocal intensity via /ah/ x5   - a/e/o: x10 for coordination and rate     Voice: Severity: Mild  and Moderate   Goal not targeted this session.       Cognition: Severity: Mild   Insight   - insightful to physical limitations with limited insight into carry over with functional tasks   - pt with questions regarding his physical prognosis and CVA recovery   Provided pt with: Stroke Education    Stroke overview   Signs and symptoms of stroke   Post Stroke Depression Management     Materials Provided:  - [facility] Guide to Stroke Recovery     Additional Interventions:         Education  Barriers To Learning: cognition and hearing  Patient Education: Provided education regarding role of SLP, results of assessment, recommendations and general speech pathology plan of care. Learning Assessment: Pt requires ongoing learning     Assessment  Impairments Requiring Therapeutic Intervention: Cognitive-Linguistic Deficits  and Oropharyngeal Dysphagia   Prognosis: good    Clinical Assessment:  Pt presents with moderate L perioral weakness resulting in dysarthria and oropharyngeal dysphagia. Instrumental assessment (FEES) completed this date, see report for assessment summary. Pt also presents with mild-moderate cognitive-linguistic deficits characterized by deficits in orientation, attention, executive functioning, short-term memory, and visuospatial functioning. Recommend SLP intervention for safe return to prior level of independence. Diet Solids Recommendation:   Liquid Consistency Recommendation:   Recommended Form of Meds:   Dysphagia III Soft and bite sized     Thin liquids    No straws  Meds whole with water       Recommended Compensatory Swallowing Strategies: Upright as possible with all PO intake , Small bites/sips , Eat/feed slowly      Plan  Frequency: 60 minutes/day; 5 days per week, as tolerated, until goals met, or discharged from ARU.   Therapeutic Interventions: Diet Tolerance Monitoring , Patient/Family Education , Instrumental assessment of swallow function (Modified Barium Swallow Study)  Speech / Motor Planning / Voice intervention , Cognitive-Linguistic intervention , and Patient/ Family education   Discharge Recommendations: Home with assistance and TBD   Continued SLP at Discharge: Yes     Goals  Patient Goals: Pt reports his goal is to get home. Time Frame: 14-21 days    Pt will tolerate recommended diet and advanced trials with no overt s/s of aspiration. ongoing  Pt will improve oral motor strength and ROM for coordinated and alternating motions, via graded tasks to improve speech back to baseline level as subjectively rated by SLP/pt and family. ongoing  Pt will improve executive function for multifactor task completion via graded tasks to 80% accuracy ongoing  Patient will complete functional problem-solving tasks for daily situations with 80% accuracy or given min cues. ongoing  Pt will improve attention to detail for graded complex information to 80%, for improved recall and retention of newly learned information  ongoing      Therapy Session Time      Session 1 Session 2   Time In 0800    Time Out 0855    Time Code Minutes 20    Individual Minutes 55      Timed Code Treatment Minutes:  15  Total Treatment Minutes:  60    Electronically Signed By:   Noemí Galaviz M.A.  CCC-SLP AUDELIA [id number]  Speech-Language Pathologist   2/10/2023 9:57 AM

## 2023-02-11 LAB
BILIRUBIN URINE: NEGATIVE
BLOOD, URINE: NEGATIVE
CLARITY: CLEAR
COLOR: ABNORMAL
GLUCOSE URINE: NEGATIVE MG/DL
KETONES, URINE: ABNORMAL MG/DL
LEUKOCYTE ESTERASE, URINE: NEGATIVE
MICROSCOPIC EXAMINATION: ABNORMAL
NITRITE, URINE: NEGATIVE
PH UA: 5 (ref 5–8)
PROTEIN UA: NEGATIVE MG/DL
SPECIFIC GRAVITY UA: >=1.03 (ref 1–1.03)
URINE REFLEX TO CULTURE: ABNORMAL
URINE TYPE: ABNORMAL
UROBILINOGEN, URINE: 1 E.U./DL

## 2023-02-11 PROCEDURE — 6360000002 HC RX W HCPCS: Performed by: PHYSICAL MEDICINE & REHABILITATION

## 2023-02-11 PROCEDURE — 1280000000 HC REHAB R&B

## 2023-02-11 PROCEDURE — 97112 NEUROMUSCULAR REEDUCATION: CPT

## 2023-02-11 PROCEDURE — 97530 THERAPEUTIC ACTIVITIES: CPT

## 2023-02-11 PROCEDURE — 92526 ORAL FUNCTION THERAPY: CPT

## 2023-02-11 PROCEDURE — 6370000000 HC RX 637 (ALT 250 FOR IP): Performed by: PHYSICAL MEDICINE & REHABILITATION

## 2023-02-11 PROCEDURE — 81003 URINALYSIS AUTO W/O SCOPE: CPT

## 2023-02-11 PROCEDURE — 92507 TX SP LANG VOICE COMM INDIV: CPT

## 2023-02-11 PROCEDURE — 97116 GAIT TRAINING THERAPY: CPT

## 2023-02-11 PROCEDURE — 97535 SELF CARE MNGMENT TRAINING: CPT

## 2023-02-11 PROCEDURE — 97129 THER IVNTJ 1ST 15 MIN: CPT

## 2023-02-11 PROCEDURE — 2580000003 HC RX 258: Performed by: PHYSICAL MEDICINE & REHABILITATION

## 2023-02-11 RX ORDER — SODIUM CHLORIDE 0.9 % (FLUSH) 0.9 %
5-40 SYRINGE (ML) INJECTION 2 TIMES DAILY
Status: DISCONTINUED | OUTPATIENT
Start: 2023-02-11 | End: 2023-02-14

## 2023-02-11 RX ADMIN — Medication 10 ML: at 20:45

## 2023-02-11 RX ADMIN — STANDARDIZED SENNA CONCENTRATE AND DOCUSATE SODIUM 2 TABLET: 8.6; 5 TABLET ORAL at 10:26

## 2023-02-11 RX ADMIN — ASPIRIN 81 MG 81 MG: 81 TABLET ORAL at 10:26

## 2023-02-11 RX ADMIN — ENOXAPARIN SODIUM 40 MG: 100 INJECTION SUBCUTANEOUS at 10:25

## 2023-02-11 RX ADMIN — STANDARDIZED SENNA CONCENTRATE AND DOCUSATE SODIUM 2 TABLET: 8.6; 5 TABLET ORAL at 20:44

## 2023-02-11 RX ADMIN — LISINOPRIL 5 MG: 5 TABLET ORAL at 10:34

## 2023-02-11 RX ADMIN — CLOPIDOGREL BISULFATE 75 MG: 75 TABLET ORAL at 10:26

## 2023-02-11 RX ADMIN — ATORVASTATIN CALCIUM 40 MG: 40 TABLET, FILM COATED ORAL at 20:44

## 2023-02-11 RX ADMIN — Medication 10 ML: at 10:35

## 2023-02-11 ASSESSMENT — PAIN SCALES - GENERAL
PAINLEVEL_OUTOF10: 0

## 2023-02-11 NOTE — PLAN OF CARE

## 2023-02-11 NOTE — PROGRESS NOTES
[facility]   Phone: [phone]    Physical Therapy    [] Initial Evaluation            [x] Daily Treatment Note         [] Discharge Summary      Patient: Yue Daniel   : 1955   MRN: [id number]   Date of Service:  2023  Admitting Diagnosis: Acute ischemic stroke [facility])  Current Admission Summary: 75-year-old male who was admitted on  with left-sided weakness for greater than 2 days. CT of the head with suggestion of possible NPH. CTA with less than 50% stenosis of the right ICA. MRI of the brain revealed subacute ischemia on the right basal ganglia. Echo with normal EF and negative bubble study. Neurology evaluated and suggested aspirin and Plavix for 21 days then aspirin only in addition to Lipitor. He was evaluated by therapy and suggested to continue in an inpatient setting prior to returning home. He reports no BM for multiple days. Past Medical History:  has a past medical history of CVA (cerebral vascular accident) ([address] and HTN (hypertension). Past Surgical History:  has a past surgical history that includes Finger surgery (Right); Hernia repair; fracture surgery; and hernia repair.   Discharge Recommendations: TBD pending progress  DME Required For Discharge: DME to be determined pending patient progress  Precautions/Restrictions: high fall risk, (L) hemiparetic shoulder  Weight Bearing Restrictions: no restrictions  [] Right Upper Extremity  [] Left Upper Extremity [] Right Lower Extremity  [] Left Lower Extremity     Required Braces/Orthotics: no braces required   [] Right  [] Left  Positional Restrictions:no positional restrictions    Pre-Admission Information   Lives With: brother Ty Wynne)                Type of Home: house  Home Layout: two level, bedroom in the basement, 10 steps with 1 handrail on the L  Home Access:  2 step to enter without rails   Bathroom Layout: tub/shower unit  Bathroom Equipment: grab bars in shower  Toilet Height: elevated height  Home Equipment: no prior equipment  Transfer Assistance: Independent without use of device  Ambulation Assistance:Independent without use of device  ADL Assistance: independent with all ADL's   IADL Assistance: independent with homemaking tasks  Active :        [x] Yes                 [] 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 Occupation: sales  Hobbies: [place]   Recent Falls: chart reports had several falls that caused this admission (about 5 falls)     Examination   Vision:   Vision Corrective Device: wears glasses for distance  Hearing:   WFL      Subjective  General:Pt supine on arrival with HOB elevated. Agreeable to PT. Pain: 0/10  Pain Interventions: not applicable       Functional Mobility  Bed Mobility  Supine to Sit: minimal assistance  Rolling Left: not attempted  Scooting: minimal assistance, moderate assistance - while seated EOB and in w/c  Comments:   Transfers  Sit to stand transfer: 2 person assistance with min x 1 --> min x 2   Stand to sit transfer: 2 person assistance with min A + CGA   Stand pivot transfer: 2 person assistance with min x 2 when transferring to both R and L. Last transfers from w/c to recliner max A x 1, but pt very fatigued. Comments: Patient presents with significant (L) lateral lean during initial stance and during transfers requiring max segmental VC and TC for correction of upright posture. Patient also required max VC + assist for (L) UE awareness, hand placement and support. Ambulation   Surface:level surface  Assistive Device: retana rail  Other Appliance: wheelchair follow  Assistance: 2 person assistance with mod A x1 min A x 1 - assist for R weight shift during L step, postural support, and management of LUE. Occasional assist for LLE advancement, but when R weight shift sufficient, pt able to advance LLE independently.     Distance: 25 ft  Gait Mechanics: Ambulates with (L) lateral lean requiring consistent segmental cueing and sequencing. Narrow and inconsistent THERESE, and decreased step length (B). Able to self advance (L) LE in response to assisted weight shift however has increased (L) knee flexion during stance but minimal knee buckling (this increased second session when pt fatigued)    Comments:  Max VC for sequencing and upright posture. Physical assist provided to (L) UE for support. Ambulation Trial 2  Surface:level surface  Assistive Device: hemiwalker  Other Appliance: givmohr sling  Assistance: 2 person assistance with max x 2   Distance: 18 ft  Gait Mechanics: assist pt able to self advance hemiwalker the second half of ambulation bout - similar gait mechanics but with increased assist provided for weight shift, postural support, max VCs for sequencing, and occasional support for L knee. Comments:     Stair Mobility   Stair mobility not completed on this date. Comments:    Wheelchair Mobility:  Chair: manual  Surface: level surface  Method: (R) UE and (R) LE  Distance: 50 ft  Assistance: moderate assistance  Comments: Assistance provided for steering and maneuvering around doorways/obstacles  Balance  Static Sitting Balance: poor (+): requires min (A) to maintain balance  Dynamic Sitting Balance: poor (+): requires min (A) to maintain balance  Static Standing Balance: poor (+): requires min (A) to maintain balance  Dynamic Standing Balance: poor: requires mod (A) to maintain balance  Comments:    Other Therapeutic Interventions   First Session:  See functional mobility noted above. Pt stood with support of bedrail for balance during pant management, min to mod A for balance. Pt completed reaching activity with RUE to lighted targets to encourage weight shift to R when pods placed on R and to encourage crossing midline when pods placed on L. Pt required min A for balance when standing static, and mod/max A for assist with weight shift.  Completed 1 min x 2 reaching to R, 1 min x 2 reaching to L. Second Session:   Pt seated in recliner on arrival and agreeable to PT. Pt verbalized feelings of frustration regarding slow progress. Pt educated on stroke recovery. Donned givmohr sling for ambulation. Pt ambulated 25 ft with hemiwalker (similar gait pattern and assist levels/cues as noted above). Pt ambulated a second bout with R retana rail 25 ft with mod A + min A. Last 5 ft, pt with smoother, more consistent pacing using smaller steps, which required min A x 2. Pt completed alternating stair taps at 4 inch stairs x 10 bilat to improve weight shifting and functional balance, requiring max A x 1 + mod A x 1 using handrail for balance. Pt completed stand pivot to recliner max A x 1 to the R. Pt with call light in reach, alarm engaged.      Functional Outcomes                 Cognition  Overall Cognitive Status: Impaired  Arousal/Alterness: appropriate responses to stimuli  Following Commands: follows one step commands with repetition, follows one step commands with increased time  Attention Span: difficulty attending to directions  Memory: decreased recall of biographical information, decreased short term memory  Safety Judgement: decreased awareness of need for safety  Problem Solving: assistance required to generate solutions, assistance required to implement solutions  Insights: decreased awareness of deficits  Initiation: requires cues for all  Sequencing: requires cues for all  Orientation:    oriented to person, oriented to place, oriented to situation, and disoriented to time   Command Following:   impaired    Education  Barriers To Learning: cognition, emotional, and physical  Patient Education: patient educated on goals, PT role and benefits, plan of care, general safety, functional mobility training, proper use of assistive device/equipment, disease specific education, transfer training  Learning Assessment:  patient verbalizes understanding, would benefit from continued reinforcement    Assessment  Activity Tolerance: Good. Seated rest breaks provided for recovery. Impairments Requiring Therapeutic Intervention: decreased functional mobility, decreased ADL status, decreased ROM, decreased strength, decreased safety awareness, decreased cognition, decreased endurance, decreased balance, decreased coordination, decreased posture  Prognosis: fair  Clinical Assessment: Pt continues to demonstrate progress toward functional goals, progressing to walking trial with hemiwalker using 2 person assist. Pt also progressed with functional endurance, completing more ambulation bouts and requiring shorter/less frequent rest breaks. Pt limited by L lateral lean with all transfers and gait but responds well to frequent tactile/verbal cues. Pt continues to require 2 person assist for safe therapy treatments and to continue to progress effectively with gait. Pt would benefit from continued skilled PT to promote functional independence. Safety Interventions: patient left in chair, chair alarm in place, call light within reach, gait belt, patient at risk for falls, telesitter in use, and fall mats at bedside    Plan  Frequency: 5 x/week, 60 min/day  Current Treatment Recommendations: strengthening, ROM, balance training, functional mobility training, transfer training, gait training, stair training, endurance training, neuromuscular re-education, wheelchair mobility training, modalities, patient/caregiver education, ADL/self-care training, cognitive/perceptual training, and safety education    Goals  Patient Goals:  To return home and be able to move his (L) UE better  Short Term Goals:  Time Frame: 1-2 weeks  Patient will complete bed mobility at [facility]   Patient will complete transfers at mod (A) of 1  Patient will ambulate 25 ft with use of LRAD at mod (A) of 1  Patient will ascend/descend 1 stairs with (B) handrail at mod (A) of 1  Patient will complete manual w/c propulsion 50 ft at A  Long Term Goals:  Time Frame: 3 weeks  Patient will complete bed mobility at modified independent   Patient will complete transfers at [facility]   Patient will ambulate 50 ft with use of LRAD at [facility]  Patient will ascend/descend 10 stairs with (L) ascending handrail at [facility]  Patient will complete car transfer at [facility]     Therapy Session Time      Individual Group Co-treatment   Time In     915   Time Out     1015   Minutes     60     Second Session:   Individual Group Co-treatment   Time In      1130   Time Out      1200   Minutes      30     Timed Code Treatment Minutes:  60 Minutes+ 30 minutes  Total Treatment Minutes:  90        Electronically Signed By: Cem Gross, PT DPT, [id number] Patient/Caregiver provided printed discharge information.

## 2023-02-11 NOTE — PROGRESS NOTES
[facility]   Phone: [phone]    Occupational Therapy    [] Initial Evaluation            [x] Daily Treatment Note         [] Discharge Summary      Patient: Danny Tolbert   : 1955   MRN: [id number]   Date of Service:  2023    Admitting Diagnosis:  Acute ischemic stroke [facility])  Current Admission Summary: 49-year-old male who was admitted on  with left-sided weakness for greater than 2 days. CT of the head with suggestion of possible NPH. CTA with less than 50% stenosis of the right ICA. MRI of the brain revealed subacute ischemia on the right basal ganglia. Echo with normal EF and negative bubble study. Neurology evaluated and suggested aspirin and Plavix for 21 days then aspirin only in addition to Lipitor. He was evaluated by therapy and suggested to continue in an inpatient setting prior to returning home. He reports no BM for multiple days. Past Medical History:  has a past medical history of CVA (cerebral vascular accident) ([address] and HTN (hypertension). Past Surgical History:  has a past surgical history that includes Finger surgery (Right); Hernia repair; fracture surgery; and hernia repair.     Discharge Recommendations: Discharge recommendations depending pt progress    DME Required For Discharge: DME to be determined pending patient progress    Precautions/Restrictions: high fall risk, up as tolerated,  Dysphagia - Soft and Bite Sized, Left inattention, Pusher syndrome    Pre-Admission Information   Lives With: brother Rohan Pickup)                Type of Home: house  Home Layout: two level, bedroom in the basement, 10 steps with 1 handrail on the L  Home Access:  2 step to enter without rails   Bathroom Layout: tub/shower unit  Bathroom Equipment: grab bars in shower  Toilet Height: elevated height  Home Equipment: no prior equipment  Transfer Assistance: Independent without use of device  Ambulation Assistance:Independent without use of device  ADL Assistance: independent with all ADL's   IADL Assistance: independent with homemaking tasks  Active :        [x] Yes                 [] 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 Occupation: sales  Hobbies: [employer]   Recent Falls: chart reports had several falls that caused this admission (about 5 falls)       Subjective  General: Pt seated upright in recliner, agreeable to OT/PT cotx. Pain: 0/10  Pain Interventions: not applicable     Activities of Daily Living  Basic Activities of Daily Living  Lower Extremity Dressing: dependent  Dressing Comments: assistance of 2 for dressing, dep to doff tabbed briefs, max A to thread pants/briefs overs hips, min A for standing balance with bed rail while second person pulled pants up over hips  Comments: dep for givmohr sling  Instrumental Activities of Daily Living  No IADL completed on this date. Functional Mobility  Bed Mobility  Supine to Sit: minimal assistance  Scooting: minimal assistance, moderate assistance  Comments HOB flat with use of hand rail, posterior LOB to the right  Transfers  Sit to stand transfer:2 person assistance with minAx1 fluctuating to min Ax2    Stand to sit transfer: 2 person assistance with min +CGA   Stand pivot transfer: 2 person assistance with min Ax2   Squat pivot transfer: 2 person assistance with min Ax2   Bed / Chair transfer: 2 person assistance with min Ax2 . Bed / Chair comments: to the left toward wheelchair  Comments: verbal cues for sequencing transfers  Functional Mobility:  Sitting Balance: minimal assistance, moderate assistance. Sitting Balance Comment: min-mod A while seated on EOB   Standing Balance: minimal assistance, moderate assistance, maximum assistance.     Standing Balance Comment: Varies, heavy left lateral lean   Functional Mobility: .  2 person assistance with mod A +min A with hand rail on the right , 2 person assistance with max Ax2 with dayton walker  Functional Mobility Activity: 25 ft +18 ft  Functional Mobility Device Use: hemiwalker  Functional Mobility Comment:  L lateral lean, VC to identify and correct, physical assist required to facilitate step-to pattern with L LE. Comment: pt required increased time for walking trails at this time, assistance for two people for walking, assistance of one for functional weight shifting and assistance of another for positioning LLE. Therapeutic Activity  While in stance at // bars at the mirror with fluctuating assistance levels for balance (assistance levels noted above), pt completed functional reaching and weight shifting to the right to activated blaze pod with RUE. Following this task, pt completed crossing midline task where pt had to use R hand to activate blaze pods on the left side of the body. Pt with min verbal cues for sequencing and initiation. Pt with verbal cues and mod -max hand over hand assistance for positioning LUE on // bar. While seated, OT placed kinesiotape on LUE to facilitate muscles for wrist extension to improve LUE AROM for ADLs of choice. Second Session  Pt seated upright in recliner upon OT/PT arrival, agreeable to therapy. Pt pleasant and cooperative throughout. Pt verbalized feelings of frustration regarding slow progress. Pt educated on stroke recovery. Activities of Daily Living  Basic Activities of Daily Living  General Comments: dep for givmohr sling , education regarding good oral intake during feeding to improve nutrition for stroke recovery, pt reporting decreased appetite. Instrumental Activities of Daily Living  No IADL completed on this date. Functional Mobility  Bed Mobility  Bed mobility not completed on this date.   Transfers  Sit to stand transfer:2 person assistance with min Ax2   Stand to sit transfer: 2 person assistance with min Ax2   Stand pivot transfer: moderate assistance, to the right  Comments:max verbal cues for sequencing  Functional Mobility:  Sitting Balance: contact guard assistance. Sitting Balance Comment: seated in wheelchair   Standing Balance: minimal assistance, moderate assistance, 2 person assistance with mod Ax2 . Standing Balance Comment: fluctuating assistance levels  Functional Mobility: .  2 person assistance with max Ax2 with dayton walker, max A+mod A with use of hand rail   Functional Mobility Activity: 25 ft + 25 ft  Functional Mobility Device Use: hemiwalker  Functional Mobility Comment: trailing use of dayton walker on first trail and then use of hand rail for second trail. Comments: While in stance with max Ax1 +mod Ax1 with use of RUE on hand rail, pt completed 10x step ups on BLE to improve functional balance and weight shifting to facilitate performance in functional mobility. Pt left in recliner chair with all needs met, call light within reach, and chair alarm activated.          Cognition  Overall Cognitive Status: WFL  Arousal/Alterness: appropriate responses to stimuli  Following Commands: follows one step commands with repetition  Attention Span: attends with cues to redirect  Memory: decreased recall of precautions, decreased recall of recent events  Safety Judgement: decreased awareness of need for assistance, decreased awareness of need for safety  Problem Solving: assistance required to generate solutions, assistance required to implement solutions, decreased awareness of errors, assistance required to identify errors made, assistance required to correct errors made  Insights: not aware of deficits  Initiation: requires cues for some  Sequencing: requires cues for some  Comments: impulsive at times  Orientation:    oriented to person, oriented to time, and oriented to situation  Comments: pt stated he was in the Reading unit in [place]" however unable to state [facility]  Command Following:   impaired  Follows one step directions with repetition and increased time  Difficulties following directions with \"Left/Right\", I.e., use R arm to do X  Education  Barriers To Learning: cognition and language  Patient Education: patient educated on goals, OT role and benefits, plan of care, precautions, ADL adaptive strategies, IADL safety, proper use of assistive device/equipment, adaptive device training, energy conservation, orientation, family education, pressure relief, transfer training, discharge recommendations  Learning Assessment:  patient verbalizes understanding, would benefit from continued reinforcement, patient will require reinforcement due to cognitive deficits  Assessment  Activity Tolerance: Fair with no c/o pain or fatigue  Impairments Requiring Therapeutic Intervention: decreased functional mobility, decreased ADL status, decreased ROM, decreased strength, decreased safety awareness, decreased cognition, decreased endurance, decreased sensation, decreased balance, decreased IADL, decreased fine motor control, decreased coordination, increased pain, decreased posture  Prognosis: good  Clinical Assessment: Pt is a 68 yo M who [facility] in ARU s/p subacute ischemia on the right basal ganglia on 2/4. PTA, pt was independent with ADLs, iADLs, transfers, and functional mobility with no AE. Currently, pt is functioning below baseline as he requires above assistance levels for ADLs, functional tranfers, and functional mobility. Pt is limited be deficits in sitting balance, standing balance, safety awareness, LUE ROM, LUE strength, LUE sensitivity, and proprioception. Trailing use of kinesiotape and givmohr sling to improve performance during ADLs and functional transfers. Skilled intensive OT in ARU warranted to improve safety and independence with all occupational pursuits. Recommend continued co-tx for therapy session. Pt benefits from x 2 skilled hands to provide increased cues/feedback and promote improved safety and performance with ADLs.   Safety Interventions: patient left in chair, chair alarm in place, call light within reach, patient at risk for falls, telesitter in use, and family/caregiver present    Plan  Frequency: 5 x/week, 60 min/day  Current Treatment Recommendations: strengthening, ROM, balance training, functional mobility training, transfer training, stair training, endurance training, neuromuscular re-education, wheelchair mobility training, modalities, patient/caregiver education, ADL/self-care training, IADL training, home management training, cognitive reorientation, home exercise program, safety education, equipment evaluation/education, and positioning  Goals  Patient Goals: \"to get this working\" in re to LUE   Short Term Goals:  Time Frame: in 10 days  Patient will complete upper body bathing at min A while seated. Patient will complete lower body bathing at min A while seated and standing as needed. Patient will complete upper body dressing at min A. Patient will complete lower body dressing at mod A using AE as needed. Patient will complete toileting at min A with GB as needed. Patient will complete a toilet transfer with min A with GB as needed. Patient will complete a visual assessment to complete occupational profile. Long Term Goals:  Time Frame: in 21 days  Patient will complete upper body bathing at supervision while seated. Patient will complete lower body bathing at supervision while seated and standing as needed. Patient will complete upper body dressing at set-up assistance. Patient will complete lower body dressing at supervision using AE as needed. Patient will complete toileting at supervision with GB as needed. Patient will complete grooming at supervision while in stance. Patient will complete functional transfers at supervision using LRAD. Patient will complete functional mobility at supervision using LRAD. Patient will increase functional sitting balance to mod I for improved ADL completion.   Patient will increase functional standing balance to SUP for improved ADL completion.     No goals met 2/11    Therapy Session Time     Individual Group Co-treatment   Time In 830     Time Out 1015     Minutes 60        Second Session Therapy Time:   Individual Concurrent Group Co-treatment   Time In 1130        Time Out 1200        Minutes 30          Timed Code Treatment Minutes:  60+30    Total Treatment Minutes:  90       Electronically Signed By: OLGA Eldridge/MIKE, [id number]

## 2023-02-11 NOTE — PLAN OF CARE
Problem: Discharge Planning  Goal: Discharge to home or other facility with appropriate resources  2/11/2023 1109 by Selena Carter RN  Outcome: Progressing  2/11/2023 0216 by Jatinder Yanez RN  Outcome: Progressing     Problem: Skin/Tissue Integrity  Goal: Absence of new skin breakdown  2/11/2023 1109 by Selena Carter RN  Outcome: Progressing  2/11/2023 0216 by Jatinder Yanez RN  Outcome: Progressing     Problem: Safety - Adult  Goal: Free from fall injury  2/11/2023 1109 by Selena Carter RN  Outcome: Progressing  2/11/2023 0216 by Jatinder Yanez RN  Outcome: Progressing     Problem: Pain  Goal: Verbalizes/displays adequate comfort level or baseline comfort level  2/11/2023 1109 by Selena Carter RN  Outcome: Progressing  2/11/2023 0216 by Jatinder Yanez RN  Outcome: Progressing     Problem: Nutrition Deficit:  Goal: Optimize nutritional status  2/11/2023 1109 by Selena Carter RN  Outcome: Progressing  2/11/2023 0216 by Jatinder Yanez RN  Outcome: Progressing

## 2023-02-11 NOTE — PROGRESS NOTES
Pt's brother and sister approached this RN asking about pt's UA, completed on Thursday 2/9/2023. This RN advised pt's family provider (Caio) is aware of result and has no plan for treatment at this time. Family aware po intake encouraged. No active bleeding noted today during assessment. Pt has standing order for M/Th labs to be drawn and checked.       Latest Reference Range & Units 2/7/23 05:35 2/9/23 05:42   BUN,BUNPL 7 - 20 mg/dL 25 (H) 28 (H)   Creatinine 0.8 - 1.3 mg/dL 0.9 0.9   (H): Data is abnormally high     Latest Reference Range & Units 2/4/23 09:22 2/7/23 05:36 2/9/23 05:42   WBC 4.0 - 11.0 K/uL 9.7 8.5 7.7   RBC 4.20 - 5.90 M/uL 4.61 4.70 4.55   Hemoglobin Quant 13.5 - 17.5 g/dL 15.8 16.3 15.2   Hematocrit 40.5 - 52.5 % 46.4 46.9 45.1       [facility], RN   [phone]

## 2023-02-11 NOTE — PROGRESS NOTES
[facility] [facility]   Phone: [phone]    Speech Therapy    [] Initial Evaluation            [x] Daily Treatment Note         [] Discharge Summary      Patient: Yue Daniel   : 1955   MRN: [id number]   Date of Service:  2023  Admitting Diagnosis: Acute ischemic stroke [facility])  Current Admission Summary: 40-year-old male who was admitted on  with left-sided weakness for greater than 2 days. CT of the head with suggestion of possible NPH. CTA with less than 50% stenosis of the right ICA. MRI of the brain revealed subacute ischemia on the right basal ganglia. Echo with normal EF and negative bubble study. Neurology evaluated and suggested aspirin and Plavix for 21 days then aspirin only in addition to Lipitor. He was evaluated by therapy and suggested to continue in an inpatient setting prior to returning home. He reports no BM for multiple days. Past Medical History:  has a past medical history of CVA (cerebral vascular accident) ([address] and HTN (hypertension). Past Surgical History:  has a past surgical history that includes Finger surgery (Right); Hernia repair; fracture surgery; and hernia repair. Precautions/Restrictions: high fall risk, modified diet      Pre-Admission Information   Living Status: Pt lives with his brother, independent prior to admission. Occupation/School: Retired last year. 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   Medication Management: :  [x]Primary   []Secondary []No  Finance Management: [x]Primary   []Secondary []No  Active :   [x]Yes         []No  H[facility]:    [facility] [employer] [facility]  Vision:    Vision Corrective Device: wears glasses at all times      Subjective  General: Pt alert and cooperative throughout sessions.    Pain: Did not state    Safety Interventions: patient left in chair, chair alarm in place, call light within reach, patient at risk for falls, and telesitter in use      Therapeutic Interventions:     Session 1:     Dysphagia: Severity: Mild  and Moderate   Meal tolerance   - thin liquids: signs of premature bolus loss x1 with an audible delayed swallow; no overt s/s of aspiration   - soft and bite sized solids: pt with slightly to mildly prolonged mastication with adequate oral clearance (mastication time fluctuated based on attention to task)  -pt independently utilizes small sips throughout PO intake of solids to promote oral clearance    Pt with reduced attention to bolus periodically throughout intake, becoming easily distracted by other items on tray- may benefit from reduced distractions and reduced items in visual field during meals. Pt edu. given on results of FEES complete date prior (2/10/23), indications, and recommendations. Pt unable to recall participating in FEES, with no recall of recommendations. On a short-term delay, pt unable to recall key recommendations (diet texture consistencies, no straws) with edu given on referencing white board in room when reminder needed. Via y/n questions (e.g., should you use straws?) pt able to accurately report recommendations. Pt would likely benefit from further edu on strategies to promote memory skills. Comprehension: Severity: Mild   Goal not directly targeted. Pt with reduced processing and comprehension of recommendations per FEES. Expressive Language: Severity: Mild   Goal not directly targeted.       Cognition: Severity: Mild   Attention: engaged in word association task targeting thought organization and attention to task  -able to determine category on 6/7 opp (86% acc)  -able to determine item outside of category on 8/10 opp (80% acc)  -pt with slightly reduced attention as task progressed but easily redirect via occasional verbal cues; however noted with environmental distraction (dietary entering) on last item with pt still attending to SLP    Session 2:  Dysphagia: Severity: Mild  and Moderate Medication pass:  Nursing present at initiation of 2nd session reporting medications given whole in water with no concerns- reports providing 4 pills at a time with thin water in AM. Single pill given with thin liquid during session, pt free of overt s/s aspiration. Pt edu reviewed on no straw recommendation as placed in water cup- verbal return given with removal by SLP. To follow w nursing. Expressive Language: Severity: Mild   Goal not directly targeted. Motor Speech: Severity: Mild  and Moderate   Completed oral repetition of verbally presented simple sentences  - mildly reduced vocal intensity impacting intelligibility and articulation precision   -able to repeat 16/20 (80%) sentences with appropriate vocal volume  -min verbal cues to reduce rate and increase vocal volume with pt able repeat sentence appropriately following cues    Speech coordination   - labial strength and coordination (noted with reduced strength and coordination across exercises as they progressed)   -o/e alternation x10    -labial pucker x5, labial pucker and hold x5    -diadochokinetic exercises x5; impaired rate and articulatory precision  -buccal strength and coordination    -cheek puffs x10    Voice: Severity: Mild  and Moderate   Goal not targeted this session. Additional Interventions:         Education  Barriers To Learning: cognition and hearing  Patient Education: Provided education regarding role of SLP, results of assessment, recommendations and general speech pathology plan of care. Learning Assessment: Pt requires ongoing learning     Assessment  Impairments Requiring Therapeutic Intervention: Cognitive-Linguistic Deficits  and Oropharyngeal Dysphagia   Prognosis: good    Clinical Assessment:  Pt presents with moderate L perioral weakness resulting in dysarthria and oropharyngeal dysphagia. Instrumental assessment (FEES) completed 2/10/22, see report for assessment summary.  Pt unable to recall [facility]  or recommendations this date, with further analysis of pts memory skills and edu on strategies to promote recall indicated. Pt also presents with mild-moderate cognitive-linguistic deficits characterized by deficits in orientation, attention, executive functioning, short-term memory, and visuospatial functioning. Recommend SLP intervention for safe return to prior level of independence. Diet Solids Recommendation:   Liquid Consistency Recommendation:   Recommended Form of Meds:   Dysphagia III Soft and bite sized     Thin liquids    No straws  Meds whole with water       Recommended Compensatory Swallowing Strategies: Upright as possible with all PO intake , Small bites/sips , Eat/feed slowly, No Straws      Plan  Frequency: 60 minutes/day; 5 days per week, as tolerated, until goals met, or discharged from ARU. Therapeutic Interventions: Diet Tolerance Monitoring , Patient/Family Education , Instrumental assessment of swallow function (Modified Barium Swallow Study)  Speech / Motor Planning / Voice intervention , Cognitive-Linguistic intervention , and Patient/ Family education   Discharge Recommendations: Home with assistance and TBD   Continued SLP at Discharge: Yes     Goals  Patient Goals: Pt reports his goal is to get home. Time Frame: 14-21 days    Pt will tolerate recommended diet and advanced trials with no overt s/s of aspiration. ongoing  Pt will improve oral motor strength and ROM for coordinated and alternating motions, via graded tasks to improve speech back to baseline level as subjectively rated by SLP/pt and family. ongoing  Pt will improve executive function for multifactor task completion via graded tasks to 80% accuracy ongoing  Patient will complete functional problem-solving tasks for daily situations with 80% accuracy or given min cues.  ongoing  Pt will improve attention to detail for graded complex information to 80%, for improved recall and retention of newly learned information  ongoing      Therapy Session Time      Session 1 Session 2   Time In 0800 10:30   Time Out 0830 11:00   Time Code Minutes 16 0   Individual Minutes 30 30     Timed Code Treatment Minutes:  15  Total Treatment Minutes:  61    Electronically Signed By:   Raul WHITING CCC-SLP   Speech-Language Pathologist    2/11/23 11:26 AM

## 2023-02-11 NOTE — PROGRESS NOTES
Repeat UA collected and sent. Urine les with small blood clots noted. Latest Reference Range & Units 2/9/23 09:57 2/11/23 15:35   Color, UA Straw/Yellow  ORANGE ! DARK YELLOW ! Clarity, UA Clear  CLOUDY ! Clear   Glucose, UA Negative mg/dL Negative Negative   Bilirubin, Urine Negative  SMALL ! Negative   Ketones, Urine Negative mg/dL TRACE ! TRACE ! Specific Gravity, UA 1.005 - 1.030  1.020 >=1.030   Blood, Urine Negative  LARGE ! Negative   pH, UA 5.0 - 8.0  5.0 5.0   Protein, UA Negative mg/dL 30 ! Negative   Urobilinogen, Urine <2.0 E.U./dL 1.0 1.0   Nitrite, Urine Negative  Negative Negative   Leukocyte Esterase, Urine Negative  SMALL ! Negative   Urine Type  NotGiven NotGiven   Urine Reflex to Culture  Not Indicated Not Indicated   !: Data is abnormal    Pt aware of results.      Dayton Severin BSN, RN   [phone]

## 2023-02-12 VITALS
BODY MASS INDEX: 21.97 KG/M2 | HEART RATE: 63 BPM | DIASTOLIC BLOOD PRESSURE: 68 MMHG | OXYGEN SATURATION: 96 % | SYSTOLIC BLOOD PRESSURE: 109 MMHG | RESPIRATION RATE: 16 BRPM | HEIGHT: 69 IN | WEIGHT: 148.3 LBS | TEMPERATURE: 97.5 F

## 2023-02-12 PROCEDURE — 2580000003 HC RX 258: Performed by: PHYSICAL MEDICINE & REHABILITATION

## 2023-02-12 PROCEDURE — 6370000000 HC RX 637 (ALT 250 FOR IP): Performed by: PHYSICAL MEDICINE & REHABILITATION

## 2023-02-12 PROCEDURE — 6360000002 HC RX W HCPCS: Performed by: PHYSICAL MEDICINE & REHABILITATION

## 2023-02-12 PROCEDURE — 1280000000 HC REHAB R&B

## 2023-02-12 RX ADMIN — STANDARDIZED SENNA CONCENTRATE AND DOCUSATE SODIUM 2 TABLET: 8.6; 5 TABLET ORAL at 23:21

## 2023-02-12 RX ADMIN — ATORVASTATIN CALCIUM 40 MG: 40 TABLET, FILM COATED ORAL at 23:20

## 2023-02-12 RX ADMIN — ENOXAPARIN SODIUM 40 MG: 100 INJECTION SUBCUTANEOUS at 09:22

## 2023-02-12 RX ADMIN — Medication 10 ML: at 09:23

## 2023-02-12 RX ADMIN — Medication 5 ML: at 23:21

## 2023-02-12 RX ADMIN — CLOPIDOGREL BISULFATE 75 MG: 75 TABLET ORAL at 09:21

## 2023-02-12 RX ADMIN — LISINOPRIL 5 MG: 5 TABLET ORAL at 09:22

## 2023-02-12 RX ADMIN — STANDARDIZED SENNA CONCENTRATE AND DOCUSATE SODIUM 2 TABLET: 8.6; 5 TABLET ORAL at 09:21

## 2023-02-12 RX ADMIN — ASPIRIN 81 MG 81 MG: 81 TABLET ORAL at 09:22

## 2023-02-12 ASSESSMENT — PAIN SCALES - GENERAL
PAINLEVEL_OUTOF10: 0

## 2023-02-12 NOTE — PLAN OF CARE
Problem: Discharge Planning  Goal: Discharge to home or other facility with appropriate resources  Recent Flowsheet Documentation  Taken 2/12/2023 1036 by Luca Patten RN  Discharge to home or other facility with appropriate resources: Identify barriers to discharge with patient and caregiver  2/12/2023 0732 by Luca Patten RN  Outcome: Progressing     Problem: Skin/Tissue Integrity  Goal: Absence of new skin breakdown  Description: 1. Monitor for areas of redness and/or skin breakdown  2. Assess vascular access sites hourly  3. Every 4-6 hours minimum:  Change oxygen saturation probe site  4. Every 4-6 hours:  If on nasal continuous positive airway pressure, respiratory therapy assess nares and determine need for appliance change or resting period.   2/12/2023 0732 by Luca Patten RN  Outcome: Progressing     Problem: Safety - Adult  Goal: Free from fall injury  2/12/2023 1233 by Ben Shelley RN  Flowsheets (Taken 2/12/2023 1233)  Free From Fall Injury: Instruct family/caregiver on patient safety  2/12/2023 0732 by Luca Patten RN  Outcome: Progressing     Problem: Pain  Goal: Verbalizes/displays adequate comfort level or baseline comfort level  2/12/2023 1233 by Ben Shelley RN  Flowsheets (Taken 2/12/2023 1233)  Verbalizes/displays adequate comfort level or baseline comfort level: Encourage patient to monitor pain and request assistance  2/12/2023 0732 by Luca Patten RN  Outcome: Progressing     Problem: Nutrition Deficit:  Goal: Optimize nutritional status  2/12/2023 1233 by Ben Shelley RN  Flowsheets (Taken 2/12/2023 1233)  Nutrient intake appropriate for improving, restoring, or maintaining nutritional needs: Assess nutritional status and recommend course of action  2/12/2023 0732 by Luca Patten RN  Outcome: Progressing

## 2023-02-12 NOTE — PROGRESS NOTES
Pt's brother present. Pt's brother shaved pt's face. Pt assisted back to bed using STEDY x2. Pt using pillow support. Bed alarm on and functioning. Urinal near for pt to use.  Pants removed for ease for pt to use urinal.     [place] GiancarloProvidence Holy Cross Medical CenterRODY   [phone]

## 2023-02-13 LAB
ANION GAP SERPL CALCULATED.3IONS-SCNC: 11 MMOL/L (ref 3–16)
BUN BLDV-MCNC: 23 MG/DL (ref 7–20)
CALCIUM SERPL-MCNC: 9.1 MG/DL (ref 8.3–10.6)
CHLORIDE BLD-SCNC: 101 MMOL/L (ref 99–110)
CO2: 25 MMOL/L (ref 21–32)
CREAT SERPL-MCNC: 0.9 MG/DL (ref 0.8–1.3)
GFR SERPL CREATININE-BSD FRML MDRD: >60 ML/MIN/{1.73_M2}
GLUCOSE BLD-MCNC: 95 MG/DL (ref 70–99)
HCT VFR BLD CALC: 42.8 % (ref 40.5–52.5)
HEMOGLOBIN: 14.5 G/DL (ref 13.5–17.5)
MCH RBC QN AUTO: 34.2 PG (ref 26–34)
MCHC RBC AUTO-ENTMCNC: 33.9 G/DL (ref 31–36)
MCV RBC AUTO: 100.7 FL (ref 80–100)
PDW BLD-RTO: 13.2 % (ref 12.4–15.4)
PLATELET # BLD: 273 K/UL (ref 135–450)
PMV BLD AUTO: 8.4 FL (ref 5–10.5)
POTASSIUM SERPL-SCNC: 4.5 MMOL/L (ref 3.5–5.1)
RBC # BLD: 4.25 M/UL (ref 4.2–5.9)
SODIUM BLD-SCNC: 137 MMOL/L (ref 136–145)
WBC # BLD: 7.1 K/UL (ref 4–11)

## 2023-02-13 PROCEDURE — 97535 SELF CARE MNGMENT TRAINING: CPT

## 2023-02-13 PROCEDURE — 97116 GAIT TRAINING THERAPY: CPT

## 2023-02-13 PROCEDURE — 92507 TX SP LANG VOICE COMM INDIV: CPT

## 2023-02-13 PROCEDURE — 36415 COLL VENOUS BLD VENIPUNCTURE: CPT

## 2023-02-13 PROCEDURE — 97530 THERAPEUTIC ACTIVITIES: CPT

## 2023-02-13 PROCEDURE — 97129 THER IVNTJ 1ST 15 MIN: CPT

## 2023-02-13 PROCEDURE — 6370000000 HC RX 637 (ALT 250 FOR IP): Performed by: PHYSICAL MEDICINE & REHABILITATION

## 2023-02-13 PROCEDURE — 80048 BASIC METABOLIC PNL TOTAL CA: CPT

## 2023-02-13 PROCEDURE — 6360000002 HC RX W HCPCS: Performed by: PHYSICAL MEDICINE & REHABILITATION

## 2023-02-13 PROCEDURE — 92526 ORAL FUNCTION THERAPY: CPT

## 2023-02-13 PROCEDURE — 2580000003 HC RX 258: Performed by: PHYSICAL MEDICINE & REHABILITATION

## 2023-02-13 PROCEDURE — 85027 COMPLETE CBC AUTOMATED: CPT

## 2023-02-13 PROCEDURE — 1280000000 HC REHAB R&B

## 2023-02-13 RX ADMIN — LISINOPRIL 5 MG: 5 TABLET ORAL at 10:13

## 2023-02-13 RX ADMIN — STANDARDIZED SENNA CONCENTRATE AND DOCUSATE SODIUM 2 TABLET: 8.6; 5 TABLET ORAL at 10:13

## 2023-02-13 RX ADMIN — Medication 5 ML: at 21:40

## 2023-02-13 RX ADMIN — ENOXAPARIN SODIUM 40 MG: 100 INJECTION SUBCUTANEOUS at 10:13

## 2023-02-13 RX ADMIN — CLOPIDOGREL BISULFATE 75 MG: 75 TABLET ORAL at 10:13

## 2023-02-13 RX ADMIN — STANDARDIZED SENNA CONCENTRATE AND DOCUSATE SODIUM 2 TABLET: 8.6; 5 TABLET ORAL at 21:40

## 2023-02-13 RX ADMIN — Medication 5 ML: at 10:00

## 2023-02-13 RX ADMIN — ATORVASTATIN CALCIUM 40 MG: 40 TABLET, FILM COATED ORAL at 21:39

## 2023-02-13 RX ADMIN — ASPIRIN 81 MG 81 MG: 81 TABLET ORAL at 10:13

## 2023-02-13 ASSESSMENT — PAIN SCALES - GENERAL: PAINLEVEL_OUTOF10: 0

## 2023-02-13 NOTE — PROGRESS NOTES
Jayden Becerra  2/13/2023  [id number]    Chief Complaint: Acute ischemic stroke [facility])    Subjective:   No significant weekend events. No current complaints. Labs reviewed. + BMs over the weekend. Unsure if he wants to start an SSRI.    ROS: No CP, SOB, dyspnea    Objective:  Patient Vitals for the past 24 hrs:   BP Temp Temp src Pulse Resp SpO2   02/12/23 2124 109/68 97.5 °F (36.4 °C) Oral 63 16 96 %   02/12/23 0908 122/72 98.2 °F (36.8 °C) Oral 56 16 95 %       Gen: No distress, pleasant. Resting in bed  HEENT: Normocephalic, atraumatic. CV: Regular rate and rhythm. No MRG   Resp: No respiratory distress. CTAB   Abd: Soft, nontender nondistended  Ext: No edema. Neuro: Alert, oriented, appropriately interactive. Left hemiparesis    Laboratory data: Available via EMR. Therapy progress:    PT    Supine to Sit: Partial/moderate assistance  Sit to Supine: Dependent   Sit to Stand: Dependent  Chair/Bed to Chair Transfer: Dependent  Car Transfer:    Ambulation 10 ft: Dependent  Ambulation 50 ft:    Ambulation 150 ft:    Stairs - 1 Step: Dependent  Stairs - 4 Step: Dependent  Stairs - 12 Step:      OT    Eating:    Oral Hygiene: Supervision or touching assistance  Bathing: Dependent  Upper Body Dressing: Partial/moderate assistance  Lower Body Dressing: Dependent  Toilet Transfer: Dependent  Toilet Hygiene:      Speech Therapy    Pt presents with mild-moderate oropharyngeal dysphagia due to reduced L side sensation and perioral weakness resulting in anterior bolus loss, impaired mastication, premature bolus loss, delayed swallow initiation, altered vocal quality with a delayed cough. Pt reports not having as much as an appetite as well as lack of dentition creating difficulty with mastication. Pt reports chewing is where his difficulty is and not with swallowing.  Pt may benefit from instrumental assessment due to evidence of reduced sensation and intermittent clinical signs of penetration/aspiration; will recommend when appropriate. Recommend dysphagia diet (soft and bite sized diet) with dysphagia intervention and implementation of safe swallow strategies to minimize aspiration risk. Pt presents with moderate dysarthria characterized by L perioral weakness resulting in reduced articulation, precision, and reduced rate of speech. Pt self-rated speech as 5% being far from baseline. Pt presents with mild-moderate cognitive-linguistic deficits characterized by deficits in orientation, attention, executive functioning, short-term memory, and visuospatial functioning. Errors consisted of many deflections and patient attributing to baseline dyslexia. Required mod cues and repetition throughout task. Pt demonstrated appropriate insight into deficits which resulted in emotional liability towards the end of the assessment. Recommend SLP intervention for safe return to prior level of independence. Body mass index is 21.9 kg/m². Assessment:  Patient Active Problem List   Diagnosis    Acute ischemic stroke [facility])    Expressive aphasia    Dysarthria    Ataxia    Primary hypertension    Tobacco abuse    Noncompliance    Left hemiparesis ([facility] [address]       Plan:   Acute right basal ganglia infarct: DAPT for 21 days (2/25) , Lipitor. PT/OT/SLP. Discussed SSRI     Dysphagia: Dysphagia diet, SLP     HTN: started lisinopril 5     Tobacco use: education provided. No supplement currently    Meatus trauma: held lovenox. Improving. UA without significant findings for infection     Bowels: Per protocol   Bladder: Per protocol   Sleep: Trazodone provided prn. Pain: Tylenol, tramadol as needed  DVT PPx: Lovenox  SOPHIA: 3/2    Lesia Uriostegui MD 2/13/2023, 8:44 AM    * This document was created using dictation software. While all precautions were taken to ensure accuracy, errors may have occurred. Please disregard any typographical errors.

## 2023-02-13 NOTE — PLAN OF CARE
Problem: Discharge Planning  Goal: Discharge to home or other facility with appropriate resources  2/13/2023 1012 by Morgan Sebastian RN  Outcome: Progressing     Problem: Skin/Tissue Integrity  Goal: Absence of new skin breakdown  Description: 1. Monitor for areas of redness and/or skin breakdown  2. Assess vascular access sites hourly  3. Every 4-6 hours minimum:  Change oxygen saturation probe site  4. Every 4-6 hours:  If on nasal continuous positive airway pressure, respiratory therapy assess nares and determine need for appliance change or resting period.   2/13/2023 1012 by Morgan Sebastian RN  Outcome: Progressing     Problem: Safety - Adult  Goal: Free from fall injury  2/13/2023 1012 by Morgan Sebastian RN  Outcome: Progressing     Problem: Pain  Goal: Verbalizes/displays adequate comfort level or baseline comfort level  2/13/2023 1012 by Morgan Sebastian RN  Outcome: Progressing     Problem: Nutrition Deficit:  Goal: Optimize nutritional status  2/13/2023 1012 by Morgan Sebastian RN  Outcome: Progressing

## 2023-02-13 NOTE — PLAN OF CARE
Problem: Discharge Planning  Goal: Discharge to home or other facility with appropriate resources  Outcome: Progressing  Flowsheets (Taken 2/12/2023 2320)  Discharge to home or other facility with appropriate resources: Identify barriers to discharge with patient and caregiver     Problem: Skin/Tissue Integrity  Goal: Absence of new skin breakdown  Description: 1. Monitor for areas of redness and/or skin breakdown  2. Assess vascular access sites hourly  3. Every 4-6 hours minimum:  Change oxygen saturation probe site  4. Every 4-6 hours:  If on nasal continuous positive airway pressure, respiratory therapy assess nares and determine need for appliance change or resting period.   Outcome: Progressing     Problem: Safety - Adult  Goal: Free from fall injury  Outcome: Progressing  Flowsheets (Taken 2/12/2023 2320)  Free From Fall Injury: Instruct family/caregiver on patient safety     Problem: Pain  Goal: Verbalizes/displays adequate comfort level or baseline comfort level  Outcome: Progressing  Flowsheets (Taken 2/12/2023 2320)  Verbalizes/displays adequate comfort level or baseline comfort level: Encourage patient to monitor pain and request assistance     Problem: Nutrition Deficit:  Goal: Optimize nutritional status  Outcome: Progressing

## 2023-02-13 NOTE — PROGRESS NOTES
[facility] [facility]   Phone: [phone]    Speech Therapy    [] Initial Evaluation            [x] Daily Treatment Note         [] Discharge Summary      Patient: Prasad Burch   : 1955   MRN: [id number]   Date of Service:  2023  Admitting Diagnosis: Acute ischemic stroke [facility])  Current Admission Summary: 49-year-old male who was admitted on  with left-sided weakness for greater than 2 days. CT of the head with suggestion of possible NPH. CTA with less than 50% stenosis of the right ICA. MRI of the brain revealed subacute ischemia on the right basal ganglia. Echo with normal EF and negative bubble study. Neurology evaluated and suggested aspirin and Plavix for 21 days then aspirin only in addition to Lipitor. He was evaluated by therapy and suggested to continue in an inpatient setting prior to returning home. He reports no BM for multiple days. Past Medical History:  has a past medical history of CVA (cerebral vascular accident) ([address] and HTN (hypertension). Past Surgical History:  has a past surgical history that includes Finger surgery (Right); Hernia repair; fracture surgery; and hernia repair. Precautions/Restrictions: high fall risk, modified diet      Pre-Admission Information   Living Status: Pt lives with his brother, independent prior to admission. Occupation/School: Retired last year. 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   Medication Management: :  [x]Primary   []Secondary []No  Finance Management: [x]Primary   []Secondary []No  Active :   [x]Yes         []No  Hearing:    [facility]  Vision:    Vision Corrective Device: wears glasses at all times      Subjective  General: Pt alert and cooperative throughout sessions.    Pain: Did not state    Safety Interventions: patient left in chair, chair alarm in place, call light within reach, patient at risk for falls, and telesitter in use      Therapeutic Interventions:     Session 1:     Dysphagia: Severity: Mild  and Moderate   Pt with limited recall of diet recommendations and FEES results   - provided verbal review of results and recommendations   - provided education with use of images (penetration, benefit of double swallow, anatomical structures)  Recall remained limited despite education \"so when are those (images) from\"      Diet tolerance (textured puree and thin liquid) with implementation of compensatory strategies   - pt reported inconsistent intake; stated he does not like hospital food   - mod cues provided for double swallow   - mild anterior loss   - no overt clinical s/s of aspiration   - intermittent episodes of poor object recognition impacting his self feeding (drinking oatmeal from bowl)    Comprehension: Severity: Mild   Goal not directly targeted. Expressive Language: Severity: Mild   Goal not directly targeted. Cognition: Severity: Mild   Orientation and functional recall   - pt oriented to recent events (Super Bowl) and weekend visitors   - demonstrated inconsistencies in his description of events (they came to my house) with evidence of error awareness   - limited recall of new information from previous sessions (FEES completion, stroke education)     Session 2:  Dysphagia: Severity: Mild  and Moderate   Initiated NMES via VitalStim placement 4a (targeting the orpharyngeal \"sling\", intrinsic/extrinsic muscles of the tongue and pharyngeal constrictor) @ 7.2 mA for 19 minutes. Pt in agreement to participate with NMES.  Utilized in combination with trials of regular solids, oropharyngeal strengthening and oral motor speech coordination tasks   - Oropharyngeal resistance with sensory integration (via popsicle): mod cues for following directions /coordination, mildly reduced resistance   - Regular solid trials: mildly prolonged/ effortful mastication; no evidence of significant oral residue; mod cues for double swallow, one instance of delayed throat clear     Expressive Language: Severity: Mild   Goal not directly targeted. Motor Speech: Severity: Mild  and Moderate   Oral Motor Exercises (OMEs) x10 each   - Labial Retraction/ Protrusion   - Wide open mouth posture to open mouth pucker   - Alternating movements e/o   - Alternating labial protrusion     Compensatory speaking strategies   - implementation via oral reading   - mod cues required for 'loud' speech     Voice: Severity: Mild  and Moderate   Goal not targeted this session. Cognition: Severity: Mild   Pt with reduced visual spatial attention while completing oral reading task   - provided tracking strategies (use of book crabtree, following with non dominant hand)     Comprehension / carry over of new information   - required mod cues via visual information for comprehension of diet recommendations   - required mod cues for comprehension of alternatives to menu     Orientation   - use of digital clock for orientation to month and date   - pt aware of initial errors in orientation (\"November\" wait, no it's not November\")     Additional Interventions:         Education  Barriers To Learning: cognition and hearing  Patient Education: Provided education regarding role of SLP, results of assessment, recommendations and general speech pathology plan of care. Learning Assessment: Pt requires ongoing learning     Assessment  Impairments Requiring Therapeutic Intervention: Cognitive-Linguistic Deficits  and Oropharyngeal Dysphagia   Prognosis: good    Clinical Assessment:  Pt presents with moderate L perioral weakness resulting in dysarthria and oropharyngeal dysphagia. Instrumental assessment (FEES) completed 2/10/22, see report for assessment summary. Pt with limited carry over of dysphagia recommendations and safe swallow strategies.  Pt also presents with mild-moderate cognitive-linguistic deficits characterized by deficits in orientation, attention, executive functioning, short-term memory, and visuospatial functioning. Recommend SLP intervention for safe return to prior level of independence. Diet Solids Recommendation:   Liquid Consistency Recommendation:   Recommended Form of Meds:   Dysphagia III Soft and bite sized     Thin liquids    No straws  Meds whole with water       Recommended Compensatory Swallowing Strategies: Upright as possible with all PO intake , Small bites/sips , Eat/feed slowly, No Straws      Plan  Frequency: 60 minutes/day; 5 days per week, as tolerated, until goals met, or discharged from ARU. Therapeutic Interventions: Diet Tolerance Monitoring , Patient/Family Education , Instrumental assessment of swallow function (Modified Barium Swallow Study)  Speech / Motor Planning / Voice intervention , Cognitive-Linguistic intervention , and Patient/ Family education   Discharge Recommendations: Home with assistance and TBD   Continued SLP at Discharge: Yes     Goals  Patient Goals: Pt reports his goal is to get home. Time Frame: 14-21 days    Pt will tolerate recommended diet and advanced trials with no overt s/s of aspiration. ongoing  Pt will improve oral motor strength and ROM for coordinated and alternating motions, via graded tasks to improve speech back to baseline level as subjectively rated by SLP/pt and family. ongoing  Pt will improve executive function for multifactor task completion via graded tasks to 80% accuracy ongoing  Patient will complete functional problem-solving tasks for daily situations with 80% accuracy or given min cues.  ongoing  Pt will improve attention to detail for graded complex information to 80%, for improved recall and retention of newly learned information  ongoing      Therapy Session Time      Session 1 Session 2   Time In 0810 1020   Time Out 0830 1102   Time Code Minutes 0 10   Individual Minutes 20 42     Timed Code Treatment Minutes:  0  Total Treatment Minutes:  62    Electronically Signed By: Beverly Levi M.A.  CCC-SLP SSandraPSandra [id number]  Speech-Language Pathologist   2/13/2023 2:19 PM

## 2023-02-13 NOTE — PROGRESS NOTES
[facility]   Phone: [phone]    Occupational Therapy    [] Initial Evaluation            [x] Daily Treatment Note         [] Discharge Summary      Patient: Cassie Valenzuela   : 1955   MRN: [id number]   Date of Service:  2023    Admitting Diagnosis:  Acute ischemic stroke [facility])  Current Admission Summary: 59-year-old male who was admitted on  with left-sided weakness for greater than 2 days. CT of the head with suggestion of possible NPH. CTA with less than 50% stenosis of the right ICA. MRI of the brain revealed subacute ischemia on the right basal ganglia. Echo with normal EF and negative bubble study. Neurology evaluated and suggested aspirin and Plavix for 21 days then aspirin only in addition to Lipitor. He was evaluated by therapy and suggested to continue in an inpatient setting prior to returning home. He reports no BM for multiple days. Past Medical History:  has a past medical history of CVA (cerebral vascular accident) ([address] and HTN (hypertension). Past Surgical History:  has a past surgical history that includes Finger surgery (Right); Hernia repair; fracture surgery; and hernia repair.     Discharge Recommendations: Discharge recommendations depending pt progress    DME Required For Discharge: DME to be determined pending patient progress    Precautions/Restrictions: high fall risk, up as tolerated,  Dysphagia - Soft and Bite Sized, Left inattention, Pusher syndrome    Pre-Admission Information   Lives With: brother Candi Sherrybenji)                Type of Home: house  Home Layout: two level, bedroom in the basement, 10 steps with 1 handrail on the L  Home Access:  2 step to enter without rails   Bathroom Layout: tub/shower unit  Bathroom Equipment: grab bars in shower  Toilet Height: elevated height  Home Equipment: no prior equipment  Transfer Assistance: Independent without use of device  Ambulation Assistance:Independent without use of device  ADL Assistance: independent with all ADL's   IADL Assistance: independent with homemaking tasks  Active :        [x] Yes                 [] 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 Occupation: sales  Hobbies: fishing, reading, boating  Recent Falls: chart reports had several falls that caused this admission (about 5 falls)     Subjective  General: Pt seated upright in recliner, agreeable to OT/PT cotx and shower. Pt reporting that he can no longer read his book due to inability to focus.    Pain: 0/10  Pain Interventions: not applicable     Activities of Daily Living  Basic Activities of Daily Living  Grooming: setup assistance stand by assistance requires verbal cueing Increased time to complete task  Grooming Comments: used electric toothbrush for oral care, rather than placing toothpaste on brush, pt put tooth brush   Upper Extremity Bathing: minimal assistance requires verbal cueing Increased time to complete task  Lower Extremity Bathing: maximum assistance requires verbal cueing Increased time to complete task   Bathing Comments: OT waterproofed IV in LUE, seated on BSC, pt provided with max verbal cues for RUE hand placement while seated on shower chair to facilitate upright posture, CGA-min A for sitting balance throughout, assistance for washing feet, assistance to dry both legs, dep for buttocks, max verbal cues for bathing BUE, assistance for hair, max verbal cues for upright posture throughout  Upper Extremity Dressing: moderate assistance Comment: assistance for doffing button down shirt from LUE, min A for pulling shirt down back, max cues for sequencing  Lower Extremity Dressing: dependent requires verbal cueing Increased time to complete task Comment: mod A for doffing pants, briefs, and socks while seated via kicking feet and using LE accordingly, max A to uziel brief and pants, max A for donning socks, assistance for two for standing   Dressing Comments: assistance of 2 for dressing, education for donning LLE then RLE, poor carryover of learned information, standing with use of // GB in bathroom with min A to mod A for standing balance  Toileting Comments: declined  General Comments: dep for donning/doffing givmohr sling, OT positioned kinesotape on LUE to facilitate LUE shoulder elevation and prevent LUE subluxation, kinesotape also applied to LUE to facilitate wrist flexion, pt educated on the purpose of kinesotape to improve AROM in LUE. Instrumental Activities of Daily Living  No IADL completed on this date. Functional Mobility  Bed Mobility  Bed mobility not completed on this date. Transfers  Sit to stand transfer:2 person assistance with min A x2   Stand to sit transfer: 2 person assistance with min A x2   Stand pivot transfer: 2 person assistance with max A x2   Stand step transfer: 2 person assistance with max A x2   Shower transfer: from wheelchair to shower chair: min A x2, from shower chair to wheelchair max Ax1  Shower transfer equipment: bedside commode, grab bars, transfers from w/c  Comments: verbal cues for sequencing transfers, pt is impulsive, poor awareness to LUE throughout  Functional Mobility:  Sitting Balance: contact guard assistance, minimal assistance. Sitting Balance Comment: while seated on the shower chair  Standing Balance: minimal assistance, moderate assistance, 2 person assistance with min Ax2 . Standing Balance Comment: left lateral lean   Functional Mobility: .  2 person assistance with max Ax2   Functional Mobility Activity: wheelchair to recliner chair, ~8 ft with one turn  Functional Mobility Device Use: hemiwalker  Functional Mobility Comment:  poor sequencing of walker and gait pattern   Comment: poor safety awareness, L lateral lean    Second Session  Pt seated upright in recliner upon OT arrival, agreeable to therapy cotx with PT.  Pt pleasant and cooperative throughout however very discouraged and depressed regarding the lack of progress. Pt with no c/o pain in LUE where OT placed kinesiotape. Activities of Daily Living  Basic Activities of Daily Living  General Comments: politely declined  Instrumental Activities of Daily Living  No IADL completed on this date. Functional Mobility  Bed Mobility  Bed mobility not completed on this date. Transfers  Sit to stand transfer:2 person assistance with min A x2   Stand to sit transfer: 2 person assistance with min A x2   Stand pivot transfer: maximum assistance, 2 person assistance with max A x2 , from recliner to wheelchair-max A x1, wheelchair to recliner with max A x2  Comments:max verbal cues for sequencing, pt with difficulties following verbal instructions  Functional Mobility:  Sitting Balance: contact guard assistance, minimal assistance. Sitting Balance Comment: sitting in wheelchair/recliner with CGA, however when sitting on edge of cushion-CGA/min A  Standing Balance: minimal assistance, moderate assistance, maximum assistance. Standing Balance Comment: fluctuating assistance for static and max A for dynamic balance, poor proprioception awareness  Functional Mobility: .  2 person assistance with mod-max A x1 + min A x1   Functional Mobility Activity: 50 ft  Functional Mobility Device Use: hemiwalker  Functional Mobility Comment: PT assisting with progression of LLE and weight shifting during ambulation, assist of another for use of [place]-walker    Comments: L lateral lean, poor sequencing  Pt left in recliner chair with all needs met, call light within reach, and chair alarm activated. Pt provided with encouragement and discussed progressing.          Cognition  Overall Cognitive Status: WFL  Arousal/Alterness: appropriate responses to stimuli  Following Commands: follows one step commands with repetition  Attention Span: attends with cues to redirect  Memory: decreased recall of precautions, decreased recall of recent events  Safety Judgement: decreased awareness of need for assistance, decreased awareness of need for safety  Problem Solving: assistance required to generate solutions, assistance required to implement solutions, decreased awareness of errors, assistance required to identify errors made, assistance required to correct errors made  Insights: not aware of deficits  Initiation: requires cues for some  Sequencing: requires cues for some  Comments: impulsive at times  Orientation:    oriented to person, oriented to time, and oriented to situation  Comments: pt stated he was in the Reading unit in [place]" however unable to state [facility]  Command Following:   impaired  Follows one step directions with repetition and increased time  Difficulties following directions with \"Left/Right\", I.e., use R arm to do X  Education  Barriers To Learning: cognition and language  Patient Education: patient educated on goals, OT role and benefits, plan of care, precautions, ADL adaptive strategies, IADL safety, proper use of assistive device/equipment, adaptive device training, energy conservation, orientation, family education, pressure relief, transfer training, discharge recommendations  Learning Assessment:  patient verbalizes understanding, would benefit from continued reinforcement, patient will require reinforcement due to cognitive deficits  Assessment  Activity Tolerance: Fair with no c/o pain or fatigue  Impairments Requiring Therapeutic Intervention: decreased functional mobility, decreased ADL status, decreased ROM, decreased strength, decreased safety awareness, decreased cognition, decreased endurance, decreased sensation, decreased balance, decreased IADL, decreased fine motor control, decreased coordination, increased pain, decreased posture  Prognosis: good  Clinical Assessment: Pt is progressing fairly at this time.   Currently, pt is functioning below baseline as he requires above assistance levels for ADLs, functional tranfers, and functional mobility. Pt is limited be deficits in sitting balance, standing balance, safety awareness, LUE ROM, LUE strength, LUE sensitivity, and proprioception. Trailing use of kinesiotape and givmohr sling to improve performance during ADLs and functional transfers. Skilled intensive OT in ARU warranted to improve safety and independence with all occupational pursuits. Recommend continued co-tx for one of two therapy sessions to advance functional mobility. Safety Interventions: patient left in chair, chair alarm in place, call light within reach, patient at risk for falls, telesitter in use, and family/caregiver present    Plan  Frequency: 5 x/week, 60 min/day  Current Treatment Recommendations: strengthening, ROM, balance training, functional mobility training, transfer training, stair training, endurance training, neuromuscular re-education, wheelchair mobility training, modalities, patient/caregiver education, ADL/self-care training, IADL training, home management training, cognitive reorientation, home exercise program, safety education, equipment evaluation/education, and positioning  Goals  Patient Goals: \"to get this working\" in re to LUE   Short Term Goals:  Time Frame: in 10 days  Patient will complete upper body bathing at min A while seated. Patient will complete lower body bathing at min A while seated and standing as needed. Patient will complete upper body dressing at min A. Patient will complete lower body dressing at mod A using AE as needed. Patient will complete toileting at min A with GB as needed. Patient will complete a toilet transfer with min A with GB as needed. Patient will complete a visual assessment to complete occupational profile. Long Term Goals:  Time Frame: in 21 days  Patient will complete upper body bathing at supervision while seated. Patient will complete lower body bathing at supervision while seated and standing as needed.   Patient will complete upper body dressing at set-up assistance. Patient will complete lower body dressing at supervision using AE as needed. Patient will complete toileting at supervision with GB as needed. Patient will complete grooming at supervision while in stance. Patient will complete functional transfers at supervision using LRAD. Patient will complete functional mobility at supervision using LRAD. Patient will increase functional sitting balance to mod I for improved ADL completion. Patient will increase functional standing balance to SUP for improved ADL completion.     No goals met 2/13    Therapy Session Time  First Session Therapy Time:   Individual Group Co-treatment   Time In   850   Time Out   950   Minutes   65      Second Session Therapy Time:   Individual Group Co-treatment   Time In   3351   Time Out   1324   Minutes   39     Timed Code Treatment Minutes:  86+36    Total Treatment Minutes:  104       Electronically Signed By: OLGA Roger/MIKE, [id number]

## 2023-02-13 NOTE — PROGRESS NOTES
[facility]   Phone: [phone]    Physical Therapy    [] Initial Evaluation            [x] Daily Treatment Note         [] Discharge Summary      Patient: Christina Horn   : 1955   MRN: [id number]   Date of Service:  2023  Admitting Diagnosis: Acute ischemic stroke [facility])  Current Admission Summary: 57-year-old male who was admitted on  with left-sided weakness for greater than 2 days. CT of the head with suggestion of possible NPH. CTA with less than 50% stenosis of the right ICA. MRI of the brain revealed subacute ischemia on the right basal ganglia. Echo with normal EF and negative bubble study. Neurology evaluated and suggested aspirin and Plavix for 21 days then aspirin only in addition to Lipitor. He was evaluated by therapy and suggested to continue in an inpatient setting prior to returning home. He reports no BM for multiple days. Past Medical History:  has a past medical history of CVA (cerebral vascular accident) ([address] and HTN (hypertension). Past Surgical History:  has a past surgical history that includes Finger surgery (Right); Hernia repair; fracture surgery; and hernia repair.   Discharge Recommendations: TBD pending progress  DME Required For Discharge: DME to be determined pending patient progress  Precautions/Restrictions: high fall risk, (L) hemiparetic shoulder  Weight Bearing Restrictions: no restrictions  [] Right Upper Extremity  [] Left Upper Extremity [] Right Lower Extremity  [] Left Lower Extremity     Required Braces/Orthotics: no braces required   [] Right  [] Left  Positional Restrictions:no positional restrictions    Pre-Admission Information   Lives With: brother Alice Larson)                Type of Home: house  Home Layout: two level, bedroom in the basement, 10 steps with 1 handrail on the L  Home Access:  2 step to enter without rails   Bathroom Layout: tub/shower unit  Bathroom Equipment: grab bars in shower  Toilet Height: elevated height  Home Equipment: no prior equipment  Transfer Assistance: Independent without use of device  Ambulation Assistance:Independent without use of device  ADL Assistance: independent with all ADL's   IADL Assistance: independent with homemaking tasks  Active :        [x] Yes                 [] 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 Occupation: sales  Hobbies: [employer]   Recent Falls: chart reports had several falls that caused this admission (about 5 falls)     Examination   Vision:   Vision Corrective Device: wears glasses for distance  Hearing:   WFL      Subjective  General:Pt seated in recliner upon arrival, agreeable to therapy. Pain: 0/10  Pain Interventions: not applicable       Functional Mobility  Bed Mobility  Bed mobility not completed on this date. Comments:   Transfers  Sit to stand transfer: 2 person assistance with min x 2   Stand to sit transfer: 2 person assistance with min A + CGA   Stand pivot transfer: 2 person assistance with min + mod from recliner => w/c, min x 2 from w/c => shower chair    Comments: Patient presents with significant (L) lateral lean during initial stance and during transfers requiring max segmental VC and TC for correction of upright posture. Patient also required max VC + assist for (L) UE awareness, hand placement and support. Ambulation   Surface:level surface  Assistive Device: hemiwalker  Other Appliance: givmohr sling  Assistance: 2 person assistance with max A   Distance: 8 ft + turn to recliner  Gait Mechanics: Ambulates with (L) lateral lean, narrow and inconsistent THERESE, and decreased step length (B)  Comments: Assist provided for AD management as well as max VC for sequencing. Physical assist provided to advance (L) LE and promote increased weight shifting to the (R). Stair Mobility   Stair mobility not completed on this date.   Comments:    Wheelchair Mobility:  Chair: manual  Surface: level surface  Method: (R) UE and (R) LE  Distance: 5 ft  Assistance: maximum assistance, (ranges from mod to max A)  Comments: Max VC provided for break management and sequencing for self-propelling  Balance  Static Sitting Balance: poor (+): requires min (A) to maintain balance  Dynamic Sitting Balance: poor (+): requires min (A) to maintain balance  Static Standing Balance: poor (+): requires min (A) to maintain balance  Dynamic Standing Balance: poor: requires mod (A) to maintain balance  Comments:    Other Therapeutic Interventions   First Session:  See functional mobility noted above. Shower completed with OT team member to maximize patient performance and maintain patient safety. Pt requires min A x 2 with use of grab bar to complete SPT from w/c => shower seat surface. Pt requires CGA to min assist to maintain static unsupported sitting balance during shower completion in addition to OT assist with ADL task completion. Pt requires min assist to maintain dynamic sitting balance and promote upright posture during shower completion in addition to OT assist with ADL task completion. Upon completion of shower, patient demonstrated poor sitting posture and c/o discomfort sitting on shower chair. As a result, patient was transferred to w/ with max A x 1 to increase safety and stability for LE dressing. LE clothing management performed dependently by OT member. In addition patient required min to mod A for stability and upright posture. Max VC provided throughout shower to reduce (L) lateral lean and promote upright posture. Kinesiotape donned to (L) shoulder and wrist to promote facilitation and improved positioning at end of session by OT member. Second Session:   Patient seated in recliner upon entry, agreeable to therapy. Patient performed transfer from recliner => w/c at max A of 1. STS from w/ with use of hemiwalker performed at min A + CGA.  Max VC provided for (R) UE placement during transfers. In addition to this, patient ambulated 68 feet with hemiwalker at mod-max A of 1 + min A. Max VC/TC provided for upright posture and max VC for appropriate sequencing. Physical assist provided to advance AD and improve step length and THERESE width on the (L) LE. As patient fatigued, forward flexed posturing worsened requiring increased cues. (L) LE step ups on 4 in step with (R) UE support on HR with physical assist of 2. Max VC provided to reduce (L) lateral lean. Patient performed SPT from w/c => recliner at  max A of 2 secondary to fatigue at EOS. Patient appeared frustrated this session and required increased encouragement and education on the progress he is making. Patient left in recliner with chair alarm in place and call light within reach.      Functional Outcomes                 Cognition  Overall Cognitive Status: Impaired  Arousal/Alterness: appropriate responses to stimuli  Following Commands: follows one step commands with repetition, follows one step commands with increased time  Attention Span: difficulty attending to directions  Memory: decreased recall of biographical information, decreased short term memory  Safety Judgement: decreased awareness of need for safety  Problem Solving: assistance required to generate solutions, assistance required to implement solutions  Insights: decreased awareness of deficits  Initiation: requires cues for all  Sequencing: requires cues for all  Orientation:    oriented to person, oriented to place, oriented to situation, and disoriented to time   Command Following:   impaired    Education  Barriers To Learning: cognition, emotional, and physical  Patient Education: patient educated on goals, PT role and benefits, plan of care, general safety, functional mobility training, proper use of assistive device/equipment, disease specific education, transfer training  Learning Assessment:  patient verbalizes understanding, would benefit from continued reinforcement    Assessment  Activity Tolerance: Good. Seated rest breaks provided for recovery. Impairments Requiring Therapeutic Intervention: decreased functional mobility, decreased ADL status, decreased ROM, decreased strength, decreased safety awareness, decreased cognition, decreased endurance, decreased balance, decreased coordination, decreased posture  Prognosis: fair  Clinical Assessment: Patient showing improvements in functional mobility this date as evidenced by ability to ambulate further distances with less assistance with hemiwalker. However, patient continues to require physical assist of two to promote stability for standing mobility secondary to significant (L) lateral lean. Patient able to self-correct posturing with appropriate VC/TC. Pt would benefit from continued skilled PT to promote functional independence. Safety Interventions: patient left in chair, chair alarm in place, call light within reach, gait belt, patient at risk for falls, telesitter in use, and fall mats at bedside    Plan  Frequency: 5 x/week, 60 min/day  Current Treatment Recommendations: strengthening, ROM, balance training, functional mobility training, transfer training, gait training, stair training, endurance training, neuromuscular re-education, wheelchair mobility training, modalities, patient/caregiver education, ADL/self-care training, cognitive/perceptual training, and safety education    Goals  Patient Goals:  To return home and be able to move his (L) UE better  Short Term Goals:  Time Frame: 1-2 weeks  Patient will complete bed mobility at [facility]   Patient will complete transfers at mod (A) of 1  Patient will ambulate 25 ft with use of LRAD at mod (A) of 1  Patient will ascend/descend 1 stairs with (B) handrail at mod (A) of 1  Patient will complete manual w/c propulsion 50 ft at [facility]  Long Term Goals:  Time Frame: 3 weeks  Patient will complete bed mobility at modified independent   Patient will complete transfers at [facility]   Patient will ambulate 50 ft with use of LRAD at [facility]  Patient will ascend/descend 10 stairs with (L) ascending handrail at [facility]  Patient will complete car transfer at [facility]     Therapy Session Time      Individual Group Co-treatment   Time In     0845   Time Out     0950   Minutes     65     Second Session:   Individual Group Co-treatment   Time In      1245   Time Out      1324   Minutes      39     Timed Code Treatment Minutes:   65 + 39 minutes  Total Treatment Minutes:  104 minutes        Electronically Signed By:   LINA Andrade  Therapist observed and directed the patient's plan of care.   Co-signed and supervised by: Víctor Olvera PT, DPT - [id number]

## 2023-02-13 NOTE — PROGRESS NOTES
Assessment complete. Denies pain. Left arm flaccid. Required assistance from two staff members to keep from leaning to the left side during transfer with the stedy to the toilet earlier this shift. The care plan and education has been reviewed and mutually agreed upon with the patient. In bed, alarm on, bed in lowest position, call light and table within reach. No further needs expressed at this time.

## 2023-02-14 PROCEDURE — 97129 THER IVNTJ 1ST 15 MIN: CPT

## 2023-02-14 PROCEDURE — 92526 ORAL FUNCTION THERAPY: CPT

## 2023-02-14 PROCEDURE — 1280000000 HC REHAB R&B

## 2023-02-14 PROCEDURE — 97112 NEUROMUSCULAR REEDUCATION: CPT

## 2023-02-14 PROCEDURE — 97116 GAIT TRAINING THERAPY: CPT

## 2023-02-14 PROCEDURE — 6370000000 HC RX 637 (ALT 250 FOR IP): Performed by: PHYSICAL MEDICINE & REHABILITATION

## 2023-02-14 PROCEDURE — 97130 THER IVNTJ EA ADDL 15 MIN: CPT

## 2023-02-14 PROCEDURE — 97530 THERAPEUTIC ACTIVITIES: CPT

## 2023-02-14 PROCEDURE — 97110 THERAPEUTIC EXERCISES: CPT

## 2023-02-14 PROCEDURE — 6360000002 HC RX W HCPCS: Performed by: PHYSICAL MEDICINE & REHABILITATION

## 2023-02-14 RX ADMIN — ATORVASTATIN CALCIUM 40 MG: 40 TABLET, FILM COATED ORAL at 20:24

## 2023-02-14 RX ADMIN — ASPIRIN 81 MG 81 MG: 81 TABLET ORAL at 09:01

## 2023-02-14 RX ADMIN — ENOXAPARIN SODIUM 40 MG: 100 INJECTION SUBCUTANEOUS at 09:01

## 2023-02-14 RX ADMIN — STANDARDIZED SENNA CONCENTRATE AND DOCUSATE SODIUM 2 TABLET: 8.6; 5 TABLET ORAL at 20:24

## 2023-02-14 RX ADMIN — LISINOPRIL 5 MG: 5 TABLET ORAL at 09:01

## 2023-02-14 RX ADMIN — CLOPIDOGREL BISULFATE 75 MG: 75 TABLET ORAL at 09:01

## 2023-02-14 RX ADMIN — STANDARDIZED SENNA CONCENTRATE AND DOCUSATE SODIUM 2 TABLET: 8.6; 5 TABLET ORAL at 09:01

## 2023-02-14 ASSESSMENT — PAIN SCALES - GENERAL
PAINLEVEL_OUTOF10: 0
PAINLEVEL_OUTOF10: 0

## 2023-02-14 NOTE — PROGRESS NOTES
Assessment complete. Alert. Oriented x4, but took a while to think about responses to orientation questions before answering. Denies pain. Left arm remains flaccid. Left-sided fingers noted to be curved in toward palm of hand. Patient denies pain in hand when fingers manipulated. Educated on risk of contractures to left hand. Patient states that he has been stretching his fingers on his left hand with his right hand throughout the day. The care plan and education has been reviewed and mutually agreed upon with the patient. In bed, alarm on, bed in lowest position, call light and table within reach. No further needs expressed at this time. 2300  Patient called, stating that he did not know where he was. Upon entry to room, patient lying in bed. Remembers having a stroke, but cannot remember where he is. Easily reoriented. Light left on in the corner of the room to help patient remain oriented to his surroundings. Per report, patient has intermittent disorientation. No other differences noted in neurological assessment aside from disorientation. 7006  Noted on camera to be sticking left leg off side of bed. Upon entry to room, patient pulled leg back in bed. Oriented x4. Re-educated on fall risk and precautions. Assisted to sit up in bed to eat snack.

## 2023-02-14 NOTE — PROGRESS NOTES
Assessment completed. Patient sitting up in chair, alert and oriented with some forgetfulness, and able to make all his needs known. Denies any pain. Uses stedy w/assist x 2 and gait belt for mobility. Appetite remains poor. Patient stated that he usually does not eat a lot. Medications administered as ordered. POC and education reviewed with patient and mutually agreed upon. Safety interventions in place. Call light in reach. Will continue to monitor.

## 2023-02-14 NOTE — PROGRESS NOTES
Nutrition Note    RECOMMENDATIONS  PO Diet: Diet consistency per SLP  ONS: dc per pt preference  Monitor for wt trends     NUTRITION ASSESSMENT   Pt remains on a dysphagia soft & bite sized diet. Po intake remains 50% or greater. Pt reported not liking magic cup supplements. Would rather have regular pudding & ice creams for snacks. Appetite remains typical to how pt ate @ home. Will d/c supplements & con't to monitor progress. Nutrition Related Findings: No edema noted; LBM 2/11  Wounds: None  Nutrition Education:  Education not indicated   Nutrition Goals: PO intake 50% or greater     MALNUTRITION ASSESSMENT   Acute Illness  Malnutrition Status: At risk for malnutrition (Comment)    NUTRITION DIAGNOSIS   Inadequate oral intake related to inadequate protein-energy intake as evidenced by mild loss of subcutaneous fat, mild muscle loss, weight loss (per pt:  ml)      CURRENT NUTRITION THERAPIES  ADULT DIET; Dysphagia - Soft and Bite Sized  ADULT ORAL NUTRITION SUPPLEMENT; Dinner, Lunch; Frozen Oral Supplement     PO Intake: 51-75%   PO Supplement Intake:1-25%    ANTHROPOMETRICS  Current Height: 5' 9\" (175.3 cm)  Current Weight: 148 lb 4.8 oz (67.3 kg)    Ideal Body Weight (IBW): 160 lbs  (73 kg)    Usual Bodyweight 160 lb (72.6 kg)       BMI: 21.8      COMPARATIVE STANDARDS  Energy (kcal):  1675- 2010     Protein (g):  82- 136g       Fluid (mL/day):  1 ml/kcal    The patient will be monitored per nutrition standards of care. Consult dietitian if additional nutrition interventions are needed prior to RD reassessment.      Nissa Bautista RD, LD    Contact: [phone]

## 2023-02-14 NOTE — PROGRESS NOTES
Jayden Becerra  2/14/2023  [id number]    Chief Complaint: Acute ischemic stroke [facility])    Subjective:   No overnight events. No current complaints. Reports being emotionally labile. Unsure if he wants to start an SSRI.    ROS: No CP, SOB, dyspnea    Objective:  Patient Vitals for the past 24 hrs:   BP Temp Temp src Pulse Resp SpO2   02/13/23 2135 (!) 156/89 97.6 °F (36.4 °C) Oral 63 16 97 %   02/13/23 1000 129/78 97.6 °F (36.4 °C) -- 56 16 98 %       Gen: No distress, pleasant. Resting in bedside chair  HEENT: Normocephalic, atraumatic. CV: Regular rate and rhythm. No MRG   Resp: No respiratory distress. CTAB   Abd: Soft, nontender nondistended  Ext: No edema. Neuro: Alert, oriented, appropriately interactive. Left hemiparesis    Laboratory data: Available via EMR. Therapy progress:    PT    Supine to Sit: Partial/moderate assistance  Sit to Supine: Dependent   Sit to Stand: Dependent  Chair/Bed to Chair Transfer: Dependent  Car Transfer:    Ambulation 10 ft: Dependent  Ambulation 50 ft:    Ambulation 150 ft:    Stairs - 1 Step: Dependent  Stairs - 4 Step: Dependent  Stairs - 12 Step:      OT    Eating:    Oral Hygiene: Supervision or touching assistance  Bathing: Substantial/maximal assistance  Upper Body Dressing: Partial/moderate assistance  Lower Body Dressing: Dependent  Toilet Transfer: Dependent  Toilet Hygiene:      Speech Therapy    Pt presents with mild-moderate oropharyngeal dysphagia due to reduced L side sensation and perioral weakness resulting in anterior bolus loss, impaired mastication, premature bolus loss, delayed swallow initiation, altered vocal quality with a delayed cough. Pt reports not having as much as an appetite as well as lack of dentition creating difficulty with mastication. Pt reports chewing is where his difficulty is and not with swallowing.  Pt may benefit from instrumental assessment due to evidence of reduced sensation and intermittent clinical signs of penetration/aspiration; will recommend when appropriate. Recommend dysphagia diet (soft and bite sized diet) with dysphagia intervention and implementation of safe swallow strategies to minimize aspiration risk. Pt presents with moderate dysarthria characterized by L perioral weakness resulting in reduced articulation, precision, and reduced rate of speech. Pt self-rated speech as 5% being far from baseline. Pt presents with mild-moderate cognitive-linguistic deficits characterized by deficits in orientation, attention, executive functioning, short-term memory, and visuospatial functioning. Errors consisted of many deflections and patient attributing to baseline dyslexia. Required mod cues and repetition throughout task. Pt demonstrated appropriate insight into deficits which resulted in emotional liability towards the end of the assessment. Recommend SLP intervention for safe return to prior level of independence. Body mass index is 21.9 kg/m². Assessment:  Patient Active Problem List   Diagnosis    Acute ischemic stroke [facility])    Expressive aphasia    Dysarthria    Ataxia    Primary hypertension    Tobacco abuse    Noncompliance    Left hemiparesis ([facility] [address]       Plan:   Acute right basal ganglia infarct: DAPT for 21 days (2/25) , Lipitor. PT/OT/SLP. Discussed SSRI     Dysphagia: Dysphagia diet, SLP     HTN: started lisinopril 5     Tobacco use: education provided. No supplement currently    Meatus trauma: held lovenox. Improving. UA without significant findings for infection     Bowels: Per protocol   Bladder: Per protocol   Sleep: Trazodone provided prn. Pain: Tylenol, tramadol as needed  DVT PPx: Lovenox  SOPHIA: 3/2    Theo Stephens MD 2/14/2023, 8:52 AM    * This document was created using dictation software. While all precautions were taken to ensure accuracy, errors may have occurred. Please disregard any typographical errors.

## 2023-02-14 NOTE — PLAN OF CARE
Problem: Discharge Planning  Goal: Discharge to home or other facility with appropriate resources  Outcome: Progressing  Flowsheets (Taken 2/13/2023 2135)  Discharge to home or other facility with appropriate resources: Identify barriers to discharge with patient and caregiver     Problem: Skin/Tissue Integrity  Goal: Absence of new skin breakdown  Description: 1. Monitor for areas of redness and/or skin breakdown  2. Assess vascular access sites hourly  3. Every 4-6 hours minimum:  Change oxygen saturation probe site  4. Every 4-6 hours:  If on nasal continuous positive airway pressure, respiratory therapy assess nares and determine need for appliance change or resting period.   Outcome: Progressing     Problem: Safety - Adult  Goal: Free from fall injury  Outcome: Progressing  Flowsheets (Taken 2/14/2023 0413)  Free From Fall Injury: Instruct family/caregiver on patient safety     Problem: Pain  Goal: Verbalizes/displays adequate comfort level or baseline comfort level  Outcome: Progressing  Flowsheets (Taken 2/13/2023 2135)  Verbalizes/displays adequate comfort level or baseline comfort level: Encourage patient to monitor pain and request assistance     Problem: Nutrition Deficit:  Goal: Optimize nutritional status  Outcome: Progressing     Problem: ABCDS Injury Assessment  Goal: Absence of physical injury  Outcome: Progressing

## 2023-02-14 NOTE — PROGRESS NOTES
[facility] Department   Phone: [phone]    Speech Therapy    [] Initial Evaluation            [x] Daily Treatment Note         [] Discharge Summary      Patient: Vitor Bhatti   : 1955   MRN: [id number]   Date of Service:  2023  Admitting Diagnosis: Acute ischemic stroke [facility])  Current Admission Summary: 47-year-old male who was admitted on  with left-sided weakness for greater than 2 days. CT of the head with suggestion of possible NPH. CTA with less than 50% stenosis of the right ICA. MRI of the brain revealed subacute ischemia on the right basal ganglia. Echo with normal EF and negative bubble study. Neurology evaluated and suggested aspirin and Plavix for 21 days then aspirin only in addition to Lipitor. He was evaluated by therapy and suggested to continue in an inpatient setting prior to returning home. He reports no BM for multiple days. Past Medical History:  has a past medical history of CVA (cerebral vascular accident) ([address] and HTN (hypertension). Past Surgical History:  has a past surgical history that includes Finger surgery (Right); Hernia repair; fracture surgery; and hernia repair. Precautions/Restrictions: high fall risk, modified diet      Pre-Admission Information   Living Status: Pt lives with his brother, independent prior to admission. Occupation/School: Retired last year. 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   Medication Management: :  [x]Primary   []Secondary []No  Finance Management: [x]Primary   []Secondary []No  Active :   [x]Yes         []No  H[facility]:    [employer] [facility]  Vision:    Vision Corrective Device: wears glasses at all times      Subjective  General: Pt alert and cooperative throughout sessions.    Pain: Did not state    Safety Interventions: patient left in chair, chair alarm in place, call light within reach, patient at risk for falls, and telesitter in use      Therapeutic Interventions:     Session 1:     Dysphagia: Severity: Mild  and Moderate   Diet tolerance (soft / regular solids and thin liquids) with implementation of compensatory strategies   - required cues for implementation of appropriate positioning for meal; mod/max cues for being upright in chair   - pt with increased build up of bolus in the left sulcus; mod cues for awareness and clearance strategies   - mod cues provided for double swallow   - no overt clinical s/s of aspiration   - intermittent episodes of poor object recognition impacting his self feeding (drinking oatmeal from bowl); mod verbal and tactile cues provided for planning     Attempted use of NMES (neuromuscular electrical stimulation) however unable to achieve electrode contact with skin despite multiple attempts (use of of tens prep wipe, pressure/wrap). Suspect facial hair barrier. Motor Speech/ Voice:  Severity: Mild  and Moderate     Cognition: Severity: Mild   Comprehension and recall of new / complex medical information   - required mod/max cues via simplification and repetition; unsure of complete comprehension   - encouraged use of note taking to carry over information (new medications); pt reported limited writing skills due to use of non dominant hand and deferred clinician assistance; will re-attempt     Provided pt with aid for detailed orientation and daily recall (calendar and notebook paper)   - he will remain dependent for use due to fine motor impairments       Session 2:  Dysphagia: Severity: Mild  and Moderate   Provided verbal review of strategies prior to lunch meal   - double swallow   - small bites sips   - clear oral cavity   Required set up - min assistance for use of utensils     Motor Speech/ Voice: Severity: Mild  and Moderate   Goal not targeted this session.       Cognition: Severity: Mild   Visuospatial attention and comprehension: following one step written directions to select word from word list f/3   - identified accurate word with 100% accuracy (8/8)   - utilized directed function with 75% accuracy (6/8); corrected given min cues, faded as task progressed     Functional recall of new/ complex medical information   - recalled 1/3 components from morning MD discussion   - provided mod/max cues for encoding (written, simplification, repetition)   - able to generate questions to indicate comprehension after strategies were implemented     Additional Interventions:   Stroke Education; pt with ongoing questions regarding timeline for recovery of various functions    - provided pt with 'stroke survivor story'; dependent for reading   - required mod cues for generation of personalized goals related to story (fishing, kayaking)       Education  Barriers To Learning: cognition and hearing  Patient Education: Provided education regarding role of SLP, results of assessment, recommendations and general speech pathology plan of care. Learning Assessment: Pt requires ongoing learning     Assessment  Impairments Requiring Therapeutic Intervention: Cognitive-Linguistic Deficits  and Oropharyngeal Dysphagia   Prognosis: good    Clinical Assessment:  Pt presents with moderate L perioral weakness resulting in dysarthria and oropharyngeal dysphagia. Instrumental assessment (FEES) completed 2/10/22, see report for assessment summary. Pt with limited carry over of dysphagia recommendations and safe swallow strategies. Pt also presents with moderate cognitive-linguistic deficits characterized by deficits in orientation, attention, executive functioning, short-term memory, and visuospatial functioning. Recommend SLP intervention for safe return to prior level of independence.          Diet Solids Recommendation:   Liquid Consistency Recommendation:   Recommended Form of Meds:   Dysphagia III Soft and bite sized     Thin liquids    No straws  Meds whole with water       Recommended Compensatory Swallowing Strategies: Upright as possible with all PO intake , Small bites/sips , Eat/feed slowly, No Straws      Plan  Frequency: 60 minutes/day; 5 days per week, as tolerated, until goals met, or discharged from ARU. Therapeutic Interventions: Diet Tolerance Monitoring , Patient/Family Education , Instrumental assessment of swallow function (Modified Barium Swallow Study)  Speech / Motor Planning / Voice intervention , Cognitive-Linguistic intervention , and Patient/ Family education   Discharge Recommendations: Home with assistance and TBD   Continued SLP at Discharge: Yes     Goals  Patient Goals: Pt reports his goal is to get home. Time Frame: 14-21 days    Pt will tolerate recommended diet and advanced trials with no overt s/s of aspiration. ongoing  Pt will improve oral motor strength and ROM for coordinated and alternating motions, via graded tasks to improve speech back to baseline level as subjectively rated by SLP/pt and family. ongoing  Pt will improve executive function for multifactor task completion via graded tasks to 80% accuracy ongoing  Patient will complete functional problem-solving tasks for daily situations with 80% accuracy or given min cues. ongoing  Pt will improve attention to detail for graded complex information to 80%, for improved recall and retention of newly learned information  ongoing      Therapy Session Time      Session 1 Session 2   Time In 0800 1125   Time Out 0835 1200   Time Code Minutes 10 35   Individual Minutes 35 35     Timed Code Treatment Minutes: 45  Total Treatment Minutes:  70    Electronically Signed By:   Beverly Levi M.A.  CCC-SLP AUDELIA [id number]  Speech-Language Pathologist   2/14/2023 9:59 AM

## 2023-02-14 NOTE — PROGRESS NOTES
[facility]   Phone: [phone]    Occupational Therapy    [] Initial Evaluation            [x] Daily Treatment Note         [] Discharge Summary      Patient: Ro Allen   : 1955   MRN: [id number]   Date of Service:  2023    Admitting Diagnosis:  Acute ischemic stroke [facility])  Current Admission Summary: 67-year-old male who was admitted on  with left-sided weakness for greater than 2 days. CT of the head with suggestion of possible NPH. CTA with less than 50% stenosis of the right ICA. MRI of the brain revealed subacute ischemia on the right basal ganglia. Echo with normal EF and negative bubble study. Neurology evaluated and suggested aspirin and Plavix for 21 days then aspirin only in addition to Lipitor. He was evaluated by therapy and suggested to continue in an inpatient setting prior to returning home. He reports no BM for multiple days. Past Medical History:  has a past medical history of CVA (cerebral vascular accident) ([address] and HTN (hypertension). Past Surgical History:  has a past surgical history that includes Finger surgery (Right); Hernia repair; fracture surgery; and hernia repair.     Discharge Recommendations: Discharge recommendations depending pt progress    DME Required For Discharge: DME to be determined pending patient progress    Precautions/Restrictions: high fall risk, up as tolerated,  Dysphagia - Soft and Bite Sized, Left inattention, Pusher syndrome    Pre-Admission Information   Lives With: brother Brad Reyna)                Type of Home: house  Home Layout: two level, bedroom in the basement, 10 steps with 1 handrail on the L  Home Access:  2 step to enter without rails   Bathroom Layout: tub/shower unit  Bathroom Equipment: grab bars in shower  Toilet Height: elevated height  Home Equipment: no prior equipment  Transfer Assistance: Independent without use of device  Ambulation Assistance:Independent without use of device  ADL Assistance: independent with all ADL's   IADL Assistance: independent with homemaking tasks  Active :        [x] Yes                 [] 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 Occupation: sales  Hobbies: fishing, reading, boating  Recent Falls: chart reports had several falls that caused this admission (about 5 falls)     Examination (updated on 2/14/24)  Vision:   Vision Corrective Device: wears glasses at all times and Patient Reported Deficits: blurring of print when reading, unable to keep objects in focus, balance difficulty, and difficulty maintaining concentration  ROM:   (B) UE PROM WFL  Strength:   (L) Shoulder: 1     (R) Shoulder: 5  (L) Elbow: 1     (R) Elbow: 5  (L) Wrist: 0     (R) Wrist: 5  (L) Hand: 0      (R) Hand: 5    Subjective  General: Pt seated upright in recliner, agreeable to OT tx. Pain: 0/10  Pain Interventions: not applicable     Activities of Daily Living  Basic Activities of Daily Living  General Comments: Donned givmohr sling with max A. Min A to doff givmohr sling. Pt re-educated on the purpose of sling to prevent subluxation, pt verbalized understanding. Instrumental Activities of Daily Living  No IADL completed on this date. Functional Mobility  Bed Mobility  Bed mobility not completed on this date. Transfers  Stand pivot transfer: maximum assistance, from recliner chair to w/c to the L  Comments: verbal cues for sequencing transfers, pt is impulsive, poor awareness to LUE throughout  Functional Mobility:  Sitting Balance: contact guard assistance, minimal assistance. Sitting Balance Comment: sitting  on edge of recliner chair prior to t/f  Standing Balance: maximum assistance. Standing Balance Comment: briefly during transfer  Therapeutic Activity   While seated in wheelchair, neuromuscular re-education completed on LUE to improve LUE AROM.  With verbal cues for sequencing and tapping applied to muscle belly, pt had trace (1/5) scapular elevation with no active scapular depression. With verbal cues for sequencing and tapping, pt had trace (1/5) elbow flexion/extension. Pt provided with tapping on muscle belly for wrist flexion (flexor  carpi radialis, flexor carpi ulnaris, and flexor digitorum, profundus muscle), with hand supported in gravity limited on table on a hand towel (0/5). Pt becoming more emotional throughout therapeutic activity and frequently shaking head no during task, therefore attempted different stimulation for elbow flexion/extension to improve AROM: attempting brushing of bicep muscle belly with continued (1/5) trace contraction with no improvement. Next attempting to use of ice on bicep muscle belly to improve neuro plasticity in LUE. Again, no change in muscle contraction for elbow flexion (1/5). Pt educated on the purpose of therapeutic activity to improve neuro plasticity to improve AROM in LUE. Second Session  Pt seated upright in recliner upon OT arrival, agreeable to therapy. Pt pleasant and cooperative throughout. Activities of Daily Living  Basic Activities of Daily Living  General Comments: politely declined  Comments: no c/o with kenisotape, no redness or skin irritation noted  Functional Mobility  Bed Mobility  Bed mobility not completed on this date. Transfers  Sit to stand transfer:minimal assistance, moderate assistance  Stand to sit transfer: minimal assistance, moderate assistance  Stand pivot transfer: moderate assistance, 2 person assistance with min Ax2   Comments:fluctuating assistance levels, provided with visual and verbal cue for RUE hand placement on wheelchair arm rest,poor carryover  Functional Mobility:  Sitting Balance: contact guard assistance, minimal assistance, 2 person assistance with min A x2 .     Sitting Balance Comment: While seated on therapy mat, pt completed LUE hand pivot to therapy mat to the left with min Ax1 (support provided to LUE elbow and shoulder) 10x to the L. Pt then completed side lying on L forearm on the therapy mat with min A (support to shoulder and torso) while pt used his RUE on therapy erich to assist with eccentric control for lowering to the mat. Max verbal cues for refraining from using the RUE for therapeutic activity to improve sitting balance and posture. Limited carryover of learned information. While seated on therapy mat, pt completed crossing midline to the L and and to the R to place cones in designated location to improve crossing midline, core strength, sitting balance, and functional sitting balance. Pt with poor insight into sitting balance and required min-mod verbal cues to remain upright. While seated on therapy mat, pt retrieved a cone on the R side of his body and placed in designated location forward on the L/R side of midline with min Ax2 for sitting balance. Standing Balance: minimal assistance, moderate assistance. Standing Balance Comment: While in stance at [facility] with bilateral hand support, verbal and visual cues of upright posture. Left lateral lean noted. Difficulties with self-correcting. Functional Mobility: .  2 person assistance with max A + CGA   Functional Mobility Activity: 15 ft + 90 ft +75 ft   Functional Mobility Device Use: rolling walker  Functional Mobility Comment: Min cues provided for upright posture, pt benefited from limited verbal cues and tactile cues d/t difficulties with processing instruction    Comments:During first functional mobility trail, pt trailing use of [place]-walker (3ft) with max Ax1+mod Ax1. Noted poor sequencing and difficulties with upright posture. Due to this performance, trailing use of RW with hand splint     Pt left in recliner chair with all needs met, call light within reach, and chair alarm activated.        Cognition  Overall Cognitive Status: WFL  Arousal/Alterness: appropriate responses to stimuli  Following Commands: follows one step commands with repetition  Attention Span: attends with cues to redirect  Memory: decreased recall of precautions, decreased recall of recent events  Safety Judgement: decreased awareness of need for assistance, decreased awareness of need for safety  Problem Solving: assistance required to generate solutions, assistance required to implement solutions, decreased awareness of errors, assistance required to identify errors made, assistance required to correct errors made  Insights: not aware of deficits  Initiation: requires cues for some  Sequencing: requires cues for some  Comments: impulsive at times  Orientation:    oriented to person, oriented to time, and oriented to situation  Comments: pt stated he was in the Reading unit in [place]" however unable to state F  Command Following:   impaired  Follows one step directions with repetition and increased time  Difficulties following directions with \"Left/Right\", I.e., use R arm to do X  Education  Barriers To Learning: cognition and language  Patient Education: patient educated on goals, OT role and benefits, plan of care, precautions, ADL adaptive strategies, IADL safety, proper use of assistive device/equipment, adaptive device training, energy conservation, orientation, family education, pressure relief, transfer training, discharge recommendations  Learning Assessment:  patient verbalizes understanding, would benefit from continued reinforcement, patient will require reinforcement due to cognitive deficits  Assessment  Activity Tolerance: Good with no c/o pain or fatigue  Impairments Requiring Therapeutic Intervention: decreased functional mobility, decreased ADL status, decreased ROM, decreased strength, decreased safety awareness, decreased cognition, decreased endurance, decreased sensation, decreased balance, decreased IADL, decreased fine motor control, decreased coordination, increased pain, decreased posture  Prognosis: good  Clinical Assessment: Pt is progressing fairly at this time. Pt with WFL PROM in LUE and now has trace scapular elevation and elbow flexion. Pt is limited by decreased deficits in sitting balance, standing balance, safety awareness, LUE AROM, LUE strength, L lateral lean, and LUE proprioception. Pt with poor carryover of learned information from previous sessions. Recommend continued reinforcement of previously learned information d/t deficits in cognition. Continue POC. Safety Interventions: patient left in chair, chair alarm in place, call light within reach, patient at risk for falls, telesitter in use, and family/caregiver present    Plan  Frequency: 5 x/week, 60 min/day  Current Treatment Recommendations: strengthening, ROM, balance training, functional mobility training, transfer training, stair training, endurance training, neuromuscular re-education, wheelchair mobility training, modalities, patient/caregiver education, ADL/self-care training, IADL training, home management training, cognitive reorientation, home exercise program, safety education, equipment evaluation/education, and positioning  Goals  Patient Goals: \"to get this working\" in re to LUE   Short Term Goals:  Time Frame: in 10 days  Patient will complete upper body bathing at min A while seated. Patient will complete lower body bathing at min A while seated and standing as needed. Patient will complete upper body dressing at min A. Patient will complete lower body dressing at mod A using AE as needed. Patient will complete toileting at min A with GB as needed. Patient will complete a toilet transfer with min A with GB as needed. Patient will complete a visual assessment to complete occupational profile. Long Term Goals:  Time Frame: in 21 days  Patient will complete upper body bathing at supervision while seated. Patient will complete lower body bathing at supervision while seated and standing as needed.   Patient will complete upper body dressing at set-up assistance. Patient will complete lower body dressing at supervision using AE as needed. Patient will complete toileting at supervision with GB as needed. Patient will complete grooming at supervision while in stance. Patient will complete functional transfers at supervision using LRAD. Patient will complete functional mobility at supervision using LRAD. Patient will increase functional sitting balance to mod I for improved ADL completion. Patient will increase functional standing balance to SUP for improved ADL completion.     No goals met 2/14    Therapy Session Time  First Session Therapy Time:   Individual Group Co-treatment   Time In 1000     Time Out 1030     Minutes 30        Second Session Therapy Time:   Individual Group Co-treatment   Time In   5612   Time Out   1345   Minutes   60     Timed Code Treatment Minutes:  74+97    Total Treatment Minutes:  90       Electronically Signed By: OLGA Montelongo/MIKE, [id number]

## 2023-02-14 NOTE — PROGRESS NOTES
[facility]   Phone: [phone]    Physical Therapy    [] Initial Evaluation            [x] Daily Treatment Note         [] Discharge Summary      Patient: Ashwini Morgan   : 1955   MRN: [id number]   Date of Service:  2023  Admitting Diagnosis: Acute ischemic stroke [facility])  Current Admission Summary: 68-year-old male who was admitted on  with left-sided weakness for greater than 2 days. CT of the head with suggestion of possible NPH. CTA with less than 50% stenosis of the right ICA. MRI of the brain revealed subacute ischemia on the right basal ganglia. Echo with normal EF and negative bubble study. Neurology evaluated and suggested aspirin and Plavix for 21 days then aspirin only in addition to Lipitor. He was evaluated by therapy and suggested to continue in an inpatient setting prior to returning home. He reports no BM for multiple days. Past Medical History:  has a past medical history of CVA (cerebral vascular accident) ([address] and HTN (hypertension). Past Surgical History:  has a past surgical history that includes Finger surgery (Right); Hernia repair; fracture surgery; and hernia repair.   Discharge Recommendations: TBD pending progress  DME Required For Discharge: DME to be determined pending patient progress  Precautions/Restrictions: high fall risk, (L) hemiparetic shoulder  Weight Bearing Restrictions: no restrictions  [] Right Upper Extremity  [] Left Upper Extremity [] Right Lower Extremity  [] Left Lower Extremity     Required Braces/Orthotics: no braces required   [] Right  [] Left  Positional Restrictions:no positional restrictions    Pre-Admission Information   Lives With: brother Kika Jackson)                Type of Home: house  Home Layout: two level, bedroom in the basement, 10 steps with 1 handrail on the L  Home Access:  2 step to enter without rails   Bathroom Layout: tub/shower unit  Bathroom Equipment: grab bars in shower  Toilet Height: elevated height  Home Equipment: no prior equipment  Transfer Assistance: Independent without use of device  Ambulation Assistance:Independent without use of device  ADL Assistance: independent with all ADL's   IADL Assistance: independent with homemaking tasks  Active :        [x] Yes                 [] 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 Occupation: sales  Hobbies: fishing   Recent Falls: chart reports had several falls that caused this admission (about 5 falls)     Examination   Vision:   Vision Corrective Device: wears glasses for distance  Hearing:   WFL      Subjective  General:Patient seated in w/c in therapy gym with OT at the start of session. Care transferred to PT at this time. Pain: 0/10  Pain Interventions: not applicable       Functional Mobility  Bed Mobility  Bed mobility not completed on this date. Comments:   Transfers  Sit to stand transfer: minimal assistance, from elevated therapy mat with use of hemiwalker  Stand to sit transfer: minimal assistance, from elevated therapy mat with use of hemiwalker  Stand pivot transfer: minimal assistance  Stand step transfer: moderate assistance, from therapy mat => w/c with use of hemiwalker  Comments: Patient presents with significant (L) lateral lean during initial stance and during transfers requiring max segmental VC and TC for correction of upright posture. Patient also required max VC + assist for (L) UE awareness, hand placement and support. Ambulation   Ambulation not tested on this date secondary to focused on other aspects of mobility. Distance:  Gait Mechanics:   Comments:   Stair Mobility   Stair mobility not completed on this date. Comments:    Wheelchair Mobility:  No w/c mobility completed on this date.   Comments:   Balance  Static Sitting Balance: poor (+): requires min (A) to maintain balance  Dynamic Sitting Balance: poor (+): requires min (A) to maintain balance  Static Standing Balance: poor (+): requires min (A) to maintain balance  Dynamic Standing Balance: poor: requires mod (A) to maintain balance  Comments:    Other Therapeutic Interventions   First Session:  See functional mobility noted above. Patient performed seated (L) LE LAQ 2 x 10 to promote increased (L) quad activation for standing activities. In addition, multiple STS from elevated therapy mat with each successive stand at lower height with use of hemiwalker to prepare for utilization of hemiwalker for stand step transfers: 22 in, 21 in , 20 in. Min A provided with STS from mat to maintain upright initial standing balance. Once standing, patient required min to mod A to reduce (L) lateral lean and improve upright posture. Visual cue provided by placing mirror in front of patient with vertical taped line in order for patient to self-correct standing posture. As patient fatigued, he required increased assistance to maintain upright. Attempted stepping to target with (R) LE and use of hemiwalker but patient unable to maintain upright posture without physical assist.     Second Session:   Patient seated in recliner upon entry, agreeable to therapy. Stand pivot transfer to the (L) performed from recliner => w/c at mod A. In addition to this, ambulation attempted with hemiwalker/ However, unsafe to attempt this date secondary to extensive physical assist provided for upright posture, stability, and AD management. As a result, ambulation with hemiwalker was discontinued. Patient ambulated 13' + 80' + 76' with RW and (L) resting wrist splint at max A + CGA. Physical assist provided to promote (R) weight shift with (L) swing phase and reduce  (L) lateral lean. Max VC provided to promote upright posture. Patient performed last two bouts of ambulation down a straight hallway in order to reduce environmental distractions and improve upright posture.  Stand pivot transfer <=> mat table at mod A of 1 with max VC for hand placement and sequencing. Seated UE mat exercises performed with CGA to min A for stability to encourage increased muscle activation on the (L) UE. See OT note for further details. Patient performed stand pivot transfer from w/c => recliner at end of session at min A of 2. Patient left in recliner with chair alarm in place and call light within reach. Functional Outcomes                 Cognition  Overall Cognitive Status: Impaired  Arousal/Alterness: appropriate responses to stimuli  Following Commands: follows one step commands with repetition, follows one step commands with increased time  Attention Span: difficulty attending to directions  Memory: decreased recall of biographical information, decreased short term memory  Safety Judgement: decreased awareness of need for safety  Problem Solving: assistance required to generate solutions, assistance required to implement solutions  Insights: decreased awareness of deficits  Initiation: requires cues for all  Sequencing: requires cues for all  Orientation:    oriented to person, oriented to place, oriented to situation, and disoriented to time   Command Following:   impaired    Education  Barriers To Learning: cognition, emotional, and physical  Patient Education: patient educated on goals, PT role and benefits, plan of care, general safety, functional mobility training, proper use of assistive device/equipment, disease specific education, transfer training  Learning Assessment:  patient verbalizes understanding, would benefit from continued reinforcement    Assessment  Activity Tolerance: Good. Seated rest breaks provided for recovery. Patient appeared more emotional this session and required increased encouragement/education on progress he is making.   Impairments Requiring Therapeutic Intervention: decreased functional mobility, decreased ADL status, decreased ROM, decreased strength, decreased safety awareness, decreased cognition, decreased endurance, decreased balance, decreased coordination, decreased posture  Prognosis: fair  Clinical Assessment: Patient showing improvements in mobility as evidenced by ability to perform sit to stand with hemiwalker with less assistance progressing from two to one person assist. However, continues to demonstrate significant (L) lateral lean with all standing activities limiting ability to functionally utilize hemiwalker for transfers. In addition to this, assistance required for all transfers continues to vary between sessions due to (L) lateral lean. Patient able to self-correct posture with appropriate visual, VC, TC. Pt would benefit from continued skilled PT to promote functional independence. Safety Interventions: patient left in chair, chair alarm in place, call light within reach, gait belt, patient at risk for falls, telesitter in use, and fall mats at bedside    Plan  Frequency: 5 x/week, 60 min/day  Current Treatment Recommendations: strengthening, ROM, balance training, functional mobility training, transfer training, gait training, stair training, endurance training, neuromuscular re-education, wheelchair mobility training, modalities, patient/caregiver education, ADL/self-care training, cognitive/perceptual training, and safety education    Goals  Patient Goals:  To return home and be able to move his (L) UE better  Short Term Goals:  Time Frame: 1-2 weeks  Patient will complete bed mobility at [facility]   Patient will complete transfers at mod (A) of 1  Patient will ambulate 25 ft with use of LRAD at mod (A) of 1  Patient will ascend/descend 1 stairs with (B) handrail at mod (A) of 1  Patient will complete manual w/c propulsion 50 ft at A  Long Term Goals:  Time Frame: 3 weeks  Patient will complete bed mobility at modified independent   Patient will complete transfers at modified independent   Patient will ambulate 50 ft with use of LRAD at modified independent  Patient will ascend/descend 10 stairs with (L) ascending handrail at [facility]  Patient will complete car transfer at [facility] Session Time      Individual Group Co-treatment   Time In 1030       Time Out 1107       Minutes 37         Second Session:   Individual Group Co-treatment   Time In      1245   Time Out      1345   Minutes      60     Timed Code Treatment Minutes:   37 + 60 minutes  Total Treatment Minutes:  97 minutes        Electronically Signed By:   Eusebio Orellana, SPT  Therapist observed and directed the patient's plan of care.   Co-signed and supervised by: Jayant Velasco PT, DPT - [id number]

## 2023-02-15 PROCEDURE — 1280000000 HC REHAB R&B

## 2023-02-15 PROCEDURE — 97110 THERAPEUTIC EXERCISES: CPT

## 2023-02-15 PROCEDURE — 92526 ORAL FUNCTION THERAPY: CPT

## 2023-02-15 PROCEDURE — 97530 THERAPEUTIC ACTIVITIES: CPT

## 2023-02-15 PROCEDURE — 97116 GAIT TRAINING THERAPY: CPT

## 2023-02-15 PROCEDURE — 97129 THER IVNTJ 1ST 15 MIN: CPT

## 2023-02-15 PROCEDURE — 6370000000 HC RX 637 (ALT 250 FOR IP): Performed by: PHYSICAL MEDICINE & REHABILITATION

## 2023-02-15 PROCEDURE — 97535 SELF CARE MNGMENT TRAINING: CPT

## 2023-02-15 PROCEDURE — 97112 NEUROMUSCULAR REEDUCATION: CPT

## 2023-02-15 PROCEDURE — 97130 THER IVNTJ EA ADDL 15 MIN: CPT

## 2023-02-15 PROCEDURE — 6360000002 HC RX W HCPCS: Performed by: PHYSICAL MEDICINE & REHABILITATION

## 2023-02-15 RX ADMIN — ENOXAPARIN SODIUM 40 MG: 100 INJECTION SUBCUTANEOUS at 07:53

## 2023-02-15 RX ADMIN — SERTRALINE 25 MG: 50 TABLET, FILM COATED ORAL at 12:05

## 2023-02-15 RX ADMIN — ASPIRIN 81 MG 81 MG: 81 TABLET ORAL at 07:52

## 2023-02-15 RX ADMIN — STANDARDIZED SENNA CONCENTRATE AND DOCUSATE SODIUM 2 TABLET: 8.6; 5 TABLET ORAL at 07:52

## 2023-02-15 RX ADMIN — LISINOPRIL 5 MG: 5 TABLET ORAL at 07:52

## 2023-02-15 RX ADMIN — ATORVASTATIN CALCIUM 40 MG: 40 TABLET, FILM COATED ORAL at 21:32

## 2023-02-15 RX ADMIN — STANDARDIZED SENNA CONCENTRATE AND DOCUSATE SODIUM 2 TABLET: 8.6; 5 TABLET ORAL at 21:32

## 2023-02-15 RX ADMIN — CLOPIDOGREL BISULFATE 75 MG: 75 TABLET ORAL at 07:52

## 2023-02-15 ASSESSMENT — PAIN SCALES - GENERAL: PAINLEVEL_OUTOF10: 0

## 2023-02-15 NOTE — PLAN OF CARE
Problem: Discharge Planning  Goal: Discharge to home or other facility with appropriate resources  Outcome: Progressing     Problem: Skin/Tissue Integrity  Goal: Absence of new skin breakdown  Description: 1. Monitor for areas of redness and/or skin breakdown  2. Assess vascular access sites hourly  3. Every 4-6 hours minimum:  Change oxygen saturation probe site  4. Every 4-6 hours:  If on nasal continuous positive airway pressure, respiratory therapy assess nares and determine need for appliance change or resting period.   Outcome: Progressing     Problem: Safety - Adult  Goal: Free from fall injury  Outcome: Progressing     Problem: Pain  Goal: Verbalizes/displays adequate comfort level or baseline comfort level  Outcome: Progressing     Problem: Nutrition Deficit:  Goal: Optimize nutritional status  Outcome: Progressing  Flowsheets (Taken 2/14/2023 1225 by Chantel Rajan, JERRELL, LD)  Nutrient intake appropriate for improving, restoring, or maintaining nutritional needs:   Assess nutritional status and recommend course of action   Monitor oral intake, labs, and treatment plans     Problem: ABCDS Injury Assessment  Goal: Absence of physical injury  Outcome: Progressing

## 2023-02-15 NOTE — PROGRESS NOTES
[facility] Department   Phone: [phone]    Physical Therapy    [] Initial Evaluation            [x] Daily Treatment Note         [] Discharge Summary      Patient: Ashwini Morgan   : 1955   MRN: [id number]   Date of Service:  2/15/2023  Admitting Diagnosis: Acute ischemic stroke [facility])  Current Admission Summary: 68-year-old male who was admitted on  with left-sided weakness for greater than 2 days. CT of the head with suggestion of possible NPH. CTA with less than 50% stenosis of the right ICA. MRI of the brain revealed subacute ischemia on the right basal ganglia. Echo with normal EF and negative bubble study. Neurology evaluated and suggested aspirin and Plavix for 21 days then aspirin only in addition to Lipitor. He was evaluated by therapy and suggested to continue in an inpatient setting prior to returning home. He reports no BM for multiple days. Past Medical History:  has a past medical history of CVA (cerebral vascular accident) ([address] and HTN (hypertension). Past Surgical History:  has a past surgical history that includes Finger surgery (Right); Hernia repair; fracture surgery; and hernia repair.   Discharge Recommendations: TBD pending progress  DME Required For Discharge: DME to be determined pending patient progress  Precautions/Restrictions: high fall risk, (L) hemiparetic shoulder  Weight Bearing Restrictions: no restrictions  [] Right Upper Extremity  [] Left Upper Extremity [] Right Lower Extremity  [] Left Lower Extremity     Required Braces/Orthotics: no braces required   [] Right  [] Left  Positional Restrictions:no positional restrictions    Pre-Admission Information   Lives With: brother Kika Jackson)                Type of Home: house  Home Layout: two level, bedroom in the basement, 10 steps with 1 handrail on the L  Home Access:  2 step to enter without rails   Bathroom Layout: tub/shower unit  Bathroom Equipment: grab bars in shower  Toilet Height: elevated height  Home Equipment: no prior equipment  Transfer Assistance: Independent without use of device  Ambulation Assistance:Independent without use of device  ADL Assistance: independent with all ADL's   IADL Assistance: independent with homemaking tasks  Active :        [x] Yes                 [] 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 Occupation: sales  Hobbies: fishing   Recent Falls: chart reports had several falls that caused this admission (about 5 falls)     Examination   Vision:   Vision Corrective Device: wears glasses for distance  Hearing:   WFL      Subjective  General:Patient seated in recliner upon entry, agreeable to therapy. Pain: 0/10  Pain Interventions: not applicable       Functional Mobility  Bed Mobility  Bed mobility not completed on this date. Comments:   Transfers  Sit to stand transfer: maximum assistance, (ranges from min to max A with use of RW due to (L) lateral lean)  Stand to sit transfer: maximum assistance, (ranges from min to max A with use of RW due to (L) lateral lean)  Stand pivot transfer: minimal assistance  Comments: Patient presents with significant (L) lateral lean during initial stance and during transfers requiring max segmental VC and TC for correction of upright posture. Patient also required max VC + assist for (L) UE awareness, hand placement and support. Ambulation   Ambulation not tested on this date secondary to focused on other aspects of mobility. Distance:  Gait Mechanics:   Comments:   Stair Mobility   Stair mobility not completed on this date. Comments:    Wheelchair Mobility:  No w/c mobility completed on this date.   Comments:   Balance  Static Sitting Balance: poor (+): requires min (A) to maintain balance  Dynamic Sitting Balance: poor (+): requires min (A) to maintain balance  Static Standing Balance: poor (+): requires min (A) to maintain balance  Dynamic Standing Balance: poor: requires mod (A) to maintain balance  Comments:    Other Therapeutic Interventions   First Session:  See functional mobility noted above. UE and LE clothing management performed with OT member at start of session. See OT note for further details. In addition to this, tall kneeling exercises performed to promote upright posture, trunk stability, and improved standing balance with physical assist of two. Assist provided to support the (L) UE in WB in addition to max VC/TC to reduce (L) lateral lean. Patient c/o increased pain in the (B) knee with tall kneeling, so exercise was discontinued at this time. Sitting (R) UE exercises performed EOM in order to promote improved sitting posture at CGA to min A for stability: rows with orange TB, shoulder ext with orange TB, (B) scapular retraction with assist at the (L) shoulder. Second Session:   Patient seated in recliner upon entry, agreeable to therapy. All stand pivot transfer to the (R) performed at min A with VC for correct hand placement. In addition, L1.5 stepper performed x 5 minutes to improve cardiovascular endurance and (B) LE strength. Physical assist provided to the (L) UE to maintain WB on handrail. (L) LE 2 in step taps at ballet bars with (R) UE support performed at mod A. Support to the (L) UE provided to promote stability. Patient required therapist assist to perform hip flexion on the (L) LE. Max VC/TC provided for upright posture and sequencing. All STS from w/c performed at min A with VC for appropriate hand placement. In addition, patient ambulated 25' with RW and (L) resting wrist splint at max A. Patient unable to self-correct (L) lateral lean requiring increased assistance for stability and transition of ambulation to fixed bar support. Patient ambulated 22' with use of hallway side rail at mod A with max support to the (L) UE for positioning.  Max VC/TC provided to reduce (L) lateral lean and for sequencing. Patient returned to recliner with chair alarm in place and call light within reach. Functional Outcomes                 Cognition  Overall Cognitive Status: Impaired  Arousal/Alterness: appropriate responses to stimuli  Following Commands: follows one step commands with repetition, follows one step commands with increased time  Attention Span: difficulty attending to directions  Memory: decreased recall of biographical information, decreased short term memory  Safety Judgement: decreased awareness of need for safety  Problem Solving: assistance required to generate solutions, assistance required to implement solutions  Insights: decreased awareness of deficits  Initiation: requires cues for all  Sequencing: requires cues for all  Orientation:    oriented to person, oriented to place, oriented to situation, and disoriented to time   Command Following:   impaired    Education  Barriers To Learning: cognition, emotional, and physical  Patient Education: patient educated on goals, PT role and benefits, plan of care, general safety, functional mobility training, proper use of assistive device/equipment, disease specific education, transfer training  Learning Assessment:  patient verbalizes understanding, would benefit from continued reinforcement    Assessment  Activity Tolerance: Good. Seated rest breaks provided for recovery. Impairments Requiring Therapeutic Intervention: decreased functional mobility, decreased ADL status, decreased ROM, decreased strength, decreased safety awareness, decreased cognition, decreased endurance, decreased balance, decreased coordination, decreased posture  Prognosis: fair  Clinical Assessment: At this time, patient has achieved one short term goal as evidenced by ability to perform all transfers with min to mod A of 1 demonstrating improvements in functional mobility.  Ambulation this date was significantly limited by (L) lateral lean with use of RW requiring increased assistance. However, ambulation and ability to self-correct posture improved with use of fixed hallway side rail. Pt would benefit from continued skilled PT to promote functional independence. Safety Interventions: patient left in chair, chair alarm in place, call light within reach, gait belt, patient at risk for falls, telesitter in use, and fall mats at bedside    Plan  Frequency: 5 x/week, 60 min/day  Current Treatment Recommendations: strengthening, ROM, balance training, functional mobility training, transfer training, gait training, stair training, endurance training, neuromuscular re-education, wheelchair mobility training, modalities, patient/caregiver education, ADL/self-care training, cognitive/perceptual training, and safety education    Goals  Patient Goals:  To return home and be able to move his (L) UE better  Short Term Goals:  Time Frame: 1-2 weeks  Patient will complete bed mobility at [facility]   Patient will complete transfers at mod (A) of 1 - goal met 2/15/2023  Patient will ambulate 25 ft with use of LRAD at mod (A) of 1  Patient will ascend/descend 1 stairs with (B) handrail at mod (A) of 1  Patient will complete manual w/c propulsion 50 ft at [facility]  Long Term Goals:  Time Frame: 3 weeks  Patient will complete bed mobility at modified independent   Patient will complete transfers at [facility]   Patient will ambulate 50 ft with use of LRAD at [facility]  Patient will ascend/descend 10 stairs with (L) ascending handrail at [facility]  Patient will complete car transfer at [facility]     Therapy Session Time      Individual Group Co-treatment   Time In     0830   Time Out     0915   Minutes     45     Second Session:   Individual Group Co-treatment   Time In  0946       Time Out  1032       Minutes  46         Timed Code Treatment Minutes:   45 + 46 minutes  Total Treatment Minutes:  91 minutes        Electronically Signed By:   Carollee Kehr, SPT  Therapist observed and directed the patient's plan of care.   Co-signed and supervised by: Manuel Truong PT, DPT - [id number]

## 2023-02-15 NOTE — CARE COORDINATION
SW/LAURA attempted to contact patient's brother, Oliver Larson regarding [employer] on 2/16/2023. Abelardotent's brother not available. A VM message was left. SW will continue to follow.     Electronically signed by MILEY Maher on 2/15/2023 at 3:35 PM

## 2023-02-15 NOTE — PROGRESS NOTES
[facility]   Phone: [phone]    Speech Therapy    [] Initial Evaluation            [x] Daily Treatment Note         [] Discharge Summary      Patient: Belkys Farrar   : 1955   MRN: [id number]   Date of Service:  2/15/2023  Admitting Diagnosis: Acute ischemic stroke [facility])  Current Admission Summary: 26-year-old male who was admitted on  with left-sided weakness for greater than 2 days. CT of the head with suggestion of possible NPH. CTA with less than 50% stenosis of the right ICA. MRI of the brain revealed subacute ischemia on the right basal ganglia. Echo with normal EF and negative bubble study. Neurology evaluated and suggested aspirin and Plavix for 21 days then aspirin only in addition to Lipitor. He was evaluated by therapy and suggested to continue in an inpatient setting prior to returning home. He reports no BM for multiple days. Past Medical History:  has a past medical history of CVA (cerebral vascular accident) ([address] and HTN (hypertension). Past Surgical History:  has a past surgical history that includes Finger surgery (Right); Hernia repair; fracture surgery; and hernia repair. Precautions/Restrictions: high fall risk, modified diet      Pre-Admission Information   Living Status: Pt lives with his brother, independent prior to admission. Occupation/School: Retired last year. 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   Medication Management: :  [x]Primary   []Secondary []No  Finance Management: [x]Primary   []Secondary []No  Active :   [x]Yes         []No  Hearing:    [facility]  Vision:    Vision Corrective Device: wears glasses at all times      Subjective  General: Pt alert and cooperative throughout sessions.    Pain: Did not state    Safety Interventions: patient left in chair, chair alarm in place, call light within reach, patient at risk for falls, and telesitter in use      Therapeutic Interventions:     Session 1:     Dysphagia: Severity: Mild  and Moderate   Patient tolerated NMES via VitalStim placement 4a (targeting the orpharyngeal \"sling\", intrinsic/extrinsic muscles of the tongue and pharyngeal constrictor) @ 6.9 mA for 16 minutes and 38 seconds. Difficulty with electrode adhesion despite pt having shaved and visible connection. Pt with limited recall of previous NMES use (\"so what is that for\"). Utilized in combination with trials of advanced solids and implementation of compensatory strategies with meal.     Diet tolerance (advanced regular solids and thin liquids) with implementation of compensatory strategies   - pt  slow but adequate bolus control of the regular solid texture (peanut butter toast and cohen); adequately cleared the oral cavity with limited/minimal anterior loss   - mod cues provided for double swallow   - pt with an episode of overt productive coughing towards the end of the meal when alternating attention between staff member entering the room   - mod verbal and tactile cues provided for planning and coordinating   Recommend to continue with current diet and to provide verbal cues for use of safe swallow strategies (double swallow, small single sips, upright posture, avoid distractions at meal time).      Motor Speech/ Voice:  Severity: Mild  and Moderate     Cognition: Severity: Mild   Orientation   - pt oriented to temporal concepts   - self corrected an initial error with the current year     Functional recall and problem solving   - discussion regarding medical information from previous day: 0/3   - recall limited pt's decision making regarding his current medications   - planning: pt with limited responses into his current physical deficits in correlation with functional home tasks and levels of family assistance   Recommend ongoing discussion regarding pt's discharge plans and assistance needs     Session 2:  Dysphagia: Severity: Mild  and Moderate   Provided verbal review of strategies prior to lunch meal   - double swallow   - small bites sips   - clear oral cavity   Required set up - min assistance for use of utensils     Motor Speech/ Voice: Severity: Mild  and Moderate   Goal not targeted this session. Cognition: Severity: Mild   Visuospatial attention: sustained attention for reverse numerical trail making   - completed the task with 100% accuracy   - impaired attention / pauses on 3/20 steps   - resulted in prolonged time for task completion   - pt aware of difficulty \"that took me much longer than it should\"     Functional recall of new information: word list recall   - recalled 0/3 in 5 minute interval   - mod cues for encoding strategies; limited recall of encoding strategy     Functional recall of new information: Prospective memory   - 0/3 in 5 minute interval     Thought organization: convergent naming via word grid   - 83% accuracy   - impaired attention impacted accuracy with intermittent episodes of perseveration on previous category       Additional Interventions:           Education  Barriers To Learning: cognition and hearing  Patient Education: Provided education regarding role of SLP, results of assessment, recommendations and general speech pathology plan of care. Learning Assessment: Pt requires ongoing learning     Assessment  Impairments Requiring Therapeutic Intervention: Cognitive-Linguistic Deficits  and Oropharyngeal Dysphagia   Prognosis: good    Clinical Assessment:  Pt presents with moderate L perioral weakness resulting in dysarthria and oropharyngeal dysphagia. Instrumental assessment (FEES) completed 2/10/22, see report for assessment summary. Pt with limited carry over of dysphagia recommendations and safe swallow strategies. Pt also presents with moderate cognitive-linguistic deficits characterized by deficits in orientation, attention, executive functioning, short-term memory, and visuospatial functioning. Recommend SLP intervention for safe return to prior level of independence. Diet Solids Recommendation:   Liquid Consistency Recommendation:   Recommended Form of Meds:   Dysphagia III Soft and bite sized     Thin liquids    No straws  Meds whole with water       Recommended Compensatory Swallowing Strategies: Upright as possible with all PO intake , Small bites/sips , Eat/feed slowly, No Straws      Plan  Frequency: 60 minutes/day; 5 days per week, as tolerated, until goals met, or discharged from ARU. Therapeutic Interventions: Diet Tolerance Monitoring , Patient/Family Education , Instrumental assessment of swallow function (Modified Barium Swallow Study)  Speech / Motor Planning / Voice intervention , Cognitive-Linguistic intervention , and Patient/ Family education   Discharge Recommendations: Home with assistance and TBD   Continued SLP at Discharge: Yes     Goals  Patient Goals: Pt reports his goal is to get home. Time Frame: 14-21 days    Pt will tolerate recommended diet and advanced trials with no overt s/s of aspiration. ongoing  Pt will improve oral motor strength and ROM for coordinated and alternating motions, via graded tasks to improve speech back to baseline level as subjectively rated by SLP/pt and family. ongoing  Pt will improve executive function for multifactor task completion via graded tasks to 80% accuracy ongoing  Patient will complete functional problem-solving tasks for daily situations with 80% accuracy or given min cues. ongoing  Pt will improve attention to detail for graded complex information to 80%, for improved recall and retention of newly learned information  ongoing      Therapy Session Time      Session 1 Session 2   Time In 0800 1130   Time Out 0835 1200   Time Code Minutes 10 30   Individual Minutes 35 30     Timed Code Treatment Minutes: 40  Total Treatment Minutes:  72    Electronically Signed By:   Felipe Gupta M.A.  CCC-SLP AUDELIA [id number]  Speech-Language Pathologist   2/15/2023 2:30 PM

## 2023-02-15 NOTE — PROGRESS NOTES
Jayden Becerra  2/15/2023  [id number]    Chief Complaint: Acute ischemic stroke [facility])    Subjective:   No overnight events. No current complaints. Reports being emotionally labile. Unable to decide on a starting SSRI. Reached out to his brother to discuss but got voicemail. ROS: No CP, SOB, dyspnea    Objective:  Patient Vitals for the past 24 hrs:   BP Temp Temp src Pulse Resp SpO2   02/15/23 0746 101/62 97.8 °F (36.6 °C) Oral 58 16 96 %   02/14/23 2022 108/66 98.2 °F (36.8 °C) Oral 60 16 94 %       Gen: No distress, pleasant. Resting in bedside chair  HEENT: Normocephalic, atraumatic. CV: Regular rate and rhythm. No MRG   Resp: No respiratory distress. CTAB   Abd: Soft, nontender nondistended  Ext: No edema. Neuro: Alert, oriented, appropriately interactive. Left hemiparesis LUE 0/4. Negative gilliam    Laboratory data: Available via EMR. Therapy progress:    PT    Supine to Sit: Partial/moderate assistance  Sit to Supine: Dependent   Sit to Stand: Dependent  Chair/Bed to Chair Transfer: Dependent  Car Transfer:    Ambulation 10 ft: Dependent  Ambulation 50 ft:    Ambulation 150 ft:    Stairs - 1 Step: Dependent  Stairs - 4 Step: Dependent  Stairs - 12 Step:      OT    Eating:    Oral Hygiene: Supervision or touching assistance  Bathing: Substantial/maximal assistance  Upper Body Dressing: Partial/moderate assistance  Lower Body Dressing: Dependent  Toilet Transfer: Dependent  Toilet Hygiene:      Speech Therapy    Pt presents with mild-moderate oropharyngeal dysphagia due to reduced L side sensation and perioral weakness resulting in anterior bolus loss, impaired mastication, premature bolus loss, delayed swallow initiation, altered vocal quality with a delayed cough. Pt reports not having as much as an appetite as well as lack of dentition creating difficulty with mastication. Pt reports chewing is where his difficulty is and not with swallowing.  Pt may benefit from instrumental assessment due to evidence of reduced sensation and intermittent clinical signs of penetration/aspiration; will recommend when appropriate. Recommend dysphagia diet (soft and bite sized diet) with dysphagia intervention and implementation of safe swallow strategies to minimize aspiration risk. Pt presents with moderate dysarthria characterized by L perioral weakness resulting in reduced articulation, precision, and reduced rate of speech. Pt self-rated speech as 5% being far from baseline. Pt presents with mild-moderate cognitive-linguistic deficits characterized by deficits in orientation, attention, executive functioning, short-term memory, and visuospatial functioning. Errors consisted of many deflections and patient attributing to baseline dyslexia. Required mod cues and repetition throughout task. Pt demonstrated appropriate insight into deficits which resulted in emotional liability towards the end of the assessment. Recommend SLP intervention for safe return to prior level of independence. Body mass index is 21.9 kg/m². Assessment:  Patient Active Problem List   Diagnosis    Acute ischemic stroke [facility])    Expressive aphasia    Dysarthria    Ataxia    Primary hypertension    Tobacco abuse    Noncompliance    Left hemiparesis ([facility] [address]       Plan:   Acute right basal ganglia infarct: DAPT for 21 days (2/25) , Lipitor. PT/OT/SLP. - start SSRI for motor recovery     Dysphagia: Dysphagia diet, SLP     HTN: started lisinopril 5     Tobacco use: education provided. No supplement currently    Meatus trauma: held lovenox. Improving. UA without significant findings for infection     Bowels: Per protocol   Bladder: Per protocol   Sleep: Trazodone provided prn. Pain: Tylenol, tramadol as needed  DVT PPx: Lovenox  SOPHIA: 3/2    Lurdes Dukes MD 2/15/2023, 9:03 AM    * This document was created using dictation software. While all precautions were taken to ensure accuracy, errors may have occurred.   Please disregard any typographical errors.

## 2023-02-15 NOTE — PATIENT CARE CONFERENCE
[facility]  Inpatient Rehabilitation  Weekly Team Conference Note    Patient Name: Cassie Valenzuela        MRN: [id number]    : 1955  (69 y.o.)  Gender: male           The team conference for this patient was held on 2023 at 11:00am and led by:  Sydnee Lopez MD    CASE MANAGEMENT:  Assessment:   Patient is a 69year old male who admitted to ARU on 2023 with the admitting diagnosis of Acute ischemic stroke ([facility] [address] Patient resides at home with his brother who is willing to provide support and care. Patient also has 2 other siblings that are active in his care. Patient is retired. Patient was independent with ADLs/IADLs as well as an active  prior to admission. Patient is benefiting from skilled ARU PT/OT/SLP. Participation in therapies is promoting increased safety and independence in order for the patient to return to his prior level of functioning. Patient's discharge plan to be determined with patient's progress. PHYSICAL THERAPY:    Bed Mobility  Supine to Sit: minimal assistance, at (L) UE  Rolling Right: minimal assistance, at (L) UE  Scooting: minimal assistance  Comments: All performed on flat hospital bed with use of HR. VC provided for sequencing with supine to sit. Transfers  Sit to stand transfer: minimal assistance, (ranges from min to CGA with use of // bars and step HR)  Stand to sit transfer: minimal assistance, (ranges from min to CGA with use of // bars and step HR)  Stand pivot transfer: minimal assistance  Comments: Patient presents with significant (L) lateral lean during initial stance and during transfers requiring segmental VC and TC for correction of upright posture. Patient also required VC + assist for (L) UE awareness, hand placement and support.   Ambulation   Surface:level surface  Assistive Device: retana rail  Assistance: minimal assistance  Distance: 25'   Gait Mechanics: (L) lateral lean with decreased step length on the (L) and decreased (L) knee extension during stance phase  Comments: Max VC/TC to weight shift (R) and for correct sequenicng  Stair Mobility   Not attempted secondary to significant (L) lateral lean    QM:  Roll Left and Right  Assistance Needed: Substantial/maximal assistance  Comment: Unable to roll (L) secondary to (L) hemiparetic shoulder with poor awareness  CARE Score: 2  Discharge Goal: Independent  Sit to Lying  Assistance Needed: Dependent  CARE Score: 1  Discharge Goal: Independent  Lying to Sitting on Side of Bed  Assistance Needed: Partial/moderate assistance  CARE Score: 3  Discharge Goal: Independent  Sit to Stand  Assistance Needed: Partial/moderate assistance  CARE Score: 3  Discharge Goal: Independent  Chair/Bed-to-Chair Transfer  Assistance Needed: Partial/moderate assistance  CARE Score: 3  Discharge Goal: Independent  Car Transfer  Reason if not Attempted: Not attempted due to medical condition or safety concerns  CARE Score: 88  Discharge Goal: Independent  Walk 10 Feet  Assistance Needed: Substantial/maximal assistance  CARE Score: 2  Discharge Goal: Independent  Walk 50 Feet with Two Turns  Reason if not Attempted: Not attempted due to medical condition or safety concerns  CARE Score: 88  Discharge Goal: Independent  Walk 150 Feet  Reason if not Attempted: Not attempted due to medical condition or safety concerns  CARE Score: 88  Discharge Goal: Independent  Walking 10 Feet on Uneven Surfaces  Reason if not Attempted: Not attempted due to medical condition or safety concerns  CARE Score: 88  Discharge Goal: Independent  1 Step (Curb)  Assistance Needed: Dependent  Reason if not Attempted: Not attempted due to medical condition or safety concerns  CARE Score: 1  Discharge Goal: Supervision or touching assistance  4 Steps  Assistance Needed: Dependent  Reason if not Attempted: Not attempted due to medical condition or safety concerns  CARE Score: 1  Discharge Goal: Supervision or touching assistance  12 Steps  Reason if not Attempted: Not attempted due to medical condition or safety concerns  CARE Score: 88  Discharge Goal: Supervision or touching assistance  Picking Up Object  Reason if not Attempted: Not attempted due to medical condition or safety concerns  CARE Score: 88  Discharge Goal: Independent  Wheelchair Ability  Uses a Wheelchair and/or Scooter?: Yes    Goals:                   Patient Goals: To return home and be able to move his (L) UE better  Short Term Goals:  Time Frame: 1-2 weeks  Patient will complete bed mobility at [facility]   Patient will complete transfers at mod (A) of 1 - goal met 2/15/2023  Patient will ambulate 25 ft with use of LRAD at mod (A) of 1  Patient will ascend/descend 1 stairs with (B) handrail at mod (A) of 1  Patient will complete manual w/c propulsion 50 ft at [facility]  Long Term Goals:  Time Frame: 3 weeks  Patient will complete bed mobility at modified independent   Patient will complete transfers at [facility]   Patient will ambulate 50 ft with use of LRAD at [facility]  Patient will ascend/descend 10 stairs with (L) ascending handrail at [facility]  Patient will complete car transfer at [facility]                These goals were reviewed with this patient at the time of assessment and Jane Naik is in agreement. Plan of Care: Pt to be seen 5 out of 7 days per week per ARU protocol ( 60 minutes with PT)                     SPEECH THERAPY:    Diet Level:ADULT DIET; Dysphagia - Soft and Bite Sized    Assessment: Impressions  Diagnosis: Pt presents with moderate L perioral weakness resulting in dysarthria and oropharyngeal dysphagia. Instrumental assessment (FEES) completed 2/10/22, see report for assessment summary. Pt with limited carry over of dysphagia recommendations and safe swallow strategies.  Pt also presents with moderate cognitive-linguistic deficits characterized by deficits in orientation, attention, executive functioning, short-term memory, and visuospatial functioning. Recommend SLP intervention for safe return to prior level of independence. Goals:               Pt will tolerate recommended diet and advanced trials with no overt s/s of aspiration. ongoing  Pt will improve oral motor strength and ROM for coordinated and alternating motions, via graded tasks to improve speech back to baseline level as subjectively rated by SLP/pt and family. ongoing  Pt will improve executive function for multifactor task completion via graded tasks to 80% accuracy ongoing  Patient will complete functional problem-solving tasks for daily situations with 80% accuracy or given min cues.  ongoing  Pt will improve attention to detail for graded complex information to 80%, for improved recall and retention of newly learned information  ongoing    Plan of Care:  Pt to be seen 5 out of 7 days per week per ARU protocol ( 60 minutes with SLP)    OCCUPATIONAL THERAPY:    ADL: Grooming: setup assistance stand by assistance requires verbal cueing Increased time to complete task  Grooming Comments: used electric toothbrush for oral care, rather than placing toothpaste on brush, pt put tooth brush   Upper Extremity Bathing: minimal assistance requires verbal cueing Increased time to complete task  Lower Extremity Bathing: maximum assistance requires verbal cueing Increased time to complete task   Bathing Comments: OT waterproofed IV in LUE, seated on BSC, pt provided with max verbal cues for RUE hand placement while seated on shower chair to facilitate upright posture, CGA-min A for sitting balance throughout, assistance for washing feet, assistance to dry both legs, dep for buttocks, max verbal cues for bathing BUE, assistance for hair, max verbal cues for upright posture throughout  Upper Extremity Dressing: moderate assistance Comment: assistance for doffing button down shirt from LUE, min A for pulling shirt down back, max cues for sequencing  Lower Extremity Dressing: dependent requires verbal cueing Increased time to complete task Comment: mod A for doffing pants, briefs, and socks while seated via kicking feet and using LE accordingly, max A to uziel brief and pants, max A for donning socks, assistance for two for standing   Dressing Comments: assistance of 2 for dressing, education for donning LLE then RLE, poor carryover of learned information, standing with use of // GB in bathroom with min A to mod A for standing balance  Toileting Comments: declined  General Comments: dep for donning/doffing givmohr sling, OT positioned kinesotape on LUE to facilitate LUE shoulder elevation and prevent LUE subluxation, kinesotape also applied to LUE to facilitate wrist flexion, pt educated on the purpose of kinesotape to improve AROM in LUE.       Toilet Transfers: max Ax1     Tub/ShowerTransfers: max Ax1     QM:  Eating  Assistance Needed: Setup or clean-up assistance  CARE Score: 5  Discharge Goal: Independent  Oral Hygiene  Assistance Needed: Supervision or touching assistance  Comment: seated in wheelchair  CARE Score: 4  Discharge Goal: Supervision or touching assistance  [address] needed: Dependent  Reason if not Attempted: Patient refused  CARE Score: 1  Discharge Goal: Independent  Toilet Transfer  Assistance needed: Dependent  CARE Score: 1  Discharge Goal: Supervision or touching assistance  Shower/Bathe Self  Assistance Needed: Substantial/maximal assistance  CARE Score: 2  Discharge Goal: Supervision or touching assistance  Upper Body Dressing  Assistance Needed: Partial/moderate assistance  CARE Score: 3  Discharge Goal: Independent  Lower Body Dressing  Assistance Needed: Substantial/maximal assistance  CARE Score: 2  Discharge Goal: Independent  Putting On/Taking Off Footwear  Assistance Needed: Substantial/maximal assistance  CARE Score: 2  Discharge Goal: Set-up or clean-up assistance    Goals:               Short Term Goals:  Time Frame: in 10 days  Patient will complete upper body bathing at min A while seated. Patient will complete lower body bathing at min A while seated and standing as needed. Patient will complete upper body dressing at min A. Patient will complete lower body dressing at mod A using AE as needed. Patient will complete toileting at min A with GB as needed. Patient will complete a toilet transfer with min A with GB as needed. Patient will complete a visual assessment to complete occupational profile. Long Term Goals:  Time Frame: in 21 days  Patient will complete upper body bathing at supervision while seated. Patient will complete lower body bathing at supervision while seated and standing as needed. Patient will complete upper body dressing at set-up assistance. Patient will complete lower body dressing at supervision using AE as needed. Patient will complete toileting at supervision with GB as needed. Patient will complete grooming at supervision while in stance. Patient will complete functional transfers at supervision using LRAD. Patient will complete functional mobility at supervision using LRAD. Patient will increase functional sitting balance to mod I for improved ADL completion. Patient will increase functional standing balance to SUP for improved ADL completion. No goals met 2/13  These goals were reviewed with this patient at the time of assessment and [facility] Staff is in agreement    Plan of Care:  Pt to be seen 5 out of 7 days per week per ARU protocol ( 60 minutes with OT)     NUTRITION:  Weight: 148 lb 4.8 oz (67.3 kg) / Body mass index is 21.9 kg/m². Diet Order: Dysphagia soft & bite sized    Supplements:Magic cup BID    Pt reported eating at least 50% most meals. Does not care for ONS & would like d/c'd. Encouraged small frequent meals to promote healing & strengthening. Please see nutrition note for details.     NURSING:    Risk for Readmission: 12%    Good Fall Risk Score: 95  Wounds/Incisions/Ulcers: None  Medication Review: Reviewed daily with patient  Pain: Managed without need for pharmaceutical intervention  Consultations/Labs/X-rays:    Labs: BMP & CBC every Monday & Thursday    Patient/Family Education provided by team:    Discharge Plan   Estimated Length of Stay:14 days  Destination: TBD pending progress and family support  Pass:No  Services at Discharge: TBD depending pt progress  Equipment at Discharge: TBD depending pt progress, Rolling walker, wheelchair  Factors facilitating achievement of predicted outcomes: Family support, Motivated, Cooperative, and Pleasant  Barriers to the achievement of predicted outcomes: Cognitive deficit, Impulsivity, Limited safety awareness, Limited insight into deficits, Communication deficit, Decreased endurance, Decreased sensation, Decreased proprioception, and Impaired vision    Patient Goals:  Get left arm working and return home. Interdisciplinary Individualized Plan of Care Review of Previous Week:    Medical and functional progress towards goals:  Medically doing well, having difficulty sleeping, adjusting meds to facilitate better sleep. Pt progressing with transfers and gait, training with AD for gait, now split PT/OT cotx, still needs x1 A with all activities, LUE improving with neuro stimulation, progressing with ADLs, improving swallowing, diet progressing, cognition slowly progressing.   Barriers towards progress:  Decreased sleep, impaired balance, decreased activity tolerance, decreased LUE activtation, impaired swallowing  Interventions to address Barriers:  MD adjusting sleep meds, PT focus on balance training, PT/OT both work on improving activity tolerance, OT focus on LUE neuro reeducation, SLP focus on swallowing with vital stim treatment  Goals still appropriate:  Yes  Modifications to goals: None  Continue Current Plan of Care:  Yes  Modifications to Plan of Care: None    Rehab Team Members in attendance for Team Conference:  Chrystal Burkitt, MSW, LSW    Judith Sanon, RD, LD    Sabra Pereira, OTR/L    Cindi Hubbard, PT, DPT    Salvatore Fierro M.A., CCC-SLP    YAZMIN BellN, RN, CRRN    Nichelle Pascal PT, DPT,     I approve the established interdisciplinary plan of care as documented within the medical record of Sven Ayala.     Ron Stark MD   Electronically signed by Ron Stark MD on 2/16/2023 at 11:15 AM

## 2023-02-15 NOTE — PROGRESS NOTES
Assessment completed. Patient in bed, alert and oriented and able to make his needs known, has some slurred speech. Denies any pain. Stedy w/assist x 2 for mobility in room. Continent of bowel and bladder. Appetite remains poor; per patient he is not a big eater. Fluids encouraged and consumed well. Medications administered as ordered. POC and education reviewed with patient and mutually agreed upon. Safety interventions in place. Call light in reach. Will continue to monitor.

## 2023-02-15 NOTE — PROGRESS NOTES
[facility]   Phone: [phone]    Occupational Therapy    [] Initial Evaluation            [x] Daily Treatment Note         [] Discharge Summary      Patient: Hillary Aranda   : 1955   MRN: [id number]   Date of Service:  2/15/2023    Admitting Diagnosis:  Acute ischemic stroke [facility])  Current Admission Summary: 68-year-old male who was admitted on  with left-sided weakness for greater than 2 days. CT of the head with suggestion of possible NPH. CTA with less than 50% stenosis of the right ICA. MRI of the brain revealed subacute ischemia on the right basal ganglia. Echo with normal EF and negative bubble study. Neurology evaluated and suggested aspirin and Plavix for 21 days then aspirin only in addition to Lipitor. He was evaluated by therapy and suggested to continue in an inpatient setting prior to returning home. He reports no BM for multiple days. Past Medical History:  has a past medical history of CVA (cerebral vascular accident) ([address] and HTN (hypertension). Past Surgical History:  has a past surgical history that includes Finger surgery (Right); Hernia repair; fracture surgery; and hernia repair.     Discharge Recommendations: Discharge recommendations depending pt progress    DME Required For Discharge: DME to be determined pending patient progress    Precautions/Restrictions: high fall risk, up as tolerated,  Dysphagia - Soft and Bite Sized, Left inattention, Pusher syndrome    Pre-Admission Information   Lives With: brother Guera Carrizales)                Type of Home: house  Home Layout: two level, bedroom in the basement, 10 steps with 1 handrail on the L  Home Access:  2 step to enter without rails   Bathroom Layout: tub/shower unit  Bathroom Equipment: grab bars in shower  Toilet Height: elevated height  Home Equipment: no prior equipment  Transfer Assistance: Independent without use of device  Ambulation Assistance:Independent without use of device  ADL Assistance: independent with all ADL's   IADL Assistance: independent with homemaking tasks  Active :        [x] Yes                 [] 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 Occupation: sales  Hobbies: fishing, reading, boating  Recent Falls: chart reports had several falls that caused this admission (about 5 falls)     Examination (updated on 2/14/24)  Vision:   Vision Corrective Device: wears glasses at all times and Patient Reported Deficits: blurring of print when reading, unable to keep objects in focus, balance difficulty, and difficulty maintaining concentration  ROM:   (B) UE PROM WFL  Strength:   (L) Shoulder: 1     (R) Shoulder: 5  (L) Elbow: 1     (R) Elbow: 5  (L) Wrist: 0     (R) Wrist: 5  (L) Hand: 0      (R) Hand: 5    Subjective  General: Pt seated upright in recliner, agreeable to OT tx. Pain: 0/10  Pain Interventions: not applicable     Activities of Daily Living  Basic Activities of Daily Living  Upper Extremity Dressing: minimal assistance  Lower Extremity Dressing: moderate assistance requires verbal cueing Increased time to complete task  Dressing Comments: pt with poor motor organizing  Instrumental Activities of Daily Living  No IADL completed on this date. Functional Mobility  Bed Mobility  Bed mobility not completed on this date. Transfers  Sit to stand transfer:minimal assistance, moderate assistance  Stand to sit transfer: minimal assistance, moderate assistance  Stand pivot transfer: minimal assistance  Squat pivot transfer: minimal assistance  Comments: verbal cues for sequencing transfers, pt is impulsive, poor awareness to LUE throughout  Functional Mobility:  Sitting Balance: contact guard assistance, minimal assistance. Sitting Balance Comment: sitting on   Standing Balance: minimal assistance, moderate assistance, maximum assistance.     Standing Balance Comment: pt with fluctuating assistance levels  Therapeutic Activity   While in high kneeling on therapy mat, pt required assistance of two with mod verbal cues for posturing and positioning. Weight throughout UB to provide proprioceptive input to LUE to facilitate LUE ROM, LUE strength, LUE gross motor coordination, and LUE fine motor coordination. Pt tolerated fairly however limited by pain in BLE knees. Pt educated on purpose of therapeutic activity, pt nodding in agreement. While seated on EOM, pt completed 1 set of 20x in RUE: Shoulder flexion/extension and row with CGA-min A for sitting balance with verbal cues for neutral posturing. Pt noted with difficulties with following verbal directions and requires verbal cues for sequencing and thoroughness. Second Session  Pt seated upright in recliner upon OT arrival, agreeable to therapy. Pt pleasant and cooperative throughout. Pt completed SPT t/f from recliner chair>w/c>EOM>w/c> recliner chair with min A (all t/f to the right). Pt seated in wheelchair to brush teeth and shave face. Pt initially attempt to complete grooming task in stance however d/t deficits in motor execution and task demands of standing (L lateral lean), pt requested to complete grooming while seated. While seated on EOM with SBA/CGA for sitting balance, pt completed 10 min of neuromuscular re-education with e-stim ( Russian, amp 10 increasing to 25 ma CC, 60 bps, cycle time 5/5). For elbow flexion, pt noted with 1/5 MMT. Pt then completed two sets of 10x: AAROM for elbow flexion/extension and pronation/supination. Pt provided with HEP and instructed to completed 1 set of 10x three times a day. Pt verbalized understanding and is receptive to HEP. Pt left in recliner chair with all needs met, call light within reach, and chair alarm activated.      Cognition  Overall Cognitive Status: WFL  Arousal/Alterness: appropriate responses to stimuli  Following Commands: follows one step commands with repetition  Attention Span: attends with cues to redirect  Memory: decreased recall of precautions, decreased recall of recent events  Safety Judgement: decreased awareness of need for assistance, decreased awareness of need for safety  Problem Solving: assistance required to generate solutions, assistance required to implement solutions, decreased awareness of errors, assistance required to identify errors made, assistance required to correct errors made  Insights: not aware of deficits  Initiation: requires cues for some  Sequencing: requires cues for some  Comments: impulsive at times, deficits in motor execution and motor control  Orientation:    oriented to person, oriented to time, and oriented to situation  Comments: pt stated he was in the Reading unit in [place]" however unable to state MHF  Command Following:   impaired  Follows one step directions with repetition and increased time  Difficulties following directions with \"Left/Right\", I.e., use R arm to do X  Education  Barriers To Learning: cognition and language  Patient Education: patient educated on goals, OT role and benefits, plan of care, precautions, ADL adaptive strategies, IADL safety, proper use of assistive device/equipment, adaptive device training, energy conservation, orientation, family education, pressure relief, transfer training, discharge recommendations  Learning Assessment:  patient verbalizes understanding, would benefit from continued reinforcement, patient will require reinforcement due to cognitive deficits  Assessment  Activity Tolerance: Good with no c/o pain or fatigue  Impairments Requiring Therapeutic Intervention: decreased functional mobility, decreased ADL status, decreased ROM, decreased strength, decreased safety awareness, decreased cognition, decreased endurance, decreased sensation, decreased balance, decreased IADL, decreased fine motor control, decreased coordination, increased pain, decreased posture  Prognosis: good  Clinical Assessment: Pt is progressing  well at this time as he requires min A for functional stand pivot transfers to the right. Pt tolerated neuromuscular re-education well at this time, recommending continued Neuro re-education to improve LUE ROM, strength, gross motor coordination, and fine motor coordination. Pt with poor carryover of learned information from previous sessions, especially hand placement for transfers. Recommend continued reinforcement of previously learned information d/t deficits in cognition. Pt demonstrated difficultly with organizing available movement (instructions/directions) into functional motor sequencing. Continue POC. Safety Interventions: patient left in chair, chair alarm in place, call light within reach, patient at risk for falls, telesitter in use, and family/caregiver present    Plan  Frequency: 5 x/week, 60 min/day  Current Treatment Recommendations: strengthening, ROM, balance training, functional mobility training, transfer training, stair training, endurance training, neuromuscular re-education, wheelchair mobility training, modalities, patient/caregiver education, ADL/self-care training, IADL training, home management training, cognitive reorientation, home exercise program, safety education, equipment evaluation/education, and positioning  Goals  Patient Goals: \"to get this working\" in re to LUE   Short Term Goals:  Time Frame: in 10 days  Patient will complete upper body bathing at min A while seated. Patient will complete lower body bathing at min A while seated and standing as needed. Patient will complete upper body dressing at min A. Patient will complete lower body dressing at mod A using AE as needed. Patient will complete toileting at min A with GB as needed. Patient will complete a toilet transfer with min A with GB as needed. Patient will complete a visual assessment to complete occupational profile.    Long Term Goals:  Time Frame: in 21 days  Patient will complete upper body bathing at supervision while seated. Patient will complete lower body bathing at supervision while seated and standing as needed. Patient will complete upper body dressing at set-up assistance. Patient will complete lower body dressing at supervision using AE as needed. Patient will complete toileting at supervision with GB as needed. Patient will complete grooming at supervision while in stance. Patient will complete functional transfers at supervision using LRAD. Patient will complete functional mobility at supervision using LRAD. Patient will increase functional sitting balance to mod I for improved ADL completion. Patient will increase functional standing balance to SUP for improved ADL completion.     No goals met 2/15    Therapy Session Time  First Session Therapy Time:   Individual Group Co-treatment   Time In   830   Time Out   915   Minutes   45      Second Session Therapy Time:   Individual Group Co-treatment   Time In 1315     Time Out 1400     Minutes 45       Timed Code Treatment Minutes:  45 + 45    Total Treatment Minutes:  90       Electronically Signed By: JAI [place], [id number]

## 2023-02-16 LAB
ANION GAP SERPL CALCULATED.3IONS-SCNC: 9 MMOL/L (ref 3–16)
BUN BLDV-MCNC: 18 MG/DL (ref 7–20)
CALCIUM SERPL-MCNC: 9 MG/DL (ref 8.3–10.6)
CHLORIDE BLD-SCNC: 101 MMOL/L (ref 99–110)
CO2: 25 MMOL/L (ref 21–32)
CREAT SERPL-MCNC: 0.8 MG/DL (ref 0.8–1.3)
GFR SERPL CREATININE-BSD FRML MDRD: >60 ML/MIN/{1.73_M2}
GLUCOSE BLD-MCNC: 83 MG/DL (ref 70–99)
HCT VFR BLD CALC: 42.4 % (ref 40.5–52.5)
HEMOGLOBIN: 14.1 G/DL (ref 13.5–17.5)
MCH RBC QN AUTO: 33.4 PG (ref 26–34)
MCHC RBC AUTO-ENTMCNC: 33.3 G/DL (ref 31–36)
MCV RBC AUTO: 100.2 FL (ref 80–100)
PDW BLD-RTO: 13.1 % (ref 12.4–15.4)
PLATELET # BLD: 263 K/UL (ref 135–450)
PMV BLD AUTO: 8.5 FL (ref 5–10.5)
POTASSIUM SERPL-SCNC: 4.3 MMOL/L (ref 3.5–5.1)
RBC # BLD: 4.23 M/UL (ref 4.2–5.9)
SODIUM BLD-SCNC: 135 MMOL/L (ref 136–145)
WBC # BLD: 7.2 K/UL (ref 4–11)

## 2023-02-16 PROCEDURE — 1280000000 HC REHAB R&B

## 2023-02-16 PROCEDURE — 92526 ORAL FUNCTION THERAPY: CPT

## 2023-02-16 PROCEDURE — 97530 THERAPEUTIC ACTIVITIES: CPT

## 2023-02-16 PROCEDURE — 97535 SELF CARE MNGMENT TRAINING: CPT

## 2023-02-16 PROCEDURE — 36415 COLL VENOUS BLD VENIPUNCTURE: CPT

## 2023-02-16 PROCEDURE — 85027 COMPLETE CBC AUTOMATED: CPT

## 2023-02-16 PROCEDURE — 97116 GAIT TRAINING THERAPY: CPT

## 2023-02-16 PROCEDURE — 97129 THER IVNTJ 1ST 15 MIN: CPT

## 2023-02-16 PROCEDURE — 6360000002 HC RX W HCPCS: Performed by: PHYSICAL MEDICINE & REHABILITATION

## 2023-02-16 PROCEDURE — 6370000000 HC RX 637 (ALT 250 FOR IP): Performed by: PHYSICAL MEDICINE & REHABILITATION

## 2023-02-16 PROCEDURE — 97112 NEUROMUSCULAR REEDUCATION: CPT

## 2023-02-16 PROCEDURE — 97130 THER IVNTJ EA ADDL 15 MIN: CPT

## 2023-02-16 PROCEDURE — 80048 BASIC METABOLIC PNL TOTAL CA: CPT

## 2023-02-16 PROCEDURE — 94760 N-INVAS EAR/PLS OXIMETRY 1: CPT

## 2023-02-16 RX ORDER — TRAZODONE HYDROCHLORIDE 50 MG/1
50 TABLET ORAL NIGHTLY
Status: DISCONTINUED | OUTPATIENT
Start: 2023-02-16 | End: 2023-03-02 | Stop reason: HOSPADM

## 2023-02-16 RX ADMIN — TRAZODONE HYDROCHLORIDE 50 MG: 50 TABLET ORAL at 21:51

## 2023-02-16 RX ADMIN — ASPIRIN 81 MG 81 MG: 81 TABLET ORAL at 08:31

## 2023-02-16 RX ADMIN — STANDARDIZED SENNA CONCENTRATE AND DOCUSATE SODIUM 2 TABLET: 8.6; 5 TABLET ORAL at 21:50

## 2023-02-16 RX ADMIN — ATORVASTATIN CALCIUM 40 MG: 40 TABLET, FILM COATED ORAL at 21:51

## 2023-02-16 RX ADMIN — LISINOPRIL 5 MG: 5 TABLET ORAL at 08:31

## 2023-02-16 RX ADMIN — CLOPIDOGREL BISULFATE 75 MG: 75 TABLET ORAL at 08:31

## 2023-02-16 RX ADMIN — STANDARDIZED SENNA CONCENTRATE AND DOCUSATE SODIUM 2 TABLET: 8.6; 5 TABLET ORAL at 08:31

## 2023-02-16 RX ADMIN — SERTRALINE 25 MG: 50 TABLET, FILM COATED ORAL at 08:31

## 2023-02-16 RX ADMIN — ENOXAPARIN SODIUM 40 MG: 100 INJECTION SUBCUTANEOUS at 08:31

## 2023-02-16 ASSESSMENT — PAIN SCALES - GENERAL
PAINLEVEL_OUTOF10: 0
PAINLEVEL_OUTOF10: 0

## 2023-02-16 NOTE — PROGRESS NOTES
[facility]   Phone: [phone]    Speech Therapy    [] Initial Evaluation            [x] Daily Treatment Note         [] Discharge Summary      Patient: Blanca Alas   : 1955   MRN: [id number]   Date of Service:  2023  Admitting Diagnosis: Acute ischemic stroke [facility])  Current Admission Summary: 76-year-old male who was admitted on  with left-sided weakness for greater than 2 days. CT of the head with suggestion of possible NPH. CTA with less than 50% stenosis of the right ICA. MRI of the brain revealed subacute ischemia on the right basal ganglia. Echo with normal EF and negative bubble study. Neurology evaluated and suggested aspirin and Plavix for 21 days then aspirin only in addition to Lipitor. He was evaluated by therapy and suggested to continue in an inpatient setting prior to returning home. He reports no BM for multiple days. Past Medical History:  has a past medical history of CVA (cerebral vascular accident) ([address] and HTN (hypertension). Past Surgical History:  has a past surgical history that includes Finger surgery (Right); Hernia repair; fracture surgery; and hernia repair. Precautions/Restrictions: high fall risk, modified diet      Pre-Admission Information   Living Status: Pt lives with his brother, independent prior to admission. Occupation/School: Retired last year. 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   Medication Management: :  [x]Primary   []Secondary []No  Finance Management: [x]Primary   []Secondary []No  Active :   [x]Yes         []No  [facility]  Vision:    Vision Corrective Device: wears glasses at all times      Subjective  General: Pt alert and cooperative throughout sessions.    Pain: Did not state    Safety Interventions: patient left in chair, chair alarm in place, call light within reach, patient at risk for falls, and telesitter in use      Therapeutic Interventions:     Session 1:     Dysphagia: Severity: Mild  and Moderate   Patient tolerated NMES via VitalStim placement 4a (targeting the orpharyngeal \"sling\", intrinsic/extrinsic muscles of the tongue and pharyngeal constrictor) @ 9.5  mA for 20 minutes. Improvement with electrode adhesion noted this date. Pt with no recall of NMES from prior date until system was turned on. Utilized in combination with trials of advanced solids and implementation of compensatory strategies with meal as well as completion of oral motor exercises. Diet tolerance (advanced regular solids- peanut butter crackers and thin liquids) with implementation of compensatory strategies   - pt continues with prolonged mastication, decreased AP transit and min oral residue  - pt independently utilizes liquid wash for clearance of regular solids after each bite  - given extended time, pt able to achieve sufficient oral clearance   - no overt clinical s/s of aspiration, intermittent use of double swallow for clearance    Recommend to continue with current diet and to provide verbal cues for use of safe swallow strategies (double swallow, small single sips, upright posture, avoid distractions at meal time). Oral motor exercises  - labial protraction/retraction x10  - e/o x10, cues provided to extend /e/ labial placement   - a/o x10, cues provided to open mouth wider for /a/     Motor Speech/ Voice:  Severity: Mild  and Moderate   Goal not targeted this session.      Cognition: Severity: Mild   Discussion regarding discharge planning/level of assist   - further discussion had regarding assistance pt will have if he discharges home  - he reported knowing what caring for him would entail due to him previously taking care of his mother who was recovering from a stroke  - he reported at this point in his recovery that he knows he will need a lot of assistance and doesn't know if he wants that from his siblings  - pt became teary during conversation reporting he is unsure if he would rather go home needing more assistance from siblings vs discharge to facility   - when asked what would be an ideal situation for discharge for him, pt reported, \"an ideal situation is I'd be dead,\" comfort and support provided   - recommend pt to have services (spiritual, counseling) come speak to him about his feelings about recovery and pt declined at this time     Session 2:  Dysphagia: Severity: Mild  and Moderate   Goal not targeted this session. Motor Speech/ Voice: Severity: Mild  and Moderate   Goal not targeted this session. Cognition: Severity: Mild   Functional recall   - recalled working with ST via reference to SLP elif, required mod verbal cues for recall of PT/OT   - unable to recall unit MD name     Matching math calculation/answers  - 78% accuracy (14/18) increased to 100% accuracy given mod verbal cues   - utilized finger as tracker to ensure he was going in order and wasn't skipping a trial  - cues provided to scan all options on one side, select option that could potentially fit and then move to other side to scan and locate optimal answer      Making word deductions  - 93% accuracy (14/15)   - when asked how pt felt about completion of this task, he reported, \"it wasn't bad but I could have done it faster\"    Immediate memory/mental manipulation (ranking f/3)   - 95% accuracy (17/18)   - required repetition of stimulus words x2     Additional Interventions:           Education  Barriers To Learning: cognition and hearing  Patient Education: Provided education regarding role of SLP, results of assessment, recommendations and general speech pathology plan of care.    Learning Assessment: Pt requires ongoing learning     Assessment  Impairments Requiring Therapeutic Intervention: Cognitive-Linguistic Deficits  and Oropharyngeal Dysphagia   Prognosis: good    Clinical Assessment:  Pt presents with moderate L perioral weakness resulting in dysarthria and oropharyngeal dysphagia. Instrumental assessment (FEES) completed 2/10/22, see report for assessment summary. Pt with limited carry over of dysphagia recommendations and safe swallow strategies. Pt also presents with moderate cognitive-linguistic deficits characterized by deficits in orientation, attention, executive functioning, short-term memory, and visuospatial functioning. Recommend SLP intervention for safe return to prior level of independence. Diet Solids Recommendation:   Liquid Consistency Recommendation:   Recommended Form of Meds:   Dysphagia III Soft and bite sized     Thin liquids    No straws  Meds whole with water       Recommended Compensatory Swallowing Strategies: Upright as possible with all PO intake , Small bites/sips , Eat/feed slowly, No Straws      Plan  Frequency: 60 minutes/day; 5 days per week, as tolerated, until goals met, or discharged from ARU. Therapeutic Interventions: Diet Tolerance Monitoring , Patient/Family Education , Instrumental assessment of swallow function (Modified Barium Swallow Study)  Speech / Motor Planning / Voice intervention , Cognitive-Linguistic intervention , and Patient/ Family education   Discharge Recommendations: Home with assistance and TBD   Continued SLP at Discharge: Yes     Goals  Patient Goals: Pt reports his goal is to get home. Time Frame: 14-21 days    Pt will tolerate recommended diet and advanced trials with no overt s/s of aspiration. ongoing  Pt will improve oral motor strength and ROM for coordinated and alternating motions, via graded tasks to improve speech back to baseline level as subjectively rated by SLP/pt and family. ongoing  Pt will improve executive function for multifactor task completion via graded tasks to 80% accuracy ongoing  Patient will complete functional problem-solving tasks for daily situations with 80% accuracy or given min cues.  ongoing  Pt will improve attention to detail for graded complex information to 80%, for improved recall and retention of newly learned information  ongoing      Therapy Session Time      Session 1 Session 2   Time In 1000 1335   Time Out 1030 1405   Time Code Minutes 10 30   Individual Minutes 30 30     Timed Code Treatment Minutes: 40  Total Treatment Minutes:  60    Electronically Signed By:   Governor Divya M.A., [address] [place] [address]  Speech-Language Pathologist

## 2023-02-16 NOTE — PROGRESS NOTES
Jayden Becerra  2/16/2023  [id number]    Chief Complaint: Acute ischemic stroke [facility])    Subjective:   No overnight events. Reports poor sleep. Uncertain if he would like medications for this. ROS: No CP, SOB, dyspnea    Objective:  Patient Vitals for the past 24 hrs:   BP Temp Temp src Pulse Resp SpO2   02/16/23 0830 114/73 -- -- 58 16 97 %   02/15/23 2132 116/66 98.1 °F (36.7 °C) Oral 61 16 98 %       Gen: No distress, pleasant. Resting in WC  HEENT: Normocephalic, atraumatic. CV: Regular rate and rhythm. No MRG   Resp: No respiratory distress. CTAB   Abd: Soft, nontender nondistended  Ext: No edema. Neuro: Alert, oriented, appropriately interactive. Left hemiparesis LUE 0/4. Negative gilliam    Laboratory data: Available via EMR. Therapy progress:    PT    Supine to Sit: Partial/moderate assistance  Sit to Supine: Dependent   Sit to Stand: Partial/moderate assistance  Chair/Bed to Chair Transfer: Partial/moderate assistance  Car Transfer:    Ambulation 10 ft: Substantial/maximal assistance  Ambulation 50 ft:    Ambulation 150 ft:    Stairs - 1 Step: Dependent  Stairs - 4 Step: Dependent  Stairs - 12 Step:      OT    Eating:    Oral Hygiene: Supervision or touching assistance  Bathing: Substantial/maximal assistance  Upper Body Dressing: Partial/moderate assistance  Lower Body Dressing: Substantial/maximal assistance  Toilet Transfer: Dependent  Toilet Hygiene:      Speech Therapy    Pt presents with mild-moderate oropharyngeal dysphagia due to reduced L side sensation and perioral weakness resulting in anterior bolus loss, impaired mastication, premature bolus loss, delayed swallow initiation, altered vocal quality with a delayed cough. Pt reports not having as much as an appetite as well as lack of dentition creating difficulty with mastication. Pt reports chewing is where his difficulty is and not with swallowing.  Pt may benefit from instrumental assessment due to evidence of reduced sensation and intermittent clinical signs of penetration/aspiration; will recommend when appropriate. Recommend dysphagia diet (soft and bite sized diet) with dysphagia intervention and implementation of safe swallow strategies to minimize aspiration risk. Pt presents with moderate dysarthria characterized by L perioral weakness resulting in reduced articulation, precision, and reduced rate of speech. Pt self-rated speech as 5% being far from baseline. Pt presents with mild-moderate cognitive-linguistic deficits characterized by deficits in orientation, attention, executive functioning, short-term memory, and visuospatial functioning. Errors consisted of many deflections and patient attributing to baseline dyslexia. Required mod cues and repetition throughout task. Pt demonstrated appropriate insight into deficits which resulted in emotional liability towards the end of the assessment. Recommend SLP intervention for safe return to prior level of independence. Body mass index is 21.9 kg/m². Assessment:  Patient Active Problem List   Diagnosis    Acute ischemic stroke [facility])    Expressive aphasia    Dysarthria    Ataxia    Primary hypertension    Tobacco abuse    Noncompliance    Left hemiparesis ([facility] [address]       Plan:   Acute right basal ganglia infarct: DAPT for 21 days (2/25) , Lipitor. PT/OT/SLP. Started SSRI for motor recovery     Dysphagia: Dysphagia diet, SLP     HTN: started lisinopril 5     Tobacco use: education provided. No supplement currently    Meatus trauma: held lovenox. Improving. UA without significant findings for infection     Bowels: Per protocol   Bladder: Per protocol   Sleep: Trazodone provided prn -> schedule  Pain: Tylenol, tramadol as needed  DVT PPx: Lovenox  SOPHIA: 3/2    Jeronimo Rogers MD 2/16/2023, 9:23 AM    * This document was created using dictation software. While all precautions were taken to ensure accuracy, errors may have occurred. Please disregard any typographical errors.

## 2023-02-16 NOTE — PROGRESS NOTES
[facility]   Phone: [phone]    Physical Therapy    [] Initial Evaluation            [x] Daily Treatment Note         [] Discharge Summary      Patient: Thanh Bruce   : 1955   MRN: [id number]   Date of Service:  2023  Admitting Diagnosis: Acute ischemic stroke [facility])  Current Admission Summary: 57-year-old male who was admitted on  with left-sided weakness for greater than 2 days. CT of the head with suggestion of possible NPH. CTA with less than 50% stenosis of the right ICA. MRI of the brain revealed subacute ischemia on the right basal ganglia. Echo with normal EF and negative bubble study. Neurology evaluated and suggested aspirin and Plavix for 21 days then aspirin only in addition to Lipitor. He was evaluated by therapy and suggested to continue in an inpatient setting prior to returning home. He reports no BM for multiple days. Past Medical History:  has a past medical history of CVA (cerebral vascular accident) ([address] and HTN (hypertension). Past Surgical History:  has a past surgical history that includes Finger surgery (Right); Hernia repair; fracture surgery; and hernia repair.   Discharge Recommendations: TBD pending progress  DME Required For Discharge: DME to be determined pending patient progress  Precautions/Restrictions: high fall risk, (L) hemiparetic shoulder  Weight Bearing Restrictions: no restrictions  [] Right Upper Extremity  [] Left Upper Extremity [] Right Lower Extremity  [] Left Lower Extremity     Required Braces/Orthotics: no braces required   [] Right  [] Left  Positional Restrictions:no positional restrictions    Pre-Admission Information   Lives With: brother Lisa Tate)                Type of Home: house  Home Layout: two level, bedroom in the basement, 10 steps with 1 handrail on the L  Home Access:  2 step to enter without rails   Bathroom Layout: tub/shower unit  Bathroom Equipment: grab bars in shower  Toilet Height: elevated height  Home Equipment: no prior equipment  Transfer Assistance: Independent without use of device  Ambulation Assistance:Independent without use of device  ADL Assistance: independent with all ADL's   IADL Assistance: independent with homemaking tasks  Active :        [x] Yes                 [] 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 Occupation: sales  Hobbies: fishing   Recent Falls: chart reports had several falls that caused this admission (about 5 falls)     Examination   Vision:   Vision Corrective Device: wears glasses for distance  Hearing:   WFL      Subjective  General:Patient supine in bed with HOB slightly elevated upon arrival, agreeable to therapy. Pain: 0/10  Pain Interventions: not applicable       Functional Mobility  Bed Mobility  Supine to Sit: minimal assistance, at (L) UE  Rolling Right: minimal assistance, at (L) UE  Scooting: minimal assistance  Comments: All performed on flat hospital bed with use of HR. VC provided for sequencing with supine to sit. Transfers  Sit to stand transfer: minimal assistance, (ranges from min to CGA with use of // bars and unilateral (R) HR)  Stand to sit transfer: minimal assistance, (ranges from min to CGA with use of // bars and unilateral (R) HR)  Stand pivot transfer: minimal assistance  Comments: Patient presents with significant (L) lateral lean during initial stance and during transfers requiring segmental VC and TC for correction of upright posture. Patient also required VC + assist for (L) UE awareness, hand placement and support.   Ambulation   Surface:level surface  Assistive Device: retana rail  Assistance: minimal assistance  Distance: 25'   Gait Mechanics: (L) lateral lean with decreased step length on the (L) and decreased (L) knee extension during stance phase  Comments: Max VC/TC to weight shift (R) and for correct sequenicng  Stair Mobility Stair mobility not completed on this date. Comments:    Wheelchair Mobility:  No w/c mobility completed on this date. Comments:   Balance  Static Sitting Balance: poor (+): requires min (A) to maintain balance  Dynamic Sitting Balance: poor (+): requires min (A) to maintain balance  Static Standing Balance: poor (+): requires min (A) to maintain balance  Dynamic Standing Balance: poor: requires mod (A) to maintain balance  Comments:    Other Therapeutic Interventions   First Session:  See functional mobility noted above. In addition, patient performed alternating stepping to colored targets in // bars with min A at the (L) LE to prepare for taking steps with stand step transfers. Mirror with vertical taped line utilized as visual cue to promote upright posture. Max VC provided to correct posture in between each step. 4 in forward step-ups with (L) LE maintained on the step at min A to promote increased quad activation on the (L). Patient had difficulty achieving TKE upon standing requiring physical assist and max VC to increase ranges. Patient demonstrated improved ability to self-correct posture with step activity. Second Session:   Patient using restroom with nursing staff upon entry. Care transferred to PT/OT staff at this time. Patient performed STS from toilet with use of w/c arm rest at min A. Patient performed LE clothing management standing with mod A for stability. SPT from toilet seat => w/c at min A to the (R). In addition to this, patient performed multiple STS [facility] [employer] [facility] with use of (R) UE on the bar. While standing, patient performed fwd/bkwd stepping on each leg with physical assist of two and with (L) UE supported on walking stick to promote increased WB through the (L) hand and facilitate thoracic extension for upright posture. VC/TC to promote full knee extension in standing on the (L) LE. Patient required max VC for sequencing and to facilitate (R) weight shifting.  Patient also performed reaching to erase letters written on the mirror with the addition of cognitive tasks to promote periods of (R) UE release on the bar in order to challenge standing balance and promote upright posture. Max VC provided for patient to adjust posture. Patient ambulated 60 ft back [facility] [place] [facility] [place] w/c follow with use of RW and (L) resting hand splint attachment. Brief periods of standing rest breaks taken to encourage self-adjustment of posture. Patient able to maintain standing balance to RW without assistance for brief periods. Patient returned to recliner with chair alarm in place and call light within reach. Functional Outcomes                 Cognition  Overall Cognitive Status: Impaired  Arousal/Alterness: appropriate responses to stimuli  Following Commands: follows one step commands with repetition, follows one step commands with increased time  Attention Span: difficulty attending to directions  Memory: decreased recall of biographical information, decreased short term memory  Safety Judgement: decreased awareness of need for safety  Problem Solving: assistance required to generate solutions, assistance required to implement solutions  Insights: decreased awareness of deficits  Initiation: requires cues for all  Sequencing: requires cues for all  Orientation:    oriented to person, oriented to place, oriented to situation, and disoriented to time   Command Following:   impaired    Education  Barriers To Learning: cognition, emotional, and physical  Patient Education: patient educated on goals, PT role and benefits, plan of care, general safety, functional mobility training, proper use of assistive device/equipment, disease specific education, transfer training  Learning Assessment:  patient verbalizes understanding, would benefit from continued reinforcement    Assessment  Activity Tolerance: Good. Seated rest breaks provided for recovery.   Impairments Requiring Therapeutic Intervention: decreased functional mobility, decreased ADL status, decreased ROM, decreased strength, decreased safety awareness, decreased cognition, decreased endurance, decreased balance, decreased coordination, decreased posture  Prognosis: fair  Clinical Assessment: Patient is showing continued progress in functional mobility as evidenced by ability to perform bed mobility with less assistance progressing from assist of two to assist of one including ambulation with use of RW during 2nd session. In addition to this, patient showing improved ability to self-correct trunk posture with step up activity. However, patient limited in ability to achieve (L) TKE in standing contributing to increased (L) lateral lean with all standing mobility. Pt would benefit from continued skilled PT to promote functional independence. Safety Interventions: patient left in chair, chair alarm in place, call light within reach, gait belt, patient at risk for falls, telesitter in use, and fall mats at bedside    Plan  Frequency: 5 x/week, 60 min/day  Current Treatment Recommendations: strengthening, ROM, balance training, functional mobility training, transfer training, gait training, stair training, endurance training, neuromuscular re-education, wheelchair mobility training, modalities, patient/caregiver education, ADL/self-care training, cognitive/perceptual training, and safety education    Goals  Patient Goals:  To return home and be able to move his (L) UE better  Short Term Goals:  Time Frame: 1-2 weeks  Patient will complete bed mobility at [facility]   Patient will complete transfers at mod (A) of 1 - goal met 2/15/2023  Patient will ambulate 25 ft with use of LRAD at mod (A) of 1  Patient will ascend/descend 1 stairs with (B) handrail at mod (A) of 1  Patient will complete manual w/c propulsion 50 ft at [facility]  Long Term Goals:  Time Frame: 3 weeks  Patient will complete bed mobility at modified independent   Patient will complete transfers at modified independent   Patient will ambulate 50 ft with use of LRAD at [facility]  Patient will ascend/descend 10 stairs with (L) ascending handrail at [facility]  Patient will complete car transfer at [facility]     Therapy Session Time      Individual Group Co-treatment   Time In 0730       Time Out 0818       Minutes 48         Second Session:   Individual Group Co-treatment   Time In      1245   Time Out      1330   Minutes      45     Timed Code Treatment Minutes:   48 + 45 minutes  Total Treatment Minutes:  93 minutes        Electronically Signed By:   LINA Omalley  Therapist observed and directed the patient's plan of care.   Co-signed and supervised by: Kristofer Brown PT, DPT - [id number]

## 2023-02-16 NOTE — CARE COORDINATION
Team conference held today. Spoke with patient  to discuss progress with therapy, barriers to discharge, and plans to return home. Estimated discharge date set for 3/2/2023. Patient's discharge plan to be determine with patient progress. Will continue to follow for support and discharge planning.     Electronically signed by MILEY Alvarez on 2/16/2023 at 1:21 PM

## 2023-02-16 NOTE — PROGRESS NOTES
[facility] [facility]   Phone: [phone]    Occupational Therapy    [] Initial Evaluation            [x] Daily Treatment Note         [] Discharge Summary      Patient: Judy He   : 1955   MRN: [id number]   Date of Service:  2023    Admitting Diagnosis:  Acute ischemic stroke [facility])  Current Admission Summary: 68-year-old male who was admitted on  with left-sided weakness for greater than 2 days. CT of the head with suggestion of possible NPH. CTA with less than 50% stenosis of the right ICA. MRI of the brain revealed subacute ischemia on the right basal ganglia. Echo with normal EF and negative bubble study. Neurology evaluated and suggested aspirin and Plavix for 21 days then aspirin only in addition to Lipitor. He was evaluated by therapy and suggested to continue in an inpatient setting prior to returning home. He reports no BM for multiple days. Past Medical History:  has a past medical history of CVA (cerebral vascular accident) ([address] and HTN (hypertension). Past Surgical History:  has a past surgical history that includes Finger surgery (Right); Hernia repair; fracture surgery; and hernia repair.     Discharge Recommendations: Discharge recommendations depending pt progress    DME Required For Discharge: DME to be determined pending patient progress    Precautions/Restrictions: high fall risk, up as tolerated    Pre-Admission Information   Lives With: brother Cesario Chauhan)                Type of Home: house  Home Layout: two level, bedroom in the basement, 10 steps with 1 handrail on the L  Home Access:  2 step to enter without rails   Bathroom Layout: tub/shower unit  Bathroom Equipment: grab bars in shower  Toilet Height: elevated height  Home Equipment: no prior equipment  Transfer Assistance: Independent without use of device  Ambulation Assistance:Independent without use of device  ADL Assistance: independent with all ADL's   IADL Assistance: independent with homemaking tasks  Active :        [x] Yes                 [] 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 Occupation: sales  Hobbies: fishing, reading, boating  Recent Falls: chart reports had several falls that caused this admission (about 5 falls)     Examination (updated on 2/14/24)  Vision:   Vision Corrective Device: wears glasses at all times and Patient Reported Deficits: blurring of print when reading, unable to keep objects in focus, balance difficulty, and difficulty maintaining concentration  ROM:   (B) UE PROM WFL  Strength:   (L) Shoulder: 1     (R) Shoulder: 5  (L) Elbow: 1     (R) Elbow: 5  (L) Wrist: 0     (R) Wrist: 5  (L) Hand: 0      (R) Hand: 5    Subjective  General: Pt seated upright in recliner, agreeable to OT tx. Pain: 0/10  Pain Interventions: not applicable     Activities of Daily Living  Basic Activities of Daily Living  Grooming: stand by assistance  Grooming Comments: seated in wheelchair for oral care  Upper Extremity Bathing: stand by assistance  Lower Extremity Bathing: contact guard assistance   Bathing Comments: sponge bathing in recliner chair, leaning for buttocks  Upper Extremity Dressing: stand by assistance requires verbal cueing Increased time to complete task  Lower Extremity Dressing: minimal assistance moderate assistance Comment: min A-mod A for standing balance with use of bed rail and cues for upright  posture, SBA to thread brief, min A to thread pants, max A for socks   Dressing Comments: pt with poor motor organizing  Instrumental Activities of Daily Living  No IADL completed on this date. Functional Mobility  Bed Mobility  Bed mobility not completed on this date.   Transfers  Sit to stand transfer:minimal assistance  Stand to sit transfer: minimal assistance  Stand pivot transfer: minimal assistance  Comments: verbal cues for sequencing transfers, pt is impulsive, poor awareness to LUE throughout  Functional Mobility:  Sitting Balance: stand by assistance. Sitting Balance Comment: sitting on wheelchair  Standing Balance: minimal assistance, moderate assistance. Standing Balance Comment: pt with fluctuating assistance levels during dressing, with no hand support  Functional Mobility: .  minimal assistance  Functional Mobility Activity: to/from bathroom  Functional Mobility Device Use: wheelchair  Functional Mobility Comment: impulsive, improve LB control     Second Session  Pt seated upright in recliner with chair alarm in place. Pt is agreeable to OT/PT co tx. Patient using restroom with nursing staff upon entry. Care transferred to PT/OT staff at this time. Patient performed STS from toilet with use of w/c arm rest at min A. Patient performed LE clothing management standing with mod A for standing balance while pt pulled pants up/down over hips. SPT from toilet seat => w/c at min A to the (R). In addition to this, patient performed multiple STS at [facility] with use of (R) UE on the bar with assistance of another for positioning of LUE. While standing, patient performed fwd/bkwd stepping on each leg with physical assist of one while another assisted with (L) UE supported on walking stick to promote increased WB through the (L) hand and facilitate thoracic extension for upright posture. Noted increased tone in LUE, especially in pt hand. Recommend continued f/u to assess if a resting splint is warranted. VC/TC to promote full knee extension in standing on the (L) LE. Patient required max VC for sequencing and to facilitate (R) weight shifting. Patient also performed reaching to erase letters written on the mirror with the addition of cognitive tasks to promote periods of (R) UE release on the bar in order to challenge standing balance and promote upright posture for ADLs of choice.  Noted difficulties with following two step commands d/t deficits in working memory and divided attention. Noted periods of difficulties with scanning and a disorganized scanning pattern noted. Max VC provided for patient to adjust posture. Patient ambulated 60 ft back to his room at mod A + w/c follow with use of RW and (L) resting hand splint attachment. Brief periods of standing rest breaks taken to encourage self-adjustment of posture. Patient returned to recliner with chair alarm in place and call light within reach.      Cognition  Overall Cognitive Status: WFL  Arousal/Alterness: appropriate responses to stimuli  Following Commands: follows one step commands with repetition  Attention Span: attends with cues to redirect  Memory: decreased recall of precautions, decreased recall of recent events  Safety Judgement: decreased awareness of need for assistance, decreased awareness of need for safety  Problem Solving: assistance required to generate solutions, assistance required to implement solutions, decreased awareness of errors, assistance required to identify errors made, assistance required to correct errors made  Insights: not aware of deficits  Initiation: requires cues for some  Sequencing: requires cues for some  Comments: impulsive at times, deficits in motor execution and motor control  Orientation:    oriented to person, oriented to time, and oriented to situation  Comments: pt stated he was in the Reading unit in [place]" however unable to state [facility]  Command Following:   impaired  Follows one step directions with repetition and increased time  Difficulties following directions with \"Left/Right\", I.e., use R arm to do X  Education  Barriers To Learning: cognition and language  Patient Education: patient educated on goals, OT role and benefits, plan of care, precautions, ADL adaptive strategies, IADL safety, proper use of assistive device/equipment, adaptive device training, energy conservation, orientation, family education, pressure relief, transfer training, discharge recommendations  Learning Assessment:  patient verbalizes understanding, would benefit from continued reinforcement, patient will require reinforcement due to cognitive deficits  Assessment  Activity Tolerance: Good with no c/o pain or fatigue  Impairments Requiring Therapeutic Intervention: decreased functional mobility, decreased ADL status, decreased ROM, decreased strength, decreased safety awareness, decreased cognition, decreased endurance, decreased sensation, decreased balance, decreased IADL, decreased fine motor control, decreased coordination, increased pain, decreased posture  Prognosis: good  Clinical Assessment: Pt is progressing well at this time as he met 2/7 STG at this time. Pt with poor carryover of learned information from previous sessions, especially hand placement for transfers. Recommend continued reinforcement of previously learned information d/t deficits in cognition. Pt demonstrated difficultly with organizing available movement (instructions/directions) into functional motor sequencing. Continue POC. Safety Interventions: patient left in chair, chair alarm in place, call light within reach, patient at risk for falls, telesitter in use, and family/caregiver present    Plan  Frequency: 5 x/week, 60 min/day  Current Treatment Recommendations: strengthening, ROM, balance training, functional mobility training, transfer training, stair training, endurance training, neuromuscular re-education, wheelchair mobility training, modalities, patient/caregiver education, ADL/self-care training, IADL training, home management training, cognitive reorientation, home exercise program, safety education, equipment evaluation/education, and positioning  Goals  Patient Goals: \"to get this working\" in re to LUE   Short Term Goals:  Time Frame: in 10 days  Patient will complete upper body bathing at min A while seated.   Patient will complete lower body bathing at min A while seated and standing as needed. Patient will complete upper body dressing at min A. -MET 2/16/23  Patient will complete lower body dressing at mod A using AE as needed. -MET 2/16/23  Patient will complete toileting at min A with GB as needed. Patient will complete a toilet transfer with min A with GB as needed. Patient will complete a visual assessment to complete occupational profile. Long Term Goals:  Time Frame: in 21 days  Patient will complete upper body bathing at supervision while seated. Patient will complete lower body bathing at supervision while seated and standing as needed. Patient will complete upper body dressing at set-up assistance. Patient will complete lower body dressing at supervision using AE as needed. Patient will complete toileting at supervision with GB as needed. Patient will complete grooming at supervision while in stance. Patient will complete functional transfers at supervision using LRAD. Patient will complete functional mobility at supervision using LRAD. Patient will increase functional sitting balance to mod I for improved ADL completion. Patient will increase functional standing balance to SUP for improved ADL completion.      No goals met 2/16    Therapy Session Time  First Session Therapy Time:   Individual Group Co-treatment   Time In 845     Time Out 930     Minutes 45        Second Session Therapy Time:   Individual Group Co-treatment   Time In   2427   Time Out   1330   Minutes   45     Timed Code Treatment Minutes:  45 + 45    Total Treatment Minutes:  90       Electronically Signed By: OLGA Huff/MIKE, [id number]

## 2023-02-16 NOTE — PROGRESS NOTES
Whenever the pt uses a urinal on his own while sitting on a recliner, his body slide more out of the chair and require repositioning to prevent fall.

## 2023-02-17 PROCEDURE — 97112 NEUROMUSCULAR REEDUCATION: CPT

## 2023-02-17 PROCEDURE — 97116 GAIT TRAINING THERAPY: CPT

## 2023-02-17 PROCEDURE — 97130 THER IVNTJ EA ADDL 15 MIN: CPT

## 2023-02-17 PROCEDURE — 6360000002 HC RX W HCPCS: Performed by: PHYSICAL MEDICINE & REHABILITATION

## 2023-02-17 PROCEDURE — 97530 THERAPEUTIC ACTIVITIES: CPT

## 2023-02-17 PROCEDURE — 92508 TX SP LANG VOICE COMM GROUP: CPT

## 2023-02-17 PROCEDURE — 51798 US URINE CAPACITY MEASURE: CPT

## 2023-02-17 PROCEDURE — 97535 SELF CARE MNGMENT TRAINING: CPT

## 2023-02-17 PROCEDURE — 97129 THER IVNTJ 1ST 15 MIN: CPT

## 2023-02-17 PROCEDURE — 92526 ORAL FUNCTION THERAPY: CPT

## 2023-02-17 PROCEDURE — 6370000000 HC RX 637 (ALT 250 FOR IP): Performed by: PHYSICAL MEDICINE & REHABILITATION

## 2023-02-17 PROCEDURE — 1280000000 HC REHAB R&B

## 2023-02-17 RX ADMIN — CLOPIDOGREL BISULFATE 75 MG: 75 TABLET ORAL at 11:02

## 2023-02-17 RX ADMIN — ASPIRIN 81 MG 81 MG: 81 TABLET ORAL at 11:02

## 2023-02-17 RX ADMIN — ENOXAPARIN SODIUM 40 MG: 100 INJECTION SUBCUTANEOUS at 11:02

## 2023-02-17 RX ADMIN — STANDARDIZED SENNA CONCENTRATE AND DOCUSATE SODIUM 2 TABLET: 8.6; 5 TABLET ORAL at 11:02

## 2023-02-17 RX ADMIN — LISINOPRIL 5 MG: 5 TABLET ORAL at 11:02

## 2023-02-17 RX ADMIN — ATORVASTATIN CALCIUM 40 MG: 40 TABLET, FILM COATED ORAL at 22:11

## 2023-02-17 RX ADMIN — SERTRALINE 25 MG: 50 TABLET, FILM COATED ORAL at 11:02

## 2023-02-17 RX ADMIN — STANDARDIZED SENNA CONCENTRATE AND DOCUSATE SODIUM 2 TABLET: 8.6; 5 TABLET ORAL at 22:11

## 2023-02-17 RX ADMIN — TRAZODONE HYDROCHLORIDE 50 MG: 50 TABLET ORAL at 22:11

## 2023-02-17 ASSESSMENT — PAIN SCALES - GENERAL: PAINLEVEL_OUTOF10: 0

## 2023-02-17 NOTE — PLAN OF CARE
Problem: Discharge Planning  Goal: Discharge to home or other facility with appropriate resources  2/16/2023 2315 by Jan Hudson RN  Outcome: Progressing     Problem: Skin/Tissue Integrity  Goal: Absence of new skin breakdown  Description: 1. Monitor for areas of redness and/or skin breakdown  2. Assess vascular access sites hourly  3. Every 4-6 hours minimum:  Change oxygen saturation probe site  4. Every 4-6 hours:  If on nasal continuous positive airway pressure, respiratory therapy assess nares and determine need for appliance change or resting period.   2/16/2023 2315 by Jan Hudson RN  Outcome: Progressing     Problem: Safety - Adult  Goal: Free from fall injury  2/17/2023 1250 by Jessica Amaro RN  Flowsheets (Taken 2/17/2023 1250)  Free From Fall Injury: Instruct family/caregiver on patient safety  2/17/2023 1249 by Jessica Amaro RN  Flowsheets (Taken 2/14/2023 0413 by Timur John RN)  Free From Fall Injury: Instruct family/caregiver on patient safety  2/16/2023 2315 by Jan Hudson RN  Outcome: Progressing     Problem: Pain  Goal: Verbalizes/displays adequate comfort level or baseline comfort level  2/17/2023 1250 by Jessica Amaro RN  Flowsheets (Taken 2/17/2023 1250)  Verbalizes/displays adequate comfort level or baseline comfort level: Encourage patient to monitor pain and request assistance  2/17/2023 1249 by Jessica Amaro RN  Flowsheets (Taken 2/17/2023 1249)  Verbalizes/displays adequate comfort level or baseline comfort level: Encourage patient to monitor pain and request assistance  2/16/2023 2315 by Jan Hudson RN  Outcome: Progressing     Problem: ABCDS Injury Assessment  Goal: Absence of physical injury  2/16/2023 2315 by Jan Hudson RN  Outcome: Progressing

## 2023-02-17 NOTE — PROGRESS NOTES
Acute Rehab Unit  Stroke Education      Patient participated in stroke education focused on topics of:  Stroke overview   Signs and symptoms of stroke   Stroke risk factors   Rehabilitation team   Caregiver information   Diet and exercise   Home modification   Post Stroke Depression Management   Bowel and Bladder Management   Driving/Travel   Sexuality   Resources   Medication Log   Weekly Schedule Planning Sheet   Monthly Planning Sheet      Patient:    [x] was socially appropriate and engaged in conversation    [x] requires reinforcement of topics covered    [] caregiver was present, appropriate and engaged in coversation                Name/relationship:     Materials Provided:  - [address] [facility] Unit Guide to Stroke Recovery   - Neurologic Drivers Evaluation Information  - Stroke Survivor Story     Time:  Time In: 12  Time Out: 1345   Total Minutes:   [] Individual Session   [x] Group Session     Signature:    Cheryl WHITING  CCC-SLP #[id number] 2/17/2023 3:50 PM  Speech-Language Pathologist
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
## 2023-02-17 NOTE — PLAN OF CARE
Problem: Discharge Planning  Goal: Discharge to home or other facility with appropriate resources  2/16/2023 2315 by Graciela Sandhoff, RN  Outcome: Progressing     Problem: Skin/Tissue Integrity  Goal: Absence of new skin breakdown  Description: 1. Monitor for areas of redness and/or skin breakdown  2. Assess vascular access sites hourly  3. Every 4-6 hours minimum:  Change oxygen saturation probe site  4. Every 4-6 hours:  If on nasal continuous positive airway pressure, respiratory therapy assess nares and determine need for appliance change or resting period.   2/16/2023 2315 by Graciela Sandhoff, RN  Outcome: Progressing     Problem: Safety - Adult  Goal: Free from fall injury  2/17/2023 1249 by Lavon Mcclendon RN  Flowsheets (Taken 2/14/2023 0413 by Freddy Kennedy RN)  Free From Fall Injury: Instruct family/caregiver on patient safety  2/16/2023 2315 by Graciela Sandhoff, RN  Outcome: Progressing     Problem: Pain  Goal: Verbalizes/displays adequate comfort level or baseline comfort level  2/17/2023 1249 by Lavon Mcclendon RN  Flowsheets (Taken 2/17/2023 1249)  Verbalizes/displays adequate comfort level or baseline comfort level: Encourage patient to monitor pain and request assistance  2/16/2023 2315 by Graciela Sandhoff, RN  Outcome: Progressing     Problem: ABCDS Injury Assessment  Goal: Absence of physical injury  2/16/2023 2315 by Graciela Sandhoff, RN  Outcome: Progressing

## 2023-02-17 NOTE — PROGRESS NOTES
[facility]   Phone: [phone]    Speech Therapy    [] Initial Evaluation            [x] Daily Treatment Note         [] Discharge Summary      Patient: Brian Romero   : 1955   MRN: [id number]   Date of Service:  2023  Admitting Diagnosis: Acute ischemic stroke [facility])  Current Admission Summary: 73-year-old male who was admitted on  with left-sided weakness for greater than 2 days. CT of the head with suggestion of possible NPH. CTA with less than 50% stenosis of the right ICA. MRI of the brain revealed subacute ischemia on the right basal ganglia. Echo with normal EF and negative bubble study. Neurology evaluated and suggested aspirin and Plavix for 21 days then aspirin only in addition to Lipitor. He was evaluated by therapy and suggested to continue in an inpatient setting prior to returning home. He reports no BM for multiple days. Past Medical History:  has a past medical history of CVA (cerebral vascular accident) ([address] and HTN (hypertension). Past Surgical History:  has a past surgical history that includes Finger surgery (Right); Hernia repair; fracture surgery; and hernia repair. Precautions/Restrictions: high fall risk, modified diet      Pre-Admission Information   Living Status: Pt lives with his brother, independent prior to admission. Occupation/School: Retired last year. 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   Medication Management: :  [x]Primary   []Secondary []No  Finance Management: [x]Primary   []Secondary []No  Active :   [x]Yes         []No  Hearing:    [employer] [facility] [employer]  Vision:    Vision Corrective Device: wears glasses at all times      Subjective  General: Pt alert and cooperative throughout sessions.    Pain: Did not state    Safety Interventions: patient left in chair, chair alarm in place, call light within reach, patient at risk for falls, and telesitter in use      Therapeutic Interventions:     Session 1:     Dysphagia: Severity: Mild  and Moderate   Facilitated trials of advanced regular solids: crunchy, dry solids (peanut butter toast, nuts/cereal) in combination with thin liquids   - pt continues with prolonged mastication, decreased AP transit and min oral residue  - pt independently utilizes liquid wash for clearance of regular solids after each bite and use of finger sweep after the meal was completed   - given extended time, pt able to achieve sufficient oral clearance   - no overt clinical s/s of aspiration, intermittent use of double swallow for clearance    Recommend to upgrade to regular solids with ongoing verbal cues for use of safe swallow strategies (double swallow, small single sips, upright posture, avoid distractions at meal time). Motor Speech/ Voice:  Severity: Mild  and Moderate   Goal not targeted this session. Cognition: Severity: Mild   Working memory: word list f/3 in progressive order   - 90% accuracy (9/10)   - pt with good divided attention and awareness to pause between bites to increase focus on word list retention     Functional recall   - demonstrated adequate recognition of SLP from previous sessions   - limited in responses towards recent therapy tasks completed   Deferred his lack of memory to his baseline difficulty with attention and dyslexia     Session 2:  Dysphagia: Severity: Mild  and Moderate   Goal not targeted this session. Motor Speech/ Voice: Severity: Mild  and Moderate   Goal not targeted this session.       Cognition: Severity: Mild   Sustained attention: trail making in alphabetic order   - 81% accuracy   - required significantly extended time to maintain place and for visual planning   - aware of errors / level of effort required; referred to his baseline attention and dyslexia     Functional problem solving: money scenarios   - 60% accuracy   - pt required mod-max cues for organization, planning, and attention (written cues and simplification)   - limited self use of cues due to impairments of dominant writing hand   - pt aware of errors and level of effort; reported this should be an easy task for him     Additional Interventions:           Education  Barriers To Learning: cognition and hearing  Patient Education: Provided education regarding role of SLP, results of assessment, recommendations and general speech pathology plan of care. Learning Assessment: Pt requires ongoing learning     Assessment  Impairments Requiring Therapeutic Intervention: Cognitive-Linguistic Deficits  and Oropharyngeal Dysphagia   Prognosis: good    Clinical Assessment:  Pt presents with moderate L perioral weakness resulting in dysarthria and oropharyngeal dysphagia. Instrumental assessment (FEES) completed 2/10/22, see report for assessment summary. Pt with limited carry over of dysphagia recommendations and safe swallow strategies. Pt also presents with moderate cognitive-linguistic deficits characterized by deficits in orientation, attention, executive functioning, short-term memory, and visuospatial functioning. Recommend SLP intervention for safe return to prior level of independence. Diet Solids Recommendation:   Liquid Consistency Recommendation:   Recommended Form of Meds:   Regular texture diet     Thin liquids    No straws  Meds whole with water       Recommended Compensatory Swallowing Strategies: Upright as possible with all PO intake , Small bites/sips , Eat/feed slowly, No Straws      Plan  Frequency: 60 minutes/day; 5 days per week, as tolerated, until goals met, or discharged from ARU.   Therapeutic Interventions: Diet Tolerance Monitoring , Patient/Family Education , Instrumental assessment of swallow function (Modified Barium Swallow Study)  Speech / Motor Planning / Voice intervention , Cognitive-Linguistic intervention , and Patient/ Family education   Discharge Recommendations: Home with assistance and TBD   Continued SLP at Discharge: Yes     Goals  Patient Goals: Pt reports his goal is to get home. Time Frame: 14-21 days    Pt will tolerate recommended diet and advanced trials with no overt s/s of aspiration. ongoing  Pt will improve oral motor strength and ROM for coordinated and alternating motions, via graded tasks to improve speech back to baseline level as subjectively rated by SLP/pt and family. ongoing  Pt will improve executive function for multifactor task completion via graded tasks to 80% accuracy ongoing  Patient will complete functional problem-solving tasks for daily situations with 80% accuracy or given min cues. ongoing  Pt will improve attention to detail for graded complex information to 80%, for improved recall and retention of newly learned information  ongoing      Therapy Session Time      Session 1 Session 2   Time In 0800 1125   Time Out 0830 1155   Time Code Minutes 12 30   Individual Minutes 30 30     Timed Code Treatment Minutes: 42  Total Treatment Minutes:  60    Electronically Signed By:   Damaris Augustin M.A.  CCC-SLP AUDELIA [id number]  Speech-Language Pathologist   2/17/2023 3:36 PM

## 2023-02-17 NOTE — PROGRESS NOTES
[facility]   Phone: [phone]    Occupational Therapy    [] Initial Evaluation            [x] Daily Treatment Note         [] Discharge Summary      Patient: Edilberto Teague   : 1955   MRN: [id number]   Date of Service:  2023    Admitting Diagnosis:  Acute ischemic stroke [facility])  Current Admission Summary: 64-year-old male who was admitted on  with left-sided weakness for greater than 2 days. CT of the head with suggestion of possible NPH. CTA with less than 50% stenosis of the right ICA. MRI of the brain revealed subacute ischemia on the right basal ganglia. Echo with normal EF and negative bubble study. Neurology evaluated and suggested aspirin and Plavix for 21 days then aspirin only in addition to Lipitor. He was evaluated by therapy and suggested to continue in an inpatient setting prior to returning home. He reports no BM for multiple days. Past Medical History:  has a past medical history of CVA (cerebral vascular accident) ([address] and HTN (hypertension). Past Surgical History:  has a past surgical history that includes Finger surgery (Right); Hernia repair; fracture surgery; and hernia repair.     Discharge Recommendations: Discharge recommendations depending pt progress    DME Required For Discharge: DME to be determined pending patient progress    Precautions/Restrictions: high fall risk, up as tolerated, reg diet    Pre-Admission Information   Lives With: brother Simón Huber                Type of Home: house  Home Layout: two level, bedroom in the basement, 10 steps with 1 handrail on the L  Home Access:  2 step to enter without rails   Bathroom Layout: tub/shower unit  Bathroom Equipment: grab bars in shower  Toilet Height: elevated height  Home Equipment: no prior equipment  Transfer Assistance: Independent without use of device  Ambulation Assistance:Independent without use of device  ADL Assistance: independent with all ADL's IADL Assistance: independent with homemaking tasks  Active :        [x] Yes                 [] 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 Occupation: sales  Hobbies: fishing, reading, boating  Recent Falls: chart reports had several falls that caused this admission (about 5 falls)     Examination (updated on 2/14/24)  Vision:   Vision Corrective Device: wears glasses at all times and Patient Reported Deficits: blurring of print when reading, unable to keep objects in focus, balance difficulty, and difficulty maintaining concentration  ROM:   (B) UE PROM WFL  Strength:   (L) Shoulder: 1     (R) Shoulder: 5  (L) Elbow: 1     (R) Elbow: 5  (L) Wrist: 0     (R) Wrist: 5  (L) Hand: 0      (R) Hand: 5  Note:increased tone in LLE, greatest in hand    Subjective  General: Pt seated upright in recliner, agreeable to OT tx and shower. SLP present and stated pt has been upgraded to reg diet.    Pain: 0/10  Pain Interventions: not applicable     Activities of Daily Living  Basic Activities of Daily Living  Grooming: supervision  Grooming Comments: seated in wheelchair for oral care, pt able to set-up toothbrush independently however with increased time for problem solving  Upper Extremity Bathing: moderate assistance  Lower Extremity Bathing: maximum assistance   Bathing Comments: shower completed on shower chair with hole for buttocks, pt with improved balance this date when compared in previous  Upper Extremity Dressing: moderate assistance requires verbal cueing Increased time to complete task Comment: trail one: pt donned shirt with correct orientation however did not place LUE in arm hole, trail two: pt placed RUE in arm hole despite cues for threading LUE first, trail three: pt able to thread LUE in arm hole with min A, assistance for pulling shirt up to axillary area and over head, assistance for pulling shirt down back   Lower Extremity Dressing: moderate assistance requires verbal cueing Increased time to complete task Comment: mod A for doffing brief, mod A for donning shoes, min A for donning socks, mod A standing balance for donning brief, min A standing balance for donning pants   Dressing Comments: HEP provided for one handed dressing techniques d/t poor carryover of learned information  Toileting: minimal assistance requires verbal cueing Increased time to complete task. Toileting Comments: min A for standing, no underwear on, attempting to have a BM   General Comments: impulsive intially however improved as thes ession progress  Instrumental Activities of Daily Living  No IADL completed on this date. Functional Mobility  Bed Mobility  Bed mobility not completed on this date. Transfers  Sit to stand transfer:minimal assistance  Stand to sit transfer: minimal assistance  Stand pivot transfer: minimal assistance  Comments: verbal cues for sequencing transfers, pt is impulsive, poor awareness to LUE throughout  Functional Mobility:  Sitting Balance: stand by assistance. Sitting Balance Comment: sitting on wheelchair  Standing Balance: minimal assistance, moderate assistance. Standing Balance Comment: pt with fluctuating assistance levels during dressing, with no hand support  Functional Mobility: .  minimal assistance  Functional Mobility Activity: to/from bathroom  Functional Mobility Device Use: wheelchair  Functional Mobility Comment: impulsive, improve LB control     Second Session  Pt seated upright in wheelchair upon PT/OT arrival, agreeable to therapy. Pt pleasant and cooperative throughout. Activities of Daily Living  Basic Activities of Daily Living  General Comments: politely declined  Instrumental Activities of Daily Living  Meal Prep: dependent assistance. Meal Prep Comments: Pt standing for ~5 minutes three times with varying levels of assist for min A to max A x 1 + min A x 1.   Assistance of two people to complete tea making task with max verbal cues and increased time for processing verbal instructions. Pt with deficits in problem solving,recognizing errors made, and correcting errors. Pt with difficulties with following one step directions. Unable to follow two step directions. Poor safety awareness throughout. Pt requires one person assistance for balance and another for LUE support on countertop. Comments:Pt retrieved 7 cones sporadically placed in kitchen area to improve endurance, activity tolerance, and functional reaching balance. Pt required cues for safety and cues for side stepping to obtain cones rather than reaching too far to reduce the risk of falls. Poor carryover of learned information. Functional Mobility  Transfers  Sit to stand transfer:minimal assistance  Stand to sit transfer: minimal assistance  Stand pivot transfer: minimal assistance  Stand step transfer: 2 person assistance with max A + min A x1   Comments: requiring max A x 1 + mod A x 1 for 180 degree turns, pt impulsively sitting  Functional Mobility:  Sitting Balance: supervision. Standing Balance: minimal assistance, moderate assistance, maximum assistance, varying levels of assist depending on task demands and attention. Functional Mobility: .  moderate assistance  Functional Mobility Activity: 5 ft x6  Functional Mobility Device Use: hand-held assist   Comments: cues for sequencing    Pt left in recliner chair with all needs met, call light within reach, and chair alarm activated.        Cognition  Overall Cognitive Status: WFL  Arousal/Alterness: appropriate responses to stimuli  Following Commands: follows one step commands with repetition  Attention Span: attends with cues to redirect  Memory: decreased recall of precautions, decreased recall of recent events  Safety Judgement: decreased awareness of need for assistance, decreased awareness of need for safety  Problem Solving: assistance required to generate solutions, assistance required to implement solutions, decreased awareness of errors, assistance required to identify errors made, assistance required to correct errors made  Insights: not aware of deficits  Initiation: requires cues for some  Sequencing: requires cues for some  Comments: impulsive at times, deficits in motor execution and motor control  Orientation:    oriented to person, oriented to time, and oriented to situation  Comments: pt stated he was in the Reading unit in [place]" however unable to state MHF  Command Following:   impaired  Follows one step directions with repetition and increased time  Difficulties following directions with \"Left/Right\", I.e., use R arm to do X  Education  Barriers To Learning: cognition and language  Patient Education: patient educated on goals, OT role and benefits, plan of care, precautions, ADL adaptive strategies, IADL safety, proper use of assistive device/equipment, adaptive device training, energy conservation, orientation, family education, pressure relief, transfer training, discharge recommendations  Learning Assessment:  patient verbalizes understanding, would benefit from continued reinforcement, patient will require reinforcement due to cognitive deficits  Assessment  Activity Tolerance: Good with no c/o pain or fatigue  Impairments Requiring Therapeutic Intervention: decreased functional mobility, decreased ADL status, decreased ROM, decreased strength, decreased safety awareness, decreased cognition, decreased endurance, decreased sensation, decreased balance, decreased IADL, decreased fine motor control, decreased coordination, increased pain, decreased posture  Prognosis: good  Clinical Assessment: Pt is progressing well at this time as he met 2/7 STG at this time. Pt with poor carryover of learned information from previous sessions, especially sequencing one handed dressing tasks and hand placement for transfers.  Recommend continued reinforcement of previously learned information d/t deficits in cognition. HEP was provided for one handed dressing techniques to reinforce newly learned information. Pt demonstrated difficultly with organizing available movement (instructions/directions) into functional motor sequencing. Continue POC. Safety Interventions: patient left in chair, chair alarm in place, call light within reach, patient at risk for falls, telesitter in use, and family/caregiver present    Plan  Frequency: 5 x/week, 60 min/day  Current Treatment Recommendations: strengthening, ROM, balance training, functional mobility training, transfer training, stair training, endurance training, neuromuscular re-education, wheelchair mobility training, modalities, patient/caregiver education, ADL/self-care training, IADL training, home management training, cognitive reorientation, home exercise program, safety education, equipment evaluation/education, and positioning  Goals  Patient Goals: \"to get this working\" in re to LUE   Short Term Goals:  Time Frame: in 10 days  Patient will complete upper body bathing at min A while seated. Patient will complete lower body bathing at min A while seated and standing as needed. Patient will complete upper body dressing at min A. -MET 2/16/23  Patient will complete lower body dressing at mod A using AE as needed. -MET 2/16/23  Patient will complete toileting at min A with GB as needed. Patient will complete a toilet transfer with min A with GB as needed. -MET 2/17/23  Patient will complete a visual assessment to complete occupational profile. Long Term Goals:  Time Frame: in 21 days  Patient will complete upper body bathing at supervision while seated. Patient will complete lower body bathing at supervision while seated and standing as needed. Patient will complete upper body dressing at set-up assistance. Patient will complete lower body dressing at supervision using AE as needed.    Patient will complete toileting at supervision with GB as needed. Patient will complete grooming at supervision while in stance. Patient will complete functional transfers at supervision using LRAD. Patient will complete functional mobility at supervision using LRAD. Patient will increase functional sitting balance to mod I for improved ADL completion. Patient will increase functional standing balance to SUP for improved ADL completion.      No LTG met 2/17    Therapy Session Time  First Session Therapy Time:   Individual Group Co-treatment   Time In 830     Time Out 938     Minutes 68        Second Session Therapy Time:   Individual Group Co-treatment   Time In   1355   Time Out   1428   Minutes   33     Timed Code Treatment Minutes:  57+04    Total Treatment Minutes:  101       Electronically Signed By: OLGA Lazo/MIKE, [id number]

## 2023-02-17 NOTE — PROGRESS NOTES
[facility]   Phone: [phone]    Physical Therapy    [] Initial Evaluation            [x] Daily Treatment Note         [] Discharge Summary      Patient: Darien Herron   : 1955   MRN: [id number]   Date of Service:  2023  Admitting Diagnosis: Acute ischemic stroke [facility])  Current Admission Summary: 68-year-old male who was admitted on  with left-sided weakness for greater than 2 days. CT of the head with suggestion of possible NPH. CTA with less than 50% stenosis of the right ICA. MRI of the brain revealed subacute ischemia on the right basal ganglia. Echo with normal EF and negative bubble study. Neurology evaluated and suggested aspirin and Plavix for 21 days then aspirin only in addition to Lipitor. He was evaluated by therapy and suggested to continue in an inpatient setting prior to returning home. He reports no BM for multiple days. Past Medical History:  has a past medical history of CVA (cerebral vascular accident) ([address] and HTN (hypertension). Past Surgical History:  has a past surgical history that includes Finger surgery (Right); Hernia repair; fracture surgery; and hernia repair.   Discharge Recommendations: TBD pending progress  DME Required For Discharge: DME to be determined pending patient progress  Precautions/Restrictions: high fall risk, (L) hemiparetic shoulder  Weight Bearing Restrictions: no restrictions  [] Right Upper Extremity  [] Left Upper Extremity [] Right Lower Extremity  [] Left Lower Extremity     Required Braces/Orthotics: no braces required   [] Right  [] Left  Positional Restrictions:no positional restrictions    Pre-Admission Information   Lives With: brother April Childers)                Type of Home: house  Home Layout: two level, bedroom in the basement, 10 steps with 1 handrail on the L  Home Access:  2 step to enter without rails   Bathroom Layout: tub/shower unit  Bathroom Equipment: grab bars in shower  Toilet Height: elevated height  Home Equipment: no prior equipment  Transfer Assistance: Independent without use of device  Ambulation Assistance:Independent without use of device  ADL Assistance: independent with all ADL's   IADL Assistance: independent with homemaking tasks  Active :        [x] Yes                 [] 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 Occupation: sales  Hobbies: fishing   Recent Falls: chart reports had several falls that caused this admission (about 5 falls)     Examination   Vision:   Vision Corrective Device: wears glasses for distance  Hearing:   [place]      Subjective  General:Pt seated in chair on arrival. Agreeable to PT and without complaints. Pain: 0/10  Pain Interventions: not applicable       Functional Mobility  Bed Mobility  Bed mobility not completed on this date. Comments:   Transfers  Sit to stand transfer: minimal assistance  Stand to sit transfer: minimal assistance  Stand pivot transfer: minimal assistance, moderate assistance - mod A stand pivot to L, min A to R  Comments: Patient presents with significant (L) lateral lean during initial stance and during transfers requiring VC and TC for correction of upright posture.    Ambulation   Surface:level surface  Assistive Device: retana rail  Assistance: minimal assistance  Distance: 25'   Gait Mechanics: (L) lateral lean with decreased step length on the (L) and decreased (L) knee extension during stance phase  Comments: VC/TC to weight shift (R) and for correct sequenicng  Ambulation Trial 2  Surface:level surface  Assistive Device: hand-held assist  Other Appliance: givmohr sling  Assistance: moderate assistance  Distance: 140 ft  Gait Mechanics: variable step length and level of foot clearance, tactile cues for R weight shift during L swing, responded well to the cue \"kick your L foot out there\" which improved step length, height, and heel strike, but when distracted reverts to short L step length and height. Comments:     Stair Mobility   Number of Steps: 4  Step Height: 4 inch  Hand Rails: (B) handrail  Device: no device  Other Appliance: givmohr sling  Assistance: maximum assistance  Comments:  mod A ascending, max A descending. Cues to \"put your R hip on the rail\" which improved R weight shift to allow for LLE to descend. Cues to place whole L foot on stair while ascending; cues for step sequencing  Wheelchair Mobility:  Chair: manual  Surface: level surface  Method: (R) UE  Distance: 80 ft  Assistance: moderate assistance  Comments:   Balance  Static Sitting Balance: poor (+): requires min (A) to maintain balance  Dynamic Sitting Balance: poor (+): requires min (A) to maintain balance  Static Standing Balance: poor (+): requires min (A) to maintain balance  Dynamic Standing Balance: poor: requires mod (A) to maintain balance  Comments:    Other Therapeutic Interventions   First Session: See functional mobility noted above. Pt completed static standing at ballet bar for a total of 3 minutes, using mirror for visual feedback and verbal/tactile cues to maintain midline. Completed with and without UE support, CGA to mod A. Pt completed reaching with cane in R UE to tap colored dots on the floor on R side to promote R weight shift x 2 min. Completed RUE reaching to light pods placed on wall on R side to also promote R weight shift, requiring min A. Pt completed 4 inch step ups x 10 RLE (min A), x 5 LLE with increased time and effort to place whole foot on step and difficulty returning L foot to floor. Completed lateral stepping at ballet bar to R /L x 10 ft bilat with min A to R and max A to L to promote lateral weight shifting    Second Session: Pt in w/c on arrival. Pt completed 6 ft ambulation with HHA to kitchen counter mod A.  Completed standing balance activity searching for cones in kitchen, shifting weight to retrieve cones placed in various places for 7 cones. Pt required increased time to recall where cones were located, often looking in the same place multiple times. Pt required varying assist levels for standing balance, from min to max A x 1 + min A x 1 when very distracted. Pt ambulated back to chair mod A x 1 but requiring max A x 1 + mod A x 1 for 180 degree turns. Pt repeated ambulation with HHA mod A to kitchen counter, and made tea with OT instructions. Pt required verbal cues for upright posture and intermittent L knee block. Pt standing for ~5 minutes with varying levels of assist for min A to max A x 1 + min A x 1. Pt ambulated back to chair mod A with HHA with max A + min A x 1 for 180 degree turns. Pt completed 3rd bout of ambulation and standing at sink for 3-4 min for washing cup, same levels of assist as noted previously. Pt completed stand pivot back to chair min A to R.         Functional Outcomes                 Cognition  Overall Cognitive Status: Impaired  Arousal/Alterness: appropriate responses to stimuli  Following Commands: follows one step commands with repetition, follows one step commands with increased time  Attention Span: difficulty attending to directions  Memory: decreased recall of biographical information, decreased short term memory  Safety Judgement: decreased awareness of need for safety  Problem Solving: assistance required to generate solutions, assistance required to implement solutions  Insights: decreased awareness of deficits  Initiation: requires cues for all  Sequencing: requires cues for all  Orientation:    oriented to person, oriented to place, oriented to situation, and disoriented to time   Command Following:   impaired    Education  Barriers To Learning: cognition, emotional, and physical  Patient Education: patient educated on goals, PT role and benefits, plan of care, general safety, functional mobility training, proper use of assistive device/equipment, disease specific education, transfer training  Learning Assessment:  patient verbalizes understanding, would benefit from continued reinforcement    Assessment  Activity Tolerance: Good. Seated rest breaks provided for recovery. Impairments Requiring Therapeutic Intervention: decreased functional mobility, decreased ADL status, decreased ROM, decreased strength, decreased safety awareness, decreased cognition, decreased endurance, decreased balance, decreased coordination, decreased posture  Prognosis: fair  Clinical Assessment: Pt continues to demonstrate progress with gait, intermittently demonstrating improved L step length and increased ambulation distance. Pt continues to demonstrate L trunk lean which worsens when distracted. Pt can self correct with verbal and visual cues at ballet bar when concentrating on the activity. Pt was able to execute stairs today with 1 person assist, which is an improvement from last stair attempt. Pt would benefit from continued skilled PT to promote functional independence. Safety Interventions: patient left in chair, chair alarm in place, call light within reach, gait belt, patient at risk for falls, telesitter in use, and fall mats at bedside    Plan  Frequency: 5 x/week, 60 min/day  Current Treatment Recommendations: strengthening, ROM, balance training, functional mobility training, transfer training, gait training, stair training, endurance training, neuromuscular re-education, wheelchair mobility training, modalities, patient/caregiver education, ADL/self-care training, cognitive/perceptual training, and safety education    Goals  Patient Goals:  To return home and be able to move his (L) UE better  Short Term Goals:  Time Frame: 1-2 weeks  Patient will complete bed mobility at [facility]   Patient will complete transfers at mod (A) of 1 - goal met 2/15/2023  Patient will ambulate 25 ft with use of LRAD at mod (A) of 1  Patient will ascend/descend 1 stairs with (B) handrail at mod (A) of 1  Patient will complete manual w/c propulsion 50 ft at [facility]  Long Term Goals:  Time Frame: 3 weeks  Patient will complete bed mobility at modified independent   Patient will complete transfers at [facility]   Patient will ambulate 50 ft with use of LRAD at [facility]  Patient will ascend/descend 10 stairs with (L) ascending handrail at [facility]  Patient will complete car transfer at [facility]     Therapy Session Time      Individual Group Co-treatment   Time In 945       Time Out 1045       Minutes 60         Second Session:   Individual Group Co-treatment   Time In      1355   Time Out      1428   Minutes     33     Timed Code Treatment Minutes:  60 + 33  Total Treatment Minutes:  93 minutes        Electronically Signed By:   Scott Parker, PT DPT [id number]

## 2023-02-17 NOTE — CARE COORDINATION
SW/CM spoke with patient's brother, Kolton Hunter ([phone]) regarding patient's discharge plan. Chuck Marcial stating that the family would like patient to discharge to home when he is medically stable. Chuck Marcial stating that family will be able to provide patient support/care. Patient's brother Chuck Marcial stated that patient has a stair lift in the home and family would be able to use to assist with patient's care. Family would also like [address] [place] [address] for patient. SW/CM discussed SNF placement with patient's brother. Brother stating that if patient needed SNF short-term that family would probably be okay. SW/CM suggested that family speak with the patient and find out what patient would like to do at discharge. Family will speak with the patient regarding his discharge plan prior to Team Conference on 2/23/2023/    SW will continue to follow.     Electronically signed by MILEY Spencer on 2/17/2023 at 12:26 PM

## 2023-02-18 PROCEDURE — 1280000000 HC REHAB R&B

## 2023-02-18 PROCEDURE — 6370000000 HC RX 637 (ALT 250 FOR IP): Performed by: PHYSICAL MEDICINE & REHABILITATION

## 2023-02-18 PROCEDURE — 51798 US URINE CAPACITY MEASURE: CPT

## 2023-02-18 PROCEDURE — 6360000002 HC RX W HCPCS: Performed by: PHYSICAL MEDICINE & REHABILITATION

## 2023-02-18 RX ADMIN — ASPIRIN 81 MG 81 MG: 81 TABLET ORAL at 08:28

## 2023-02-18 RX ADMIN — STANDARDIZED SENNA CONCENTRATE AND DOCUSATE SODIUM 2 TABLET: 8.6; 5 TABLET ORAL at 20:33

## 2023-02-18 RX ADMIN — LISINOPRIL 5 MG: 5 TABLET ORAL at 08:28

## 2023-02-18 RX ADMIN — STANDARDIZED SENNA CONCENTRATE AND DOCUSATE SODIUM 2 TABLET: 8.6; 5 TABLET ORAL at 08:27

## 2023-02-18 RX ADMIN — ENOXAPARIN SODIUM 40 MG: 100 INJECTION SUBCUTANEOUS at 08:28

## 2023-02-18 RX ADMIN — CLOPIDOGREL BISULFATE 75 MG: 75 TABLET ORAL at 08:28

## 2023-02-18 RX ADMIN — ATORVASTATIN CALCIUM 40 MG: 40 TABLET, FILM COATED ORAL at 20:33

## 2023-02-18 RX ADMIN — TRAZODONE HYDROCHLORIDE 50 MG: 50 TABLET ORAL at 20:33

## 2023-02-18 RX ADMIN — SERTRALINE 25 MG: 50 TABLET, FILM COATED ORAL at 08:28

## 2023-02-18 ASSESSMENT — PAIN SCALES - GENERAL
PAINLEVEL_OUTOF10: 0
PAINLEVEL_OUTOF10: 0

## 2023-02-18 NOTE — PROGRESS NOTES
Jayden Becerra  2/18/2023  [id number]    Chief Complaint: Acute ischemic stroke [facility])    Subjective:   No overnight events. Resting in bed without complaint. ROS: No CP, SOB, dyspnea    Objective:  Patient Vitals for the past 24 hrs:   BP Temp Temp src Pulse Resp SpO2   02/18/23 0823 125/65 97.8 °F (36.6 °C) Oral 60 16 98 %   02/17/23 2209 128/74 97.8 °F (36.6 °C) Oral 71 16 97 %       Gen: No distress, pleasant. Resting in bed  HEENT: Normocephalic, atraumatic. CV: Regular rate and rhythm. No MRG   Resp: No respiratory distress. CTAB   Abd: Soft, nontender nondistended  Ext: No edema. Neuro: Alert, oriented, appropriately interactive. Left hemiparesis LUE 0/4. Negative gilliam    Laboratory data: Available via EMR. Therapy progress:    PT    Supine to Sit: Partial/moderate assistance  Sit to Supine: Dependent   Sit to Stand: Partial/moderate assistance  Chair/Bed to Chair Transfer: Partial/moderate assistance  Car Transfer:    Ambulation 10 ft: Partial/moderate assistance  Ambulation 50 ft: Partial/moderate assistance  Ambulation 150 ft:    Stairs - 1 Step: Substantial/maximal assistance  Stairs - 4 Step: Substantial/maximal assistance  Stairs - 12 Step:      OT    Eating: Setup or clean-up assistance  Oral Hygiene: Supervision or touching assistance  Bathing: Substantial/maximal assistance  Upper Body Dressing: Partial/moderate assistance  Lower Body Dressing: Partial/moderate assistance  Toilet Transfer: Partial/moderate assistance  Toilet Hygiene: Partial/moderate assistance    Speech Therapy    Pt presents with moderate L perioral weakness resulting in dysarthria and oropharyngeal dysphagia. Instrumental assessment (FEES) completed 2/10/22, see report for assessment summary. Pt with limited carry over of dysphagia recommendations and safe swallow strategies.  Pt also presents with moderate cognitive-linguistic deficits characterized by deficits in orientation, attention, executive functioning, short-term memory, and visuospatial functioning. Recommend SLP intervention for safe return to prior level of independence. Body mass index is 21.9 kg/m². Assessment:  Patient Active Problem List   Diagnosis    Acute ischemic stroke [facility])    Expressive aphasia    Dysarthria    Ataxia    Primary hypertension    Tobacco abuse    Noncompliance    Left hemiparesis ([facility] [address]       Plan:   Acute right basal ganglia infarct: DAPT for 21 days (2/25) , Lipitor. PT/OT/SLP. Started SSRI for motor recovery     Dysphagia: Dysphagia diet, SLP     HTN: started lisinopril 5     Tobacco use: education provided. No supplement currently    Meatus trauma: held lovenox. Improving. UA without significant findings for infection     Bowels: Per protocol   Bladder: Per protocol   Sleep: Trazodone scheduled  Pain: Tylenol, tramadol as needed  DVT PPx: Lovenox  SOPHIA: 3/2    Comfort Gallegos MD  2/18/2023, 11:05 AM    * This document was created using dictation software. While all precautions were taken to ensure accuracy, errors may have occurred. Please disregard any typographical errors.

## 2023-02-18 NOTE — PROGRESS NOTES
Assessment completed. Patient in bed, alert and oriented x 4 and able to make his needs known. Slurred speech at times. Denies any pain. Uses stedy w/assist x 2 for mobility in room. Ate about 50% of breakfast. Incontinent of bladder. Medications administered as ordered. POC and education reviewed with patient and mutually agreed upon. All needs met at this time. Call light in reach. Will continue to monitor.

## 2023-02-18 NOTE — PLAN OF CARE
Problem: Discharge Planning  Goal: Discharge to home or other facility with appropriate resources  Outcome: Progressing  Flowsheets (Taken 2/17/2023 2209)  Discharge to home or other facility with appropriate resources: Identify barriers to discharge with patient and caregiver     Problem: Safety - Adult  Goal: Free from fall injury  2/17/2023 2337 by Flaquito Rivera RN  Outcome: Progressing  Flowsheets (Taken 2/17/2023 2335)  Free From Fall Injury: Instruct family/caregiver on patient safety  2/17/2023 1250 by Beatriz Valdez RN  Flowsheets (Taken 2/17/2023 1250)  Free From Fall Injury: Instruct family/caregiver on patient safety  2/17/2023 1249 by Beatriz Valdez RN  Flowsheets (Taken 2/14/2023 0413 by Dylan Brower RN)  Free From Fall Injury: Instruct family/caregiver on patient safety     Problem: Pain  Goal: Verbalizes/displays adequate comfort level or baseline comfort level  2/17/2023 2337 by Flaquito Rivera RN  Outcome: Progressing  Flowsheets (Taken 2/17/2023 2209)  Verbalizes/displays adequate comfort level or baseline comfort level: Encourage patient to monitor pain and request assistance  2/17/2023 1250 by Beatriz Valdez RN  Flowsheets (Taken 2/17/2023 1250)  Verbalizes/displays adequate comfort level or baseline comfort level: Encourage patient to monitor pain and request assistance  2/17/2023 1249 by Beatriz Valdez RN  Flowsheets (Taken 2/17/2023 1249)  Verbalizes/displays adequate comfort level or baseline comfort level: Encourage patient to monitor pain and request assistance     Problem: Nutrition Deficit:  Goal: Optimize nutritional status  2/17/2023 2337 by Flaquito Rivera RN  Outcome: Progressing  2/17/2023 1250 by Beatriz Valdez RN  Flowsheets (Taken 2/17/2023 1249)  Nutrient intake appropriate for improving, restoring, or maintaining nutritional needs: Assess nutritional status and recommend course of action  2/17/2023 1249 by Beatriz Valdez RN  Flowsheets (Taken 2/17/2023 0231)  Nutrient intake appropriate for improving, restoring, or maintaining nutritional needs: Assess nutritional status and recommend course of action     Problem: ABCDS Injury Assessment  Goal: Absence of physical injury  Outcome: Progressing  Flowsheets (Taken 2/17/2023 8934)  Absence of Physical Injury: Implement safety measures based on patient assessment

## 2023-02-18 NOTE — FLOWSHEET NOTE
02/17/23 2217   Output (mL)   Urine 200 mL   Urine Assessment   Urinary Status Voiding   Urinary Incontinence Absent   Urine Color Beti   Urine Appearance Clear   Urine Odor No odor   $ Bladder scan $ Yes   Post Void Cath Residual (mL) 68   Per day shift RN patient having difficulty urinating throughout the day. Patient states it is difficult to urinate with an audience. Patient was able to void using urinal- see documentation. Post void bladder scan 68 ml.

## 2023-02-18 NOTE — PLAN OF CARE
Problem: Discharge Planning  Goal: Discharge to home or other facility with appropriate resources  2/18/2023 1044 by Ela Ferrell RN  Outcome: Progressing  2/17/2023 2337 by Sue Sprague RN  Outcome: Progressing  Flowsheets (Taken 2/17/2023 2209)  Discharge to home or other facility with appropriate resources: Identify barriers to discharge with patient and caregiver     Problem: Skin/Tissue Integrity  Goal: Absence of new skin breakdown  Description: 1. Monitor for areas of redness and/or skin breakdown  2. Assess vascular access sites hourly  3. Every 4-6 hours minimum:  Change oxygen saturation probe site  4. Every 4-6 hours:  If on nasal continuous positive airway pressure, respiratory therapy assess nares and determine need for appliance change or resting period.   2/18/2023 1044 by Ela Ferrell RN  Outcome: Progressing  2/17/2023 2337 by Sue Sprague RN  Outcome: Progressing     Problem: Safety - Adult  Goal: Free from fall injury  2/18/2023 1044 by Ela Ferrell RN  Outcome: Progressing  2/17/2023 2337 by Sue Sprague RN  Outcome: Progressing  Flowsheets (Taken 2/17/2023 2335)  Free From Fall Injury: Instruct family/caregiver on patient safety     Problem: Pain  Goal: Verbalizes/displays adequate comfort level or baseline comfort level  2/18/2023 1044 by Ela Ferrell RN  Outcome: Progressing  2/17/2023 2337 by Sue Sprague RN  Outcome: Progressing  Flowsheets (Taken 2/17/2023 2209)  Verbalizes/displays adequate comfort level or baseline comfort level: Encourage patient to monitor pain and request assistance     Problem: Nutrition Deficit:  Goal: Optimize nutritional status  2/18/2023 1044 by Ela Ferrell RN  Outcome: Progressing  2/17/2023 2337 by Sue Sprague RN  Outcome: Progressing     Problem: ABCDS Injury Assessment  Goal: Absence of physical injury  2/18/2023 1044 by Ela Ferrell RN  Outcome: Progressing  2/17/2023 2337 by Adonay Shell Letty Cortez RN  Outcome: Progressing  Flowsheets (Taken 2/17/2023 1902)  Absence of Physical Injury: Implement safety measures based on patient assessment

## 2023-02-19 VITALS
TEMPERATURE: 97.7 F | HEART RATE: 56 BPM | DIASTOLIC BLOOD PRESSURE: 61 MMHG | SYSTOLIC BLOOD PRESSURE: 117 MMHG | BODY MASS INDEX: 21.97 KG/M2 | WEIGHT: 148.3 LBS | RESPIRATION RATE: 18 BRPM | HEIGHT: 69 IN | OXYGEN SATURATION: 92 %

## 2023-02-19 PROCEDURE — 6370000000 HC RX 637 (ALT 250 FOR IP): Performed by: PHYSICAL MEDICINE & REHABILITATION

## 2023-02-19 PROCEDURE — 6360000002 HC RX W HCPCS: Performed by: PHYSICAL MEDICINE & REHABILITATION

## 2023-02-19 PROCEDURE — 51701 INSERT BLADDER CATHETER: CPT

## 2023-02-19 PROCEDURE — 51798 US URINE CAPACITY MEASURE: CPT

## 2023-02-19 PROCEDURE — 1280000000 HC REHAB R&B

## 2023-02-19 RX ORDER — TAMSULOSIN HYDROCHLORIDE 0.4 MG/1
0.4 CAPSULE ORAL DAILY
Status: DISCONTINUED | OUTPATIENT
Start: 2023-02-19 | End: 2023-03-02 | Stop reason: HOSPADM

## 2023-02-19 RX ADMIN — CLOPIDOGREL BISULFATE 75 MG: 75 TABLET ORAL at 09:38

## 2023-02-19 RX ADMIN — SERTRALINE 25 MG: 50 TABLET, FILM COATED ORAL at 09:39

## 2023-02-19 RX ADMIN — STANDARDIZED SENNA CONCENTRATE AND DOCUSATE SODIUM 2 TABLET: 8.6; 5 TABLET ORAL at 09:38

## 2023-02-19 RX ADMIN — TAMSULOSIN HYDROCHLORIDE 0.4 MG: 0.4 CAPSULE ORAL at 13:19

## 2023-02-19 RX ADMIN — LISINOPRIL 5 MG: 5 TABLET ORAL at 09:38

## 2023-02-19 RX ADMIN — TRAZODONE HYDROCHLORIDE 50 MG: 50 TABLET ORAL at 20:30

## 2023-02-19 RX ADMIN — ENOXAPARIN SODIUM 40 MG: 100 INJECTION SUBCUTANEOUS at 09:38

## 2023-02-19 RX ADMIN — ATORVASTATIN CALCIUM 40 MG: 40 TABLET, FILM COATED ORAL at 20:30

## 2023-02-19 RX ADMIN — ASPIRIN 81 MG 81 MG: 81 TABLET ORAL at 09:38

## 2023-02-19 RX ADMIN — STANDARDIZED SENNA CONCENTRATE AND DOCUSATE SODIUM 2 TABLET: 8.6; 5 TABLET ORAL at 20:30

## 2023-02-19 ASSESSMENT — PAIN SCALES - GENERAL: PAINLEVEL_OUTOF10: 0

## 2023-02-19 NOTE — PLAN OF CARE
Problem: Nutrition Deficit:  Goal: Optimize nutritional status  Outcome: Progressing  Flowsheets (Taken 2/19/2023 8371)  Nutrient intake appropriate for improving, restoring, or maintaining nutritional needs:   Assess nutritional status and recommend course of action   Recommend appropriate diets, oral nutritional supplements, and vitamin/mineral supplements   Order, calculate, and assess calorie counts as needed   Recommend, monitor, and adjust tube feedings and TPN/PPN based on assessed needs   Provide specific nutrition education to patient or family as appropriate     Problem: Skin/Tissue Integrity  Goal: Absence of new skin breakdown  Description: 1. Monitor for areas of redness and/or skin breakdown  2. Assess vascular access sites hourly  3. Every 4-6 hours minimum:  Change oxygen saturation probe site  4. Every 4-6 hours:  If on nasal continuous positive airway pressure, respiratory therapy assess nares and determine need for appliance change or resting period.   Outcome: Progressing

## 2023-02-19 NOTE — PROGRESS NOTES
Nasim Alarcon  2/19/2023  [id number]    Chief Complaint: Acute ischemic stroke [facility])    Subjective:   Retaining urine; no other c/o. ROS: No CP, SOB, dyspnea    Objective:  Patient Vitals for the past 24 hrs:   BP Temp Temp src Pulse Resp SpO2   02/19/23 0930 137/85 97.7 °F (36.5 °C) Oral 63 16 95 %   02/18/23 2031 128/76 97.9 °F (36.6 °C) Oral 71 16 98 %       Gen: No distress, pleasant. Resting in bed  HEENT: Normocephalic, atraumatic. CV: Regular rate and rhythm. No MRG   Resp: No respiratory distress. CTAB   Abd: Soft, nontender nondistended  Ext: No edema. Neuro: Alert, oriented, appropriately interactive. Left hemiparesis LUE 0/4. Negative gilliam    Laboratory data: Available via EMR. Therapy progress:    PT    Supine to Sit: Partial/moderate assistance  Sit to Supine: Dependent   Sit to Stand: Partial/moderate assistance  Chair/Bed to Chair Transfer: Partial/moderate assistance  Car Transfer:    Ambulation 10 ft: Partial/moderate assistance  Ambulation 50 ft: Partial/moderate assistance  Ambulation 150 ft:    Stairs - 1 Step: Substantial/maximal assistance  Stairs - 4 Step: Substantial/maximal assistance  Stairs - 12 Step:      OT    Eating: Setup or clean-up assistance  Oral Hygiene: Supervision or touching assistance  Bathing: Substantial/maximal assistance  Upper Body Dressing: Partial/moderate assistance  Lower Body Dressing: Partial/moderate assistance  Toilet Transfer: Partial/moderate assistance  Toilet Hygiene: Partial/moderate assistance    Speech Therapy    Pt presents with moderate L perioral weakness resulting in dysarthria and oropharyngeal dysphagia. Instrumental assessment (FEES) completed 2/10/22, see report for assessment summary. Pt with limited carry over of dysphagia recommendations and safe swallow strategies.  Pt also presents with moderate cognitive-linguistic deficits characterized by deficits in orientation, attention, executive functioning, short-term memory, and visuospatial functioning. Recommend SLP intervention for safe return to prior level of independence. Body mass index is 21.9 kg/m². Assessment:  Patient Active Problem List   Diagnosis    Acute ischemic stroke [facility])    Expressive aphasia    Dysarthria    Ataxia    Primary hypertension    Tobacco abuse    Noncompliance    Left hemiparesis ([facility] [address]       Plan:   Acute right basal ganglia infarct: DAPT for 21 days (2/25) , Lipitor. PT/OT/SLP. Started SSRI for motor recovery     Dysphagia: Dysphagia diet, SLP     HTN: started lisinopril 5     Tobacco use: education provided. No supplement currently    Meatus trauma: held lovenox. Improving. UA without significant findings for infection     Bowels: Per protocol   Bladder: Per protocol; start flomax  Sleep: Trazodone scheduled  Pain: Tylenol, tramadol as needed  DVT PPx: Lovenox  SOPHIA: 3/2    Comfort Gallegos MD  2/19/2023, 10:36 AM    * This document was created using dictation software. While all precautions were taken to ensure accuracy, errors may have occurred. Please disregard any typographical errors.

## 2023-02-19 NOTE — PLAN OF CARE
Problem: Discharge Planning  Goal: Discharge to home or other facility with appropriate resources  2/18/2023 2041 by Tyler Shirley RN  Outcome: Progressing  Flowsheets (Taken 2/18/2023 2037)  Discharge to home or other facility with appropriate resources: Identify barriers to discharge with patient and caregiver  2/18/2023 1044 by Jaswinder Tavarez RN  Outcome: Progressing     Problem: Skin/Tissue Integrity  Goal: Absence of new skin breakdown  Description: 1. Monitor for areas of redness and/or skin breakdown  2. Assess vascular access sites hourly  3. Every 4-6 hours minimum:  Change oxygen saturation probe site  4. Every 4-6 hours:  If on nasal continuous positive airway pressure, respiratory therapy assess nares and determine need for appliance change or resting period.   2/18/2023 2041 by Tyler Shirley RN  Outcome: Progressing  2/18/2023 1044 by Jaswinder Tavarez RN  Outcome: Progressing     Problem: Safety - Adult  Goal: Free from fall injury  2/18/2023 2041 by Tyler Shirley RN  Outcome: Baltazar Rainey (Taken 2/18/2023 2040)  Free From Fall Injury: Instruct family/caregiver on patient safety  2/18/2023 1044 by Jaswinder Tavarez RN  Outcome: Progressing     Problem: Pain  Goal: Verbalizes/displays adequate comfort level or baseline comfort level  2/18/2023 2041 by Tyler Shirley RN  Outcome: Progressing  Flowsheets (Taken 2/18/2023 2031)  Verbalizes/displays adequate comfort level or baseline comfort level: Encourage patient to monitor pain and request assistance  2/18/2023 1044 by Jaswinder Tavarez RN  Outcome: Progressing     Problem: Nutrition Deficit:  Goal: Optimize nutritional status  2/18/2023 2041 by Tyler Shirley RN  Outcome: Progressing  2/18/2023 1044 by Jaswinder Tavarez RN  Outcome: Progressing     Problem: ABCDS Injury Assessment  Goal: Absence of physical injury  2/18/2023 2041 by Tyler Shirley RN  Outcome: Progressing  Flowsheets (Taken 2/18/2023 2040)  Absence of Physical Injury: Implement safety measures based on patient assessment  2/18/2023 1044 by Keith Garnett RN  Outcome: Progressing

## 2023-02-19 NOTE — ACP (ADVANCE CARE PLANNING)
Advance Care Planning     Advance Care Planning Inpatient Note  [facility] Department    Today's Date: 2/19/2023  Unit: [facility] ACUTE REHAB UNIT    Received request from IDT Member, Sha Smith RN. Upon review of chart and communication with care team, patient's decision making abilities are not in question. . Patient and Spouse was/were present in the room during visit. Goals of ACP Conversation:  Discuss advance care planning documents    Health Care Decision Makers:       Primary Decision Maker: Pat Schmitz - Brother/Sister - [phone]    Secondary Decision Maker: Tiffani Jain - Other - [phone]  Summary:  No Decision Maker named by patient at this time  No ACP documents noted  Advance Care Planning Documents (Patient Wishes):  None     Assessment:  Pt requested to review AD documents. Pt was given blank copies of POA and LW documents to review and complete when appropriate. Pt was instructed to notify the nurse or contact [address] to witness pt sign completed POA/LW documents. Provided spiritual and emotional support through active listening, affirmed feelings,, thoughts, concerns, and pastoral presence. Pt expressed hope and gratitude. Interventions:  Provided education on documents for clarity and greater understanding  Discussed and provided education on state decision maker hierarchy    Care Preferences Communicated:   No    Outcomes/Plan:  ACP Discussion: Postponed. Pt will notify the nurse when appropriate.     Electronically signed by Claudia Hearn on 2/19/2023 at 1:58 PM

## 2023-02-19 NOTE — FLOWSHEET NOTE
02/19/23 0537   Urine Assessment   Urinary Status Retention   Urinary Incontinence Absent   Urine Color Beti   Urine Appearance Clear   Urine Odor No odor   Bladder Scan Volume (mL) 310 mL   $ Bladder scan $ Yes   Intermittent/Straight Cath (mL) 275 mL   $ Cath urethra straight $ Yes   Post Void Cath Residual (mL) 46    Intermittent straight cath completed per orders. Patient had to take several deep breathes to relax abdominal muscles for catheter advancement. Upon removal of catheter noted the tip had scant amount of blood. No bleeding noted from meatus. Patient denies any discomfort. States he feels better. Post void bladder scan 46ml.

## 2023-02-19 NOTE — FLOWSHEET NOTE
02/19/23 0253   Urine Assessment   Bladder Scan Volume (mL) 256 mL   $ Bladder scan $ Yes   Patient has drank a total of 480ml since start of shift. Has frequent c/o of need to urinate however unable to produce any urine. He denies any cramping. Lower abdomen is slightly distended. Bladder scan shows 256 ml. Upon review of medical history patient was taking Flomax in 2017 however he cannot recall why. Message left for weekend physician. Assisted patient to reposition to right side and placed urinal for patient. Patient verbalizes he will call rn when he is done. Bed in lowest position. Brakes locked. Call light and bedside table within reach. Camera in room and operating.

## 2023-02-20 LAB
ANION GAP SERPL CALCULATED.3IONS-SCNC: 9 MMOL/L (ref 3–16)
BUN BLDV-MCNC: 16 MG/DL (ref 7–20)
CALCIUM SERPL-MCNC: 9.1 MG/DL (ref 8.3–10.6)
CHLORIDE BLD-SCNC: 103 MMOL/L (ref 99–110)
CO2: 25 MMOL/L (ref 21–32)
CREAT SERPL-MCNC: 0.9 MG/DL (ref 0.8–1.3)
GFR SERPL CREATININE-BSD FRML MDRD: >60 ML/MIN/{1.73_M2}
GLUCOSE BLD-MCNC: 92 MG/DL (ref 70–99)
HCT VFR BLD CALC: 42.6 % (ref 40.5–52.5)
HEMOGLOBIN: 14.1 G/DL (ref 13.5–17.5)
MCH RBC QN AUTO: 33.1 PG (ref 26–34)
MCHC RBC AUTO-ENTMCNC: 33.1 G/DL (ref 31–36)
MCV RBC AUTO: 100.2 FL (ref 80–100)
PDW BLD-RTO: 13 % (ref 12.4–15.4)
PLATELET # BLD: 282 K/UL (ref 135–450)
PMV BLD AUTO: 8.2 FL (ref 5–10.5)
POTASSIUM SERPL-SCNC: 4.3 MMOL/L (ref 3.5–5.1)
RBC # BLD: 4.25 M/UL (ref 4.2–5.9)
SODIUM BLD-SCNC: 137 MMOL/L (ref 136–145)
WBC # BLD: 6.9 K/UL (ref 4–11)

## 2023-02-20 PROCEDURE — 80048 BASIC METABOLIC PNL TOTAL CA: CPT

## 2023-02-20 PROCEDURE — 6370000000 HC RX 637 (ALT 250 FOR IP): Performed by: PHYSICAL MEDICINE & REHABILITATION

## 2023-02-20 PROCEDURE — 97116 GAIT TRAINING THERAPY: CPT

## 2023-02-20 PROCEDURE — 92526 ORAL FUNCTION THERAPY: CPT

## 2023-02-20 PROCEDURE — 94760 N-INVAS EAR/PLS OXIMETRY 1: CPT

## 2023-02-20 PROCEDURE — 97535 SELF CARE MNGMENT TRAINING: CPT

## 2023-02-20 PROCEDURE — 97110 THERAPEUTIC EXERCISES: CPT

## 2023-02-20 PROCEDURE — 97129 THER IVNTJ 1ST 15 MIN: CPT

## 2023-02-20 PROCEDURE — 36415 COLL VENOUS BLD VENIPUNCTURE: CPT

## 2023-02-20 PROCEDURE — 97530 THERAPEUTIC ACTIVITIES: CPT

## 2023-02-20 PROCEDURE — 1280000000 HC REHAB R&B

## 2023-02-20 PROCEDURE — 6360000002 HC RX W HCPCS: Performed by: PHYSICAL MEDICINE & REHABILITATION

## 2023-02-20 PROCEDURE — 97130 THER IVNTJ EA ADDL 15 MIN: CPT

## 2023-02-20 PROCEDURE — 85027 COMPLETE CBC AUTOMATED: CPT

## 2023-02-20 PROCEDURE — 97112 NEUROMUSCULAR REEDUCATION: CPT

## 2023-02-20 PROCEDURE — 51798 US URINE CAPACITY MEASURE: CPT

## 2023-02-20 RX ADMIN — LISINOPRIL 5 MG: 5 TABLET ORAL at 08:55

## 2023-02-20 RX ADMIN — TRAZODONE HYDROCHLORIDE 50 MG: 50 TABLET ORAL at 20:29

## 2023-02-20 RX ADMIN — TAMSULOSIN HYDROCHLORIDE 0.4 MG: 0.4 CAPSULE ORAL at 08:54

## 2023-02-20 RX ADMIN — CLOPIDOGREL BISULFATE 75 MG: 75 TABLET ORAL at 08:54

## 2023-02-20 RX ADMIN — ENOXAPARIN SODIUM 40 MG: 100 INJECTION SUBCUTANEOUS at 08:55

## 2023-02-20 RX ADMIN — STANDARDIZED SENNA CONCENTRATE AND DOCUSATE SODIUM 2 TABLET: 8.6; 5 TABLET ORAL at 20:29

## 2023-02-20 RX ADMIN — STANDARDIZED SENNA CONCENTRATE AND DOCUSATE SODIUM 2 TABLET: 8.6; 5 TABLET ORAL at 08:54

## 2023-02-20 RX ADMIN — SERTRALINE 25 MG: 50 TABLET, FILM COATED ORAL at 08:54

## 2023-02-20 RX ADMIN — ASPIRIN 81 MG 81 MG: 81 TABLET ORAL at 08:55

## 2023-02-20 RX ADMIN — ATORVASTATIN CALCIUM 40 MG: 40 TABLET, FILM COATED ORAL at 20:29

## 2023-02-20 ASSESSMENT — PAIN SCALES - GENERAL
PAINLEVEL_OUTOF10: 0
PAINLEVEL_OUTOF10: 0

## 2023-02-20 NOTE — PROGRESS NOTES
Assessment completed. Patient sitting up in chair, alert and oriented x 4 and able to make all his needs known. Denies any pain t this time but c/o some starting issues with urination. VSS. Medications administered as ordered. POC and education reviewed with patient and mutually agreed upon. ST at side. Call light in reach. Will continue to monitor.

## 2023-02-20 NOTE — PROGRESS NOTES
[facility] [address] Department   Phone: [phone]    Physical Therapy    [] Initial Evaluation            [x] Daily Treatment Note         [] Discharge Summary      Patient: Fátima Jason   : 1955   MRN: [id number]   Date of Service:  2023  Admitting Diagnosis: Acute ischemic stroke [facility])  Current Admission Summary: 78-year-old male who was admitted on  with left-sided weakness for greater than 2 days. CT of the head with suggestion of possible NPH. CTA with less than 50% stenosis of the right ICA. MRI of the brain revealed subacute ischemia on the right basal ganglia. Echo with normal EF and negative bubble study. Neurology evaluated and suggested aspirin and Plavix for 21 days then aspirin only in addition to Lipitor. He was evaluated by therapy and suggested to continue in an inpatient setting prior to returning home. He reports no BM for multiple days. Past Medical History:  has a past medical history of CVA (cerebral vascular accident) ([address] and HTN (hypertension). Past Surgical History:  has a past surgical history that includes Finger surgery (Right); Hernia repair; fracture surgery; and hernia repair.   Discharge Recommendations: TBD pending progress  DME Required For Discharge: DME to be determined pending patient progress  Precautions/Restrictions: high fall risk, (L) hemiparetic shoulder  Weight Bearing Restrictions: no restrictions  [] Right Upper Extremity  [] Left Upper Extremity [] Right Lower Extremity  [] Left Lower Extremity     Required Braces/Orthotics: no braces required   [] Right  [] Left  Positional Restrictions:no positional restrictions    Pre-Admission Information   Lives With: brother Murtis Aschoff)                Type of Home: house  Home Layout: two level, bedroom in the basement, 10 steps with 1 handrail on the L  Home Access:  2 step to enter without rails   Bathroom Layout: tub/shower unit  Bathroom Equipment: grab bars in shower  Toilet Height: elevated height  Home Equipment: no prior equipment  Transfer Assistance: Independent without use of device  Ambulation Assistance:Independent without use of device  ADL Assistance: independent with all ADL's   IADL Assistance: independent with homemaking tasks  Active :        [x] Yes                 [] 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 Occupation: sales  Hobbies: fishing   Recent Falls: chart reports had several falls that caused this admission (about 5 falls)     Examination   Vision:   Vision Corrective Device: wears glasses for distance  Hearing:   WFL      Subjective  General:Pt seated in recliner on arrival. Agreeable to PT. Pain: 0/10  Pain Interventions: not applicable       Functional Mobility  Bed Mobility  Bed mobility not completed on this date. Comments:   Transfers  Sit to stand transfer: minimal assistance, (CGA to fixed bar support, CGA/min (A) to RW)  Stand to sit transfer: minimal assistance, (CGA to fixed bar support, CGA/min (A) from RW)  Stand step transfer: minimal assistance, w/c => recliner with use of RW    Comments: Patient presents with ongoing periods of (L) lateral lean and narrow THERESE with improved ability to self correct in response to VC/TC   Ambulation   Surface:level surface  Assistive Device: rolling walker  Other Appliance: (L), walker splint attachment  Assistance: maximum assistance, (min A for short distance progressing to max A once fatigue due to increase (L) lateral lean and reduced awareness of (L) LE positioning  Distance: 20' + 70 ft   Gait Mechanics: (L) lateral lean with inconsistent THERESE and narrow THERESE.   Reduced step length/foot clearance on the (L) and decreased (L) knee extension during stance phase  Comments: VC/TC to weight shift (R) and for correct sequenicng  Stair Mobility   Number of Steps: 7  Step Height: 4 inch and 6 inch  Hand Rails: requires (B) HR as patient utilizes unilateral (R) side HR while ascending/descending  Assistance: moderate assistance  Comments:  Reciprocal pattern utilized for facilitation of motor return. VC for sequence and positioning. Manual support provided to (L) UE during task for facilitation of WB. Wheelchair Mobility:  No w/c mobility completed on this date. Comments:   Balance  Static Sitting Balance: fair: maintains balance at CGA without use of UE support  Dynamic Sitting Balance: fair (-): maintains balance at CGA with use of UE support  Static Standing Balance: poor (+): requires min (A) to maintain balance  Dynamic Standing Balance: poor (-): requires max (A) to maintain balance  Comments: Standing balance rated with RW use. Able to maintain static and dynamic stance at CGA/[place] with use of fixed bar support    Other Therapeutic Interventions   First Session:   See above functional mobility. In addition patient completes 6\" (L) LE forward step ups 1 x 10 with (R) UE support. 6\" alternating toe taps with (R) UE support 1 x 10 ea. Lateral stepping completed 10 ft x 2 ea at ballet bar. (L) LE lateral smith step over completed with fixed UE support at ballet bar 1 x 10 with TC/VC for motor sequence/facilitation of coordinated directional movement. 3 dot targeted forward stepping with (L) LE completed with (R) UE support on ballet bar 3 x 5 with VC for increased THERESE upon return to starting position. All activities completed with fixed bar support with balance maintained at CGA/min (A), ongoing VC for awareness of postural correction. Second Session:    Pt seated in w/c  upon arrival, denies pain, agreeable to PT session. Sit <=> stand transfers completed throughout session at CGA/min (A). Stand step transfer with RW w/c <=> seated stepper at min/mod (A) of 1, VC for turning sequence and LE positioning. Seated stepper L3 x 8 min without use of (L) UE to improve strength and cardiovascular endurance.   Reduced ROM wall pushup completed 2 x 10 with positioning assistance to (L) UE, CGA for balance stabilization. Upon return to room, pt remains up in w/c with chair alarm and call light within reach. No new complaints following session. Functional Outcomes                 Cognition  Overall Cognitive Status: Impaired  Arousal/Alterness: appropriate responses to stimuli  Following Commands: follows one step commands with repetition, follows one step commands with increased time  Attention Span: difficulty attending to directions  Memory: decreased recall of biographical information, decreased short term memory  Safety Judgement: decreased awareness of need for safety  Problem Solving: assistance required to generate solutions, assistance required to implement solutions  Insights: decreased awareness of deficits  Initiation: requires cues for all  Sequencing: requires cues for all  Orientation:    oriented to person, oriented to place, oriented to situation, and disoriented to time   Command Following:   impaired    Education  Barriers To Learning: cognition, emotional, and physical  Patient Education: patient educated on goals, PT role and benefits, plan of care, general safety, functional mobility training, proper use of assistive device/equipment, disease specific education, transfer training - max education on current progress within therapy sessions secondary to patients frustration with ongoing deficits  Learning Assessment:  patient verbalizes understanding, would benefit from continued reinforcement    Assessment  Activity Tolerance: Good. Seated rest breaks provided for recovery.   Impairments Requiring Therapeutic Intervention: decreased functional mobility, decreased ADL status, decreased ROM, decreased strength, decreased safety awareness, decreased cognition, decreased endurance, decreased balance, decreased coordination, decreased posture  Prognosis: fair  Clinical Assessment: Pt continues to make consistent gains in response to therapy services including increased stability while utilizing RW for surface to surface transfers and increased ambulation distance on this date. Despite improvement, patient continues to require consistent physical assist of 1 for all functional mobility completion with ongoing lean with LOB during mobility tasks when not utilizing a fixed bar support. Pt would benefit from continued skilled PT to promote functional independence. Safety Interventions: patient left in chair, chair alarm in place, call light within reach, gait belt, patient at risk for falls, telesitter in use, and fall mats at bedside    Plan  Frequency: 5 x/week, 60 min/day  Current Treatment Recommendations: strengthening, ROM, balance training, functional mobility training, transfer training, gait training, stair training, endurance training, neuromuscular re-education, wheelchair mobility training, modalities, patient/caregiver education, ADL/self-care training, cognitive/perceptual training, and safety education    Goals  Patient Goals:  To return home and be able to move his (L) UE better  Short Term Goals:  Time Frame: 1-2 weeks  Patient will complete bed mobility at [facility]   Patient will complete transfers at mod (A) of 1 - goal met 2/15/2023  Patient will ambulate 25 ft with use of LRAD at mod (A) of 1 - goal met 2/20/2023  Patient will ascend/descend 1 stairs with (B) handrail at mod (A) of 1 - goal met 2/20/2023  Patient will complete manual w/c propulsion 50 ft at SBA  Long Term Goals:  Time Frame: 3 weeks  Patient will complete bed mobility at modified independent   Patient will complete transfers at [facility]   Patient will ambulate 50 ft with use of LRAD at [facility]  Patient will ascend/descend 10 stairs with (L) ascending handrail at [facility]  Patient will complete car transfer at [facility]     Therapy Session Time      Individual Group Co-treatment   Time In 0930       Time Out 1030 Minutes 60         Second Session:   Individual Group Co-treatment   Time In  1350       Time Out  1420       Minutes  30        Timed Code Treatment Minutes:  60 + 30  Total Treatment Minutes:  90 minutes        Electronically Signed By:   Rufus Berger PT, DPNEETA - [id number], 2/20/2023 11:40 AM

## 2023-02-20 NOTE — PLAN OF CARE
Problem: Discharge Planning  Goal: Discharge to home or other facility with appropriate resources  2/20/2023 1054 by Nhi Hodgson RN  Outcome: Progressing  2/20/2023 0503 by Layla Sanchez RN  Outcome: Progressing     Problem: Skin/Tissue Integrity  Goal: Absence of new skin breakdown  Description: 1. Monitor for areas of redness and/or skin breakdown  2. Assess vascular access sites hourly  3. Every 4-6 hours minimum:  Change oxygen saturation probe site  4. Every 4-6 hours:  If on nasal continuous positive airway pressure, respiratory therapy assess nares and determine need for appliance change or resting period.   2/20/2023 1054 by Nhi Hodgson RN  Outcome: Progressing  2/20/2023 0503 by Layla Sanchez RN  Outcome: Progressing     Problem: Safety - Adult  Goal: Free from fall injury  2/20/2023 1054 by Nhi Hodgson RN  Outcome: Progressing  2/20/2023 0503 by Layla Sanchez RN  Outcome: Progressing     Problem: Pain  Goal: Verbalizes/displays adequate comfort level or baseline comfort level  2/20/2023 1054 by Nhi Hodgson RN  Outcome: Progressing  2/20/2023 0503 by Layla Sanchez RN  Outcome: Progressing     Problem: Nutrition Deficit:  Goal: Optimize nutritional status  Outcome: Progressing     Problem: ABCDS Injury Assessment  Goal: Absence of physical injury  Outcome: Progressing

## 2023-02-20 NOTE — PROGRESS NOTES
[facility] [facility]   Phone: [phone]    Speech Therapy    [] Initial Evaluation            [x] Daily Treatment Note         [] Discharge Summary      Patient: Lyn Ortiz   : 1955   MRN: [id number]   Date of Service:  2023  Admitting Diagnosis: Acute ischemic stroke [facility])  Current Admission Summary: 58-year-old male who was admitted on  with left-sided weakness for greater than 2 days. CT of the head with suggestion of possible NPH. CTA with less than 50% stenosis of the right ICA. MRI of the brain revealed subacute ischemia on the right basal ganglia. Echo with normal EF and negative bubble study. Neurology evaluated and suggested aspirin and Plavix for 21 days then aspirin only in addition to Lipitor. He was evaluated by therapy and suggested to continue in an inpatient setting prior to returning home. He reports no BM for multiple days. Past Medical History:  has a past medical history of CVA (cerebral vascular accident) ([address] and HTN (hypertension). Past Surgical History:  has a past surgical history that includes Finger surgery (Right); Hernia repair; fracture surgery; and hernia repair. Precautions/Restrictions: high fall risk, modified diet      Pre-Admission Information   Living Status: Pt lives with his brother, independent prior to admission. Occupation/School: Retired last year. 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   Medication Management: :  [x]Primary   []Secondary []No  Finance Management: [x]Primary   []Secondary []No  Active :   [x]Yes         []No  [facility]:    [facility]  Vision:    Vision Corrective Device: wears glasses at all times      Subjective  General: Pt alert and cooperative throughout sessions. Ongoing report of frustration and depression related to his stroke and recovery process.  Reported he feels like he is doing worse today, physically and mentally, suspects it is related to feeling tired and/or not having therapy over the weekend. Pain: Did not state    Safety Interventions: patient left in chair, chair alarm in place, call light within reach, patient at risk for falls, and telesitter in use      Therapeutic Interventions:     Session 1:     Dysphagia: Severity: Mild  and Moderate   Pt consuming breakfast meal during session: mixed consistencies (cereal with milk, fruit cup)   - pt seemingly tolerated all consistencies without overt s/s of aspiration   - limited use of precautions: double swallow   - mild to moderate oral dysphagia: anterior bolus loss (liquid), prolonged bolus propulsion   Continue with regular diet with ongoing implementation / verbal cues for aspiration precautions     Motor Speech/ Voice:  Severity: Mild  and Moderate   Goal not targeted this session. Cognition: Severity: Mild   Thought organization and Working memory: category exclusion from word list f/5   - 93% accuracy for identifying the excluded category   - required min cues via repetition on 5/15 presentations      Delayed recall: procedural memory of personalized / contextual information   - 0% accuracy (0/2)    - required mod/max cues for recognition (contextual and initial letters)     Functional recall/ plan of care:   - required mod cues for expansion of thought and reasoning for plan of care   - vaguely recalled recent tasks completed     Session 2:  Dysphagia: Severity: Mild  and Moderate   Goal not targeted this session. Motor Speech/ Voice: Severity: Mild  and Moderate   Goal not targeted this session.       Cognition: Severity: Mild    Functional recall: medication list    - provided mod/max cues for encoding (simplification, initial letters, written list, visuals)    - recalled 0% Independently     - improved to 57% (4/7) given mod/max cues    - pt became frustrated with his lack of retention of the functional information      Thought organization: generative naming from concrete categories f/4    - 83% (19/23)    - improved to 91% (21/23) given mod verbal cues    - errors related to alternating attention between categories      Working memory: retention of category card suit correlation f/2    - 25% accuracy    - dependent for correction of errors    - limited response to encoding cues (written, alphabet)     Additional Interventions:     Provided emotional support and encouragement regarding stroke recovery process. Education re importance of sleep hygiene and aiding in therapy process. Education  Barriers To Learning: cognition and hearing  Patient Education: Provided education regarding role of SLP, results of assessment, recommendations and general speech pathology plan of care. Learning Assessment: Pt requires ongoing learning     Assessment  Impairments Requiring Therapeutic Intervention: Cognitive-Linguistic Deficits  and Oropharyngeal Dysphagia   Prognosis: good    Clinical Assessment:  Pt presents with moderate L perioral weakness resulting in dysarthria and oropharyngeal dysphagia. Instrumental assessment (FEES) completed 2/10/22, see report for assessment summary. Pt with limited carry over of information between sessions and day to day which limits his responses to therapy and results in frustration and reported depression. Pt also presents with moderate cognitive-linguistic deficits characterized by deficits in orientation, attention, executive functioning, short-term memory, and visuospatial functioning. Recommend SLP intervention for safe return to prior level of independence.          Diet Solids Recommendation:   Liquid Consistency Recommendation:   Recommended Form of Meds:   Regular texture diet     Thin liquids    No straws  Meds whole with water       Recommended Compensatory Swallowing Strategies: Upright as possible with all PO intake , Small bites/sips , Eat/feed slowly, No Straws      Plan  Frequency: 60 minutes/day; 5 days per week, as tolerated, until goals met, or discharged from ARU. Therapeutic Interventions: Diet Tolerance Monitoring , Patient/Family Education , Instrumental assessment of swallow function (Modified Barium Swallow Study)  Speech / Motor Planning / Voice intervention , Cognitive-Linguistic intervention , and Patient/ Family education   Discharge Recommendations: Home with assistance and TBD   Continued SLP at Discharge: Yes     Goals  Patient Goals: Pt reports his goal is to get home. Time Frame: 14-21 days    Pt will tolerate recommended diet and advanced trials with no overt s/s of aspiration. ongoing  Pt will improve oral motor strength and ROM for coordinated and alternating motions, via graded tasks to improve speech back to baseline level as subjectively rated by SLP/pt and family. ongoing  Pt will improve executive function for multifactor task completion via graded tasks to 80% accuracy ongoing  Patient will complete functional problem-solving tasks for daily situations with 80% accuracy or given min cues. ongoing  Pt will improve attention to detail for graded complex information to 80%, for improved recall and retention of newly learned information  ongoing      Therapy Session Time      Session 1 Session 2   Time In 0830 1030   Time Out 0900 1100   Time Code Minutes 15 30   Individual Minutes 30 30     Timed Code Treatment Minutes: 45  Total Treatment Minutes:  60    Electronically Signed By:   Joe Foy M.A.  CCC-SLP AUDELIA [id number]  Speech-Language Pathologist   2/20/2023 9:48 AM

## 2023-02-20 NOTE — PLAN OF CARE
Problem: Discharge Planning  Goal: Discharge to home or other facility with appropriate resources  Outcome: Progressing     Problem: Skin/Tissue Integrity  Goal: Absence of new skin breakdown  Description: 1. Monitor for areas of redness and/or skin breakdown  2. Assess vascular access sites hourly  3. Every 4-6 hours minimum:  Change oxygen saturation probe site  4. Every 4-6 hours:  If on nasal continuous positive airway pressure, respiratory therapy assess nares and determine need for appliance change or resting period.   2/20/2023 0503 by Therese Prakash RN  Outcome: Progressing     Problem: Safety - Adult  Goal: Free from fall injury  Outcome: Progressing     Problem: Pain  Goal: Verbalizes/displays adequate comfort level or baseline comfort level  Outcome: Progressing

## 2023-02-20 NOTE — PROGRESS NOTES
Shayla Richard  2/20/2023  [id number]    Chief Complaint: Acute ischemic stroke [facility])    Subjective:   Was retaining urine but now resolved. No current complaints. Labs reviewed. ROS: No CP, SOB, dyspnea    Objective:  Patient Vitals for the past 24 hrs:   BP Temp Temp src Pulse Resp SpO2   02/20/23 0845 126/76 97.6 °F (36.4 °C) Oral 71 17 97 %   02/19/23 2015 117/61 97.7 °F (36.5 °C) Oral 56 18 92 %   02/19/23 0930 137/85 97.7 °F (36.5 °C) Oral 63 16 95 %       Gen: No distress, pleasant. Resting in bed  HEENT: Normocephalic, atraumatic. CV: Regular rate and rhythm. No MRG   Resp: No respiratory distress. CTAB   Abd: Soft, nontender nondistended  Ext: No edema. Neuro: Alert, oriented, appropriately interactive. Left hemiparesis LUE 0/4. Negative gilliam    Laboratory data: Available via EMR. Therapy progress:    PT    Supine to Sit: Partial/moderate assistance  Sit to Supine: Dependent   Sit to Stand: Partial/moderate assistance  Chair/Bed to Chair Transfer: Partial/moderate assistance  Car Transfer:    Ambulation 10 ft: Partial/moderate assistance  Ambulation 50 ft: Partial/moderate assistance  Ambulation 150 ft:    Stairs - 1 Step: Substantial/maximal assistance  Stairs - 4 Step: Substantial/maximal assistance  Stairs - 12 Step:      OT    Eating: Setup or clean-up assistance  Oral Hygiene: Supervision or touching assistance  Bathing: Substantial/maximal assistance  Upper Body Dressing: Partial/moderate assistance  Lower Body Dressing: Partial/moderate assistance  Toilet Transfer: Partial/moderate assistance  Toilet Hygiene: Partial/moderate assistance    Speech Therapy    Pt presents with moderate L perioral weakness resulting in dysarthria and oropharyngeal dysphagia. Instrumental assessment (FEES) completed 2/10/22, see report for assessment summary. Pt with limited carry over of dysphagia recommendations and safe swallow strategies.  Pt also presents with moderate cognitive-linguistic deficits characterized by deficits in orientation, attention, executive functioning, short-term memory, and visuospatial functioning. Recommend SLP intervention for safe return to prior level of independence. Body mass index is 21.9 kg/m². Assessment:  Patient Active Problem List   Diagnosis    Acute ischemic stroke [facility])    Expressive aphasia    Dysarthria    Ataxia    Primary hypertension    Tobacco abuse    Noncompliance    Left hemiparesis ([address]       Plan:   Acute right basal ganglia infarct: DAPT for 21 days (2/25) , Lipitor. PT/OT/SLP. Started SSRI for motor recovery     Dysphagia: Dysphagia diet, SLP     HTN: started lisinopril 5     Tobacco use: education provided. No supplement currently    Meatus trauma: held lovenox. Resolved. UA without significant findings for infection     Bowels: Per protocol   Bladder: Per protocol   Sleep: Trazodone scheduled  Pain: Tylenol, tramadol as needed  DVT PPx: Lovenox  SOPHIA: 3/2    Latoya Michel MD 2/20/2023, 8:59 AM    * This document was created using dictation software. While all precautions were taken to ensure accuracy, errors may have occurred. Please disregard any typographical errors.

## 2023-02-20 NOTE — PROGRESS NOTES
Nutrition Note    RECOMMENDATIONS  PO Diet: Regular as tolerated  Staff to obtain CBW      NUTRITION ASSESSMENT   Diet advanced to regular per SLP. PO intake at least 51% of meals per EMR; observed most of bkf consumed this am (cereal & fruit). Con't to encourage snacks between meals d/t not being a big eater. Will add ice cream & puddings to meal trays to help increase intake. Will monitor for wt changes as well. Nutrition Related Findings: Labs reviewed; LBM 2/18; no edema noted  Wounds: None  Nutrition Education:  Education not indicated   Nutrition Goals: PO intake 50% or greater     MALNUTRITION ASSESSMENT   Acute Illness  Malnutrition Status: At risk for malnutrition (Comment)    NUTRITION DIAGNOSIS   Inadequate oral intake related to inadequate protein-energy intake as evidenced by mild muscle loss, mild loss of subcutaneous fat, weight loss, other (comment) (typically not a big eater.)      CURRENT NUTRITION THERAPIES  ADULT DIET; Regular     PO Intake: 51-75%, %   PO Supplement Intake:None Ordered    ANTHROPOMETRICS  Current Height: 5' 9\" (175.3 cm)  Current Weight: 148 lb 4.8 oz (67.3 kg)    Ideal Body Weight (IBW): 160 lbs  (73 kg)    Usual Bodyweight 160 lb (72.6 kg)       BMI: 21.8      COMPARATIVE STANDARDS  Energy (kcal):  1675- 2010     Protein (g):  82- 136g       Fluid (mL/day):  1 ml/kcal    The patient will be monitored per nutrition standards of care. Consult dietitian if additional nutrition interventions are needed prior to RD reassessment.      Monika Campos RD, LD    Contact: [phone]

## 2023-02-20 NOTE — PROGRESS NOTES
[facility]   Phone: [phone]    Occupational Therapy    [] Initial Evaluation            [x] Daily Treatment Note         [] Discharge Summary      Patient: Nadia Andino   : 1955   MRN: [id number]   Date of Service:  2023    Admitting Diagnosis:  Acute ischemic stroke [facility])  Current Admission Summary: 78-year-old male who was admitted on  with left-sided weakness for greater than 2 days. CT of the head with suggestion of possible NPH. CTA with less than 50% stenosis of the right ICA. MRI of the brain revealed subacute ischemia on the right basal ganglia. Echo with normal EF and negative bubble study. Neurology evaluated and suggested aspirin and Plavix for 21 days then aspirin only in addition to Lipitor. He was evaluated by therapy and suggested to continue in an inpatient setting prior to returning home. He reports no BM for multiple days. Past Medical History:  has a past medical history of CVA (cerebral vascular accident) ([address] and HTN (hypertension). Past Surgical History:  has a past surgical history that includes Finger surgery (Right); Hernia repair; fracture surgery; and hernia repair.     Discharge Recommendations: Discharge recommendations depending pt progress    DME Required For Discharge: DME to be determined pending patient progress    Precautions/Restrictions: high fall risk, up as tolerated, reg diet    Pre-Admission Information   Lives With: brother Gonzella Bamberger)                Type of Home: house  Home Layout: two level, bedroom in the basement, 10 steps with 1 handrail on the L  Home Access:  2 step to enter without rails   Bathroom Layout: tub/shower unit  Bathroom Equipment: grab bars in shower  Toilet Height: elevated height  Home Equipment: no prior equipment  Transfer Assistance: Independent without use of device  Ambulation Assistance:Independent without use of device  ADL Assistance: independent with all ADL's Retinal tear and detachment warning symptoms reviewed and patient instructed to call immediately if increasing floaters, flashes, or decreasing peripheral vision. 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 Occupation: sales  Hobbies: fishing, reading, boating  Recent Falls: chart reports had several falls that caused this admission (about 5 falls)     Examination (updated on 2/20/24)  Vision:   Vision Corrective Device: wears glasses at all times and Patient Reported Deficits: blurring of print when reading, unable to keep objects in focus, balance difficulty, and difficulty maintaining concentration  ROM:   (B) UE PROM WFL  Strength:   (L) Shoulder: 1     (R) Shoulder: 5  (L) Elbow: 1     (R) Elbow: 5  (L) Wrist: 0     (R) Wrist: 5  (L) Hand: 0      (R) Hand: 5  Note:increased tone in LLE, greatest in hand    Subjective  General: Pt seated upright in recliner, agreeable to OT tx and shower. SLP present and stated pt has been upgraded to reg diet. Pain: 0/10  Pain Interventions: not applicable     Activities of Daily Living  Basic Activities of Daily Living  Upper Extremity Dressing: moderate assistance requires verbal cueing Increased time to complete task Comment: poor carryover of learned information  Dressing Comments: seated in recliner to don jacket, upon OT arrival, pt did not have LUE in jacket  Instrumental Activities of Daily Living  No IADL completed on this date. Functional Mobility  Bed Mobility  Bed mobility not completed on this date. Transfers  Sit to stand transfer:minimal assistance  Stand to sit transfer: minimal assistance  Stand pivot transfer: contact guard assistance, minimal assistance  Comments: verbal cues for sequencing transfers, pt is impulsive, poor awareness to LUE throughout  Functional Mobility:  Sitting Balance: stand by assistance. Sitting Balance Comment: impulsive at times  Standing Balance: minimal assistance, moderate assistance.     Standing Balance Comment: pt with fluctuating assistance levels with no hand support  Functional Mobility: .  moderate assistance  Functional Mobility Activity: to therapy room  Functional Mobility Device Use: wheelchair  Therapeutic Activity  Pt participated in the Atlantic Test which is a cancellation test and is used as a screening assessment for unilateral spatial neglect. The objects are presented in an apparently random order, but are actually equally distributed in 7 columns containing 5 targets and 40 distractors each. Total number of bells circled: 26/35 (maximum 35 bells within 264 distracters)  Time taken to complete test (minutes): 5 minutes and 30 seconds. Time spent on the task is NOT appropriate to the level of difficulty. Description of scanning pattern:Search pattern is random and generally inefficient. Pt completed the Bell's test with 5 omissions from the left and 4 omissions from the right. If the pt has more than three omission, then the pt is suspected of presenting with an attention deficit. If the pt omits six bells or more on the left or right half of the sheet, then the pt is suspected of presenting with a visual neglect. Normally, an organized scanning strategy is undertaken whereby a vertical or horizontal patten emerges across the scoring sheet. Patients with a deficit in attention with demonstrate a disorganized scanning pattern. Recommend continued education to improve scanning pattern and attention to detail. While seated pt completed near point copy where pt has to match the shapes per the pattern. Pt required no verbal cues for task while seated. Next, attempted the same task while standing. Pt with deficits in balance, required max A initially  for standing balance d/t poor LE positioning. Pt requesting to sit d/t fear of falling. Pt requested to sit for the remainder of the task d/t poor performance.  Pt educated on the purpose of the task to improve standing balance for ADLs, especially LB dressing. Pt receptive and agreeable to complete rest of task while standing. Standing with  min A-mod A to complete the pattern. Second Session  Pt seated upright in recliner upon OT arrival, agreeable to therapy. Pt pleasant throughout however frequently cussing and apologizing for performance. Pt provided with encouragement throughout and provided education regarding progress since evaluation. Activities of Daily Living  Basic Activities of Daily Living  Grooming: setup assistance  Grooming Comments: seated in recliner chair for oral care and shaving face  Upper Extremity Bathing: contact guard assistance requires verbal cueing Increased time to complete task  Lower Extremity Bathing: minimal assistance requires verbal cueing Increased time to complete task   Bathing Comments: assistance for drying buttocks, CGA for sitting balance, pt with improved sitting balance when compared to recent shower, pt complete shower on TTB at this time   Upper Extremity Dressing: moderate assistance requires verbal cueing Increased time to complete task Comment: max A to doff jacket, min a to don shirt  Lower Extremity Dressing: maximum assistance requires verbal cueing Increased time to complete task  Dressing Comments: mod A for socks/shoes, min A for pants and min A for briefs, in stance with mod-max A for standing balance, cues provided for upright posture however continues to have significant L lateral lean  Toileting: maximum assistance. Toileting Comments: max A for standing balance with assistance for pulling down pants, pt with high impulsivity, pt attempting to have BM prior to showering however was unsuccessful  Comments:poor insight into deficits.    Functional Mobility  Transfers  Sit to stand transfer:minimal assistance  Stand to sit transfer: minimal assistance  Stand pivot transfer: minimal assistance  Squat pivot transfer: minimal assistance  Toilet transfer: moderate assistance  Toilet transfer equipment: standard bedside commode, grab bars, transfers from w/c  Toilet transfer comments: stand pivot  Shower transfer: minimal assistance  Shower transfer equipment: shower seat with back, tub transfer bench, grab bars, transfers from w/c  Shower transfer comments: squat pivot  Comments:poor carryover of previously learned information for hand placement, pt benefits from pointing where to place hands rather than verbal instructions  Functional Mobility:  Sitting Balance: stand by assistance, contact guard assistance. Standing Balance: minimal assistance, moderate assistance, maximum assistance. Standing Balance Comment: fluctuates throughout task, verbal cues for upright posture  Functional Mobility: .  moderate assistance  Functional Mobility Activity: to/from bathroom  Functional Mobility Device Use: wheelchair  Functional Mobility Comment: poor safety awareness, increased time and verbal cues provided to apply/take off brakes, however at times continues to require max A for brake management    Comments:impulsive at times  Pt left in wheelchair with all needs met, call light within reach, and chair alarm activated.      Cognition  Overall Cognitive Status: WFL  Arousal/Alterness: appropriate responses to stimuli  Following Commands: follows one step commands with repetition  Attention Span: attends with cues to redirect  Memory: decreased recall of precautions, decreased recall of recent events  Safety Judgement: decreased awareness of need for assistance, decreased awareness of need for safety  Problem Solving: assistance required to generate solutions, assistance required to implement solutions, decreased awareness of errors, assistance required to identify errors made, assistance required to correct errors made  Insights: not aware of deficits  Initiation: requires cues for some  Sequencing: requires cues for some  Comments: impulsive at times, deficits in motor execution and motor control  Orientation: oriented to person, oriented to time, and oriented to situation  Command Following:   impaired  Follows one step directions with repetition and increased time  Difficulties following directions with \"Left/Right\", I.e., use R arm to do X  Education  Barriers To Learning: cognition and language  Patient Education: patient educated on goals, OT role and benefits, plan of care, precautions, ADL adaptive strategies, IADL safety, proper use of assistive device/equipment, adaptive device training, energy conservation, orientation, family education, pressure relief, transfer training, discharge recommendations  Learning Assessment:  patient verbalizes understanding, would benefit from continued reinforcement, patient will require reinforcement due to cognitive deficits  Assessment  Activity Tolerance: Fair with encouragement  Impairments Requiring Therapeutic Intervention: decreased functional mobility, decreased ADL status, decreased ROM, decreased strength, decreased safety awareness, decreased cognition, decreased endurance, decreased sensation, decreased balance, decreased IADL, decreased fine motor control, decreased coordination, increased pain, decreased posture  Prognosis: good  Clinical Assessment: Pt is progressing well at this time however is presenting with increased depression/sadness. Pt with poor carryover of learned information from previous sessions, especially sequencing one handed dressing tasks and hand placement for transfers. Recommend continued reinforcement of previously learned information d/t deficits in cognition. Pt demonstrated difficultly with organizing available movement (instructions/directions) into functional motor sequencing. Continue POC.    Safety Interventions: patient left in chair, chair alarm in place, call light within reach, patient at risk for falls, telesitter in use, and family/caregiver present    Plan  Frequency: 5 x/week, 60 min/day  Current Treatment Recommendations: strengthening, ROM, balance training, functional mobility training, transfer training, stair training, endurance training, neuromuscular re-education, wheelchair mobility training, modalities, patient/caregiver education, ADL/self-care training, IADL training, home management training, cognitive reorientation, home exercise program, safety education, equipment evaluation/education, and positioning  Goals  Patient Goals: \"to get this working\" in re to LUE   Short Term Goals:  Time Frame: in 10 days  Patient will complete upper body bathing at min A while seated. -MET 2/20  Patient will complete lower body bathing at min A while seated and standing as needed. -MET 2/20  Patient will complete upper body dressing at min A. Patient will complete lower body dressing at mod A using AE as needed. Patient will complete toileting at min A with GB as needed. Patient will complete a toilet transfer with min A with GB as needed. -MET 2/20  Patient will complete a visual assessment to complete occupational profile. -MET 2/20  Long Term Goals:  Time Frame: in 21 days  Patient will complete upper body bathing at supervision while seated. Patient will complete lower body bathing at supervision while seated and standing as needed. Patient will complete upper body dressing at set-up assistance. Patient will complete lower body dressing at supervision using AE as needed. Patient will complete toileting at supervision with GB as needed. Patient will complete grooming at supervision while in stance. Patient will complete functional transfers at supervision using LRAD. Patient will complete functional mobility at supervision using LRAD. Patient will increase functional sitting balance to mod I for improved ADL completion. Patient will increase functional standing balance to SUP for improved ADL completion.      No LTG met 2/20    Therapy Session Time  First Session Therapy Time:   Individual Group Co-treatment   Time In 1115 Time Out 1145     Minutes 30        Second Session Therapy Time:   Individual Group Co-treatment   Time In 3842     Time Out 1345     Minutes 60       Timed Code Treatment Minutes:  26+45    Total Treatment Minutes:  90       Electronically Signed By: OLGA Foley/MIKE, [id number]

## 2023-02-21 PROCEDURE — 97112 NEUROMUSCULAR REEDUCATION: CPT

## 2023-02-21 PROCEDURE — 94760 N-INVAS EAR/PLS OXIMETRY 1: CPT

## 2023-02-21 PROCEDURE — 97530 THERAPEUTIC ACTIVITIES: CPT

## 2023-02-21 PROCEDURE — 92507 TX SP LANG VOICE COMM INDIV: CPT

## 2023-02-21 PROCEDURE — 6360000002 HC RX W HCPCS: Performed by: PHYSICAL MEDICINE & REHABILITATION

## 2023-02-21 PROCEDURE — 97116 GAIT TRAINING THERAPY: CPT

## 2023-02-21 PROCEDURE — 1280000000 HC REHAB R&B

## 2023-02-21 PROCEDURE — 51798 US URINE CAPACITY MEASURE: CPT

## 2023-02-21 PROCEDURE — 6370000000 HC RX 637 (ALT 250 FOR IP): Performed by: PHYSICAL MEDICINE & REHABILITATION

## 2023-02-21 PROCEDURE — 97110 THERAPEUTIC EXERCISES: CPT

## 2023-02-21 PROCEDURE — 97129 THER IVNTJ 1ST 15 MIN: CPT

## 2023-02-21 PROCEDURE — 97535 SELF CARE MNGMENT TRAINING: CPT

## 2023-02-21 PROCEDURE — 92526 ORAL FUNCTION THERAPY: CPT

## 2023-02-21 RX ADMIN — ENOXAPARIN SODIUM 40 MG: 100 INJECTION SUBCUTANEOUS at 07:51

## 2023-02-21 RX ADMIN — ATORVASTATIN CALCIUM 40 MG: 40 TABLET, FILM COATED ORAL at 20:05

## 2023-02-21 RX ADMIN — SERTRALINE 25 MG: 50 TABLET, FILM COATED ORAL at 07:52

## 2023-02-21 RX ADMIN — CLOPIDOGREL BISULFATE 75 MG: 75 TABLET ORAL at 07:51

## 2023-02-21 RX ADMIN — STANDARDIZED SENNA CONCENTRATE AND DOCUSATE SODIUM 2 TABLET: 8.6; 5 TABLET ORAL at 07:51

## 2023-02-21 RX ADMIN — TRAZODONE HYDROCHLORIDE 50 MG: 50 TABLET ORAL at 20:08

## 2023-02-21 RX ADMIN — ASPIRIN 81 MG 81 MG: 81 TABLET ORAL at 07:52

## 2023-02-21 RX ADMIN — LISINOPRIL 5 MG: 5 TABLET ORAL at 07:52

## 2023-02-21 RX ADMIN — STANDARDIZED SENNA CONCENTRATE AND DOCUSATE SODIUM 2 TABLET: 8.6; 5 TABLET ORAL at 20:08

## 2023-02-21 RX ADMIN — TRAMADOL HYDROCHLORIDE 50 MG: 50 TABLET ORAL at 23:08

## 2023-02-21 RX ADMIN — TAMSULOSIN HYDROCHLORIDE 0.4 MG: 0.4 CAPSULE ORAL at 07:52

## 2023-02-21 ASSESSMENT — PAIN SCALES - GENERAL
PAINLEVEL_OUTOF10: 0
PAINLEVEL_OUTOF10: 5
PAINLEVEL_OUTOF10: 0

## 2023-02-21 ASSESSMENT — PAIN DESCRIPTION - LOCATION: LOCATION: TEETH

## 2023-02-21 NOTE — PLAN OF CARE
Problem: Discharge Planning  Goal: Discharge to home or other facility with appropriate resources  2/21/2023 0805 by Riley Callahan RN  Outcome: Progressing  2/21/2023 0226 by Oneta Cogan, RN  Outcome: Progressing  Flowsheets (Taken 2/20/2023 2015 by Salas Obrien, RN)  Discharge to home or other facility with appropriate resources: Identify barriers to discharge with patient and caregiver     Problem: Skin/Tissue Integrity  Goal: Absence of new skin breakdown  Description: 1. Monitor for areas of redness and/or skin breakdown  2. Assess vascular access sites hourly  3. Every 4-6 hours minimum:  Change oxygen saturation probe site  4. Every 4-6 hours:  If on nasal continuous positive airway pressure, respiratory therapy assess nares and determine need for appliance change or resting period.   2/21/2023 0805 by Riley Callahan RN  Outcome: Progressing  2/21/2023 0226 by Oneta Cogan, RN  Outcome: Progressing     Problem: Safety - Adult  Goal: Free from fall injury  2/21/2023 0805 by Riley Callahan RN  Outcome: Progressing  2/21/2023 0226 by Oneta Cogan, RN  Outcome: Progressing  Flowsheets (Taken 2/20/2023 1932 by Salas Obrien, RN)  Free From Fall Injury: Instruct family/caregiver on patient safety     Problem: Pain  Goal: Verbalizes/displays adequate comfort level or baseline comfort level  2/21/2023 0805 by Riley Callahan RN  Outcome: Progressing  2/21/2023 0226 by Oneta Cogan, RN  Outcome: Progressing  Flowsheets (Taken 2/20/2023 2015 by Salas Obiren, RN)  Verbalizes/displays adequate comfort level or baseline comfort level: Encourage patient to monitor pain and request assistance     Problem: Nutrition Deficit:  Goal: Optimize nutritional status  2/21/2023 0805 by Riley Callahan RN  Outcome: Progressing  2/21/2023 0226 by Oneta Cogan, RN  Outcome: Progressing     Problem: ABCDS Injury Assessment  Goal: Absence of physical injury  2/21/2023 0805 by Leonard Rueda RN  Outcome: Progressing  2/21/2023 0226 by Pranav Keller RN  Outcome: Progressing  Flowsheets (Taken 2/20/2023 1932 by Siobhan Monroy RN)  Absence of Physical Injury: Implement safety measures based on patient assessment

## 2023-02-21 NOTE — PROGRESS NOTES
[facility] Department   Phone: [phone]    Speech Therapy    [] Initial Evaluation            [x] Daily Treatment Note         [] Discharge Summary      Patient: Vincent Haynes   : 1955   MRN: [id number]   Date of Service:  2023  Admitting Diagnosis: Acute ischemic stroke [facility])  Current Admission Summary: 57-year-old male who was admitted on  with left-sided weakness for greater than 2 days. CT of the head with suggestion of possible NPH. CTA with less than 50% stenosis of the right ICA. MRI of the brain revealed subacute ischemia on the right basal ganglia. Echo with normal EF and negative bubble study. Neurology evaluated and suggested aspirin and Plavix for 21 days then aspirin only in addition to Lipitor. He was evaluated by therapy and suggested to continue in an inpatient setting prior to returning home. He reports no BM for multiple days. Past Medical History:  has a past medical history of CVA (cerebral vascular accident) ([address] and HTN (hypertension). Past Surgical History:  has a past surgical history that includes Finger surgery (Right); Hernia repair; fracture surgery; and hernia repair. Precautions/Restrictions: high fall risk, modified diet      Pre-Admission Information   Living Status: Pt lives with his brother, independent prior to admission. Occupation/School: Retired last year. 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   Medication Management: :  [x]Primary   []Secondary []No  Finance Management: [x]Primary   []Secondary []No  Active :   [x]Yes         []No  Hearing:    [facility]6APT  Vision:    Vision Corrective Device: wears glasses at all times      Subjective  General: Pt alert and cooperative throughout sessions. Pt with improved response to therapy this date.    Pain: Did not state    Safety Interventions: patient left in chair, chair alarm in place, call light within reach, patient at risk for falls, and telesitter in use      Therapeutic Interventions:     Session 1:     Dysphagia: Severity: Mild  and Moderate   Goal not targeted this session. Motor Speech/ Voice:  Severity: Mild and Moderate    Oral Motor Exercises (OMEs)     - Bilateral Cheek puffs x 5 reps, 5 sec hold - mild air leakage in 1 trial  - Unilateral alternating cheek puffs x 5 reps with 3 sec hold each side    Functional Words Chart:   - pt provided with direct education and reinforcement of compensatory speaking strategies (I.e., Slow, Loud, Over-articulate, Breathe)  - 83% intelligibility with mod cues to use speaking strategies (I.e., over-articulate, loud)    5 - 7 Word Sentences:  - pt with 80% intelligibility with mod cues to use speaking strategies     Pt self-report of speech via reading passage:   - pt reported 85% intelligible compares to baseline level of speech     Cognition: Severity: Mild   Goal not targeted this session. Session 2:  Dysphagia: Severity: Mild  and Moderate   Pt consuming lunch meal during session: mixed consistencies (salad greens with dressing, steak bites and rice)   - pt seemingly tolerated all consistencies without overt s/s of aspiration, no overt anterior bolus loss, limited stasis in the left sulcus   - reported mild difficulty with the lettuce; reviewed compensatory methods for bolus cohesion (use of dressing/ sauce)  - limited use of precautions: double swallow   Continue with regular diet with ongoing implementation / verbal cues for aspiration precautions     Motor Speech/ Voice: Severity: Mild  and Moderate   Goal not targeted this session.       Cognition: Severity: Mild    Functional problem-solving: medication errors    - pt with 74% accuracy (11/15) independently identifying errors within pill box   - increased to 87% accuracy (13/15) with mod visual and verbal cues    - errors characterized by limited use of metacognitive strategies (I.e., focusing on one item at a time and double checking work)    Functional recall: medication list    - provided visual supports (I.e., written list)   - recalled 85% given max visual support     Additional Interventions:      Education  Barriers To Learning: cognition and hearing  Patient Education: Provided education regarding role of SLP, results of assessment, recommendations and general speech pathology plan of care. Learning Assessment: Pt requires ongoing learning     Assessment  Impairments Requiring Therapeutic Intervention: Cognitive-Linguistic Deficits  and Oropharyngeal Dysphagia   Prognosis: good    Clinical Assessment:  Pt presents with moderate L perioral weakness resulting in dysarthria and oropharyngeal dysphagia. Instrumental assessment (FEES) completed 2/10/22, see report for assessment summary. Pt continuing to tolerate diet. Pt with limited carry over of information between sessions and day to day which limits his responses to therapy and results in frustration and reported depression. Pt with increased intelligibility this date, reporting hsi speech is returning close to baseline. Pt also presents with moderate cognitive-linguistic deficits characterized by deficits in orientation, attention, executive functioning, short-term memory, and visuospatial functioning. Pt benefits from increased cueing and reinforcement of strategies to complete tasks appropriately. Recommend SLP intervention for safe return to prior level of independence. Diet Solids Recommendation:   Liquid Consistency Recommendation:   Recommended Form of Meds:   Regular texture diet     Thin liquids    No straws  Meds whole with water       Recommended Compensatory Swallowing Strategies: Upright as possible with all PO intake , Small bites/sips , Eat/feed slowly, No Straws      Plan  Frequency: 60 minutes/day; 5 days per week, as tolerated, until goals met, or discharged from ARU.   Therapeutic Interventions: Diet Tolerance Monitoring , Patient/Family Education , Instrumental assessment of swallow function (Modified Barium Swallow Study)  Speech / Motor Planning / Voice intervention , Cognitive-Linguistic intervention , and Patient/ Family education   Discharge Recommendations: Home with assistance and TBD   Continued SLP at Discharge: Yes     Goals  Patient Goals: Pt reports his goal is to get home. Time Frame: 14-21 days    Pt will tolerate recommended diet and advanced trials with no overt s/s of aspiration. ongoing  Pt will improve oral motor strength and ROM for coordinated and alternating motions, via graded tasks to improve speech back to baseline level as subjectively rated by SLP/pt and family. ongoing  Pt will improve executive function for multifactor task completion via graded tasks to 80% accuracy ongoing  Patient will complete functional problem-solving tasks for daily situations with 80% accuracy or given min cues. ongoing  Pt will improve attention to detail for graded complex information to 80%, for improved recall and retention of newly learned information  ongoing      Therapy Session Time      Session 1 Session 2   Time In 0930 1245   Time Out 1000 1315   Time Code Minutes 0 15   Individual Minutes 30 30     Timed Code Treatment Minutes: 15  Total Treatment Minutes:  60    Electronically Signed By:   Wing Ursula M.A. CCC-SLP AUDELIA [id number]  Speech-Language Pathologist   2/21/2023 2:12 PM     The speech-language pathologist was present, directed the patient's care, made skilled judgment and was responsible for assessment and treatment.     Davon Santillan.,  Speech-Language Pathology

## 2023-02-21 NOTE — PROGRESS NOTES
[place]  2/21/2023  [id number]    Chief Complaint: Acute ischemic stroke [facility])    Subjective:   No overnight events. No current complaints. Decreased PO intake but reports having an appetite. ROS: No CP, SOB, dyspnea    Objective:  Patient Vitals for the past 24 hrs:   BP Temp Temp src Pulse Resp SpO2 Height   02/21/23 0746 111/68 97.6 °F (36.4 °C) Oral 66 16 95 % --   02/20/23 2015 130/83 97.8 °F (36.6 °C) Oral 56 16 97 % --   02/20/23 1057 -- -- -- -- -- -- 5' 9\" (1.753 m)   02/20/23 0845 126/76 97.6 °F (36.4 °C) Oral 71 17 97 % --       Gen: No distress, pleasant. Resting in bed  HEENT: Normocephalic, atraumatic. CV: Regular rate and rhythm. No MRG   Resp: No respiratory distress. CTAB   Abd: Soft, nontender nondistended  Ext: No edema. Neuro: Alert, oriented, appropriately interactive. Left hemiparesis LUE 0/4. Negative gilliam    Laboratory data: Available via EMR. Therapy progress:    PT    Supine to Sit: Partial/moderate assistance  Sit to Supine: Dependent   Sit to Stand: Partial/moderate assistance  Chair/Bed to Chair Transfer: Partial/moderate assistance  Car Transfer:    Ambulation 10 ft: Partial/moderate assistance  Ambulation 50 ft: Substantial/maximal assistance  Ambulation 150 ft:    Stairs - 1 Step: Partial/moderate assistance  Stairs - 4 Step: Partial/moderate assistance  Stairs - 12 Step:      OT    Eating: Setup or clean-up assistance  Oral Hygiene: Setup or clean-up assistance  Bathing: Partial/moderate assistance  Upper Body Dressing: Partial/moderate assistance  Lower Body Dressing: Substantial/maximal assistance  Toilet Transfer: Partial/moderate assistance  Toilet Hygiene: Substantial/maximal assistance    Speech Therapy    Pt presents with moderate L perioral weakness resulting in dysarthria and oropharyngeal dysphagia. Instrumental assessment (FEES) completed 2/10/22, see report for assessment summary.  Pt with limited carry over of information between sessions and day to day which limits his responses to therapy and results in frustration and reported depression. Pt also presents with moderate cognitive-linguistic deficits characterized by deficits in orientation, attention, executive functioning, short-term memory, and visuospatial functioning. Recommend SLP intervention for safe return to prior level of independence. Body mass index is 21.9 kg/m². Assessment:  Patient Active Problem List   Diagnosis    Acute ischemic stroke [facility])    Expressive aphasia    Dysarthria    Ataxia    Primary hypertension    Tobacco abuse    Noncompliance    Left hemiparesis ([facility] [address]       Plan:   Acute right basal ganglia infarct: DAPT for 21 days (2/25) , Lipitor. PT/OT/SLP. Started SSRI for motor recovery     Dysphagia: Dysphagia diet, SLP     HTN: started lisinopril 5     Tobacco use: education provided. No supplement currently    Meatus trauma: held lovenox. Resolved. UA without significant findings for infection     Bowels: Per protocol   Bladder: Per protocol   Sleep: Trazodone scheduled  Pain: Tylenol, tramadol as needed  DVT PPx: Lovenox  SOPHIA: 3/2    Fatemeh Rubio MD 2/21/2023, 8:18 AM    * This document was created using dictation software. While all precautions were taken to ensure accuracy, errors may have occurred. Please disregard any typographical errors.

## 2023-02-21 NOTE — PROGRESS NOTES
Assessment completed. Patient up in bed, alert and oriented x 4 and able to make all his needs known. Denies any pain at this time. LCTA. VSS. Appetite remains poor, prefers to eat food brought in by family. Continues to have some difficulty starting to urinate. Remains on Flomax. Fluids encouraged. Medications administered as ordered. Mobility via stedy and x 2 staff assist. POC and education reviewed with patient and mutually agreed upon. Safety interventions in place. Continues on video monitoring. Call light in reach. Will continue to monitor.

## 2023-02-21 NOTE — PLAN OF CARE
Problem: Discharge Planning  Goal: Discharge to home or other facility with appropriate resources  Outcome: Progressing    Problem: Skin/Tissue Integrity  Goal: Absence of new skin breakdown  Description: 1. Monitor for areas of redness and/or skin breakdown  2. Assess vascular access sites hourly  3. Every 4-6 hours minimum:  Change oxygen saturation probe site  4. Every 4-6 hours:  If on nasal continuous positive airway pressure, respiratory therapy assess nares and determine need for appliance change or resting period.   Outcome: Progressing     Problem: Safety - Adult  Goal: Free from fall injury  Outcome: Progressing     Problem: Pain  Goal: Verbalizes/displays adequate comfort level or baseline comfort level  Outcome: Progressing

## 2023-02-21 NOTE — PROGRESS NOTES
[facility] [address]   Phone: [phone]    Physical Therapy    [] Initial Evaluation            [x] Daily Treatment Note         [] Discharge Summary      Patient: Iraida Manzo   : 1955   MRN: [id number]   Date of Service:  2023  Admitting Diagnosis: Acute ischemic stroke [facility])  Current Admission Summary: 78-year-old male who was admitted on  with left-sided weakness for greater than 2 days. CT of the head with suggestion of possible NPH. CTA with less than 50% stenosis of the right ICA. MRI of the brain revealed subacute ischemia on the right basal ganglia. Echo with normal EF and negative bubble study. Neurology evaluated and suggested aspirin and Plavix for 21 days then aspirin only in addition to Lipitor. He was evaluated by therapy and suggested to continue in an inpatient setting prior to returning home. He reports no BM for multiple days. Past Medical History:  has a past medical history of CVA (cerebral vascular accident) ([address] and HTN (hypertension). Past Surgical History:  has a past surgical history that includes Finger surgery (Right); Hernia repair; fracture surgery; and hernia repair.   Discharge Recommendations: TBD pending progress  DME Required For Discharge: DME to be determined pending patient progress  Precautions/Restrictions: high fall risk, (L) hemiparetic shoulder  Weight Bearing Restrictions: no restrictions  [] Right Upper Extremity  [] Left Upper Extremity [] Right Lower Extremity  [] Left Lower Extremity     Required Braces/Orthotics: no braces required   [] Right  [] Left  Positional Restrictions:no positional restrictions    Pre-Admission Information   Lives With: brother Chantel [place])                Type of Home: house  Home Layout: two level, bedroom in the basement, 10 steps with 1 handrail on the L  Home Access:  2 step to enter without rails   Bathroom Layout: tub/shower unit  Bathroom Equipment: grab bars in shower  Toilet Height: elevated height  Home Equipment: no prior equipment  Transfer Assistance: Independent without use of device  Ambulation Assistance:Independent without use of device  ADL Assistance: independent with all ADL's   IADL Assistance: independent with homemaking tasks  Active :        [x] Yes                 [] 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 Occupation: sales  Hobbies: [employer]   Recent Falls: chart reports had several falls that caused this admission (about 5 falls)     Examination   Vision:   Vision Corrective Device: wears glasses for distance  Hearing:   WFL      Subjective  General:Pt seated in recliner on arrival. Agreeable to PT. Pain: 0/10  Pain Interventions: not applicable       Functional Mobility  Bed Mobility  Bed mobility not completed on this date. Comments:   Transfers  Sit to stand transfer: minimal assistance, (CGA to fixed bar support, CGA/min (A) to RW)  Stand to sit transfer: minimal assistance, (CGA to fixed bar support, CGA/min (A) from RW)  Stand pivot transfer: contact guard assistance, (CGA w/c => recliner without device completing to (R) side)  Stand step transfer: minimal assistance, w/c => recliner with use of RW    Comments: Patient presents with ongoing periods of (L) lateral lean and narrow THERESE with improved ability to self correct in response to VC/TC   Ambulation   Surface:level surface  Assistive Device: rolling walker  Other Appliance: (L), dorsiflex assist, walker splint attachment, (DF assist added for long distance ambulation trial)  Assistance: maximum assistance, (min A for short distance progressing to max A once fatigue due to increase (L) lateral lean and reduced awareness of (L) LE positioning  Distance: 20' + 70 '  Gait Mechanics: (L) lateral lean with inconsistent THERESE and narrow THERESE.   Reduced step length/foot clearance on the (L) and decreased (L) knee extension during stance phase  Comments: VC/TC to weight shift (R) and for correct sequencing. Added T-band to walker to facilitate target during (L) LE advancement for attempted increased THERESE width. Stair Mobility   Number of Steps: 6  Step Height: 6 inch  Hand Rails: requires (B) HR as patient utilizes unilateral (R) side HR while ascending/descending  Assistance: minimal assistance  Comments:  Reciprocal pattern utilized for facilitation of motor return. VC for sequence and and to slow aby. Manual support provided to (L) UE during task for facilitation of WB. Wheelchair Mobility:  Chair: manual  Surface: level surface  Method: (R) UE  Distance: 75 ft  Assistance: moderate assistance  Comments: Able to serve as propulsion but requires sequence for all maneuverability with inability to sequence LE use for steering despite max VC. Balance  Static Sitting Balance: fair: maintains balance at CGA without use of UE support  Dynamic Sitting Balance: fair (-): maintains balance at CGA with use of UE support  Static Standing Balance: poor (+): requires min (A) to maintain balance  Dynamic Standing Balance: poor (-): requires max (A) to maintain balance  Comments: Standing balance rated with RW use. Able to maintain static and dynamic stance at CGA/[place] with use of fixed bar support    Other Therapeutic Interventions   First Session:   See above functional mobility. In addition patient completes 6\" (L) LE forward step ups 1 x 10 with (R) UE support. 6\" alternating toe taps with (R) UE support 1 x 10 ea. Static stance resistive UE push/pull with therapy chair 1 x 10 progressing to (R) LE stepping during activity 1 x 10. Activities completed in standing modified plank position at ballet bar:  static postural hold with scapular retraction, modified pushup, trunk rotation with unilateral shoulder flex/abduction, lateral hand slides.   During all activities completed at ballet bar, patient requires assist of 1 for (L) UE positioning/support/facilitation in addition to CGA for balance stabilization and facilitation of trunk rotation.    Second Session:    Pt seated in recliner upon arrival, denies pain, agreeable to PT session.  Sit <=> stand transfers completed throughout session at CGA/min (A) with ongoing VC for hand placement.  Stand step transfer completed w/c => recliner with RW at mod/max (A), ongoing significant balance loss and multiple episodes in which he left walker THERESE.  Squat pivot transfer w/c <=> seated stepper at CGA with VC for sequence.  Seated stepper without (L) UE use L3 x 6 min to improve strength and cardiovascular endurance.  Patient transitions from w/c => tall kneeling position on therapy mat at mod (A) of 1.  While in tall kneeling patient completes: static postural holds, (B) UE scapular retraction, unilateral (R) UE shoulder flexion with static postural hold, assisted (B) UE sensory ball lift.  Patient requires max VC for postural awareness and correction in addition to up to mod (A) for correction when UE in unsupported position.  Patient transitions from tall kneeling => sidelying => supine => sit on therapy mat at mod (A) of 1.  Patient seated EOM with OT upon transition of services.       Functional Outcomes                 Cognition  Overall Cognitive Status: Impaired  Arousal/Alterness: appropriate responses to stimuli  Following Commands: follows one step commands with repetition, follows one step commands with increased time  Attention Span: difficulty attending to directions  Memory: decreased recall of biographical information, decreased short term memory  Safety Judgement: decreased awareness of need for safety  Problem Solving: assistance required to generate solutions, assistance required to implement solutions  Insights: decreased awareness of deficits  Initiation: requires cues for all  Sequencing: requires cues for all  Orientation:    oriented to person, oriented to place, oriented to  situation, and disoriented to time   Command Following:   impaired    Education  Barriers To Learning: cognition, emotional, and physical  Patient Education: patient educated on goals, PT role and benefits, plan of care, general safety, functional mobility training, proper use of assistive device/equipment, disease specific education, transfer training - max education on current progress within therapy sessions secondary to patients frustration with ongoing deficits  Learning Assessment:  patient verbalizes understanding, would benefit from continued reinforcement    Assessment  Activity Tolerance: Good. Seated rest breaks provided for recovery. Impairments Requiring Therapeutic Intervention: decreased functional mobility, decreased ADL status, decreased ROM, decreased strength, decreased safety awareness, decreased cognition, decreased endurance, decreased balance, decreased coordination, decreased posture  Prognosis: fair  Clinical Assessment: Pt continues to demonstrate improved ability to correct posture in static stance both at RW and fixed bar support. Despite improvement in static stance, patient continues to require consistent extensive physical assist of 1 for all dynamic functional mobility with use of RW secondary to difficulty attending to and correction of balance loss and inconsistent foot placement. Pt would benefit from continued skilled PT to promote functional independence.      Safety Interventions: patient left in chair, chair alarm in place, call light within reach, gait belt, patient at risk for falls, telesitter in use, and fall mats at bedside    Plan  Frequency: 5 x/week, 60 min/day  Current Treatment Recommendations: strengthening, ROM, balance training, functional mobility training, transfer training, gait training, stair training, endurance training, neuromuscular re-education, wheelchair mobility training, modalities, patient/caregiver education, ADL/self-care training, cognitive/perceptual training, and safety education    Goals  Patient Goals:  To return home and be able to move his (L) UE better  Short Term Goals:  Time Frame: 1-2 weeks  Patient will complete bed mobility at [facility]   Patient will complete transfers at mod (A) of 1 - goal met 2/15/2023  Patient will ambulate 25 ft with use of LRAD at mod (A) of 1 - goal met 2/20/2023  Patient will ascend/descend 1 stairs with (B) handrail at mod (A) of 1 - goal met 2/20/2023  Patient will complete manual w/c propulsion 50 ft at [facility]  Long Term Goals:  Time Frame: 3 weeks  Patient will complete bed mobility at modified independent   Patient will complete transfers at [facility]   Patient will ambulate 50 ft with use of LRAD at [facility]  Patient will ascend/descend 10 stairs with (L) ascending handrail at [facility] 62  Patient will complete car transfer at [facility]     Therapy Session Time      Individual Group Co-treatment   Time In 0830       Time Out 0930       Minutes 60         Second Session:   Individual Group Co-treatment   Time In  1335       Time Out  1400       Minutes  25        Timed Code Treatment Minutes:  60 + 25  Total Treatment Minutes:  85 minutes        Electronically Signed By:   Christina Torres PT, DPT - [id number], 2/21/2023 9:45 AM

## 2023-02-21 NOTE — PROGRESS NOTES
[facility] Department   Phone: [phone]    Occupational Therapy    [] Initial Evaluation            [x] Daily Treatment Note         [] Discharge Summary      Patient: Violette Ramos   : 1955   MRN: [id number]   Date of Service:  2023    Admitting Diagnosis:  Acute ischemic stroke [facility])  Current Admission Summary: 47-year-old male who was admitted on  with left-sided weakness for greater than 2 days. CT of the head with suggestion of possible NPH. CTA with less than 50% stenosis of the right ICA. MRI of the brain revealed subacute ischemia on the right basal ganglia. Echo with normal EF and negative bubble study. Neurology evaluated and suggested aspirin and Plavix for 21 days then aspirin only in addition to Lipitor. He was evaluated by therapy and suggested to continue in an inpatient setting prior to returning home. He reports no BM for multiple days. Past Medical History:  has a past medical history of CVA (cerebral vascular accident) ([address] and HTN (hypertension). Past Surgical History:  has a past surgical history that includes Finger surgery (Right); Hernia repair; fracture surgery; and hernia repair.     Discharge Recommendations: Discharge recommendations depending pt progress    DME Required For Discharge: rolling walker, wheelchair, tub transfer bench, hand-held shower head, grab bars - toilet, grab bars - shower    Precautions/Restrictions: high fall risk, up as tolerated, reg diet    Pre-Admission Information   Lives With: brother Yoselyn Barrett)                Type of Home: house  Home Layout: two level, bedroom in the basement, 10 steps with 1 handrail on the L  Home Access:  2 step to enter without rails   Bathroom Layout: tub/shower unit  Bathroom Equipment: grab bars in shower  Toilet Height: elevated height  Home Equipment: no prior equipment  Transfer Assistance: Independent without use of device  Ambulation Assistance:Independent without use of device  ADL Assistance: independent with all ADL's   IADL Assistance: independent with homemaking tasks  Active :        [x] Yes                 [] 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 Occupation: sales  Hobbies: fishing, reading, boating  Recent Falls: chart reports had several falls that caused this admission (about 5 falls)     Examination (updated on 2/20/24)  Vision:   Vision Corrective Device: wears glasses at all times and Patient Reported Deficits: blurring of print when reading, unable to keep objects in focus, balance difficulty, and difficulty maintaining concentration  ROM:   (B) UE PROM WFL  Strength:   (L) Shoulder: 1     (R) Shoulder: 5  (L) Elbow: 1     (R) Elbow: 5  (L) Wrist: 0     (R) Wrist: 5  (L) Hand: 0      (R) Hand: 5  Note:increased tone in LLE, greatest in hand    Subjective  General: Pt seated upright in recliner, agreeable to OT tx and requesting to change clothes.    Pain: 0/10  Pain Interventions: not applicable     Activities of Daily Living  Basic Activities of Daily Living  Grooming: stand by assistance contact guard assistance minimal assistance moderate assistance maximum assistance requires verbal cueing Increased time to complete task Comment: fluctuating assistance levels for balance  Grooming Comments: completed oral care in stance with CGA-max A standing balance with L knee blocked and weight bearing throughout LUE, pt requesting to sit to shave and mouthwash  Upper Extremity Dressing: moderate assistance requires verbal cueing Increased time to complete task Comment: poor carryover of learned information of donning cloothes on LUE prior to RUE, doffed long sleeve shirt and donned button up shirt  Lower Extremity Dressing: minimal assistance requires verbal cueing Increased time to complete task Comment: use one hand for donning socks, assistance for threading underwear, min A for standing balance for pulling up pants,   Dressing Comments: seated in recliner to don jacket, upon OT arrival, pt did not have LUE in jacket  Instrumental Activities of Daily Living  No IADL completed on this date. Functional Mobility  Bed Mobility  Bed mobility not completed on this date. Transfers  Sit to stand transfer:minimal assistance  Stand to sit transfer: minimal assistance  Stand pivot transfer: contact guard assistance, minimal assistance  Comments: no cues for sequencing, increased time for problem solving  Functional Mobility:  Sitting Balance: supervision. Sitting Balance Comment: during ADL  Standing Balance: minimal assistance, moderate assistance, maximum assistance. Standing Balance Comment: pt with fluctuating assistance levels with LUE supported on surface during grooming task  Functional Mobility: .  moderate assistance  Functional Mobility Activity: to/from bathroom  Functional Mobility Device Use: wheelchair    Second Session  PT transferred pt to OT. Pt agreeable to OT tx. While seated upright on EOB, pt completed 10x set with hold WB activity on forearm to provide proprioceptive input to LUE as well as strengthen LUE shoulder mm. Then, pt completed 3x sets of 10x: weight shifting on flat hand to provide proprioceptive input to LUE. While seated on EOM, pt with active scapular retraction and scapular elevation. However, no active shoulder flexion/extension or elbow flexion/extension. Pt educated on stroke progression and how he may be noticing increased tone or spasticity. Pt receptive to OT educaiton however is unable to verbalize new information. While supine, pt completed 1 set of 10x AAROM on LUE using a dowel nikolas with co-band to position LUE to improve bilateral coordination for ADLs: shoulder flexion/extension, shoulder flexion to shoulder height w/ chest press, circles forward, and shoulders backward.  Pt then attempting to complete AAROM from shoulder height towards left shoulder however w/ increased pain in RUE. Therapeutic activity terminated d/t pain. Biofreeze applied to RUE. RN informed. While seated in wheelchair, pt completed 15 minutes of electrical stimulation for LUE elbow flexion/extension, amplitude 10-32 mA CC ([place], cycle time 5/5). Pt educated on attending to LUE and talking to arm tpo improve LUE elbow flexion. Pt noted with poor attention to LUE. Pt educated on purpose of therapeutic activity and he is receptive. Pt stated he felt as if his hand had improved circulation following task. Skin integrity-WFL. While seated in wheelchair, pt completed oral care at sink. Pt requesting to sit during task and does not remember standing during oral care task in AM session. Pt provided with hand over hand assistance for LUE on electric toothbrush. Pt began laughing and requested he finished oral care using RUE rather than LUE. Pt educated on the benefits of continued use of LUE during ADL task for stroke recovery. Pt is receptive to OT education however continued grooming task using RUE. After grooming, pt requesting to have a bowel movement. Pt transferred from w/c to [facility] w/ max A with max A to pull down underwear and pants from hips. From [facility] to w/ with min A. Pt in stance to pull up pants leaning on L wall with min A for standing balance and pulling up over hips. Pt  able to reach between [facility] and toilet to complete buttocks hygiene. Pt completed functional SPT w/ mod A at this time which is increased from AM session, possibly due to fatigue. Pt completed wheelchair mobility with mod/max A with no carryover of previously learned information. Pt left in wheelchair with all needs met.      Cognition  Overall Cognitive Status: WFL  Arousal/Alterness: appropriate responses to stimuli  Following Commands: follows one step commands with repetition  Attention Span: attends with cues to redirect  Memory: decreased recall of precautions, decreased recall of recent events  Safety Judgement: decreased awareness of need for assistance, decreased awareness of need for safety  Problem Solving: assistance required to generate solutions, assistance required to implement solutions, decreased awareness of errors, assistance required to identify errors made, assistance required to correct errors made  Insights: not aware of deficits  Initiation: requires cues for some  Sequencing: requires cues for some  Comments: impulsive at times, deficits in motor execution and motor control  Orientation:    oriented to person, oriented to time, and oriented to situation  Command Following:   impaired  Follows one step directions with repetition and increased time  Difficulties following directions with \"Left/Right\", I.e., use R arm to do X  Education  Barriers To Learning: cognition and language  Patient Education: patient educated on goals, OT role and benefits, plan of care, precautions, ADL adaptive strategies, IADL safety, proper use of assistive device/equipment, adaptive device training, energy conservation, orientation, family education, pressure relief, transfer training, discharge recommendations  Learning Assessment:  patient verbalizes understanding, would benefit from continued reinforcement, patient will require reinforcement due to cognitive deficits  Assessment  Activity Tolerance: Good  Impairments Requiring Therapeutic Intervention: decreased functional mobility, decreased ADL status, decreased ROM, decreased strength, decreased safety awareness, decreased cognition, decreased endurance, decreased sensation, decreased balance, decreased IADL, decreased fine motor control, decreased coordination, increased pain, decreased posture  Prognosis: good  Clinical Assessment: Pt is progressing well at this time. Pt is now able to don/doff socks using one-handed technique with no verbal cues.  However, pt continues to be limited by deficits in short term memory as pt continues to be limited by poor carryover of learned information. Recommend continued reinforcement of previously learned information d/t deficits in cognition. Continue POC. Safety Interventions: patient left in chair, chair alarm in place, call light within reach, patient at risk for falls, telesitter in use, and family/caregiver present    Plan  Frequency: 5 x/week, 60 min/day  Current Treatment Recommendations: strengthening, ROM, balance training, functional mobility training, transfer training, stair training, endurance training, neuromuscular re-education, wheelchair mobility training, modalities, patient/caregiver education, ADL/self-care training, IADL training, home management training, cognitive reorientation, home exercise program, safety education, equipment evaluation/education, and positioning  Goals  Patient Goals: \"to get this working\" in re to LUE   Short Term Goals:  Time Frame: in 10 days  Patient will complete upper body bathing at min A while seated. -MET 2/20  Patient will complete lower body bathing at min A while seated and standing as needed. -MET 2/20  Patient will complete upper body dressing at min A. Patient will complete lower body dressing at mod A using AE as needed. Patient will complete toileting at min A with GB as needed. Patient will complete a toilet transfer with min A with GB as needed. -MET 2/20  Patient will complete a visual assessment to complete occupational profile. -MET 2/20  Long Term Goals:  Time Frame: in 21 days  Patient will complete upper body bathing at supervision while seated. Patient will complete lower body bathing at supervision while seated and standing as needed. Patient will complete upper body dressing at set-up assistance. Patient will complete lower body dressing at supervision using AE as needed. Patient will complete toileting at supervision with GB as needed. Patient will complete grooming at supervision while in stance.    Patient will complete functional transfers at [facility]. Patient will complete functional mobility at supervision using LRAD. Patient will increase functional sitting balance to mod I for improved ADL completion. Patient will increase functional standing balance to SUP for improved ADL completion.      No LTG met 2/21    Therapy Session Time  First Session Therapy Time:   Individual Group Co-treatment   Time In 6268     Time Out 1100     Minutes 39        Second Session Therapy Time:   Individual Group Co-treatment   Time In 1400     Time Out 1523     Minutes 83       Timed Code Treatment Minutes:  27+93    Total Treatment Minutes:  128       Electronically Signed By: Aashish Mckinney, OTR/L, [id number]

## 2023-02-22 PROCEDURE — 97112 NEUROMUSCULAR REEDUCATION: CPT

## 2023-02-22 PROCEDURE — 97530 THERAPEUTIC ACTIVITIES: CPT

## 2023-02-22 PROCEDURE — 97129 THER IVNTJ 1ST 15 MIN: CPT

## 2023-02-22 PROCEDURE — 1280000000 HC REHAB R&B

## 2023-02-22 PROCEDURE — 97116 GAIT TRAINING THERAPY: CPT

## 2023-02-22 PROCEDURE — 97535 SELF CARE MNGMENT TRAINING: CPT

## 2023-02-22 PROCEDURE — 6370000000 HC RX 637 (ALT 250 FOR IP): Performed by: PHYSICAL MEDICINE & REHABILITATION

## 2023-02-22 PROCEDURE — 97130 THER IVNTJ EA ADDL 15 MIN: CPT

## 2023-02-22 PROCEDURE — 6360000002 HC RX W HCPCS: Performed by: PHYSICAL MEDICINE & REHABILITATION

## 2023-02-22 RX ADMIN — STANDARDIZED SENNA CONCENTRATE AND DOCUSATE SODIUM 2 TABLET: 8.6; 5 TABLET ORAL at 20:32

## 2023-02-22 RX ADMIN — LISINOPRIL 5 MG: 5 TABLET ORAL at 10:03

## 2023-02-22 RX ADMIN — TRAZODONE HYDROCHLORIDE 50 MG: 50 TABLET ORAL at 20:32

## 2023-02-22 RX ADMIN — TAMSULOSIN HYDROCHLORIDE 0.4 MG: 0.4 CAPSULE ORAL at 10:00

## 2023-02-22 RX ADMIN — ENOXAPARIN SODIUM 40 MG: 100 INJECTION SUBCUTANEOUS at 09:57

## 2023-02-22 RX ADMIN — SERTRALINE 25 MG: 50 TABLET, FILM COATED ORAL at 09:57

## 2023-02-22 RX ADMIN — STANDARDIZED SENNA CONCENTRATE AND DOCUSATE SODIUM 2 TABLET: 8.6; 5 TABLET ORAL at 10:00

## 2023-02-22 RX ADMIN — ATORVASTATIN CALCIUM 40 MG: 40 TABLET, FILM COATED ORAL at 20:32

## 2023-02-22 RX ADMIN — ASPIRIN 81 MG 81 MG: 81 TABLET ORAL at 10:00

## 2023-02-22 RX ADMIN — CLOPIDOGREL BISULFATE 75 MG: 75 TABLET ORAL at 09:58

## 2023-02-22 RX ADMIN — TRAMADOL HYDROCHLORIDE 50 MG: 50 TABLET ORAL at 09:58

## 2023-02-22 ASSESSMENT — PAIN DESCRIPTION - ORIENTATION: ORIENTATION: LEFT;LOWER

## 2023-02-22 ASSESSMENT — PAIN DESCRIPTION - PAIN TYPE: TYPE: ACUTE PAIN

## 2023-02-22 ASSESSMENT — PAIN DESCRIPTION - DESCRIPTORS
DESCRIPTORS: DISCOMFORT
DESCRIPTORS: DISCOMFORT

## 2023-02-22 ASSESSMENT — PAIN - FUNCTIONAL ASSESSMENT: PAIN_FUNCTIONAL_ASSESSMENT: ACTIVITIES ARE NOT PREVENTED

## 2023-02-22 ASSESSMENT — PAIN SCALES - GENERAL
PAINLEVEL_OUTOF10: 2

## 2023-02-22 ASSESSMENT — PAIN DESCRIPTION - LOCATION
LOCATION: TEETH
LOCATION: TEETH

## 2023-02-22 NOTE — PROGRESS NOTES
[facility]   Phone: [phone]    Speech Therapy    [] Initial Evaluation            [x] Daily Treatment Note         [] Discharge Summary      Patient: Clemencia Sanders   : 1955   MRN: [id number]   Date of Service:  2023  Admitting Diagnosis: Acute ischemic stroke [facility])  Current Admission Summary: 67-year-old male who was admitted on  with left-sided weakness for greater than 2 days. CT of the head with suggestion of possible NPH. CTA with less than 50% stenosis of the right ICA. MRI of the brain revealed subacute ischemia on the right basal ganglia. Echo with normal EF and negative bubble study. Neurology evaluated and suggested aspirin and Plavix for 21 days then aspirin only in addition to Lipitor. He was evaluated by therapy and suggested to continue in an inpatient setting prior to returning home. He reports no BM for multiple days. Past Medical History:  has a past medical history of CVA (cerebral vascular accident) ([address] and HTN (hypertension). Past Surgical History:  has a past surgical history that includes Finger surgery (Right); Hernia repair; fracture surgery; and hernia repair. Precautions/Restrictions: high fall risk, modified diet      Pre-Admission Information   Living Status: Pt lives with his brother, independent prior to admission. Occupation/School: Retired last year. 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   Medication Management: :  [x]Primary   []Secondary []No  Finance Management: [x]Primary   []Secondary []No  Active :   [x]Yes         []No  H[facility]:    [employer] [facility]  Vision:    Vision Corrective Device: wears glasses at all times      Subjective  General: Pt alert and cooperative throughout sessions.  Ongoing reports of impaired mood \"I feel grumpy\"   Pain: Did not state    Safety Interventions: patient left in chair, chair alarm in place, call light within reach, patient at risk for falls, and telesitter in use      Therapeutic Interventions:     Session 1:     Dysphagia: Severity: Mild  and Moderate   Goal not targeted this session. Motor Speech/ Voice:  Severity: Mild and Moderate   Goal not targeted this session. Cognition: Severity: Mild   Executive function: following directions on a map; planning efficient routes   - 50% accuracy independently planning an efficient route and following directions on a map  - increased to 100% accuracy with min cues  - errors characterized by impulsivity in thinking, reduced thought organization, and decreased sustained attention    Functional recall / day to day: recall of medication list   - 86% accuracy recalling medication given maximum visual supports of medication list    Attention/Abstract reasoning: following written directions   - 83% accuracy given min cues to follow written directions   - increased to 100% accuracy given increased cues   - errors characterized by decreased sustained attention to task; improved with re-orientation to instruction      Session 2:  Dysphagia: Severity: Mild  and Moderate   Goal not targeted this session. Motor Speech/ Voice: Severity: Mild  and Moderate   Goal not targeted this session. Cognition: Severity: Mild    Executive function (recall, working memory, following directions): navigation of hospital environment   - 50% (8/16) accuracy recalling passed locations throughout the hospital  - increased to 75% (12/16) accuracy with increased to mod cues and repetition of specific locations and floors to navigate through the hospital   - errors characterized by various stimuli pulling attention away from the task and short term recall      Attention/Abstract reasoning: following written directions   - 83% accuracy independently.    - accuracy increased to 92% accuracy given increased cueing   - errors characterized by decreased attention to detail     Additional Interventions:      Education  Barriers To Learning: cognition and hearing  Patient Education: Provided education regarding role of SLP, results of assessment, recommendations and general speech pathology plan of care. Learning Assessment: Pt requires ongoing learning     Assessment  Impairments Requiring Therapeutic Intervention: Cognitive-Linguistic Deficits  and Oropharyngeal Dysphagia   Prognosis: good    Clinical Assessment:  Pt presents with moderate L perioral weakness resulting in dysarthria and oropharyngeal dysphagia. Instrumental assessment (FEES) completed 2/10/22, see report for assessment summary. Pt continuing to tolerate diet without any consistency restrictions. Pt with limited carry over of information between sessions and day to day which limits his responses to therapy and results in frustration and reported depression. Pt also presents with moderate cognitive-linguistic deficits characterized by deficits in orientation, attention, executive functioning, short-term memory, and visuospatial functioning. Pt increasing sustained attention to task with intermittent SLP support. Pt benefits from increased cueing and reinforcement of strategies to complete tasks appropriately. Recommend SLP intervention for safe return to prior level of independence. Diet Solids Recommendation:   Liquid Consistency Recommendation:   Recommended Form of Meds:   Regular texture diet     Thin liquids    No straws  Meds whole with water       Recommended Compensatory Swallowing Strategies: Upright as possible with all PO intake , Small bites/sips , Eat/feed slowly, No Straws      Plan  Frequency: 60 minutes/day; 5 days per week, as tolerated, until goals met, or discharged from ARU.   Therapeutic Interventions: Diet Tolerance Monitoring , Patient/Family Education , Instrumental assessment of swallow function (Modified Barium Swallow Study)  Speech / Motor Planning / Voice intervention , Cognitive-Linguistic intervention , and Patient/ Family education   Discharge Recommendations: Home with assistance and TBD   Continued SLP at Discharge: Yes     Goals  Patient Goals: Pt reports his goal is to get home. Time Frame: 14-21 days    Pt will tolerate recommended diet and advanced trials with no overt s/s of aspiration. ongoing  Pt will improve oral motor strength and ROM for coordinated and alternating motions, via graded tasks to improve speech back to baseline level as subjectively rated by SLP/pt and family. ongoing  Pt will improve executive function for multifactor task completion via graded tasks to 80% accuracy ongoing  Patient will complete functional problem-solving tasks for daily situations with 80% accuracy or given min cues. ongoing  Pt will improve attention to detail for graded complex information to 80%, for improved recall and retention of newly learned information  ongoing      Therapy Session Time      Session 1 Session 2   Time In 0900 1120   Time Out 0930 1150   Time Code Minutes 30 30   Individual Minutes 30 30     Timed Code Treatment Minutes: 15  Total Treatment Minutes:  60    Electronically Signed By:   Remedios Tomas M.A. [facility]SLP AUDELIA [id number]  Speech-Language Pathologist   2/21/2023 2:12 PM     The speech-language pathologist was present, directed the patient's care, made skilled judgment and was responsible for assessment and treatment.     Fabienne Washburn.,  Speech-Language Pathology

## 2023-02-22 NOTE — PROGRESS NOTES
[facility] [address]   Phone: [phone]    Occupational Therapy    [] Initial Evaluation            [x] Daily Treatment Note         [] Discharge Summary      Patient: Clemencia Sanders   : 1955   MRN: [id number]   Date of Service:  2023    Admitting Diagnosis:  Acute ischemic stroke [facility])  Current Admission Summary: 78-year-old male who was admitted on  with left-sided weakness for greater than 2 days. CT of the head with suggestion of possible NPH. CTA with less than 50% stenosis of the right ICA. MRI of the brain revealed subacute ischemia on the right basal ganglia. Echo with normal EF and negative bubble study. Neurology evaluated and suggested aspirin and Plavix for 21 days then aspirin only in addition to Lipitor. He was evaluated by therapy and suggested to continue in an inpatient setting prior to returning home. He reports no BM for multiple days. Past Medical History:  has a past medical history of CVA (cerebral vascular accident) ([address] and HTN (hypertension). Past Surgical History:  has a past surgical history that includes Finger surgery (Right); Hernia repair; fracture surgery; and hernia repair.     Discharge Recommendations: Discharge recommendations depending pt progress    DME Required For Discharge: rolling walker, wheelchair, tub transfer bench, hand-held shower head, grab bars - toilet, grab bars - shower    Precautions/Restrictions: high fall risk, up as tolerated, reg diet    Pre-Admission Information   Lives With: brother Maynor Alvarez)                Type of Home: house  Home Layout: two level, bedroom in the basement, 10 steps with 1 handrail on the L  Home Access:  2 step to enter without rails   Bathroom Layout: tub/shower unit  Bathroom Equipment: grab bars in shower  Toilet Height: elevated height  Home Equipment: no prior equipment  Transfer Assistance: Independent without use of device  Ambulation Assistance:Independent without use of device  ADL Assistance: independent with all ADL's   IADL Assistance: independent with homemaking tasks  Active :        [x] Yes                 [] 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 Occupation: sales  Hobbies: fishing, reading, boating  Recent Falls: chart reports had several falls that caused this admission (about 5 falls)     Examination (updated on 2/20/24)  Vision:   Vision Corrective Device: wears glasses at all times and Patient Reported Deficits: blurring of print when reading, unable to keep objects in focus, balance difficulty, and difficulty maintaining concentration  ROM:   (B) UE PROM WFL  Strength:   (L) Shoulder: 1     (R) Shoulder: 5  (L) Elbow: 1     (R) Elbow: 5  (L) Wrist: 0     (R) Wrist: 5  (L) Hand: 0      (R) Hand: 5  Note:increased tone in LLE, greatest in hand    Subjective  General: Pt supine in bed, stated he is having a hard time sitting up. Pt then c/o \"bad day:\" during sit<>stand transitions for toileting. Pt stated he \"has turned into a grumpy old man\". Pt provided with emotional support and encouragement throughout session to improve active participation.    Pain: 0/10  Pain Interventions: not applicable     Activities of Daily Living  Basic Activities of Daily Living  Grooming: stand by assistance requires verbal cueing Increased time to complete task  Grooming Comments: set-up assistance for oral care while seated in wheelchair, pt requesting to remain seated for grooming task d/t difficulties with toileting and toilet transfer  Upper Extremity Dressing: moderate assistance requires verbal cueing Increased time to complete task Comment: assistance for doffing button down shirt, assistance for donning t-shirt and jacket  Lower Extremity Dressing: maximum assistance requires verbal cueing Increased time to complete task Comment: max A standing balance for pants up down, verbal cues for doffing pants, min A to remove pants from L leg completely, min A to thread L foot in hole d/t difficulties with motor planning, pt with max A with max A for balance for pants up over hips, pt c/o wedgy during task and became very distracted and unsafe, recommend continued use of krys stedy with nursing staff d/t poor safety awareness and high distractibility to reduce the risk of falls  Dressing Comments: seated on BSC ontop of toilet for LB dressing  Toileting: maximum assistance. Toileting Comments: unable to void urine or have a BM  General Comments: pt very discouraged throughout  Instrumental Activities of Daily Living  No IADL completed on this date. Functional Mobility  Bed Mobility  Bed mobility not completed on this date. Transfers  Sit to stand transfer:maximum assistance  Stand to sit transfer: maximum assistance  Stand pivot transfer: moderate assistance  Toilet transfer: maximum assistance  Toilet transfer equipment: transfers from w/c  Toilet transfer comments: poor sequencing noted  Comments increased time for problem solving  Functional Mobility:  Sitting Balance: stand by assistance, contact guard assistance. Sitting Balance Comment: during ADL  Standing Balance: maximum assistance. Standing Balance Comment: during LB dressing  Functional Mobility: .  maximum assistance  Functional Mobility Activity: to/from bathroom  Functional Mobility Device Use: wheelchair    Second Session  Pt supine in bed, agreeable to OT tx. Pt completed bed mobility with mod A. Pt seated on EOB with min A. Pt required mod A for donning tennis shoes. While seated in wheelchair, pt completed 15 minutes of electrical stimulation for LUE elbow flexion/extension, amplitude 14-42 mA CC ([place], cycle time 5/5). Pt educated on attending to LUE and talking to arm to improve LUE elbow flexion. Pt noted with poor attention to LUE however improved once pt had cup in hand.  Pt noted to initiate elbow flexion (mm activation noted however no range produce). Required assist to complete full ROM. Pt re-educated on purpose of therapeutic activity and he is receptive. Skin integrity-WFL. While seated on wheelchair, pt completed AAROM using therapy ball with spikes on table top to facilitate AROM for elbow flexion/extension. Initial muscle twitch in digit 2 and 3. However no sustained mm activation and no range produced. While seated in wheelchair, pt completed AAROM and coordination testing for LUE using towel. Pt completed 2 sets of 10x: shoulder flexion/extension (forward and backward and then arc left/right). Pt with no mm activation and no active range produced. Pt asking OT to not provided assistance for ROM. While seated in wheelchair, pt completed word search (grade level 1) with max cues. Pt unable to carryover learned information to use note card to assist with visual scanning. Pt requesting to complete task without the assist of OT. Pt unable to independently to find a word. OT transferred pt's care to PT.      Cognition  Overall Cognitive Status: WFL  Arousal/Alterness: appropriate responses to stimuli  Following Commands: follows one step commands with repetition  Attention Span: attends with cues to redirect  Memory: decreased recall of precautions, decreased recall of recent events  Safety Judgement: decreased awareness of need for assistance, decreased awareness of need for safety  Problem Solving: assistance required to generate solutions, assistance required to implement solutions, decreased awareness of errors, assistance required to identify errors made, assistance required to correct errors made  Insights: not aware of deficits  Initiation: requires cues for some  Sequencing: requires cues for some  Comments: impulsive at times, deficits in motor execution and motor control  Orientation:    oriented to person, oriented to time, and oriented to situation  Command Following:   impaired  Follows one step directions with repetition and increased time  Difficulties following directions with \"Left/Right\", I.e., use R arm to do X  Education  Barriers To Learning: cognition and language  Patient Education: patient educated on goals, OT role and benefits, plan of care, precautions, ADL adaptive strategies, IADL safety, proper use of assistive device/equipment, adaptive device training, energy conservation, orientation, family education, pressure relief, transfer training, discharge recommendations  Learning Assessment:  patient verbalizes understanding, would benefit from continued reinforcement, patient will require reinforcement due to cognitive deficits  Assessment  Activity Tolerance: Good  Impairments Requiring Therapeutic Intervention: decreased functional mobility, decreased ADL status, decreased ROM, decreased strength, decreased safety awareness, decreased cognition, decreased endurance, decreased sensation, decreased balance, decreased IADL, decreased fine motor control, decreased coordination, increased pain, decreased posture  Prognosis: good  Clinical Assessment: Pt is progressing inconsistently at this time. Pt continues to be limited by deficits in short term memory as pt continues to be limited by poor carryover of learned information. Recommend continued reinforcement of previously learned information d/t deficits in cognition. Pt is tolerating neuromuscular re-education (use of e-stim) well however with limited carryover of purpose of task and with decreased attention during task. Continue POC.    Safety Interventions: patient left in chair, chair alarm in place, call light within reach, patient at risk for falls, telesitter in use, and family/caregiver present    Plan  Frequency: 5 x/week, 60 min/day  Current Treatment Recommendations: strengthening, ROM, balance training, functional mobility training, transfer training, stair training, endurance training, neuromuscular re-education, wheelchair mobility training, modalities, patient/caregiver education, ADL/self-care training, IADL training, home management training, cognitive reorientation, home exercise program, safety education, equipment evaluation/education, and positioning  Goals  Patient Goals: \"to get this working\" in re to LUE   Short Term Goals:  Time Frame: in 10 days  Patient will complete upper body bathing at min A while seated. -MET 2/20  Patient will complete lower body bathing at min A while seated and standing as needed. -MET 2/20  Patient will complete upper body dressing at min A. Patient will complete lower body dressing at mod A using AE as needed. Patient will complete toileting at min A with GB as needed. Patient will complete a toilet transfer with min A with GB as needed. -MET 2/20  Patient will complete a visual assessment to complete occupational profile. -MET 2/20  Long Term Goals:  Time Frame: in 21 days  Patient will complete upper body bathing at supervision while seated. Patient will complete lower body bathing at supervision while seated and standing as needed. Patient will complete upper body dressing at set-up assistance. Patient will complete lower body dressing at supervision using AE as needed. Patient will complete toileting at supervision with GB as needed. Patient will complete grooming at supervision while in stance. Patient will complete functional transfers at supervision using LRAD. Patient will complete functional mobility at supervision using LRAD. Patient will increase functional sitting balance to mod I for improved ADL completion. Patient will increase functional standing balance to SUP for improved ADL completion.      No LTG met 2/22    Therapy Session Time  First Session Therapy Time:   Individual Group Co-treatment   Time In 745     Time Out 837     Minutes 52        Second Session Therapy Time:   Individual Group Co-treatment   Time In 1336     Time Out 1430     Minutes 54       Timed Code Treatment Minutes:  66+78    Total Treatment Minutes:  106       Electronically Signed By: JAI Ramesh, [id number]

## 2023-02-22 NOTE — PATIENT CARE CONFERENCE
[facility]  Inpatient Rehabilitation  Weekly Team Conference Note    Patient Name: Ro Allen        MRN: [id number]    : 1955  (69 y.o.)  Gender: male           The team conference for this patient was held on 2023 at 11:00am and led by:  Guanakito Bauer MD    CASE MANAGEMENT:  Assessment:   Patient is a 69year old male who admitted to ARU on 2023 with the admitting diagnosis of Acute ischemic stroke ([address] Patient resides at home with his older brother and has support from several other siblings. Patient would benefit from continued skilled PT/OT/SLP to promote increased safety and independence . Patient's discharge plan to be determined pending patient's progress in the program.      PHYSICAL THERAPY:    Bed Mobility  Supine <=> Sit: Extensive assist of 1  Comments:   Transfers  Sit to stand transfer: minimal assistance, (CGA/min (A) to RW, CGA to fixed (R) HR)  Stand to sit transfer: contact guard assistance  Stand step transfer: moderate assistance, (with use of (R) dayton-walker)  Toilet transfer: moderate assistance   Comments: Patient presents with ongoing periods of (L) lateral lean and narrow THERESE  Ambulation   Surface:level surface  Assistive Device: rolling walker  Other Appliance: (L), dorsiflex assist, walker splint attachment, (DF assist added for long distance ambulation trial)  Assistance: maximum assistance  Distance: 20'   Gait Mechanics: Significant (L) lateral lean with inconsistent THERESE and narrow THERESE. Reduced step length/foot clearance on the (L) and decreased (L) knee extension during stance phase  Comments: max VC/TC to weight shift (R) and for correct sequencing. Ambulation Trial 2  Surface:level surface  Assistive Device: retana rail  Other Appliance: (L), givmohr sling  Assistance: contact guard assistance  Distance: 25' x 2 completions  Gait Mechanics: Ongoing (L) lateral lean with inconsistent and narrow THERESE.   Able to counter (L) lateral lean with fixed HR support. Comments:    Ambulation Trial 2  Surface:level surface  Assistive Device: hemiwalker  Other Appliance: (L), givmohr sling  Assistance: moderate assistance, (ranges from min/mod A with max VC for (R) lateral weight shift)  Distance: 36' + 80'  Gait Mechanics: Similar to above with improved (R) lateral weight shift onto device. Therapist completes device management. Comments:    Stair Mobility   Number of Steps: 6  Step Height: 6 inch  Hand Rails: requires (B) HR as patient utilizes unilateral (R) side HR while ascending/descending  Other Appliance: (L), givmohr sling  Assistance: minimal assistance  Comments:  Step to mechanics with max VC for sequence and and to slow aby. Wheelchair Mobility:  Chair: manual  Surface: level surface  Method: (R) LE and (L) LE  Distance: 100 ft  Assistance: stand by assistance  Comments: Able to self propel and maneuver w/c in response to cueing. VC for object avoidance.     QM:  Roll Left and Right  Assistance Needed: Dependent  Comment: Unable to roll (L) secondary to (L) hemiparetic shoulder with poor awareness  CARE Score: 1  Discharge Goal: Independent  Sit to Lying  Assistance Needed: Dependent  CARE Score: 1  Discharge Goal: Independent  Lying to Sitting on Side of Bed  Assistance Needed: Partial/moderate assistance  CARE Score: 3  Discharge Goal: Independent  Sit to Stand  Assistance Needed: Partial/moderate assistance  CARE Score: 3  Discharge Goal: Independent  Chair/Bed-to-Chair Transfer  Assistance Needed: Partial/moderate assistance  CARE Score: 3  Discharge Goal: Independent  Car Transfer  Reason if not Attempted: Not attempted due to medical condition or safety concerns  CARE Score: 88  Discharge Goal: Independent  Walk 10 Feet  Assistance Needed: Partial/moderate assistance  CARE Score: 3  Discharge Goal: Independent  Walk 50 Feet with Two Turns  Assistance Needed: Partial/moderate assistance  Reason if not Attempted: Not attempted due to medical condition or safety concerns  CARE Score: 3  Discharge Goal: Independent  Walk 150 Feet  Reason if not Attempted: Not attempted due to medical condition or safety concerns  CARE Score: 88  Discharge Goal: Independent  Walking 10 Feet on Uneven Surfaces  Reason if not Attempted: Not attempted due to medical condition or safety concerns  CARE Score: 88  Discharge Goal: Independent  1 Step (Curb)  Assistance Needed: Partial/moderate assistance  Reason if not Attempted: Not attempted due to medical condition or safety concerns  CARE Score: 3  Discharge Goal: Supervision or touching assistance  4 Steps  Assistance Needed: Partial/moderate assistance  Reason if not Attempted: Not attempted due to medical condition or safety concerns  CARE Score: 3  Discharge Goal: Supervision or touching assistance  12 Steps  Reason if not Attempted: Not attempted due to medical condition or safety concerns  CARE Score: 88  Discharge Goal: Supervision or touching assistance  Picking Up Object  Reason if not Attempted: Not attempted due to medical condition or safety concerns  CARE Score: 88  Discharge Goal: Independent  Wheelchair Ability  Uses a Wheelchair and/or Scooter?: Yes    Goals:                   Patient Goals:  To return home and be able to move his (L) UE better  Short Term Goals:  Time Frame: 1-2 weeks  Patient will complete bed mobility at [facility]   Patient will complete transfers at mod (A) of 1 - goal met 2/15/2023  Patient will ambulate 25 ft with use of LRAD at mod (A) of 1 - goal met 2/20/2023  Patient will ascend/descend 1 stairs with (B) handrail at mod (A) of 1 - goal met 2/20/2023  Patient will complete manual w/c propulsion 50 ft at S[facility] - goal met 2/22/2023  Long Term Goals:  Time Frame: 3 weeks  Patient will complete bed mobility at modified independent   Patient will complete transfers at modified [facility]   Patient will ambulate 50 ft with use of LRAD at [facility]  Patient will ascend/descend 10 stairs with (L) ascending handrail at [id number]  Patient will complete car transfer at [facility]                These goals were reviewed with this patient at the time of assessment and Iraida Manzo is in agreement. Plan of Care: Pt to be seen 5 out of 7 days per week per ARU protocol ( 60 minutes with PT)                     SPEECH THERAPY:    Diet Level:ADULT DIET; Regular    Assessment: Impressions  Diagnosis: Pt presents with moderate L perioral weakness resulting in dysarthria and oropharyngeal dysphagia. Instrumental assessment (FEES) completed 2/10/22, see report for assessment summary. Pt continuing to tolerate diet without any consistency restrictions. Pt with limited carry over of information between sessions and day to day which limits his responses to therapy and results in frustration and reported depression. Pt also presents with moderate cognitive-linguistic deficits characterized by deficits in orientation, attention, executive functioning, short-term memory, and visuospatial functioning. Pt increasing sustained attention to task with intermittent SLP support. Pt benefits from increased cueing and reinforcement of strategies to complete tasks appropriately. Recommend SLP intervention for safe return to prior level of independence. Goals:  Time Frame: 14-21 days  Pt will tolerate recommended diet and advanced trials with no overt s/s of aspiration. Goal met (will continue to assess for diet tolerance)  Pt will improve oral motor strength and ROM for coordinated and alternating motions, via graded tasks to improve speech back to baseline level as subjectively rated by SLP/pt and family. Ongoing  Pt will improve executive function for multifactor task completion via graded tasks to 80% accuracy. Ongoing  Patient will complete functional problem-solving tasks for daily situations with 80% accuracy or given min cues.  Ongoing  Pt will improve attention to detail for graded complex information to 80%, for improved recall and retention of newly learned information  Ongoing    Plan of Care:  Pt to be seen 5 out of 7 days per week per ARU protocol ( 60 minutes with SLP)    OCCUPATIONAL THERAPY:    ADL: Grooming: setup assistance  Grooming Comments: seated in recliner chair for oral care and shaving face  Upper Extremity Bathing: contact guard assistance requires verbal cueing Increased time to complete task  Lower Extremity Bathing: minimal assistance requires verbal cueing Increased time to complete task   Bathing Comments: assistance for drying buttocks, CGA for sitting balance, pt with improved sitting balance when compared to recent shower, pt complete shower on TTB at this time   Upper Extremity Dressing: moderate assistance requires verbal cueing Increased time to complete task Comment: max A to doff jacket, min a to don shirt  Lower Extremity Dressing: maximum assistance requires verbal cueing Increased time to complete task  Dressing Comments: mod A for socks/shoes, min A for pants and min A for briefs, in stance with mod-max A for standing balance, cues provided for upright posture however continues to have significant L lateral lean  Toileting: maximum assistance. Toileting Comments: max A for standing balance with assistance for pulling down pants, pt with high impulsivity, pt attempting to have BM prior to showering however was unsuccessful  Comments:poor insight into deficits.       Toilet Transfers: max     Tub/ShowerTransfers:min/mod     QM:  Eating  Assistance Needed: Setup or clean-up assistance  CARE Score: 5  Discharge Goal: Independent  Oral Hygiene  Assistance Needed: Setup or clean-up assistance  Comment: seated in wheelchair  CARE Score: 5  Discharge Goal: Supervision or touching assistance  [address] needed: Substantial/maximal assistance  Reason if not Attempted: Patient refused  CARE Score: 2  Discharge Goal: Independent  Toilet Transfer  Assistance needed: Substantial/maximal assistance  CARE Score: 2  Discharge Goal: Supervision or touching assistance  Shower/Bathe Self  Assistance Needed: Partial/moderate assistance  CARE Score: 3  Discharge Goal: Supervision or touching assistance  Upper Body Dressing  Assistance Needed: Partial/moderate assistance  CARE Score: 3  Discharge Goal: Independent  Lower Body Dressing  Assistance Needed: Substantial/maximal assistance  CARE Score: 2  Discharge Goal: Independent  Putting On/Taking Off Footwear  Assistance Needed: Partial/moderate assistance  CARE Score: 3  Discharge Goal: Set-up or clean-up assistance    Goals:             Short Term Goals:  Time Frame: in 10 days  Patient will complete upper body bathing at min A while seated. -MET 2/20  Patient will complete lower body bathing at min A while seated and standing as needed. -MET 2/20  Patient will complete upper body dressing at min A. Patient will complete lower body dressing at mod A using AE as needed. Patient will complete toileting at min A with GB as needed. Patient will complete a toilet transfer with min A with GB as needed. -MET 2/20  Patient will complete a visual assessment to complete occupational profile. -MET 2/20  Long Term Goals:  Time Frame: in 21 days  Patient will complete upper body bathing at supervision while seated. Patient will complete lower body bathing at supervision while seated and standing as needed. Patient will complete upper body dressing at set-up assistance. Patient will complete lower body dressing at supervision using AE as needed. Patient will complete toileting at supervision with GB as needed. Patient will complete grooming at supervision while in stance. Patient will complete functional transfers at supervision using LRAD. Patient will complete functional mobility at supervision using LRAD. Patient will increase functional sitting balance to mod I for improved ADL completion.   Patient will increase functional standing balance to SUP for improved ADL completion. These goals were reviewed with this patient at the time of assessment and Justadrew Patrick is in agreement    Plan of Care:  Pt to be seen 5 out of 7 days per week per ARU protocol ( 60 minutes with OT)     NUTRITION:  Weight: 161 lb 11.2 oz (73.3 kg) / Body mass index is 23.88 kg/m². Diet Order: Regular    Supplements:NA    Pt reports is eating better, ~50% of meals or better. Staff reports family is providing food from home, which pt has been consuming. Recommend staff to obtain CBW. Please see nutrition note for details. NURSING:    Risk for Readmission: 12%    Good Fall Risk Score: 75  Wounds/Incisions/Ulcers: None  Medication Review: Reviewed daily with patient  Pain: Managed with and without medications  Consultations/Labs/X-rays:    Labs: BMP & CBC every Monday & Thursday    Patient/Family Education provided by team:    Discharge Plan   Estimated Length of Stay:7 days  Destination: SNF  Pass:No  Services at Discharge: 24 hr physical assist with ongoing PT/OT/SLP  Equipment at Discharge: TBD depending pt progress, Wheelchair and Walker vs/ dayton-walker, GB in toilet, GB in shower, TTB  Factors facilitating achievement of predicted outcomes: Cooperative and Pleasant  Barriers to the achievement of predicted outcomes: Confusion, Cognitive deficit, Impulsivity, Limited safety awareness, Limited insight into deficits, Decreased endurance, Decreased sensation, Decreased proprioception, Upper extremity weakness, Lower extremity weakness, and Impaired vision    Patient Goals:  Return home, get LUE moving better    Interdisciplinary Individualized Plan of Care Review of Previous Week:    Medical and functional progress towards goals:  Medically doing well, labs and vitals good, big focus is on therapy to facilitate recovery. PT with improved gait and transfers, but still needs significant assist, but only of 1 person for mobility. Cognition is main limiting factor. Carryover impaired. Pt with significant lean and decreased safety awareness. Pt upgraded to regular diet. Barriers towards progress:  Impaired cognition, impaired balance, left weakness  Interventions to address Barriers:  SLP addressing cognition and carryover, all staff cueing pt for safety and using repetition to facilitate carryover. PT focus on balance training, PT/OT both work on left strengthening and neuro reeducation. Goals still appropriate:  Yes  Modifications to goals: None  Continue Current Plan of Care:  No  Modifications to Plan of Care: Change discharge destination to SNF. Rehab Team Members in attendance for Team Conference:  Marcello Langston, MSW, LSW    Katherine Berry, RD, LD    Beau Hernandez, OTR/L    Bert Haynes, PT, DPT    Madeleine Yost M.A., CCC-SLP    CHUCKY Bell, RN, [address] PT, DPT,     I have led the above team conference and agree with all decisions made, and approve the established interdisciplinary plan of care as documented within the medical record of Lyn Diana.     Colin Haro MD   Electronically signed by Colin Haro MD on 2/23/2023 at 11:24 AM

## 2023-02-22 NOTE — PROGRESS NOTES
[facility]   Phone: [phone]    Physical Therapy    [] Initial Evaluation            [x] Daily Treatment Note         [] Discharge Summary      Patient: Nathan Sepulveda   : 1955   MRN: [id number]   Date of Service:  2023  Admitting Diagnosis: Acute ischemic stroke [facility])  Current Admission Summary: 66-year-old male who was admitted on  with left-sided weakness for greater than 2 days. CT of the head with suggestion of possible NPH. CTA with less than 50% stenosis of the right ICA. MRI of the brain revealed subacute ischemia on the right basal ganglia. Echo with normal EF and negative bubble study. Neurology evaluated and suggested aspirin and Plavix for 21 days then aspirin only in addition to Lipitor. He was evaluated by therapy and suggested to continue in an inpatient setting prior to returning home. He reports no BM for multiple days. Past Medical History:  has a past medical history of CVA (cerebral vascular accident) ([address] and HTN (hypertension). Past Surgical History:  has a past surgical history that includes Finger surgery (Right); Hernia repair; fracture surgery; and hernia repair.   Discharge Recommendations: TBD pending progress  DME Required For Discharge: DME to be determined pending patient progress  Precautions/Restrictions: high fall risk, (L) hemiparetic shoulder  Weight Bearing Restrictions: no restrictions  [] Right Upper Extremity  [] Left Upper Extremity [] Right Lower Extremity  [] Left Lower Extremity     Required Braces/Orthotics: no braces required   [] Right  [] Left  Positional Restrictions:no positional restrictions    Pre-Admission Information   Lives With: brother Wyatt Rosa)                Type of Home: house  Home Layout: two level, bedroom in the basement, 10 steps with 1 handrail on the L  Home Access:  2 step to enter without rails   Bathroom Layout: tub/shower unit  Bathroom Equipment: grab bars in shower  Toilet Height: elevated height  Home Equipment: no prior equipment  Transfer Assistance: Independent without use of device  Ambulation Assistance:Independent without use of device  ADL Assistance: independent with all ADL's   IADL Assistance: independent with homemaking tasks  Active :        [x] Yes                 [] 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 Occupation: sales  Hobbies: fishing   Recent Falls: chart reports had several falls that caused this admission (about 5 falls)     Examination   Vision:   Vision Corrective Device: wears glasses for distance  Hearing:   WFL      Subjective  General:Pt seated in recliner on arrival. Agreeable to PT. Pain: 0/10  Pain Interventions: not applicable       Functional Mobility  Bed Mobility  Bed mobility not completed on this date. Comments:   Transfers  Sit to stand transfer: minimal assistance, (CGA/min (A) to RW, CGA to fixed (R) HR)  Stand to sit transfer: contact guard assistance  Stand step transfer: moderate assistance, (with use of (R) dayton-walker)  Toilet transfer: moderate assistance   Comments: Patient presents with ongoing periods of (L) lateral lean and narrow THERESE  Ambulation   Surface:level surface  Assistive Device: rolling walker  Other Appliance: (L), dorsiflex assist, walker splint attachment, (DF assist added for long distance ambulation trial)  Assistance: maximum assistance  Distance: 20'   Gait Mechanics: Significant (L) lateral lean with inconsistent THERESE and narrow THERESE. Reduced step length/foot clearance on the (L) and decreased (L) knee extension during stance phase  Comments: max VC/TC to weight shift (R) and for correct sequencing.     Ambulation Trial 2  Surface:level surface  Assistive Device: retana rail  Other Appliance: (L), givmohr sling  Assistance: contact guard assistance  Distance: 25' x 2 completions  Gait Mechanics: Ongoing (L) lateral lean with inconsistent and narrow THERESE. Able to counter (L) lateral lean with fixed HR support. Comments:    Ambulation Trial 2  Surface:level surface  Assistive Device: hemiwalker  Other Appliance: (L), givmohr sling  Assistance: moderate assistance, (ranges from min/mod A with max VC for (R) lateral weight shift)  Distance: 36' + 80'  Gait Mechanics: Similar to above with improved (R) lateral weight shift onto device. Therapist completes device management. Comments:    Stair Mobility   Number of Steps: 6  Step Height: 6 inch  Hand Rails: requires (B) HR as patient utilizes unilateral (R) side HR while ascending/descending  Other Appliance: (L), givmohr sling  Assistance: minimal assistance  Comments:  Step to mechanics with max VC for sequence and and to slow aby. Wheelchair Mobility:  Chair: manual  Surface: level surface  Method: (R) LE and (L) LE  Distance: 100 ft  Assistance: stand by assistance  Comments: Able to self propel and maneuver w/c in response to cueing. VC for object avoidance. Balance  Static Sitting Balance: fair: maintains balance at CGA without use of UE support  Dynamic Sitting Balance: fair (-): maintains balance at CGA with use of UE support  Static Standing Balance: poor (+): requires min (A) to maintain balance  Dynamic Standing Balance: poor: requires mod (A) to maintain balance  Comments: Standing balance rated with hemiwalker use. Able to maintain static and dynamic stance at CGA/[place] with use of fixed bar support    Other Therapeutic Interventions   First Session:   See above functional mobility. Session begins with ambulation recliner => toilet with use of RW as documented above. Toilet transfer complete at mod (A), requires up to max (A) for balance stabilization during assisted clothing management. Second Session: Seated in wc with OT at arrival in therapy gym. Stand step transfer wc to/from stepper and wc to bed without AD towards R side with moderate assistance. Seated stepper x 6 minutes B LEs, L UE on arm rest for endurance/strength.   Staggered 4 inch step stance (Left up) with Givmohr on LUE and R hand on railing--mod A for balance and posture. Progressed to RUE OH raise and cross body taps with mod A for balance. Pt leaning towards left with flexed posture.  Attempted staggered stance with Right up but required max A for balance due to L knee buckle and left lateral lean.    Ambulated 20 ft with HW with gait pattern as above and moderate to max A for balance due to fatigue.  Kelley railing with RUE support: left target taps fwd and in a lateral/diagonal position with max cues/assist for wt shift right and full advancement of LLE towards target.   Pt left in bed, alarm on and needs in reach.          Functional Outcomes                 Cognition  Overall Cognitive Status: Impaired  Arousal/Alterness: appropriate responses to stimuli  Following Commands: follows one step commands with repetition, follows one step commands with increased time  Attention Span: difficulty attending to directions  Memory: decreased recall of biographical information, decreased short term memory  Safety Judgement: decreased awareness of need for safety  Problem Solving: assistance required to generate solutions, assistance required to implement solutions  Insights: decreased awareness of deficits  Initiation: requires cues for all  Sequencing: requires cues for all  Orientation:    oriented to person, oriented to place, oriented to situation, and disoriented to time   Command Following:   impaired    Education  Barriers To Learning: cognition, emotional, and physical  Patient Education: patient educated on goals, PT role and benefits, plan of care, general safety, functional mobility training, proper use of assistive device/equipment, disease specific education, transfer training - max education on current progress within therapy sessions secondary to patients frustration with ongoing  deficits  Learning Assessment:  patient verbalizes understanding, would benefit from continued reinforcement    Assessment  Activity Tolerance: Good. Seated rest breaks provided for recovery. Impairments Requiring Therapeutic Intervention: decreased functional mobility, decreased ADL status, decreased ROM, decreased strength, decreased safety awareness, decreased cognition, decreased endurance, decreased balance, decreased coordination, decreased posture  Prognosis: fair  Clinical Assessment: Pt continues to present with significant (L) lateral lean during all functional mobility tasks, requiring up to max (A) for stabilization during RW use and toileting. Patient transitioned back to dayton-walker with mild improvement in lateral lean and increased ambulation distance. Pt would benefit from continued skilled PT to promote functional independence. Safety Interventions: patient left in chair, chair alarm in place, call light within reach, gait belt, patient at risk for falls, telesitter in use, and fall mats at bedside    Plan  Frequency: 5 x/week, 60 min/day  Current Treatment Recommendations: strengthening, ROM, balance training, functional mobility training, transfer training, gait training, stair training, endurance training, neuromuscular re-education, wheelchair mobility training, modalities, patient/caregiver education, ADL/self-care training, cognitive/perceptual training, and safety education    Goals  Patient Goals:  To return home and be able to move his (L) UE better  Short Term Goals:  Time Frame: 1-2 weeks  Patient will complete bed mobility at [facility]   Patient will complete transfers at mod (A) of 1 - goal met 2/15/2023  Patient will ambulate 25 ft with use of LRAD at mod (A) of 1 - goal met 2/20/2023  Patient will ascend/descend 1 stairs with (B) handrail at mod (A) of 1 - goal met 2/20/2023  Patient will complete manual w/c propulsion 50 ft at SBA - goal met 2/22/2023  Long Term Goals:  Time Frame: 3 weeks  Patient will complete bed mobility at modified independent   Patient will complete transfers at [facility]   Patient will ambulate 50 ft with use of LRAD at [facility]  Patient will ascend/descend 10 stairs with (L) ascending handrail at [facility]  Patient will complete car transfer at [facility]     Therapy Session Time      Individual Group Co-treatment   Time In 1015       Time Out 1100       Minutes 45         Second Session Therapy Time:   Individual Concurrent Group Co-treatment   Time In 1430         Time Out 1505         Minutes 35           Timed Code Treatment Minutes:  80    Total Treatment Minutes:  80         Electronically Signed By:   First Session Completed By:  Barbara Almanzar PT, DPT - [id number], 2/22/2023 11:28 AM    2nd session: Merlinda Peacemaker PT, DPT [id number]

## 2023-02-22 NOTE — CARE COORDINATION
SW/CM attempted to contact patient's brothers, Sera Coronado and Gail Chapa regarding family training and patient's discharge plan. Brothers not available. Had to leave VM messages. SW will continue to follow.     Electronically signed by MILEY Gant on 2/22/2023 at 11:45 AM

## 2023-02-22 NOTE — PROGRESS NOTES
[place]  2/22/2023  [id number]    Chief Complaint: Acute ischemic stroke [facility])    Subjective:   No overnight events. No current complaints. Still frustrated by lack of recovery in his LUE.     ROS: No CP, SOB, dyspnea    Objective:  Patient Vitals for the past 24 hrs:   BP Temp Temp src Pulse Resp SpO2   02/21/23 2000 (!) 144/76 98 °F (36.7 °C) Oral 57 19 98 %   02/21/23 0929 -- -- -- -- 16 95 %       Gen: No distress, pleasant. Resting in WC  HEENT: Normocephalic, atraumatic. CV: Regular rate and rhythm. No MRG   Resp: No respiratory distress. CTAB   Abd: Soft, nontender nondistended  Ext: No edema. Neuro: Alert, oriented, appropriately interactive. Left hemiparesis LUE 0/4. Negative gilliam    Laboratory data: Available via EMR. Therapy progress:    PT    Supine to Sit: Partial/moderate assistance  Sit to Supine: Dependent   Sit to Stand: Partial/moderate assistance  Chair/Bed to Chair Transfer: Partial/moderate assistance  Car Transfer:    Ambulation 10 ft: Partial/moderate assistance  Ambulation 50 ft: Substantial/maximal assistance  Ambulation 150 ft:    Stairs - 1 Step: Partial/moderate assistance  Stairs - 4 Step: Partial/moderate assistance  Stairs - 12 Step:      OT    Eating: Independent  Oral Hygiene: Substantial/maximal assistance  Bathing: Partial/moderate assistance  Upper Body Dressing: Partial/moderate assistance  Lower Body Dressing: Substantial/maximal assistance  Toilet Transfer: Substantial/maximal assistance  Toilet Hygiene: Substantial/maximal assistance    Speech Therapy    Pt presents with moderate L perioral weakness resulting in dysarthria and oropharyngeal dysphagia. Instrumental assessment (FEES) completed 2/10/22, see report for assessment summary. Pt with limited carry over of information between sessions and day to day which limits his responses to therapy and results in frustration and reported depression.  Pt also presents with moderate cognitive-linguistic deficits characterized by deficits in orientation, attention, executive functioning, short-term memory, and visuospatial functioning. Recommend SLP intervention for safe return to prior level of independence. Body mass index is 21.9 kg/m². Assessment:  Patient Active Problem List   Diagnosis    Acute ischemic stroke [facility])    Expressive aphasia    Dysarthria    Ataxia    Primary hypertension    Tobacco abuse    Noncompliance    Left hemiparesis ([facility] [address]       Plan:   Acute right basal ganglia infarct: DAPT for 21 days (2/25) , Lipitor. PT/OT/SLP. Started SSRI for motor recovery     Dysphagia: Dysphagia diet, SLP     HTN: started lisinopril 5     Tobacco use: education provided. No supplement currently    Meatus trauma: held lovenox. Resolved. UA without significant findings for infection     Bowels: Per protocol   Bladder: Per protocol   Sleep: Trazodone scheduled  Pain: Tylenol, tramadol as needed  DVT PPx: Lovenox  SOPHIA: 3/2    Marquez Lema MD 2/22/2023, 8:51 AM    * This document was created using dictation software. While all precautions were taken to ensure accuracy, errors may have occurred. Please disregard any typographical errors.

## 2023-02-23 LAB
ANION GAP SERPL CALCULATED.3IONS-SCNC: 8 MMOL/L (ref 3–16)
BUN BLDV-MCNC: 16 MG/DL (ref 7–20)
CALCIUM SERPL-MCNC: 9.3 MG/DL (ref 8.3–10.6)
CHLORIDE BLD-SCNC: 101 MMOL/L (ref 99–110)
CO2: 26 MMOL/L (ref 21–32)
CREAT SERPL-MCNC: 0.8 MG/DL (ref 0.8–1.3)
GFR SERPL CREATININE-BSD FRML MDRD: >60 ML/MIN/{1.73_M2}
GLUCOSE BLD-MCNC: 96 MG/DL (ref 70–99)
HCT VFR BLD CALC: 40.7 % (ref 40.5–52.5)
HEMOGLOBIN: 13.5 G/DL (ref 13.5–17.5)
MCH RBC QN AUTO: 33 PG (ref 26–34)
MCHC RBC AUTO-ENTMCNC: 33.1 G/DL (ref 31–36)
MCV RBC AUTO: 99.7 FL (ref 80–100)
PDW BLD-RTO: 13.2 % (ref 12.4–15.4)
PLATELET # BLD: 259 K/UL (ref 135–450)
PMV BLD AUTO: 8.1 FL (ref 5–10.5)
POTASSIUM SERPL-SCNC: 4.4 MMOL/L (ref 3.5–5.1)
RBC # BLD: 4.08 M/UL (ref 4.2–5.9)
SODIUM BLD-SCNC: 135 MMOL/L (ref 136–145)
WBC # BLD: 7 K/UL (ref 4–11)

## 2023-02-23 PROCEDURE — 1280000000 HC REHAB R&B

## 2023-02-23 PROCEDURE — 85027 COMPLETE CBC AUTOMATED: CPT

## 2023-02-23 PROCEDURE — 6360000002 HC RX W HCPCS: Performed by: PHYSICAL MEDICINE & REHABILITATION

## 2023-02-23 PROCEDURE — 97116 GAIT TRAINING THERAPY: CPT

## 2023-02-23 PROCEDURE — 6370000000 HC RX 637 (ALT 250 FOR IP): Performed by: PHYSICAL MEDICINE & REHABILITATION

## 2023-02-23 PROCEDURE — 80048 BASIC METABOLIC PNL TOTAL CA: CPT

## 2023-02-23 PROCEDURE — 36415 COLL VENOUS BLD VENIPUNCTURE: CPT

## 2023-02-23 PROCEDURE — 97110 THERAPEUTIC EXERCISES: CPT

## 2023-02-23 PROCEDURE — 97535 SELF CARE MNGMENT TRAINING: CPT

## 2023-02-23 PROCEDURE — 97129 THER IVNTJ 1ST 15 MIN: CPT

## 2023-02-23 PROCEDURE — 97130 THER IVNTJ EA ADDL 15 MIN: CPT

## 2023-02-23 PROCEDURE — 97530 THERAPEUTIC ACTIVITIES: CPT

## 2023-02-23 PROCEDURE — 92507 TX SP LANG VOICE COMM INDIV: CPT

## 2023-02-23 RX ADMIN — CLOPIDOGREL BISULFATE 75 MG: 75 TABLET ORAL at 08:56

## 2023-02-23 RX ADMIN — ASPIRIN 81 MG 81 MG: 81 TABLET ORAL at 08:55

## 2023-02-23 RX ADMIN — STANDARDIZED SENNA CONCENTRATE AND DOCUSATE SODIUM 2 TABLET: 8.6; 5 TABLET ORAL at 08:55

## 2023-02-23 RX ADMIN — LISINOPRIL 5 MG: 5 TABLET ORAL at 08:55

## 2023-02-23 RX ADMIN — ENOXAPARIN SODIUM 40 MG: 100 INJECTION SUBCUTANEOUS at 08:56

## 2023-02-23 RX ADMIN — SERTRALINE 25 MG: 50 TABLET, FILM COATED ORAL at 08:55

## 2023-02-23 RX ADMIN — TRAZODONE HYDROCHLORIDE 50 MG: 50 TABLET ORAL at 20:34

## 2023-02-23 RX ADMIN — ATORVASTATIN CALCIUM 40 MG: 40 TABLET, FILM COATED ORAL at 20:34

## 2023-02-23 RX ADMIN — TAMSULOSIN HYDROCHLORIDE 0.4 MG: 0.4 CAPSULE ORAL at 08:55

## 2023-02-23 ASSESSMENT — PAIN SCALES - GENERAL: PAINLEVEL_OUTOF10: 2

## 2023-02-23 NOTE — PROGRESS NOTES
[facility]   Phone: [phone]    Occupational Therapy    [] Initial Evaluation            [x] Daily Treatment Note         [] Discharge Summary      Patient: Izabella Hussein   : 1955   MRN: [id number]   Date of Service:  2023    Admitting Diagnosis:  Acute ischemic stroke [facility])  Current Admission Summary: 55-year-old male who was admitted on  with left-sided weakness for greater than 2 days. CT of the head with suggestion of possible NPH. CTA with less than 50% stenosis of the right ICA. MRI of the brain revealed subacute ischemia on the right basal ganglia. Echo with normal EF and negative bubble study. Neurology evaluated and suggested aspirin and Plavix for 21 days then aspirin only in addition to Lipitor. He was evaluated by therapy and suggested to continue in an inpatient setting prior to returning home. He reports no BM for multiple days. Past Medical History:  has a past medical history of CVA (cerebral vascular accident) ([address] and HTN (hypertension). Past Surgical History:  has a past surgical history that includes Finger surgery (Right); Hernia repair; fracture surgery; and hernia repair.     Discharge Recommendations: Continued OT with 24/ assistance    DME Required For Discharge: rolling walker, wheelchair, tub transfer bench, hand-held shower head, grab bars - toilet, grab bars - shower    Precautions/Restrictions: high fall risk, up as tolerated, reg diet    Pre-Admission Information   Lives With: brother Kerry Baumgarten)                Type of Home: house  Home Layout: two level, bedroom in the basement, 10 steps with 1 handrail on the L  Home Access:  2 step to enter without rails   Bathroom Layout: tub/shower unit  Bathroom Equipment: grab bars in shower  Toilet Height: elevated height  Home Equipment: no prior equipment  Transfer Assistance: Independent without use of device  Ambulation Assistance:Independent without use of device  ADL Assistance: independent with all ADL's   IADL Assistance: independent with homemaking tasks  Active :        [x] Yes                 [] 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 Occupation: sales  Hobbies: fishing, reading, boating  Recent Falls: chart reports had several falls that caused this admission (about 5 falls)     Subjective  General: Pt seated upright in recliner, pt with no further questions from earilier team meeting with family. Pt agreeable to OT tx and shower. Pt requesting to use the restroom prior to bathing. Noted LUE subluxation of shoulder. Pt educated on importance of attention to LUE. Pain: 0/10  Pain Interventions: not applicable     Activities of Daily Living  Basic Activities of Daily Living  Grooming: supervision  Grooming Comments: set-up assistance for oral care while seated in wheelchair  Upper Extremity Bathing: contact guard assistance requires verbal cueing Increased time to complete task  Lower Extremity Bathing: minimal assistance requires verbal cueing Increased time to complete task   Bathing Comments: CGA for sitting balance throughout task, reaching forward to wash feet, cues for thoroughness impulsive at times  Upper Extremity Dressing: moderate assistance requires verbal cueing Increased time to complete task  Lower Extremity Dressing: maximum assistance requires verbal cueing Increased time to complete task  Dressing Comments: seated on BSC ontop of toilet for LB dressing  Toileting: maximum assistance requires verbal cueing Increased time to complete task.     Toileting Comments: max A for standing balance, assistance for pants up/down, pt able to clean buttocks region via bending forward and/or reaching in the space between toilet seat and bedside commode  General Comments: poor safety awareness, pt is persistent to complete tasks by self however requires assistance to reduce the risk of falls.  Instrumental Activities of Daily Living  No IADL completed on this date. Functional Mobility  Bed Mobility  Bed mobility not completed on this date. Transfers  Sit to stand transfer:minimal assistance, moderate assistance, maximum assistance  Stand to sit transfer: minimal assistance, moderate assistance, maximum assistance  Stand pivot transfer: minimal assistance, moderate assistance, maximum assistance  Toilet transfer: maximum assistance  Toilet transfer equipment: standard bedside commode, grab bars  Toilet transfer comments: poor sequencing noted  Shower transfer: maximum assistance  Shower transfer equipment: transfers from w/c  Shower transfer comments: poor sequencing  Comments poor safety awareness and impulsive at times, pt with inattention to LUE throughout  Functional Mobility:  Sitting Balance: contact guard assistance. Sitting Balance Comment: during ADL  Standing Balance: moderate assistance, maximum assistance.     Standing Balance Comment: during LB dressing  Functional Mobility: .  moderate assistance  Functional Mobility Activity: to/from therapy room  Functional Mobility Device Use: wheelchair  Comments: pt with difficulties with turning wheelchair, max verbal cues for positioning of LLE, pt often neglecting LLE    Cognition  Overall Cognitive Status: Impaired  Arousal/Alterness: appropriate responses to stimuli  Following Commands: inconsistently follows commands  Attention Span: attends with cues to redirect, difficulty attending to directions, difficulty dividing attention  Memory: decreased recall of precautions, decreased recall of recent events, decreased short term memory  Safety Judgement: decreased awareness of need for assistance, decreased awareness of need for safety  Problem Solving: assistance required to generate solutions, assistance required to implement solutions, decreased awareness of errors, assistance required to identify errors made, assistance required to correct errors made  Insights: not aware of deficits  Initiation: requires cues for some  Sequencing: requires cues for all  Comments: impulsive at times, deficits in motor execution and motor control  Orientation:    oriented to person, oriented to time, and oriented to situation  Command Following:   impaired  Follows one step directions with repetition and increased time  Difficulties following directions with \"Left/Right\", I.e., use R arm to do X  Education  Barriers To Learning: cognition and language  Patient Education: patient educated on goals, OT role and benefits, plan of care, precautions, ADL adaptive strategies, IADL safety, proper use of assistive device/equipment, adaptive device training, energy conservation, orientation, family education, pressure relief, transfer training, discharge recommendations  Learning Assessment:  patient verbalizes understanding, would benefit from continued reinforcement, patient will require reinforcement due to cognitive deficits  Assessment  Activity Tolerance: Good  Impairments Requiring Therapeutic Intervention: decreased functional mobility, decreased ADL status, decreased ROM, decreased strength, decreased safety awareness, decreased cognition, decreased endurance, decreased sensation, decreased balance, decreased IADL, decreased fine motor control, decreased coordination, increased pain, decreased posture  Prognosis: good  Clinical Assessment: Pt is progressing inconsistently. Pt requires significant assistance for balance throughout ADL and standing tasks. Pt with limited/no attention to lateral leans during sitting and standing tasks. Pt continues to be limited by deficits in short term memory as pt continues to be limited by poor carryover of learned information. Recommend continued reinforcement of previously learned information d/t deficits in cognition. Continue POC.    Safety Interventions: patient left in chair, chair alarm in place, call light within reach, patient at risk for falls, telesitter in use, and family/caregiver present    Plan  Frequency: 5 x/week, 60 min/day  Current Treatment Recommendations: strengthening, ROM, balance training, functional mobility training, transfer training, stair training, endurance training, neuromuscular re-education, wheelchair mobility training, modalities, patient/caregiver education, ADL/self-care training, IADL training, home management training, cognitive reorientation, home exercise program, safety education, equipment evaluation/education, and positioning  Goals  Patient Goals: \"to get this working\" in re to LUE   Short Term Goals:  Time Frame: in 10 days  Patient will complete upper body bathing at min A while seated. -MET 2/20  Patient will complete lower body bathing at min A while seated and standing as needed. -MET 2/20  Patient will complete upper body dressing at min A. Patient will complete lower body dressing at mod A using AE as needed. Patient will complete toileting at min A with GB as needed. Patient will complete a toilet transfer with min A with GB as needed. -MET 2/20  Patient will complete a visual assessment to complete occupational profile. -MET 2/20  Long Term Goals:  Time Frame: in 21 days  Patient will complete upper body bathing at supervision while seated. Patient will complete lower body bathing at supervision while seated and standing as needed. Patient will complete upper body dressing at set-up assistance. Patient will complete lower body dressing at supervision using AE as needed. Patient will complete toileting at supervision with GB as needed. Patient will complete grooming at supervision while in stance. Patient will complete functional transfers at supervision using LRAD. Patient will complete functional mobility at supervision using LRAD. Patient will increase functional sitting balance to mod I for improved ADL completion.   Patient will increase functional standing balance to SUP for improved ADL completion.     No LTG met 2/23    Therapy Session Time     Individual Group Co-treatment   Time In 1245     Time Out 1400     Minutes 75        Timed Code Treatment Minutes:  75    Total Treatment Minutes:  75       Electronically Signed By: OLGA Kendrick/MIKE, [id number]

## 2023-02-23 NOTE — PROGRESS NOTES
[facility]   Phone: [phone]    Physical Therapy    [] Initial Evaluation            [x] Daily Treatment Note         [] Discharge Summary      Patient: Clemencia Sanders   : 1955   MRN: [id number]   Date of Service:  2023  Admitting Diagnosis: Acute ischemic stroke [facility])  Current Admission Summary: 49-year-old male who was admitted on  with left-sided weakness for greater than 2 days. CT of the head with suggestion of possible NPH. CTA with less than 50% stenosis of the right ICA. MRI of the brain revealed subacute ischemia on the right basal ganglia. Echo with normal EF and negative bubble study. Neurology evaluated and suggested aspirin and Plavix for 21 days then aspirin only in addition to Lipitor. He was evaluated by therapy and suggested to continue in an inpatient setting prior to returning home. He reports no BM for multiple days. Past Medical History:  has a past medical history of CVA (cerebral vascular accident) ([address] and HTN (hypertension). Past Surgical History:  has a past surgical history that includes Finger surgery (Right); Hernia repair; fracture surgery; and hernia repair.   Discharge Recommendations: 24 hr physical assistance with ongoing skilled therapy services  DME Required For Discharge: DME to be determined pending patient progress and d/c location  Precautions/Restrictions: high fall risk, (L) hemiparetic shoulder  Weight Bearing Restrictions: no restrictions  [] Right Upper Extremity  [] Left Upper Extremity [] Right Lower Extremity  [] Left Lower Extremity     Required Braces/Orthotics: no braces required   [] Right  [] Left  Positional Restrictions:no positional restrictions    Pre-Admission Information   Lives With: brother Maynor Alvarez)                Type of Home: house  Home Layout: two level, bedroom in the basement, 10 steps with 1 handrail on the L  Home Access:  2 step to enter without rails   Bathroom Layout: tub/shower unit  Bathroom Equipment: grab bars in shower  Toilet Height: elevated height  Home Equipment: no prior equipment  Transfer Assistance: Independent without use of device  Ambulation Assistance:Independent without use of device  ADL Assistance: independent with all ADL's   IADL Assistance: independent with homemaking tasks  Active :        [x] Yes                 [] 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 Occupation: sales  Hobbies: [employer]   Recent Falls: chart reports had several falls that caused this admission (about 5 falls)     Examination   Vision:   Vision Corrective Device: wears glasses for distance  Hearing:   WFL      Subjective  General:  Pt seated in recliner on arrival. Agreeable to PT. Pain: 0/10  Pain Interventions: not applicable       Functional Mobility  Bed Mobility  Supine to Sit: stand by assistance  Sit to Supine: stand by assistance  Rolling Right: stand by assistance  Comments: Completed on flat bed with use of under mattress HR. VC for (L) UE/LE positioning awareness during bed mobility completion  Transfers  Sit to stand transfer: minimal assistance, (CGA/min (A) to hemiwalker, CGA to fixed (R) HR)  Stand to sit transfer: contact guard assistance  Stand pivot transfer: contact guard assistance, (w/c <=> seated stepper without device use completed to (R) side)  Stand step transfer: maximum assistance, (with use of (R) dayton-walker)  Bed to chair transfer: minimal assistance, (completed w/c <=> bed. Requires CGA to the right, min (A) to the left upon exit from bed)    Comments:   Ambulation Trial  Surface:level surface  Assistive Device: hemiwalker  Other Appliance: (L), givmohr sling  Assistance: maximum assistance  Distance: 15' x 2 completions  Gait Mechanics: Significant (L) lateral requiring max (A) to correct. Narrow THERESE and walks self under device despite max VC.     Comments:    Ambulation Trial 2  Surface:level surface  Assistive Device: retana rail  Other Appliance: (L), givmohr sling  Assistance: maximum assistance - requires periods of max (A) secondary to (L) lateral lean and poor awareness/impulsivity  Distance: 25' x 2 completions  Gait Mechanics: Ongoing (L) lateral lean with inconsistent and narrow THERESE. Unable to counter (L) lateral lean on this date. Comments:    Stair Mobility   Number of Steps: 6  Step Height: 6 inch  Hand Rails: requires (B) HR as patient utilizes unilateral (R) side HR while ascending/descending  Other Appliance: (L), givmohr sling  Assistance: minimal assistance  Comments:  Step to mechanics with max VC for sequence and and to slow aby. Assist for (L) LE placement to increased THERESE while descending. Wheelchair Mobility:  Chair: manual  Surface: level surface  Method: (R) LE and (L) LE  Distance: 50 ft  Assistance: stand by assistance  Comments: Increased difficulty sequencing mobility but with increased time and cueing able to self propel and maneuver w/c.  VC for object avoidance. Balance  Static Sitting Balance: fair: maintains balance at CGA without use of UE support  Dynamic Sitting Balance: fair (-): maintains balance at CGA with use of UE support  Static Standing Balance: poor (+): requires min (A) to maintain balance  Dynamic Standing Balance: poor (-): requires max (A) to maintain balance  Comments: Standing balance rated with hemiwalker use. Able to maintain static and dynamic stance at CGA/[place] with use of fixed bar support    Other Therapeutic Interventions   Seated stepper L3 without (L) UE use x 8 min to improve strength and cardiovascular endurance.       Functional Outcomes                 Cognition  Overall Cognitive Status: Impaired  Arousal/Alterness: appropriate responses to stimuli  Following Commands: follows one step commands with repetition, follows one step commands with increased time  Attention Span: difficulty attending to directions  Memory: decreased recall of biographical information, decreased short term memory  Safety Judgement: decreased awareness of need for safety  Problem Solving: assistance required to generate solutions, assistance required to implement solutions  Insights: decreased awareness of deficits  Initiation: requires cues for all  Sequencing: requires cues for all  Orientation:    oriented to person, oriented to place, oriented to situation, and disoriented to time   Command Following:   impaired    Education  Barriers To Learning: cognition, emotional, and physical  Patient Education: patient educated on goals, PT role and benefits, plan of care, general safety, functional mobility training, proper use of assistive device/equipment, disease specific education, transfer training, educated on recommendation for 24 hr physical assistance upon discharge. Learning Assessment:  patient verbalizes understanding, would benefit from continued reinforcement    Assessment  Activity Tolerance: Good. Seated rest breaks provided for recovery. Impairments Requiring Therapeutic Intervention: decreased functional mobility, decreased ADL status, decreased ROM, decreased strength, decreased safety awareness, decreased cognition, decreased endurance, decreased balance, decreased coordination, decreased posture  Prognosis: fair  Clinical Assessment: Pt presenting with increased (L) lateral lean in standing on this date partially due to increased impulsivity and frustration with mobility deficits. As a result of increased leaning, patient requiring significant increase in physical assistance and decreased ambulation distance. Pt continues to require extensive assistance of 1 person for all standing based functional mobility. Pt would benefit from continued skilled PT to promote functional independence.      Safety Interventions: patient left in chair, chair alarm in place, call light within reach, gait belt, patient at risk for falls, telesitter in use, and fall mats at bedside    Plan  Frequency: 5 x/week, 60 min/day  Current Treatment Recommendations: strengthening, ROM, balance training, functional mobility training, transfer training, gait training, stair training, endurance training, neuromuscular re-education, wheelchair mobility training, modalities, patient/caregiver education, ADL/self-care training, cognitive/perceptual training, and safety education    Goals  Patient Goals:  To return home and be able to move his (L) UE better  Short Term Goals:  Time Frame: 1-2 weeks  Patient will complete bed mobility at [facility] - goal met 2/23/2023  Patient will complete transfers at mod (A) of 1 - goal met 2/15/2023  Patient will ambulate 25 ft with use of LRAD at mod (A) of 1 - goal met 2/20/2023  Patient will ascend/descend 1 stairs with (B) handrail at mod (A) of 1 - goal met 2/20/2023  Patient will complete manual w/c propulsion 50 ft at A - goal met 2/22/2023  Long Term Goals:  Time Frame: 3 weeks  Patient will complete bed mobility at modified independent   Patient will complete transfers at [facility]   Patient will ambulate 50 ft with use of LRAD at [facility]  Patient will ascend/descend 10 stairs with (L) ascending handrail at [facility]  Patient will complete car transfer at [facility]     Therapy Session Time      Individual Group Co-treatment   Time In 0930       Time Out 1030       Minutes 60         Timed Code Treatment Minutes:  60    Total Treatment Minutes:  60       Electronically Signed By:   Kathy Fitzpatrick PT, DPT - [id number], 2/23/2023 10:48 AM

## 2023-02-23 NOTE — PROGRESS NOTES
[facility] Department   Phone: [phone]    Speech Therapy    [] Initial Evaluation            [x] Daily Treatment Note         [] Discharge Summary      Patient: Terri Haro   : 1955   MRN: [id number]   Date of Service:  2023  Admitting Diagnosis: Acute ischemic stroke [facility])  Current Admission Summary: 68-year-old male who was admitted on  with left-sided weakness for greater than 2 days. CT of the head with suggestion of possible NPH. CTA with less than 50% stenosis of the right ICA. MRI of the brain revealed subacute ischemia on the right basal ganglia. Echo with normal EF and negative bubble study. Neurology evaluated and suggested aspirin and Plavix for 21 days then aspirin only in addition to Lipitor. He was evaluated by therapy and suggested to continue in an inpatient setting prior to returning home. He reports no BM for multiple days. Past Medical History:  has a past medical history of CVA (cerebral vascular accident) ([address] and HTN (hypertension). Past Surgical History:  has a past surgical history that includes Finger surgery (Right); Hernia repair; fracture surgery; and hernia repair. Precautions/Restrictions: high fall risk, modified diet      Pre-Admission Information   Living Status: Pt lives with his brother, independent prior to admission. Occupation/School: Retired last year. 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   Medication Management: :  [x]Primary   []Secondary []No  Finance Management: [x]Primary   []Secondary []No  Active :   [x]Yes         []No  H[facility]    [employer] [facility]  Vision:    Vision Corrective Device: wears glasses at all times      Subjective  General: Pt alert and cooperative throughout sessions. Pt reported he feels \"alright, not great. \" Team discussed discharge to SNF and pt/family agreeable.    Pain: Did not state    Safety Interventions: patient left in chair, chair alarm in place, call light within reach, patient at risk for falls, and telesitter in use      Therapeutic Interventions:     Session 1: Tiarra Shi MA, CCC-SLP/Shereen Hearn,  Speech-Language Pathology    Dysphagia: Severity: Mild  and Moderate   Goal not targeted this session. Motor Speech/ Voice:  Severity: Mild and Moderate   Goal not targeted this session. Cognition: Severity: Mild   Thought organization: generative naming from concrete categories f/4    - 84% (21/25)    - improved to 91% (21/23) given mod verbal cues    - errors related to alternating attention between categories      Working memory: retention of category card suit correlation f/2  - 60% accuracy (6/10)   - dependent for correction of errors   - limited response to encoding cues (written, alphabet)   - pt provided education of internal memory strategies to functionally recall (e.g., repetition and association)    Functional recall / day to day: recall of medication list   - 80% accuracy recalling medication given mod cues  - pt provided education of memory strategies to recall medications appropriately    Session 2: Rosa Taylor MA CCC-SLP / YAZMIN Guthrie Speech-Language Pathology Graduate Clinician    Dysphagia: Severity: Mild  and Moderate   Goal not targeted this session. Motor Speech/ Voice: Severity: Mild  and Moderate   Goal not targeted this session.     Cognition: Severity: Mild    Abstract convergent thinking: word exclusion from written list of four words   -100% accuracy   - pt benefited from paper being placed on clipboard and held by SLP for circling numbers      Orientation:   - SLP oriented pt to external orientation aids (I.e., whiteboard, digital clock); pt did not refer to aids independently   - SLP developed written monthly calendar to aid in orientation to ana and date  - given mod verbal and visual cues, pt referred to written calendar to verbalize current date     Executive function: problem solving given functional scenarios   - pt independently solved 2/4 (50% acc) problems   - pt with some confusion for the \"what you should do\" part and stated this was difficult for him   - pt originally requested SLP to read problems to him but pt's accuracy increased when he read the problems himself     Additional Interventions:      Education  Barriers To Learning: cognition and hearing  Patient Education: Provided education regarding role of SLP, results of assessment, recommendations and general speech pathology plan of care. Learning Assessment: Pt requires ongoing learning     Assessment  Impairments Requiring Therapeutic Intervention: Cognitive-Linguistic Deficits  and Oropharyngeal Dysphagia   Prognosis: good    Clinical Assessment:  Pt presents with moderate L perioral weakness resulting in dysarthria and oropharyngeal dysphagia. Instrumental assessment (FEES) completed 2/10/22, see report for assessment summary. Pt continuing to tolerate diet without any consistency restrictions. Pt with limited carry over of information between sessions and day to day which limits his responses to therapy and results in frustration and reported depression. Pt also presents with moderate cognitive-linguistic deficits characterized by deficits in orientation, attention, executive functioning, short-term memory, and visuospatial functioning. Pt increasing sustained attention to task with intermittent SLP support. Pt benefited from reinforcement of internal memory strategies to recall items appropriately. Recommend SLP intervention for safe return to prior level of independence.        Diet Solids Recommendation:   Liquid Consistency Recommendation:   Recommended Form of Meds:   Regular texture diet     Thin liquids    No straws  Meds whole with water       Recommended Compensatory Swallowing Strategies: Upright as possible with all PO intake , Small bites/sips , Eat/feed slowly, No Straws      Plan  Frequency: 60 minutes/day; 5 days per week, as tolerated, until goals met, or discharged from ARU. Therapeutic Interventions: Diet Tolerance Monitoring , Patient/Family Education , Instrumental assessment of swallow function (Modified Barium Swallow Study)  Speech / Motor Planning / Voice intervention , Cognitive-Linguistic intervention , and Patient/ Family education   Discharge Recommendations: Home with assistance and TBD   Continued SLP at Discharge: Yes     Goals  Patient Goals: Pt reports his goal is to get home. Time Frame: 14-21 days    Pt will tolerate recommended diet and advanced trials with no overt s/s of aspiration. GOAL MET (will continue to target diet tolerance monitoring)   Pt will improve oral motor strength and ROM for coordinated and alternating motions, via graded tasks to improve speech back to baseline level as subjectively rated by SLP/pt and family. ongoing  Pt will improve executive function for multifactor task completion via graded tasks to 80% accuracy ongoing  Patient will complete functional problem-solving tasks for daily situations with 80% accuracy or given min cues. ongoing  Pt will improve attention to detail for graded complex information to 80%, for improved recall and retention of newly learned information  ongoing      Therapy Session Time      Session 1 Session 2   Time In 1045 1530   Time Out 1115 1600   Time Code Minutes 30 30   Individual Minutes 30 30     Timed Code Treatment Minutes: 60  Total Treatment Minutes:  60    Electronically Signed By:   Treatment session 1:  Ailyn Weber M.A., [address]  Speech-Language Pathologist    The speech-language pathologist was present, directed the patient's care, made skilled judgment and was responsible for assessment and treatment.     Yue Montero.,  Speech-Language Pathology      Treatment session 2:   Live Logan, [facility]  Speech-Language Pathology Graduate Clinician    The speech-language pathologist was present, directed the patient's care, made skilled judgment and was responsible for assessment and treatment.     Malcolm Taylor, [address] [facility]  Speech-Language Pathologist  [phone]

## 2023-02-23 NOTE — PROGRESS NOTES
Jayden Becerra  2/23/2023  [id number]    Chief Complaint: Acute ischemic stroke [facility])    Subjective:   No overnight events. No current complaints. Labs reviewed. Slow and mild functional improvement thus far. ROS: No CP, SOB, dyspnea    Objective:  Patient Vitals for the past 24 hrs:   BP Temp Temp src Pulse Resp SpO2 Weight   02/22/23 2015 122/76 97.7 °F (36.5 °C) Oral 68 18 98 % --   02/22/23 1545 -- -- -- -- -- -- 161 lb 11.2 oz (73.3 kg)   02/22/23 0951 130/77 98.1 °F (36.7 °C) Oral 75 19 96 % --       Gen: No distress, pleasant. Resting in bedside chair  HEENT: Normocephalic, atraumatic. CV: Regular rate and rhythm. No MRG   Resp: No respiratory distress. CTAB   Abd: Soft, nontender nondistended  Ext: No edema. Neuro: Alert, oriented, appropriately interactive. Left hemiparesis LUE 0/4. Negative gilliam    Laboratory data: Available via EMR. Therapy progress:    PT    Supine to Sit: Partial/moderate assistance  Sit to Supine: Dependent   Sit to Stand: Partial/moderate assistance  Chair/Bed to Chair Transfer: Partial/moderate assistance  Car Transfer:    Ambulation 10 ft: Partial/moderate assistance  Ambulation 50 ft: Partial/moderate assistance  Ambulation 150 ft:    Stairs - 1 Step: Partial/moderate assistance  Stairs - 4 Step: Partial/moderate assistance  Stairs - 12 Step:      OT    Eating: Setup or clean-up assistance  Oral Hygiene: Setup or clean-up assistance  Bathing: Partial/moderate assistance  Upper Body Dressing: Partial/moderate assistance  Lower Body Dressing: Substantial/maximal assistance  Toilet Transfer: Substantial/maximal assistance  Toilet Hygiene: Substantial/maximal assistance    Speech Therapy    Pt presents with moderate L perioral weakness resulting in dysarthria and oropharyngeal dysphagia. Instrumental assessment (FEES) completed 2/10/22, see report for assessment summary.  Pt with limited carry over of information between sessions and day to day which limits his responses to therapy and results in frustration and reported depression. Pt also presents with moderate cognitive-linguistic deficits characterized by deficits in orientation, attention, executive functioning, short-term memory, and visuospatial functioning. Recommend SLP intervention for safe return to prior level of independence. Body mass index is 23.88 kg/m². Assessment:  Patient Active Problem List   Diagnosis    Acute ischemic stroke [facility])    Expressive aphasia    Dysarthria    Ataxia    Primary hypertension    Tobacco abuse    Noncompliance    Left hemiparesis ([facility] [address]       Plan:   Acute right basal ganglia infarct: DAPT for 21 days (2/25) , Lipitor. PT/OT/SLP. Started SSRI for motor recovery     Dysphagia: Dysphagia diet, SLP     HTN: started lisinopril 5     Tobacco use: education provided. No supplement currently    Meatus trauma: held lovenox. Resolved. UA without significant findings for infection     Bowels: Per protocol   Bladder: Per protocol   Sleep: Trazodone scheduled  Pain: Tylenol, tramadol as needed  DVT PPx: Lovenox  SOPHIA: 3/2    Abbie Gordillo MD 2/23/2023, 8:27 AM    * This document was created using dictation software. While all precautions were taken to ensure accuracy, errors may have occurred. Please disregard any typographical errors.

## 2023-02-23 NOTE — CARE COORDINATION
Team conference held today. Spoke with patient and his spouse to discuss progress with therapy, barriers to discharge, and plans to return home. Estimated discharge date set for 3/2/2023. Plan to be determine. Will continue to follow for support and discharge planning. TYLER/LAURA met with patient and his family after team conference discussed patient discharging to a SNF. TYLER/LAURA provided a SNF for patient/family. TYLER/LAURA received a call from patient's brother Machelle Espinoza Foxryan Cadena requesting that a SNF list be email to him @ [email or web address].     Electronically signed by MILEY Natarajan on 2/23/2023 at 5:41 PM

## 2023-02-23 NOTE — PROGRESS NOTES
Nutrition Note    RECOMMENDATIONS  PO Diet: Regular as tolerated  Wt changes as available     NUTRITION ASSESSMENT   Pt is tolerating regular diet consistency & reports fair appetite. PO intake fluctuates, however staff reports pt is eating better since family brings in foods from outside. Standing scale wt 161 lb, & if accurate is 13 lb more than admission/bedscale wt & is at baseline of UBW. Will con't to monitor & encourage po intake. Nutrition Related Findings: LBM 2/21; no edema noted; -1.3L; Na 135  Wounds: Skin Tears (x1)  Nutrition Education:  Education not indicated   Nutrition Goals: PO intake 50% or greater     MALNUTRITION ASSESSMENT   Acute Illness  Malnutrition Status: At risk for malnutrition (Comment)    NUTRITION DIAGNOSIS   Inadequate oral intake related to inadequate protein-energy intake as evidenced by mild muscle loss, mild loss of subcutaneous fat, other (comment) (typically not a big eater)      CURRENT NUTRITION THERAPIES  ADULT DIET; Regular     PO Intake: 26-50%, %   PO Supplement Intake:None Ordered    ANTHROPOMETRICS  Current Height: 5' 9\" (175.3 cm)  Current Weight: 161 lb 11.2 oz (73.3 kg)    Admission weight: 148 lb (67.1 kg) (bedscale)  Ideal Body Weight (IBW): 160 lbs  (73 kg)    Usual Bodyweight 160 lb (72.6 kg)       BMI: 23.8    The patient will be monitored per nutrition standards of care. Consult dietitian if additional nutrition interventions are needed prior to RD reassessment.      Higinio Luevano RD, LD    Contact: [phone]

## 2023-02-24 PROCEDURE — 97116 GAIT TRAINING THERAPY: CPT

## 2023-02-24 PROCEDURE — 97535 SELF CARE MNGMENT TRAINING: CPT

## 2023-02-24 PROCEDURE — 97130 THER IVNTJ EA ADDL 15 MIN: CPT

## 2023-02-24 PROCEDURE — 97129 THER IVNTJ 1ST 15 MIN: CPT

## 2023-02-24 PROCEDURE — 97530 THERAPEUTIC ACTIVITIES: CPT

## 2023-02-24 PROCEDURE — 6360000002 HC RX W HCPCS: Performed by: PHYSICAL MEDICINE & REHABILITATION

## 2023-02-24 PROCEDURE — 6370000000 HC RX 637 (ALT 250 FOR IP): Performed by: PHYSICAL MEDICINE & REHABILITATION

## 2023-02-24 PROCEDURE — 97112 NEUROMUSCULAR REEDUCATION: CPT

## 2023-02-24 PROCEDURE — 97110 THERAPEUTIC EXERCISES: CPT

## 2023-02-24 PROCEDURE — 1280000000 HC REHAB R&B

## 2023-02-24 RX ADMIN — LISINOPRIL 5 MG: 5 TABLET ORAL at 09:32

## 2023-02-24 RX ADMIN — ENOXAPARIN SODIUM 40 MG: 100 INJECTION SUBCUTANEOUS at 09:32

## 2023-02-24 RX ADMIN — TAMSULOSIN HYDROCHLORIDE 0.4 MG: 0.4 CAPSULE ORAL at 09:32

## 2023-02-24 RX ADMIN — SERTRALINE 25 MG: 50 TABLET, FILM COATED ORAL at 09:32

## 2023-02-24 RX ADMIN — ATORVASTATIN CALCIUM 40 MG: 40 TABLET, FILM COATED ORAL at 20:25

## 2023-02-24 RX ADMIN — STANDARDIZED SENNA CONCENTRATE AND DOCUSATE SODIUM 2 TABLET: 8.6; 5 TABLET ORAL at 20:25

## 2023-02-24 RX ADMIN — CLOPIDOGREL BISULFATE 75 MG: 75 TABLET ORAL at 09:32

## 2023-02-24 RX ADMIN — STANDARDIZED SENNA CONCENTRATE AND DOCUSATE SODIUM 2 TABLET: 8.6; 5 TABLET ORAL at 09:32

## 2023-02-24 RX ADMIN — TRAZODONE HYDROCHLORIDE 50 MG: 50 TABLET ORAL at 20:25

## 2023-02-24 RX ADMIN — ASPIRIN 81 MG 81 MG: 81 TABLET ORAL at 09:32

## 2023-02-24 ASSESSMENT — PAIN SCALES - GENERAL: PAINLEVEL_OUTOF10: 0

## 2023-02-24 NOTE — PROGRESS NOTES
[facility]   Phone: [phone]    Physical Therapy    [] Initial Evaluation            [x] Daily Treatment Note         [] Discharge Summary      Patient: Vadim Mahajan   : 1955   MRN: [id number]   Date of Service:  2023  Admitting Diagnosis: Acute ischemic stroke [facility])  Current Admission Summary: 47-year-old male who was admitted on  with left-sided weakness for greater than 2 days. CT of the head with suggestion of possible NPH. CTA with less than 50% stenosis of the right ICA. MRI of the brain revealed subacute ischemia on the right basal ganglia. Echo with normal EF and negative bubble study. Neurology evaluated and suggested aspirin and Plavix for 21 days then aspirin only in addition to Lipitor. He was evaluated by therapy and suggested to continue in an inpatient setting prior to returning home. He reports no BM for multiple days. Past Medical History:  has a past medical history of CVA (cerebral vascular accident) ([address] and HTN (hypertension). Past Surgical History:  has a past surgical history that includes Finger surgery (Right); Hernia repair; fracture surgery; and hernia repair.   Discharge Recommendations: 24 hr physical assistance with ongoing skilled therapy services  DME Required For Discharge: DME to be determined pending patient progress   Precautions/Restrictions: high fall risk, (L) hemiparetic shoulder  Weight Bearing Restrictions: no restrictions  [] Right Upper Extremity  [] Left Upper Extremity [] Right Lower Extremity  [] Left Lower Extremity     Required Braces/Orthotics: no braces required   [] Right  [] Left  Positional Restrictions:no positional restrictions    Pre-Admission Information   Lives With: brother Katherine Peralta)                Type of Home: house  Home Layout: two level, bedroom in the basement, 10 steps with 1 handrail on the L  Home Access:  2 step to enter without rails   Bathroom Layout: tub/shower unit  Bathroom Equipment: grab bars in shower  Toilet Height: elevated height  Home Equipment: no prior equipment  Transfer Assistance: Independent without use of device  Ambulation Assistance:Independent without use of device  ADL Assistance: independent with all ADL's   IADL Assistance: independent with homemaking tasks  Active :        [x] Yes                 [] 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 Occupation: sales  Hobbies: [employer]   Recent Falls: chart reports had several falls that caused this admission (about 5 falls)     Examination   Vision:   Vision Corrective Device: wears glasses for distance  Hearing:   WFL      Subjective  General:  Pt seated in recliner on arrival. Agreeable to PT. Pain: 0/10  Pain Interventions: not applicable       Functional Mobility  Bed Mobility  Bed mobility not completed on this date. Comments: Last observed status SBA completed towards (R) UE side. Transfers  Sit to stand transfer: minimal assistance, (min (A) to hemiwalker, CGA to fixed (R) HR)  Stand to sit transfer: contact guard assistance  Stand pivot transfer: minimal assistance, (seated stepper => w/c without device use completed to (R) side)  Stand step transfer: moderate assistance, (wc => seated stepper with use of (R) dayton-walker)   Comments:   Ambulation Trial 1  Surface:level surface  Assistive Device: retana rail  Other Appliance: (L), givmohr sling  Assistance: contact guard assistance   Distance: 25' x 2 completions  Gait Mechanics: Ongoing (L) lateral lean with narrow THERESE and inconsistent pattern. Decreased impulsivity on this date allowing patient to counter (L) lateral lean on this date.   Comments:    Ambulation Trial  Surface:level surface  Assistive Device: hemiwalker  Other Appliance: (L), dorsiflex assist, givmohr sling  Assistance: moderate assistance  Distance: 25'  Gait Mechanics: Device utilized in front of patient with patient straddling (L) device post to facilitate increased THERESE width. Ongoing (L) lateral requiring min/mod (A) to correct. Max VC for weight shifting and step by step sequence of gait to minimize balance loss. Comments:    Stair Mobility   Stair mobility not completed on this date. Comments:    Wheelchair Mobility:  No w/c mobility completed on this date. Comments:   Balance  Static Sitting Balance: fair: maintains balance at CGA without use of UE support  Dynamic Sitting Balance: fair (-): maintains balance at CGA with use of UE support  Static Standing Balance: poor (+): requires min (A) to maintain balance  Dynamic Standing Balance: poor: requires mod (A) to maintain balance  Comments: Standing balance rated with hemiwalker use. Able to maintain static and dynamic stance at CGA/[place]ul[place] with use of fixed bar support    Other Therapeutic Interventions   1st Session:  Seated stepper L3 without (L) UE use x 8 min to improve strength and cardiovascular endurance. 2nd Session:   Pt seated in recliner upon arrival, denies pain, agreeable to PT session. Sit <=> stand transfers completed throughout session at CGA/min (A). Stand step transfer completed recliner => w/c at mod (A). Car transfer completed with use of fixed car door support at min (A), pt utilizes (R) UE to assist (L) LE in/out of vehicle. Pt ambulates 25 ft with hemiwalker placed infront of patient at min/mod (A) of 1 with max VC for sequence and balance correction. Patient transitions to OT services up in w/c.       Functional Outcomes                 Cognition  Overall Cognitive Status: Impaired  Arousal/Alterness: appropriate responses to stimuli  Following Commands: follows one step commands with repetition, follows one step commands with increased time  Attention Span: difficulty attending to directions  Memory: decreased recall of biographical information, decreased short term memory  Safety Judgement: decreased awareness of need for safety  Problem Solving: assistance required to generate solutions, assistance required to implement solutions  Insights: decreased awareness of deficits  Initiation: requires cues for all  Sequencing: requires cues for all  Orientation:    oriented to person, oriented to place, oriented to situation, and disoriented to time   Command Following:   impaired    Education  Barriers To Learning: cognition, emotional, and physical  Patient Education: patient educated on goals, PT role and benefits, plan of care, general safety, functional mobility training, proper use of assistive device/equipment, disease specific education, transfer training, educated on recommendation for 24 hr physical assistance upon discharge. Learning Assessment:  patient verbalizes understanding, would benefit from continued reinforcement    Assessment  Activity Tolerance: Good. Seated rest breaks provided for recovery. Impairments Requiring Therapeutic Intervention: decreased functional mobility, decreased ADL status, decreased ROM, decreased strength, decreased safety awareness, decreased cognition, decreased endurance, decreased balance, decreased coordination, decreased posture  Prognosis: fair  Clinical Assessment: Pt continues to present with significant (L) lateral lean in standing with decreased self awareness of postural positioning requiring physical assistance for balance correction during all standing tasks. Gait mechanics and stability improved on this date with use of hemiwalker in front of patient resulting in increased THERESE width. Pt reporting plan for d/c to SNF following this skilled therapy intervention in attempt to further improve functional mobility status. Pt would benefit from continued skilled PT to promote functional independence.      Safety Interventions: patient left in chair, chair alarm in place, call light within reach, gait belt, patient at risk for falls, telesitter in use, and fall mats at bedside    Plan  Frequency: 5 x/week, 60 min/day  Current Treatment Recommendations: strengthening, ROM, balance training, functional mobility training, transfer training, gait training, stair training, endurance training, neuromuscular re-education, wheelchair mobility training, modalities, patient/caregiver education, ADL/self-care training, cognitive/perceptual training, and safety education    Goals  Patient Goals:  To return home and be able to move his (L) UE better  Short Term Goals:  Time Frame: 1-2 weeks  Patient will complete bed mobility at [facility] - goal met 2/23/2023  Patient will complete transfers at mod (A) of 1 - goal met 2/15/2023  Patient will ambulate 25 ft with use of LRAD at mod (A) of 1 - goal met 2/20/2023  Patient will ascend/descend 1 stairs with (B) handrail at mod (A) of 1 - goal met 2/20/2023  Patient will complete manual w/c propulsion 50 ft at A - goal met 2/22/2023  Long Term Goals:  Time Frame: 3 weeks  Patient will complete bed mobility at modified independent   Patient will complete transfers at [facility]   Patient will ambulate 50 ft with use of LRAD at [facility]  Patient will ascend/descend 10 stairs with (L) ascending handrail at [facility]  Patient will complete car transfer at [facility]     Therapy Session Time      Individual Group Co-treatment   Time In 0840       Time Out 0930       Minutes 50         Timed Code Treatment Minutes:  50    Second Session Therapy Time:   Individual Concurrent Group Co-treatment   Time In  1340         Time Out  1400         Minutes  15           Timed Code Treatment Minutes:  20    Total Treatment Minutes:  70 Minutes       Electronically Signed By:   Edward Griffin PT, ELIZABETH - [id number], 2/23/2023 10:48 AM

## 2023-02-24 NOTE — PROGRESS NOTES
[facility] Department   Phone: [phone]    Occupational Therapy    [] Initial Evaluation            [x] Daily Treatment Note         [] Discharge Summary      Patient: Abbie Anders   : 1955   MRN: [id number]   Date of Service:  2023    Admitting Diagnosis:  Acute ischemic stroke [facility])  Current Admission Summary: 64-year-old male who was admitted on  with left-sided weakness for greater than 2 days. CT of the head with suggestion of possible NPH. CTA with less than 50% stenosis of the right ICA. MRI of the brain revealed subacute ischemia on the right basal ganglia. Echo with normal EF and negative bubble study. Neurology evaluated and suggested aspirin and Plavix for 21 days then aspirin only in addition to Lipitor. He was evaluated by therapy and suggested to continue in an inpatient setting prior to returning home. He reports no BM for multiple days. Past Medical History:  has a past medical history of CVA (cerebral vascular accident) ([address] and HTN (hypertension). Past Surgical History:  has a past surgical history that includes Finger surgery (Right); Hernia repair; fracture surgery; and hernia repair.     Discharge Recommendations: Continued OT with  assistance    DME Required For Discharge: rolling walker, wheelchair, tub transfer bench, hand-held shower head, grab bars - toilet, grab bars - shower    Precautions/Restrictions: high fall risk, up as tolerated, reg diet    Pre-Admission Information   Lives With: brother Jazmyne Cheema)                Type of Home: house  Home Layout: two level, bedroom in the basement, 10 steps with 1 handrail on the L  Home Access:  2 step to enter without rails   Bathroom Layout: tub/shower unit  Bathroom Equipment: grab bars in shower  Toilet Height: elevated height  Home Equipment: no prior equipment  Transfer Assistance: Independent without use of device  Ambulation Assistance:Independent without use of device  ADL Assistance: independent with all ADL's   IADL Assistance: independent with homemaking tasks  Active :        [x] Yes                 [] 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 Occupation: sales  Hobbies: fishing, reading, boating  Recent Falls: chart reports had several falls that caused this admission (about 5 falls)     Subjective  General: Pt seated upright in w/c. Agreeable to OT tx. Denied pain. RN present at bedside and administering medication. Pain Interventions: not applicable     Activities of Daily Living  Basic Activities of Daily Living  Grooming: minimal assistance moderate assistance  Grooming Comments: min/mod A standing balance for oral care in stance, pt responding well to verbal cues to correct posture however was not able to independently correct, poor attention and sequncing noted  Instrumental Activities of Daily Living  No IADL completed on this date. Functional Mobility  Bed Mobility  Bed mobility not completed on this date. Transfers  Sit to stand transfer:moderate assistance  Stand to sit transfer: moderate assistance  Stand pivot transfer: minimal assistance  Comments: cues to push up from weehchair  Functional Mobility:  Sitting Balance: stand by assistance. Standing Balance: moderate assistance. Functional Mobility: .  maximum assistance  Functional Mobility Activity: to/from bathroom, to/from therapy room  Functional Mobility Device Use: wheelchair  Comments: pt with difficulties with turning wheelchair, max verbal cues for positioning of LLE, pt often neglecting LLE    While seated, pt complete therapeutic activity called \"memory\". Pt able to complete 6 card matches with mod verbal cues, 8 with max cues, and 10 with max cues. Pt with poor working memory and short term memory and therefore required significant assistance for completing matching game.  Initially this activity was attempted in stance, however d/t activity demand activity modified to be completed while seated. While seated, pt completed 2 sets of 10 using 5# weight on RUE: shoulder flexion, elbow flexion, chest press, serves with palm up, supination/pronation. Pt tolerated well with no rest breaks. Second Session  Pt seated in w/c with PT, agreeable to therapy. Pt pleasant and cooperative throughout. Activities of Daily Living  Basic Activities of Daily Living  General Comments: politely declined  Functional Mobility  Bed Mobility  Sit to Supine: minimal assistance  Comments:assistance for LLE  Transfers  Stand pivot transfer: minimal assistance, moderate assistance  Comments: cues for sequencing  Functional Mobility:  Sitting Balance: contact guard assistance, minimal assistance. Comments:sitting on therapy mat  Therapeutic Activity   While seated on EOM,  pt completed 15 minutes of electrical stimulation for LUE elbow flexion/extension, amplitude 14-42 mA CC ([place], cycle time 5/5). Pt educated on attending to LUE and talking to arm to improve LUE elbow flexion. Pt noted with poor attention to LUE and it did not improve despite placing a fork in mouth. Pt noted to initiate elbow flexion (mm activation noted however no range produce, good contraction noted ). Required assist to complete full ROM. Pt re-educated on purpose of therapeutic activity and he is receptive. Skin integrity-WFL. MMT- 0/5 for elbow flexion. While seated in wheelchair, pt completed 5 minutes of electrical stimulation for LUE wrist extension, amplitude 40 mA CC ([place], cycle time 5/5). Pt able to initiate wrist extension and maintain activation during task. No active wrist flexion noted. Cued to attend to wrist and to not let wrist drop once cycle time completed. Skin integrity-WFL. Pt left in bed, call light within reach, and bed alarm activated.      Cognition  Overall Cognitive Status: Impaired  Arousal/Alterness: appropriate responses to stimuli  Following Commands: inconsistently follows commands  Attention Span: attends with cues to redirect, difficulty attending to directions, difficulty dividing attention  Memory: decreased recall of precautions, decreased recall of recent events, decreased short term memory  Safety Judgement: decreased awareness of need for assistance, decreased awareness of need for safety  Problem Solving: assistance required to generate solutions, assistance required to implement solutions, decreased awareness of errors, assistance required to identify errors made, assistance required to correct errors made  Insights: not aware of deficits  Initiation: requires cues for some  Sequencing: requires cues for all  Comments: impulsive at times, deficits in motor execution and motor control  Orientation:    oriented to person, oriented to time, and oriented to situation  Command Following:   impaired  Follows one step directions with repetition and increased time  Difficulties following directions with \"Left/Right\", I.e., use R arm to do X  Education  Barriers To Learning: cognition and language  Patient Education: patient educated on goals, OT role and benefits, plan of care, precautions, ADL adaptive strategies, IADL safety, proper use of assistive device/equipment, adaptive device training, energy conservation, orientation, family education, pressure relief, transfer training, discharge recommendations  Learning Assessment:  patient verbalizes understanding, would benefit from continued reinforcement, patient will require reinforcement due to cognitive deficits  Assessment  Activity Tolerance: Good  Impairments Requiring Therapeutic Intervention: decreased functional mobility, decreased ADL status, decreased ROM, decreased strength, decreased safety awareness, decreased cognition, decreased endurance, decreased sensation, decreased balance, decreased IADL, decreased fine motor control, decreased coordination, increased pain, decreased posture  Prognosis: good  Clinical Assessment: Pt is progressing inconsistently. Pt requires significant assistance for balance throughout ADL and standing tasks. Pt with limited/no attention to lateral leans during sitting and standing tasks. Pt continues to be limited by deficits in short term memory as pt continues to be limited by poor carryover of learned information. Recommend continued reinforcement of previously learned information d/t deficits in cognition. Continue POC. Safety Interventions: patient left in chair, chair alarm in place, call light within reach, patient at risk for falls, telesitter in use, and family/caregiver present    Plan  Frequency: 5 x/week, 60 min/day  Current Treatment Recommendations: strengthening, ROM, balance training, functional mobility training, transfer training, stair training, endurance training, neuromuscular re-education, wheelchair mobility training, modalities, patient/caregiver education, ADL/self-care training, IADL training, home management training, cognitive reorientation, home exercise program, safety education, equipment evaluation/education, and positioning  Goals  Patient Goals: \"to get this working\" in re to LUE   Short Term Goals:  Time Frame: in 10 days  Patient will complete upper body bathing at min A while seated. -MET 2/20  Patient will complete lower body bathing at min A while seated and standing as needed. -MET 2/20  Patient will complete upper body dressing at min A. Patient will complete lower body dressing at mod A using AE as needed. Patient will complete toileting at min A with GB as needed. Patient will complete a toilet transfer with min A with GB as needed. -MET 2/20  Patient will complete a visual assessment to complete occupational profile. -MET 2/20  Long Term Goals:  Time Frame: in 21 days  Patient will complete upper body bathing at supervision while seated.   Patient will complete lower body bathing at supervision while seated and standing as needed. Patient will complete upper body dressing at set-up assistance. Patient will complete lower body dressing at supervision using AE as needed. Patient will complete toileting at supervision with GB as needed. Patient will complete grooming at supervision while in stance. Patient will complete functional transfers at supervision using LRAD. Patient will complete functional mobility at supervision using LRAD. Patient will increase functional sitting balance to mod I for improved ADL completion. Patient will increase functional standing balance to SUP for improved ADL completion.      No LTG met 2/23    Therapy Session Time  First Session   Individual Group Co-treatment   Time In 930     Time Out 1015     Minutes 45     Second Session   Individual Group Co-treatment   Time In 1400     Time Out 1438     Minutes 38       Timed Code Treatment Minutes: 97+70    Total Treatment Minutes:  83    Electronically Signed By: OLGA Schultz/MIKE, [id number]

## 2023-02-24 NOTE — CARE COORDINATION
TYLER/LAURA received a call from patient's brother Gemma Marrufo requesting stating that he no longer needs SNF list emailed to him. Patient's brother stating that he will be visiting SNF over the week-end and will have choices on 2/27/2023. TYLER will follow.     Electronically signed by MILEY Tello on 2/24/2023 at 12:03 PM

## 2023-02-24 NOTE — PROGRESS NOTES
[facility]   Phone: [phone]    Speech Therapy    [] Initial Evaluation            [x] Daily Treatment Note         [] Discharge Summary      Patient: Cristal Hunter   : 1955   MRN: [id number]   Date of Service:  2023  Admitting Diagnosis: Acute ischemic stroke [facility])  Current Admission Summary: 78-year-old male who was admitted on  with left-sided weakness for greater than 2 days. CT of the head with suggestion of possible NPH. CTA with less than 50% stenosis of the right ICA. MRI of the brain revealed subacute ischemia on the right basal ganglia. Echo with normal EF and negative bubble study. Neurology evaluated and suggested aspirin and Plavix for 21 days then aspirin only in addition to Lipitor. He was evaluated by therapy and suggested to continue in an inpatient setting prior to returning home. He reports no BM for multiple days. Past Medical History:  has a past medical history of CVA (cerebral vascular accident) ([address] and HTN (hypertension). Past Surgical History:  has a past surgical history that includes Finger surgery (Right); Hernia repair; fracture surgery; and hernia repair. Precautions/Restrictions: high fall risk, modified diet      Pre-Admission Information   Living Status: Pt lives with his brother, independent prior to admission. Occupation/School: Retired last year. 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   Medication Management: :  [x]Primary   []Secondary []No  Finance Management: [x]Primary   []Secondary []No  Active :   [x]Yes         []No  [facility]  Vision:    Vision Corrective Device: wears glasses at all times      Subjective  General: Pt alert and cooperative throughout sessions. Pt reported \"I feel grumpy,\" but reported he enjoyed playing cards during the second session.     Pain: Did not state    Safety Interventions: patient left in chair, chair alarm in place, call light within reach, patient at risk for falls, and telesitter in use      Therapeutic Interventions:     Session 1:   Dysphagia: Severity: Mild  and Moderate   Goal not targeted this session. Motor Speech/ Voice:  Severity: Mild and Moderate   Goal not targeted this session. Cognition: Severity: Mild   Executive functioning (organizing/problem solving): calendar organization activity   - organizing: pt with 55% accuracy appropriately organizing calendar given min-mod cues   - problem solving: pt with 77% accuracy appropriately identifying event in calendar given min cues   - errors related to decreased alternating attention between instruction list and calendar, immediate memory of prompt     Functional recall / day to day: recall of medication list   - 40% accuracy recalling medication given mod cues    Session 2:     Dysphagia: Severity: Mild and Moderate   Goal not targeted this session. Motor Speech/ Voice: Severity: Mild  and Moderate   Goal not targeted this session. Cognition: Severity: Mild    Immediate recall: recall of calendar activity   - pt with 88% accuracy recalling dates/events in calendar given max visual and verbal cues    - errors related to decreased sustained attention to task     Executive functioning (planning)/thought organization: procedural discourse about card game   - planning: pt with 80% accuracy planning task with mod verbal cues   - thought organization: pt with sufficient basic procedural discourse of task, but lacked detail and organized thought processing of the task       Additional Interventions:      Education  Barriers To Learning: cognition and hearing  Patient Education: Provided education regarding role of SLP, results of assessment, recommendations and general speech pathology plan of care.    Learning Assessment: Pt requires ongoing learning     Assessment  Impairments Requiring Therapeutic Intervention: Cognitive-Linguistic Deficits and Oropharyngeal Dysphagia   Prognosis: good    Clinical Assessment:  Pt presents with moderate L perioral weakness resulting in dysarthria and oropharyngeal dysphagia. Instrumental assessment (FEES) completed 2/10/22, see report for assessment summary. Pt continuing to tolerate diet without consistency restrictions. Pt with limited carry over of information between sessions and day to day which limits his responses to therapy, results in frustration and reported depression. Pt also presents with moderate cognitive-linguistic deficits characterized by deficits in orientation, attention, executive functioning, short-term memory, and visuospatial functioning. Pt increasing sustained attention to task with intermittent SLP support, but still exhibits difficulty with alternating attention between variables within a task. Pt continues to progress in planning/organizing with continuous visual/verbal support from the SLP. Recommend SLP intervention for safe return to prior level of independence. Diet Solids Recommendation:   Liquid Consistency Recommendation:   Recommended Form of Meds:   Regular texture diet     Thin liquids    No straws  Meds whole with water       Recommended Compensatory Swallowing Strategies: Upright as possible with all PO intake , Small bites/sips , Eat/feed slowly, No Straws      Plan  Frequency: 60 minutes/day; 5 days per week, as tolerated, until goals met, or discharged from ARU. Therapeutic Interventions: Diet Tolerance Monitoring , Patient/Family Education , Instrumental assessment of swallow function (Modified Barium Swallow Study)  Speech / Motor Planning / Voice intervention , Cognitive-Linguistic intervention , and Patient/ Family education   Discharge Recommendations: Home with assistance and TBD   Continued SLP at Discharge: Yes     Goals  Patient Goals: Pt reports his goal is to get home.   Time Frame: 14-21 days    Pt will tolerate recommended diet and advanced trials with no overt s/s of aspiration. GOAL MET (will continue to target diet tolerance monitoring)   Pt will improve oral motor strength and ROM for coordinated and alternating motions, via graded tasks to improve speech back to baseline level as subjectively rated by SLP/pt and family. ongoing  Pt will improve executive function for multifactor task completion via graded tasks to 80% accuracy ongoing  Patient will complete functional problem-solving tasks for daily situations with 80% accuracy or given min cues. ongoing  Pt will improve attention to detail for graded complex information to 80%, for improved recall and retention of newly learned information  ongoing      Therapy Session Time      Session 1 Session 2   Time In 1030 1245   Time Out 1100 1315   Time Code Minutes 30 30   Individual Minutes 30 30     Timed Code Treatment Minutes: 60  Total Treatment Minutes:  60    Electronically Signed By:   Ana Laura Gonzalez M.A. CCC-SLP AUDELIA [id number]  Speech-Language Pathologist   2/24/2023 10:59 AM     The speech-language pathologist was present, directed the patient's care, made skilled judgment and was responsible for assessment and treatment.     Loi Coto.,  Speech-Language Pathology

## 2023-02-24 NOTE — PROGRESS NOTES
Jayden Becerra  2/24/2023  [id number]    Chief Complaint: Acute ischemic stroke [facility])    Subjective:   No overnight events. No current complaints. ROS: No CP, SOB, dyspnea    Objective:  Patient Vitals for the past 24 hrs:   BP Temp Temp src Pulse Resp SpO2   02/24/23 0930 112/69 97.6 °F (36.4 °C) Oral 80 18 97 %   02/23/23 2015 128/77 98.2 °F (36.8 °C) Oral 66 18 95 %       Gen: No distress, pleasant. Resting in bedside chair  HEENT: Normocephalic, atraumatic  CV: Regular rate and rhythm. No MRG   Resp: No respiratory distress. CTAB   Abd: Soft, nontender nondistended  Ext: No edema  Neuro: Alert, oriented, appropriately interactive. Left hemiparesis LUE 0/4. Negative gilliam    Laboratory data: Available via EMR. Therapy progress:    PT    Supine to Sit: Partial/moderate assistance  Sit to Supine: Dependent   Sit to Stand: Partial/moderate assistance  Chair/Bed to Chair Transfer: Partial/moderate assistance  Car Transfer:    Ambulation 10 ft: Partial/moderate assistance  Ambulation 50 ft: Partial/moderate assistance  Ambulation 150 ft:    Stairs - 1 Step: Partial/moderate assistance  Stairs - 4 Step: Partial/moderate assistance  Stairs - 12 Step:      OT    Eating: Setup or clean-up assistance  Oral Hygiene: Setup or clean-up assistance  Bathing: Partial/moderate assistance  Upper Body Dressing: Partial/moderate assistance  Lower Body Dressing: Substantial/maximal assistance  Toilet Transfer: Substantial/maximal assistance  Toilet Hygiene: Substantial/maximal assistance    Speech Therapy    Pt presents with moderate L perioral weakness resulting in dysarthria and oropharyngeal dysphagia. Instrumental assessment (FEES) completed 2/10/22, see report for assessment summary. Pt continuing to tolerate diet without any consistency restrictions. Pt with limited carry over of information between sessions and day to day which limits his responses to therapy and results in frustration and reported depression.  Pt also presents with moderate cognitive-linguistic deficits characterized by deficits in orientation, attention, executive functioning, short-term memory, and visuospatial functioning. Pt increasing sustained attention to task with intermittent SLP support. Pt benefits from increased cueing and reinforcement of strategies to complete tasks appropriately. Recommend SLP intervention for safe return to prior level of independence. Body mass index is 23.88 kg/m². Assessment:  Patient Active Problem List   Diagnosis    Acute ischemic stroke [facility])    Expressive aphasia    Dysarthria    Ataxia    Primary hypertension    Tobacco abuse    Noncompliance    Left hemiparesis ([facility] [address]       Plan:   Acute right basal ganglia infarct: DAPT for 21 days (2/25) , Lipitor. PT/OT/SLP. Started SSRI for motor recovery     Dysphagia: Dysphagia diet, SLP     HTN: started lisinopril 5     Tobacco use: education provided. No supplement currently    Meatus trauma: held lovenox. Resolved. UA without significant findings for infection     Bowels: Per protocol   Bladder: Per protocol   Sleep: Trazodone scheduled  Pain: Tylenol, tramadol as needed  DVT PPx: Lovenox  SOPHIA: 3/2    Ashlyn Horner MD 2/24/2023, 2:35 PM    * This document was created using dictation software. While all precautions were taken to ensure accuracy, errors may have occurred. Please disregard any typographical errors.

## 2023-02-25 PROCEDURE — 6370000000 HC RX 637 (ALT 250 FOR IP): Performed by: PHYSICAL MEDICINE & REHABILITATION

## 2023-02-25 PROCEDURE — 1280000000 HC REHAB R&B

## 2023-02-25 PROCEDURE — 6360000002 HC RX W HCPCS: Performed by: PHYSICAL MEDICINE & REHABILITATION

## 2023-02-25 RX ADMIN — SERTRALINE 25 MG: 50 TABLET, FILM COATED ORAL at 09:48

## 2023-02-25 RX ADMIN — STANDARDIZED SENNA CONCENTRATE AND DOCUSATE SODIUM 2 TABLET: 8.6; 5 TABLET ORAL at 09:47

## 2023-02-25 RX ADMIN — CLOPIDOGREL BISULFATE 75 MG: 75 TABLET ORAL at 09:47

## 2023-02-25 RX ADMIN — ATORVASTATIN CALCIUM 40 MG: 40 TABLET, FILM COATED ORAL at 20:40

## 2023-02-25 RX ADMIN — ENOXAPARIN SODIUM 40 MG: 100 INJECTION SUBCUTANEOUS at 09:47

## 2023-02-25 RX ADMIN — TAMSULOSIN HYDROCHLORIDE 0.4 MG: 0.4 CAPSULE ORAL at 09:47

## 2023-02-25 RX ADMIN — STANDARDIZED SENNA CONCENTRATE AND DOCUSATE SODIUM 2 TABLET: 8.6; 5 TABLET ORAL at 20:40

## 2023-02-25 RX ADMIN — TRAZODONE HYDROCHLORIDE 50 MG: 50 TABLET ORAL at 20:40

## 2023-02-25 RX ADMIN — LISINOPRIL 5 MG: 5 TABLET ORAL at 09:48

## 2023-02-25 ASSESSMENT — PAIN SCALES - GENERAL
PAINLEVEL_OUTOF10: 0

## 2023-02-25 NOTE — PLAN OF CARE
Problem: Discharge Planning  Goal: Discharge to home or other facility with appropriate resources  2/25/2023 0721 by José Kenney RN  Outcome: Progressing  2/24/2023 2245 by Ledy Rocha RN  Outcome: Progressing     Problem: Skin/Tissue Integrity  Goal: Absence of new skin breakdown  2/25/2023 0721 by José Kenney RN  Outcome: Progressing  2/24/2023 2245 by Ledy Rocha RN  Outcome: Progressing     Problem: Safety - Adult  Goal: Free from fall injury  2/25/2023 0721 by José Kenney RN  Outcome: Progressing  2/24/2023 2245 by Ledy Rocha RN  Outcome: Progressing     Problem: Pain  Goal: Verbalizes/displays adequate comfort level or baseline comfort level  2/25/2023 0721 by José Kenney RN  Outcome: Progressing  2/24/2023 2245 by Ledy Rocha RN  Outcome: Progressing     Problem: Nutrition Deficit:  Goal: Optimize nutritional status  2/25/2023 0721 by José Kenney RN  Outcome: Progressing  2/24/2023 2245 by Ledy Rocha RN  Outcome: Progressing     Problem: ABCDS Injury Assessment  Goal: Absence of physical injury  2/25/2023 0721 by José Kenney RN  Outcome: Progressing  2/24/2023 2245 by Ledy Rocha RN  Outcome: Progressing

## 2023-02-25 NOTE — PLAN OF CARE
Problem: Discharge Planning  Goal: Discharge to home or other facility with appropriate resources  Outcome: Progressing  Flowsheets (Taken 2/24/2023 2015)  Discharge to home or other facility with appropriate resources: Identify barriers to discharge with patient and caregiver     Problem: Skin/Tissue Integrity  Goal: Absence of new skin breakdown  Description: 1. Monitor for areas of redness and/or skin breakdown  2. Assess vascular access sites hourly  3. Every 4-6 hours minimum:  Change oxygen saturation probe site  4. Every 4-6 hours:  If on nasal continuous positive airway pressure, respiratory therapy assess nares and determine need for appliance change or resting period.   Outcome: Progressing     Problem: Safety - Adult  Goal: Free from fall injury  2/24/2023 2245 by Saundra Monk RN  Outcome: Progressing  Flowsheets (Taken 2/24/2023 2243)  Free From Fall Injury: Liana Dakins family/caregiver on patient safety  2/24/2023 1307 by Crystal Reinoso RN  Outcome: Progressing     Problem: Pain  Goal: Verbalizes/displays adequate comfort level or baseline comfort level  Outcome: Progressing  Flowsheets (Taken 2/24/2023 2015)  Verbalizes/displays adequate comfort level or baseline comfort level: Encourage patient to monitor pain and request assistance     Problem: ABCDS Injury Assessment  Goal: Absence of physical injury  Outcome: Progressing  Flowsheets (Taken 2/24/2023 2243)  Absence of Physical Injury: Implement safety measures based on patient assessment

## 2023-02-25 NOTE — PROGRESS NOTES
Charlette Singh  2/25/2023  [id number]    Chief Complaint: Acute ischemic stroke [facility])    Subjective:   No overnight events. No pain. Slept well. ROS: No CP, SOB, dyspnea    Objective:  Patient Vitals for the past 24 hrs:   BP Temp Temp src Pulse Resp SpO2   02/25/23 0945 113/65 97.4 °F (36.3 °C) Oral 62 17 96 %   02/24/23 2015 125/71 97.3 °F (36.3 °C) Oral 58 16 97 %       Gen: No distress, pleasant. Resting in bedside chair  HEENT: Normocephalic, atraumatic  CV: Regular rate and rhythm. No MRG   Resp: No respiratory distress. CTAB   Abd: Soft, nontender nondistended  Ext: No edema  Neuro: Alert, oriented, appropriately interactive. Left hemiparesis LUE 0/4. Negative gilliam    Laboratory data: Available via EMR. Therapy progress:    PT    Supine to Sit: Partial/moderate assistance  Sit to Supine: Dependent   Sit to Stand: Partial/moderate assistance  Chair/Bed to Chair Transfer: Partial/moderate assistance  Car Transfer:    Ambulation 10 ft: Partial/moderate assistance  Ambulation 50 ft: Partial/moderate assistance  Ambulation 150 ft:    Stairs - 1 Step: Partial/moderate assistance  Stairs - 4 Step: Partial/moderate assistance  Stairs - 12 Step:      OT    Eating: Setup or clean-up assistance  Oral Hygiene: Partial/moderate assistance  Bathing: Partial/moderate assistance  Upper Body Dressing: Partial/moderate assistance  Lower Body Dressing: Substantial/maximal assistance  Toilet Transfer: Substantial/maximal assistance  Toilet Hygiene: Substantial/maximal assistance    Speech Therapy    Pt presents with moderate L perioral weakness resulting in dysarthria and oropharyngeal dysphagia. Instrumental assessment (FEES) completed 2/10/22, see report for assessment summary. Pt continuing to tolerate diet without any consistency restrictions. Pt with limited carry over of information between sessions and day to day which limits his responses to therapy and results in frustration and reported depression.  Pt also presents with moderate cognitive-linguistic deficits characterized by deficits in orientation, attention, executive functioning, short-term memory, and visuospatial functioning. Pt increasing sustained attention to task with intermittent SLP support. Pt benefits from increased cueing and reinforcement of strategies to complete tasks appropriately. Recommend SLP intervention for safe return to prior level of independence. Body mass index is 23.88 kg/m². Assessment:  Patient Active Problem List   Diagnosis    Acute ischemic stroke [facility])    Expressive aphasia    Dysarthria    Ataxia    Primary hypertension    Tobacco abuse    Noncompliance    Left hemiparesis ([facility] [address]       Plan:   Acute right basal ganglia infarct: DAPT for 21 days (2/25) , Lipitor. PT/OT/SLP. Started SSRI for motor recovery     Dysphagia: Dysphagia diet, SLP     HTN: started lisinopril 5     Tobacco use: education provided. No supplement currently    Meatus trauma: held lovenox. Resolved. UA without significant findings for infection     Bowels: Per protocol   Bladder: Per protocol   Sleep: Trazodone scheduled  Pain: Tylenol, tramadol as needed  DVT PPx: Lovenox  SOPHIA: 3/2    Alejandro Lieberman MD  2/25/2023, 9:47 AM    * This document was created using dictation software. While all precautions were taken to ensure accuracy, errors may have occurred. Please disregard any typographical errors.

## 2023-02-26 VITALS
HEART RATE: 65 BPM | OXYGEN SATURATION: 97 % | DIASTOLIC BLOOD PRESSURE: 67 MMHG | SYSTOLIC BLOOD PRESSURE: 111 MMHG | HEIGHT: 69 IN | WEIGHT: 161.7 LBS | TEMPERATURE: 98.4 F | BODY MASS INDEX: 23.95 KG/M2 | RESPIRATION RATE: 17 BRPM

## 2023-02-26 PROCEDURE — 94760 N-INVAS EAR/PLS OXIMETRY 1: CPT

## 2023-02-26 PROCEDURE — 6360000002 HC RX W HCPCS: Performed by: PHYSICAL MEDICINE & REHABILITATION

## 2023-02-26 PROCEDURE — 1280000000 HC REHAB R&B

## 2023-02-26 PROCEDURE — 6370000000 HC RX 637 (ALT 250 FOR IP): Performed by: PHYSICAL MEDICINE & REHABILITATION

## 2023-02-26 RX ADMIN — CLOPIDOGREL BISULFATE 75 MG: 75 TABLET ORAL at 09:55

## 2023-02-26 RX ADMIN — SERTRALINE 25 MG: 50 TABLET, FILM COATED ORAL at 09:55

## 2023-02-26 RX ADMIN — STANDARDIZED SENNA CONCENTRATE AND DOCUSATE SODIUM 2 TABLET: 8.6; 5 TABLET ORAL at 09:55

## 2023-02-26 RX ADMIN — TRAMADOL HYDROCHLORIDE 50 MG: 50 TABLET ORAL at 16:00

## 2023-02-26 RX ADMIN — ENOXAPARIN SODIUM 40 MG: 100 INJECTION SUBCUTANEOUS at 09:56

## 2023-02-26 RX ADMIN — ATORVASTATIN CALCIUM 40 MG: 40 TABLET, FILM COATED ORAL at 20:04

## 2023-02-26 RX ADMIN — TRAZODONE HYDROCHLORIDE 50 MG: 50 TABLET ORAL at 20:04

## 2023-02-26 RX ADMIN — LISINOPRIL 5 MG: 5 TABLET ORAL at 09:55

## 2023-02-26 RX ADMIN — TAMSULOSIN HYDROCHLORIDE 0.4 MG: 0.4 CAPSULE ORAL at 09:55

## 2023-02-26 RX ADMIN — STANDARDIZED SENNA CONCENTRATE AND DOCUSATE SODIUM 2 TABLET: 8.6; 5 TABLET ORAL at 20:04

## 2023-02-26 ASSESSMENT — PAIN DESCRIPTION - DESCRIPTORS: DESCRIPTORS: ACHING;DISCOMFORT;DULL

## 2023-02-26 ASSESSMENT — PAIN SCALES - GENERAL
PAINLEVEL_OUTOF10: 3
PAINLEVEL_OUTOF10: 0
PAINLEVEL_OUTOF10: 4
PAINLEVEL_OUTOF10: 0
PAINLEVEL_OUTOF10: 0

## 2023-02-26 ASSESSMENT — PAIN DESCRIPTION - LOCATION: LOCATION: TEETH

## 2023-02-26 ASSESSMENT — PAIN DESCRIPTION - ORIENTATION: ORIENTATION: RIGHT;LOWER

## 2023-02-26 NOTE — PROGRESS NOTES
[place]  2/26/2023  [id number]    Chief Complaint: Acute ischemic stroke [facility])    Subjective:   No overnight events. Resting in bed without complaint. Pain control variable. ROS: No CP, SOB, dyspnea    Objective:  Patient Vitals for the past 24 hrs:   BP Temp Temp src Pulse Resp SpO2   02/25/23 2030 107/69 98 °F (36.7 °C) Oral 68 16 96 %   02/25/23 0945 113/65 97.4 °F (36.3 °C) Oral 62 17 96 %       Gen: No distress, pleasant. Resting in bedside chair  HEENT: Normocephalic, atraumatic  CV: Regular rate and rhythm. No MRG   Resp: No respiratory distress. CTAB   Abd: Soft, nontender nondistended  Ext: No edema  Neuro: Alert, oriented, appropriately interactive. Left hemiparesis LUE 0/4. Negative gilliam    Laboratory data: Available via EMR. Therapy progress:    PT    Supine to Sit: Partial/moderate assistance  Sit to Supine: Dependent   Sit to Stand: Partial/moderate assistance  Chair/Bed to Chair Transfer: Partial/moderate assistance  Car Transfer:    Ambulation 10 ft: Partial/moderate assistance  Ambulation 50 ft: Partial/moderate assistance  Ambulation 150 ft:    Stairs - 1 Step: Partial/moderate assistance  Stairs - 4 Step: Partial/moderate assistance  Stairs - 12 Step:      OT    Eating: Setup or clean-up assistance  Oral Hygiene: Partial/moderate assistance  Bathing: Partial/moderate assistance  Upper Body Dressing: Partial/moderate assistance  Lower Body Dressing: Substantial/maximal assistance  Toilet Transfer: Substantial/maximal assistance  Toilet Hygiene: Substantial/maximal assistance    Speech Therapy    Pt presents with moderate L perioral weakness resulting in dysarthria and oropharyngeal dysphagia. Instrumental assessment (FEES) completed 2/10/22, see report for assessment summary. Pt continuing to tolerate diet without any consistency restrictions.  Pt with limited carry over of information between sessions and day to day which limits his responses to therapy and results in frustration and reported depression. Pt also presents with moderate cognitive-linguistic deficits characterized by deficits in orientation, attention, executive functioning, short-term memory, and visuospatial functioning. Pt increasing sustained attention to task with intermittent SLP support. Pt benefits from increased cueing and reinforcement of strategies to complete tasks appropriately. Recommend SLP intervention for safe return to prior level of independence. Body mass index is 23.88 kg/m². Assessment:  Patient Active Problem List   Diagnosis    Acute ischemic stroke [facility])    Expressive aphasia    Dysarthria    Ataxia    Primary hypertension    Tobacco abuse    Noncompliance    Left hemiparesis ([facility] [address]       Plan:   Acute right basal ganglia infarct: DAPT for 21 days (2/25) , Lipitor. PT/OT/SLP. Started SSRI for motor recovery     Dysphagia: Dysphagia diet, SLP     HTN: started lisinopril 5     Tobacco use: education provided. No supplement currently    Meatus trauma: held lovenox. Resolved. UA without significant findings for infection     Bowels: Per protocol   Bladder: Per protocol   Sleep: Trazodone scheduled  Pain: Tylenol, tramadol as needed  DVT PPx: Lovenox  SOPHIA: 3/2    Marisol Thorpe MD  2/26/2023, 9:37 AM    * This document was created using dictation software. While all precautions were taken to ensure accuracy, errors may have occurred. Please disregard any typographical errors.

## 2023-02-26 NOTE — PLAN OF CARE
Problem: Discharge Planning  Goal: Discharge to home or other facility with appropriate resources  Outcome: Progressing  Flowsheets  Taken 2/25/2023 2030 by Tu Coredro RN  Discharge to home or other facility with appropriate resources: Identify barriers to discharge with patient and caregiver  Taken 2/25/2023 0859 by Aquiles Diana RN  Discharge to home or other facility with appropriate resources: Identify barriers to discharge with patient and caregiver     Problem: Skin/Tissue Integrity  Goal: Absence of new skin breakdown  Description: 1. Monitor for areas of redness and/or skin breakdown  2. Assess vascular access sites hourly  3. Every 4-6 hours minimum:  Change oxygen saturation probe site  4. Every 4-6 hours:  If on nasal continuous positive airway pressure, respiratory therapy assess nares and determine need for appliance change or resting period.   Outcome: Progressing     Problem: Safety - Adult  Goal: Free from fall injury  Outcome: Progressing  Flowsheets (Taken 2/25/2023 2222)  Free From Fall Injury: Instruct family/caregiver on patient safety     Problem: Pain  Goal: Verbalizes/displays adequate comfort level or baseline comfort level  Outcome: Progressing  Flowsheets  Taken 2/25/2023 2030 by Tu Cordero RN  Verbalizes/displays adequate comfort level or baseline comfort level: Encourage patient to monitor pain and request assistance  Taken 2/25/2023 0945 by Aquiles Diana RN  Verbalizes/displays adequate comfort level or baseline comfort level:   Encourage patient to monitor pain and request assistance   Assess pain using appropriate pain scale     Problem: Nutrition Deficit:  Goal: Optimize nutritional status  Outcome: Progressing     Problem: ABCDS Injury Assessment  Goal: Absence of physical injury  Outcome: Progressing  Flowsheets (Taken 2/25/2023 2222)  Absence of Physical Injury: Implement safety measures based on patient assessment

## 2023-02-26 NOTE — PLAN OF CARE
Problem: Discharge Planning  Goal: Discharge to home or other facility with appropriate resources  2/26/2023 0713 by Meg Mahoney RN  Outcome: Progressing  2/25/2023 2224 by Didi Becker RN  Outcome: Progressing     Problem: Skin/Tissue Integrity  Goal: Absence of new skin breakdown  2/26/2023 0713 by Meg Mahoney RN  Outcome: Progressing  2/25/2023 2224 by Didi Becker RN  Outcome: Progressing     Problem: Safety - Adult  Goal: Free from fall injury  2/26/2023 0713 by Meg Mahoney RN  Outcome: Progressing  2/25/2023 2224 by Didi Becker RN  Outcome: Progressing     Problem: Pain  Goal: Verbalizes/displays adequate comfort level or baseline comfort level  2/26/2023 0713 by Meg Mahoney RN  Outcome: Progressing  2/25/2023 2224 by Didi Becker RN  Outcome: Progressing     Problem: Nutrition Deficit:  Goal: Optimize nutritional status  2/26/2023 0713 by Meg Mahoney RN  Outcome: Progressing  2/25/2023 2224 by Didi Becker RN  Outcome: Progressing     Problem: ABCDS Injury Assessment  Goal: Absence of physical injury  2/26/2023 0713 by Meg Mahoney RN  Outcome: Progressing  2/25/2023 2224 by Didi Becker RN  Outcome: Progressing

## 2023-02-27 LAB
ANION GAP SERPL CALCULATED.3IONS-SCNC: 9 MMOL/L (ref 3–16)
BUN BLDV-MCNC: 14 MG/DL (ref 7–20)
CALCIUM SERPL-MCNC: 9.5 MG/DL (ref 8.3–10.6)
CHLORIDE BLD-SCNC: 104 MMOL/L (ref 99–110)
CO2: 26 MMOL/L (ref 21–32)
CREAT SERPL-MCNC: 0.7 MG/DL (ref 0.8–1.3)
GFR SERPL CREATININE-BSD FRML MDRD: >60 ML/MIN/{1.73_M2}
GLUCOSE BLD-MCNC: 98 MG/DL (ref 70–99)
HCT VFR BLD CALC: 40.9 % (ref 40.5–52.5)
HEMOGLOBIN: 13.9 G/DL (ref 13.5–17.5)
MCH RBC QN AUTO: 33.9 PG (ref 26–34)
MCHC RBC AUTO-ENTMCNC: 33.9 G/DL (ref 31–36)
MCV RBC AUTO: 100 FL (ref 80–100)
PDW BLD-RTO: 13.2 % (ref 12.4–15.4)
PLATELET # BLD: 294 K/UL (ref 135–450)
PMV BLD AUTO: 8.1 FL (ref 5–10.5)
POTASSIUM SERPL-SCNC: 3.9 MMOL/L (ref 3.5–5.1)
RBC # BLD: 4.09 M/UL (ref 4.2–5.9)
SODIUM BLD-SCNC: 139 MMOL/L (ref 136–145)
WBC # BLD: 7.2 K/UL (ref 4–11)

## 2023-02-27 PROCEDURE — 97110 THERAPEUTIC EXERCISES: CPT

## 2023-02-27 PROCEDURE — 6370000000 HC RX 637 (ALT 250 FOR IP): Performed by: PHYSICAL MEDICINE & REHABILITATION

## 2023-02-27 PROCEDURE — 6360000002 HC RX W HCPCS: Performed by: PHYSICAL MEDICINE & REHABILITATION

## 2023-02-27 PROCEDURE — 1280000000 HC REHAB R&B

## 2023-02-27 PROCEDURE — 80048 BASIC METABOLIC PNL TOTAL CA: CPT

## 2023-02-27 PROCEDURE — 36415 COLL VENOUS BLD VENIPUNCTURE: CPT

## 2023-02-27 PROCEDURE — 97530 THERAPEUTIC ACTIVITIES: CPT

## 2023-02-27 PROCEDURE — 97129 THER IVNTJ 1ST 15 MIN: CPT

## 2023-02-27 PROCEDURE — 85027 COMPLETE CBC AUTOMATED: CPT

## 2023-02-27 PROCEDURE — 97116 GAIT TRAINING THERAPY: CPT

## 2023-02-27 PROCEDURE — 97130 THER IVNTJ EA ADDL 15 MIN: CPT

## 2023-02-27 PROCEDURE — 97535 SELF CARE MNGMENT TRAINING: CPT

## 2023-02-27 PROCEDURE — 92526 ORAL FUNCTION THERAPY: CPT

## 2023-02-27 RX ADMIN — ENOXAPARIN SODIUM 40 MG: 100 INJECTION SUBCUTANEOUS at 07:53

## 2023-02-27 RX ADMIN — TAMSULOSIN HYDROCHLORIDE 0.4 MG: 0.4 CAPSULE ORAL at 07:53

## 2023-02-27 RX ADMIN — CLOPIDOGREL BISULFATE 75 MG: 75 TABLET ORAL at 07:53

## 2023-02-27 RX ADMIN — STANDARDIZED SENNA CONCENTRATE AND DOCUSATE SODIUM 2 TABLET: 8.6; 5 TABLET ORAL at 07:53

## 2023-02-27 RX ADMIN — LISINOPRIL 5 MG: 5 TABLET ORAL at 07:53

## 2023-02-27 RX ADMIN — SERTRALINE 25 MG: 50 TABLET, FILM COATED ORAL at 07:52

## 2023-02-27 RX ADMIN — ATORVASTATIN CALCIUM 40 MG: 40 TABLET, FILM COATED ORAL at 19:40

## 2023-02-27 RX ADMIN — STANDARDIZED SENNA CONCENTRATE AND DOCUSATE SODIUM 2 TABLET: 8.6; 5 TABLET ORAL at 19:40

## 2023-02-27 RX ADMIN — TRAZODONE HYDROCHLORIDE 50 MG: 50 TABLET ORAL at 19:40

## 2023-02-27 ASSESSMENT — PAIN DESCRIPTION - PAIN TYPE: TYPE: ACUTE PAIN

## 2023-02-27 ASSESSMENT — PAIN DESCRIPTION - FREQUENCY: FREQUENCY: INTERMITTENT

## 2023-02-27 ASSESSMENT — PAIN DESCRIPTION - ORIENTATION: ORIENTATION: RIGHT

## 2023-02-27 ASSESSMENT — PAIN SCALES - GENERAL
PAINLEVEL_OUTOF10: 0
PAINLEVEL_OUTOF10: 3

## 2023-02-27 ASSESSMENT — PAIN DESCRIPTION - LOCATION: LOCATION: SHOULDER

## 2023-02-27 ASSESSMENT — PAIN - FUNCTIONAL ASSESSMENT: PAIN_FUNCTIONAL_ASSESSMENT: ACTIVITIES ARE NOT PREVENTED

## 2023-02-27 ASSESSMENT — PAIN DESCRIPTION - DESCRIPTORS: DESCRIPTORS: ACHING

## 2023-02-27 NOTE — PROGRESS NOTES
[facility]   Phone: [phone]    Occupational Therapy    [] Initial Evaluation            [x] Daily Treatment Note         [] Discharge Summary      Patient: Amilcar Pineda   : 1955   MRN: [id number]   Date of Service:  2023    Admitting Diagnosis:  Acute ischemic stroke [facility])  Current Admission Summary: 59-year-old male who was admitted on  with left-sided weakness for greater than 2 days. CT of the head with suggestion of possible NPH. CTA with less than 50% stenosis of the right ICA. MRI of the brain revealed subacute ischemia on the right basal ganglia. Echo with normal EF and negative bubble study. Neurology evaluated and suggested aspirin and Plavix for 21 days then aspirin only in addition to Lipitor. He was evaluated by therapy and suggested to continue in an inpatient setting prior to returning home. He reports no BM for multiple days. Past Medical History:  has a past medical history of CVA (cerebral vascular accident) ([address] and HTN (hypertension). Past Surgical History:  has a past surgical history that includes Finger surgery (Right); Hernia repair; fracture surgery; and hernia repair.     Discharge Recommendations: Continued OT with 24/ assistance    DME Required For Discharge: rolling walker, wheelchair, tub transfer bench, hand-held shower head, grab bars - toilet, grab bars - shower    Precautions/Restrictions: high fall risk, up as tolerated, reg diet    Pre-Admission Information   Lives With: brother Rayna Hall)                Type of Home: house  Home Layout: two level, bedroom in the basement, 10 steps with 1 handrail on the L  Home Access:  2 step to enter without rails   Bathroom Layout: tub/shower unit  Bathroom Equipment: grab bars in shower  Toilet Height: elevated height  Home Equipment: no prior equipment  Transfer Assistance: Independent without use of device  Ambulation Assistance:Independent without use of device  ADL Assistance: independent with all ADL's   IADL Assistance: independent with homemaking tasks  Active :        [x] Yes                 [] 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  Occupation: sales  Hobbies: fishing, reading, boating  Recent Falls: chart reports had several falls that caused this admission (about 5 falls)     Subjective  General: Pt seated upright in recliner, agreeable to OT tx and requesting shower. Pt w/ significant bruising in belly region from RN shots.  Pain Interventions: not applicable     Activities of Daily Living  Basic Activities of Daily Living  Grooming: moderate assistance  Grooming Comments: pt initially agreeable to complete grooming task in stance, however, once seated in front of mirror pt requesting to remain seated to ensure a thorough job, pt c/o pain in R jaw d/t tooth, set-up assistance for oral care, max A for shaving with an electric razor  Upper Extremity Bathing: stand by assistance  Lower Extremity Bathing: minimal assistance   Bathing Comments: pt seated in shower chair, shower chair re-orientated so pt had to maintain balance without use of RUE on GB, pt with improve posture noted throughout bathing, pt used hand held shower chair and stood one time with CGA while OT assisted to wash buttocks, pt instructed to continuously use RUE on GB to reduce risk of falls, pt is receptive to OT education  Upper Extremity Dressing: moderate assistance requires verbal cueing Increased time to complete task Comment: SBA to doff shirt, mod A to don shirt, mod A to don jacket, pt with increased time and several trails, poor carryover of previously learned information for [place]-dressing technique, pt continues to thread pants/shirt on R side prior to L side  Lower Extremity Dressing: minimal assistance requires verbal cueing Increased time to complete task Comment: min A to thread leg in L leg whole for PJ  pants, SUP for threading underwear, in stance with CGA/min A for standing balance while pulling up over hips, pt responding well to verbal cue, min A for socks (assistance for starting L sock over toes), pt is able to use one handed dressing technique for R sock  Dressing Comments: seated in wheelchair with use of bed rail to stand as needed, pt collecting own clothing while seated in wheelchair   Instrumental Activities of Daily Living  No IADL completed on this date. Functional Mobility  Bed Mobility  Sit to Supine: supervision  Transfers  Sit to stand transfer:contact guard assistance  Stand to sit transfer: contact guard assistance  Stand pivot transfer: contact guard assistance  Shower transfer: minimal assistance  Shower transfer equipment: shower seat with back, transfers from w/c  Comments: no cues for hand placement  Functional Mobility:  Sitting Balance: supervision, stand by assistance. Sitting Balance Comment: SUP for dressing in wheelchair, SBA for bathing without hand rail on the R side and only GB on L side and front  Standing Balance: contact guard assistance, minimal assistance.     Standing Balance Comment: CGA with brief periods of min A for standing balance during dressing with no upper extremity support, with unilateral hand support-CGA  Functional Mobility: .  minimal assistance  Functional Mobility Activity: to/from bathroom  Functional Mobility Device Use: wheelchair  Comments: pt pulling on furniture for WC mobility, no carryover    Cognition  Overall Cognitive Status: Impaired  Arousal/Alterness: appropriate responses to stimuli  Following Commands: inconsistently follows commands  Attention Span: attends with cues to redirect, difficulty attending to directions, difficulty dividing attention  Memory: decreased recall of precautions, decreased recall of recent events, decreased short term memory  Safety Judgement: decreased awareness of need for assistance, decreased awareness of need for safety  Problem Solving: assistance required to generate solutions, assistance required to implement solutions, decreased awareness of errors, assistance required to identify errors made, assistance required to correct errors made  Insights: not aware of deficits  Initiation: requires cues for some  Sequencing: requires cues for all  Comments: impulsive at times, deficits in motor execution and motor control  Orientation:    oriented to person, oriented to time, and oriented to situation  Command Following:   impaired  Follows one step directions with repetition and increased time  Difficulties following directions with \"Left/Right\", I.e., use R arm to do X  Education  Barriers To Learning: cognition and language  Patient Education: patient educated on goals, OT role and benefits, plan of care, precautions, ADL adaptive strategies, IADL safety, proper use of assistive device/equipment, adaptive device training, energy conservation, orientation, family education, pressure relief, transfer training, discharge recommendations  Learning Assessment:  patient verbalizes understanding, would benefit from continued reinforcement, patient will require reinforcement due to cognitive deficits  Assessment  Activity Tolerance: Good  Impairments Requiring Therapeutic Intervention: decreased functional mobility, decreased ADL status, decreased ROM, decreased strength, decreased safety awareness, decreased cognition, decreased endurance, decreased sensation, decreased balance, decreased IADL, decreased fine motor control, decreased coordination, increased pain, decreased postureeducated on recommendation for 24 hr physical assistance upon discharge  Prognosis: good  Clinical Assessment: Pt progressing very well towards goals at this time as pt's standing balance and awareness to lateral lean improve significantly at this date.  Despite improvement, pt continues to require assistance of 1 to complete ADLs, functional mobility, and functional transfers. Recommend continued skilled OT service upon d/c to improve safety and independence with ADLs and all occupational pursuits. Continue POC. Safety Interventions: patient left in chair, chair alarm in place, call light within reach, patient at risk for falls, telesitter in use, and family/caregiver present    Plan  Frequency: 5 x/week, 60 min/day  Current Treatment Recommendations: strengthening, ROM, balance training, functional mobility training, transfer training, stair training, endurance training, neuromuscular re-education, wheelchair mobility training, modalities, patient/caregiver education, ADL/self-care training, IADL training, home management training, cognitive reorientation, home exercise program, safety education, equipment evaluation/education, and positioning  Goals  Patient Goals: \"to get this working\" in re to LUE   Short Term Goals:  Time Frame: in 10 days  Patient will complete upper body bathing at min A while seated. -MET 2/20  Patient will complete lower body bathing at min A while seated and standing as needed. -MET 2/20  Patient will complete upper body dressing at min A. Patient will complete lower body dressing at mod A using AE as needed. -MET 2/27  Patient will complete toileting at min A with GB as needed. Patient will complete a toilet transfer with min A with GB as needed. -MET 2/20  Patient will complete a visual assessment to complete occupational profile. -MET 2/20  Long Term Goals:  Time Frame: in 21 days  Patient will complete upper body bathing at supervision while seated. Patient will complete lower body bathing at supervision while seated and standing as needed. Patient will complete upper body dressing at set-up assistance. Patient will complete lower body dressing at supervision using AE as needed. Patient will complete toileting at supervision with GB as needed. Patient will complete grooming at supervision while in stance.    Patient will complete functional transfers at supervision using LRAD. Patient will complete functional mobility at supervision using LRAD. Patient will increase functional sitting balance to mod I for improved ADL completion. Patient will increase functional standing balance to SUP for improved ADL completion.      No LTG met 2/23    Therapy Session Time     Individual Group Co-treatment   Time In 8540     Time Out 1354     Minutes 69       Timed Code Treatment Minutes: 69    Total Treatment Minutes:  69    Electronically Signed By: Chitra Ding, SORINR/MIKE, [id number]

## 2023-02-27 NOTE — PLAN OF CARE
Problem: Discharge Planning  Goal: Discharge to home or other facility with appropriate resources  2/27/2023 1022 by Deng Lemon RN  Outcome: Progressing  2/26/2023 2336 by Eliezer Quinteros RN  Outcome: Progressing  Flowsheets (Taken 2/26/2023 2000)  Discharge to home or other facility with appropriate resources: Identify barriers to discharge with patient and caregiver     Problem: Skin/Tissue Integrity  Goal: Absence of new skin breakdown  Description: 1. Monitor for areas of redness and/or skin breakdown  2. Assess vascular access sites hourly  3. Every 4-6 hours minimum:  Change oxygen saturation probe site  4. Every 4-6 hours:  If on nasal continuous positive airway pressure, respiratory therapy assess nares and determine need for appliance change or resting period.   2/27/2023 1022 by Deng Lemon RN  Outcome: Progressing  2/26/2023 2336 by Eliezer Quinteros RN  Outcome: Progressing     Problem: Safety - Adult  Goal: Free from fall injury  2/27/2023 1022 by Deng Lemon RN  Outcome: Progressing  2/26/2023 2336 by Eliezer Quinteros RN  Outcome: Progressing  Flowsheets (Taken 2/26/2023 2334)  Free From Fall Injury: Instruct family/caregiver on patient safety     Problem: Pain  Goal: Verbalizes/displays adequate comfort level or baseline comfort level  2/27/2023 1022 by Deng Lemon RN  Outcome: Progressing  2/26/2023 2336 by Eliezer Quinteros RN  Outcome: Progressing  Flowsheets  Taken 2/26/2023 1845 by Prince Chavez RN  Verbalizes/displays adequate comfort level or baseline comfort level:   Encourage patient to monitor pain and request assistance   Assess pain using appropriate pain scale  Taken 2/26/2023 0955 by Prince Chavez RN  Verbalizes/displays adequate comfort level or baseline comfort level:   Encourage patient to monitor pain and request assistance   Assess pain using appropriate pain scale     Problem: Nutrition Deficit:  Goal: Optimize nutritional status  2/27/2023 1022 by Mikayla Mascorro RN  Outcome: Progressing  2/26/2023 2336 by Nidia Wise RN  Outcome: Progressing     Problem: ABCDS Injury Assessment  Goal: Absence of physical injury  2/27/2023 1022 by Mikayla Mascorro RN  Outcome: Progressing  2/26/2023 2336 by Nidia Wise RN  Outcome: Progressing  Flowsheets (Taken 2/26/2023 2334)  Absence of Physical Injury: Implement safety measures based on patient assessment

## 2023-02-27 NOTE — PROGRESS NOTES
[facility]   Phone: [phone]    Speech Therapy    [] Initial Evaluation            [x] Daily Treatment Note         [] Discharge Summary      Patient: Maxime Ross   : 1955   MRN: [id number]   Date of Service:  2023  Admitting Diagnosis: Acute ischemic stroke [facility])  Current Admission Summary: 49-year-old male who was admitted on  with left-sided weakness for greater than 2 days. CT of the head with suggestion of possible NPH. CTA with less than 50% stenosis of the right ICA. MRI of the brain revealed subacute ischemia on the right basal ganglia. Echo with normal EF and negative bubble study. Neurology evaluated and suggested aspirin and Plavix for 21 days then aspirin only in addition to Lipitor. He was evaluated by therapy and suggested to continue in an inpatient setting prior to returning home. He reports no BM for multiple days. Past Medical History:  has a past medical history of CVA (cerebral vascular accident) ([address] and HTN (hypertension). Past Surgical History:  has a past surgical history that includes Finger surgery (Right); Hernia repair; fracture surgery; and hernia repair. Precautions/Restrictions: high fall risk, modified diet      Pre-Admission Information   Living Status: Pt lives with his brother, independent prior to admission. Occupation/School: Retired last year. 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   Medication Management: :  [x]Primary   []Secondary []No  Finance Management: [x]Primary   []Secondary []No  Active :   [x]Yes         []No  [facility]  Vision:    Vision Corrective Device: wears glasses at all times      Subjective  General: Pt alert and cooperative throughout sessions. Pt reported \"I am feeling better today. \"   Pain: Did not state    Safety Interventions: patient left in chair, chair alarm in place, call light within reach, patient at risk for falls, and telesitter in use      Therapeutic Interventions:     Session 1:   Dysphagia: Severity: Mild  and Moderate   Pt consuming breakfast meal which consisted of regular / complex solids (cohen, orange, pancakes)   - pt tolerated all consistencies with slow but adequate mastication   - Independently compensated for left labial weakness   - no overt indication of residuals in the left sulcus   - no overt indication of fatigue; consumed approximately 50% of the meal   Pt reported he feels like his endurance /engagement with eating is improving but still not eating much. Motor Speech/ Voice:  Severity: Mild and Moderate   Goal not targeted this session. Cognition: Severity: Mild   Abstract reasoning/thought organization: naming items given change in attributes   - 90% (9/10) with min cues  - pt benefited from reinforcement of chosen items and additional models provided for question    Abstract problem solving: functional problem solving scenarios   - 77% (7/9) with min-mod cues and repetition of scenario   - pt benefited from additional models of solutions to scenarios  - errors related to decreased sustained attention and decreased abstract thought processing       Additional Interventions:    Pt provided with education on smart technology to enhance safety in a home environment. Session 2:     Dysphagia: Severity: Mild and Moderate   Goal not targeted this session. Motor Speech/ Voice: Severity: Mild  and Moderate   Goal not targeted this session.     Cognition: Severity: Mild   Executive functioning (planning)/thought organization: procedural discourse about card game   - planning: pt with 70% accuracy planning task with mod verbal cues   - pt with decreased recall of task instructions, exhibiting differences in procedural discourse this date versus the previous date  - thought organization: pt with sufficient basic procedural discourse of task, but lacked detail and organized thought processing of the task  - pt benefited from continuous SLP support to provide greater detail within instruction    Functional recall: recall of current medication list   - 88% accuracy given mod cues in immediate recall of medication list after removal of visual support  - initially required max visual and verbal support to identify morning medication list (I.e., written list of medications)    Additional Interventions:          Education  Barriers To Learning: cognition and hearing  Patient Education: Provided education regarding role of SLP, results of assessment, recommendations and general speech pathology plan of care. Learning Assessment: Pt requires ongoing learning     Assessment  Impairments Requiring Therapeutic Intervention: Cognitive-Linguistic Deficits  and Oropharyngeal Dysphagia   Prognosis: good    Clinical Assessment:  Pt presents with mild to moderate L perioral weakness; improving dysarthria and oropharyngeal dysphagia. Instrumental assessment (FEES) completed 2/10/22, see report for assessment summary. Pt continuing to tolerate regular diet without consistency restrictions. Pt with limited carry over of information between sessions and day to day which limits his responses to therapy, results in frustration and reported depression. Pt also presents with moderate cognitive-linguistic deficits characterized by deficits in orientation, attention, executive functioning, short-term memory, and visuospatial functioning. Pt increasing sustained attention to task with intermittent SLP support, but still exhibits difficulty with alternating attention between variables within a task. Pt continues to progress in planning/organizing with continuous visual/verbal support from the SLP. Recommend SLP intervention for safe return to prior level of independence.        Diet Solids Recommendation:   Liquid Consistency Recommendation:   Recommended Form of Meds:   Regular texture diet     Thin liquids    No straws Meds whole with water       Recommended Compensatory Swallowing Strategies: Upright as possible with all PO intake , Small bites/sips , Eat/feed slowly, No Straws      Plan  Frequency: 60 minutes/day; 5 days per week, as tolerated, until goals met, or discharged from ARU. Therapeutic Interventions: Diet Tolerance Monitoring , Patient/Family Education , Instrumental assessment of swallow function (Modified Barium Swallow Study)  Speech / Motor Planning / Voice intervention , Cognitive-Linguistic intervention , and Patient/ Family education   Discharge Recommendations: Home with assistance and TBD   Continued SLP at Discharge: Yes     Goals  Patient Goals: Pt reports his goal is to get home. Time Frame: 14-21 days    Pt will tolerate recommended diet and advanced trials with no overt s/s of aspiration. GOAL MET (will continue to target diet tolerance monitoring)   Pt will improve oral motor strength and ROM for coordinated and alternating motions, via graded tasks to improve speech back to baseline level as subjectively rated by SLP/pt and family. ongoing  Pt will improve executive function for multifactor task completion via graded tasks to 80% accuracy ongoing  Patient will complete functional problem-solving tasks for daily situations with 80% accuracy or given min cues. ongoing  Pt will improve attention to detail for graded complex information to 80%, for improved recall and retention of newly learned information  ongoing      Therapy Session Time      Session 1 Session 2   Time In 0830 1030   Time Out 0900 1100   Time Code Minutes 20 30   Individual Minutes 30 30     Timed Code Treatment Minutes: 50  Total Treatment Minutes:  60    Electronically Signed By:   Eleazar Rios M.A. CCC-SLP AUDELIA [id number]  Speech-Language Pathologist   2/27/2023 8:51 AM     The speech-language pathologist was present, directed the patient's care, made skilled judgment and was responsible for assessment and treatment.     Mary Kate Knox RIOS,  Speech-Language Pathology

## 2023-02-27 NOTE — CARE COORDINATION
TYLER/LAURA received a call from patient's brother, Lolis Brown regarding patient's discharge plan. Family would like patient referred to [facility]. TYLER/LAURA sent referral via Epic and also contacted Rupesh Albert the Admission Director @ [address] [place] [address]. TYLER/LAURA will follow. Electronically signed by MILEY Garduno on 2/27/2023 at 4:11 PM     ADDENDUM:    GEOVANNY spoke with Rupesh Albert, Admission Director @ [address] [place] [address]. Facility will accept patient when patient is medically stable for discharge. TYLER will follow.     Electronically signed by MILEY Garduno on 2/27/2023 at 4:56 PM

## 2023-02-27 NOTE — PROGRESS NOTES
[place]  2/27/2023  [id number]    Chief Complaint: Acute ischemic stroke [facility])    Subjective:   No significant weekend events. No current complaints. Labs reviewed. Still with lack of recovery in LUE.     ROS: No CP, SOB, dyspnea    Objective:  Patient Vitals for the past 24 hrs:   BP Temp Temp src Pulse Resp SpO2   02/27/23 0745 115/66 97.6 °F (36.4 °C) Oral 66 16 93 %   02/26/23 1845 111/67 98.4 °F (36.9 °C) Oral 65 17 97 %   02/26/23 1600 -- -- -- -- 16 --   02/26/23 0955 104/63 97.8 °F (36.6 °C) Oral 66 17 96 %       Gen: No distress, pleasant. Resting in bedside chair  HEENT: Normocephalic, atraumatic  CV: Regular rate and rhythm. No MRG   Resp: No respiratory distress. CTAB   Abd: Soft, nontender nondistended  Ext: No edema  Neuro: Alert, oriented, appropriately interactive. Left hemiparesis LUE 0/4. Negative gilliam    Laboratory data: Available via EMR. Therapy progress:    PT    Supine to Sit: Partial/moderate assistance  Sit to Supine: Dependent   Sit to Stand: Partial/moderate assistance  Chair/Bed to Chair Transfer: Partial/moderate assistance  Car Transfer:    Ambulation 10 ft: Partial/moderate assistance  Ambulation 50 ft: Partial/moderate assistance  Ambulation 150 ft:    Stairs - 1 Step: Partial/moderate assistance  Stairs - 4 Step: Partial/moderate assistance  Stairs - 12 Step:      OT    Eating: Setup or clean-up assistance  Oral Hygiene: Partial/moderate assistance  Bathing: Partial/moderate assistance  Upper Body Dressing: Partial/moderate assistance  Lower Body Dressing: Substantial/maximal assistance  Toilet Transfer: Substantial/maximal assistance  Toilet Hygiene: Substantial/maximal assistance    Speech Therapy    Pt presents with moderate L perioral weakness resulting in dysarthria and oropharyngeal dysphagia. Instrumental assessment (FEES) completed 2/10/22, see report for assessment summary. Pt continuing to tolerate diet without any consistency restrictions.  Pt with limited carry over of information between sessions and day to day which limits his responses to therapy and results in frustration and reported depression. Pt also presents with moderate cognitive-linguistic deficits characterized by deficits in orientation, attention, executive functioning, short-term memory, and visuospatial functioning. Pt increasing sustained attention to task with intermittent SLP support. Pt benefits from increased cueing and reinforcement of strategies to complete tasks appropriately. Recommend SLP intervention for safe return to prior level of independence.    Body mass index is 23.88 kg/m².    Assessment:  Patient Active Problem List   Diagnosis    Acute ischemic stroke (HCC)    Expressive aphasia    Dysarthria    Ataxia    Primary hypertension    Tobacco abuse    Noncompliance    Left hemiparesis (HCC)       Plan:   Acute right basal ganglia infarct: DAPT for 21 days (2/25) , Lipitor.  PT/OT/SLP. Started SSRI for motor recovery     Dysphagia: Dysphagia diet, SLP     HTN: started lisinopril 5     Tobacco use: education provided. No supplement currently    Meatus trauma: held lovenox. Resolved. UA without significant findings for infection     Bowels: Per protocol   Bladder: Per protocol   Sleep: Trazodone scheduled  Pain: Tylenol, tramadol as needed  DVT PPx: Lovenox  SOPHIA: 3/2    Jorden Kearney MD  2/27/2023, 9:07 AM    * This document was created using dictation software.  While all precautions were taken to ensure accuracy, errors may have occurred.  Please disregard any typographical errors.

## 2023-02-27 NOTE — PROGRESS NOTES
[facility]   Phone: [phone]    Physical Therapy    [] Initial Evaluation            [x] Daily Treatment Note         [] Discharge Summary      Patient: Maxime Ross   : 1955   MRN: [id number]   Date of Service:  2023  Admitting Diagnosis: Acute ischemic stroke [facility])  Current Admission Summary: 49-year-old male who was admitted on  with left-sided weakness for greater than 2 days. CT of the head with suggestion of possible NPH. CTA with less than 50% stenosis of the right ICA. MRI of the brain revealed subacute ischemia on the right basal ganglia. Echo with normal EF and negative bubble study. Neurology evaluated and suggested aspirin and Plavix for 21 days then aspirin only in addition to Lipitor. He was evaluated by therapy and suggested to continue in an inpatient setting prior to returning home. He reports no BM for multiple days. Past Medical History:  has a past medical history of CVA (cerebral vascular accident) ([address] and HTN (hypertension). Past Surgical History:  has a past surgical history that includes Finger surgery (Right); Hernia repair; fracture surgery; and hernia repair.   Discharge Recommendations: 24 hr physical assistance with ongoing skilled therapy services  DME Required For Discharge: DME to be determined at next level of care, currently utilizing dayton-walker  Precautions/Restrictions: high fall risk, (L) hemiparetic shoulder  Weight Bearing Restrictions: no restrictions  [] Right Upper Extremity  [] Left Upper Extremity [] Right Lower Extremity  [] Left Lower Extremity     Required Braces/Orthotics: no braces required   [] Right  [] Left  Positional Restrictions:no positional restrictions    Pre-Admission Information   Lives With: brother Cornelius Neighbors)                Type of Home: house  Home Layout: two level, bedroom in the basement, 10 steps with 1 handrail on the L  Home Access:  2 step to enter without rails   Bathroom Layout: tub/shower unit  Bathroom Equipment: grab bars in shower  Toilet Height: elevated height  Home Equipment: no prior equipment  Transfer Assistance: Independent without use of device  Ambulation Assistance:Independent without use of device  ADL Assistance: independent with all ADL's   IADL Assistance: independent with homemaking tasks  Active :        [x] Yes                 [] 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  Occupation: sales  Hobbies: [employer]   Recent Falls: chart reports had several falls that caused this admission (about 5 falls)     Examination   Vision:   Vision Corrective Device: wears glasses for distance  Hearing:   WFL      Subjective  General:  Pt seated in recliner on arrival. Agreeable to PT.  Session begins with donning of Giv-antolin sling to (L) UE for support during unilateral supported mobility.   Pain: 0/10  Pain Interventions: not applicable       Functional Mobility  Bed Mobility  Bed mobility not completed on this date.  Comments:   Transfers  Sit to stand transfer: minimal assistance, (CGA/min (A) to hemiwalker, CGA to fixed (R) HR)  Stand to sit transfer: contact guard assistance  Stand pivot transfer: contact guard assistance, (w/c <=> seated stepper and w/c => recliner completed without device use towards (R) side)  Stand step transfer: minimal assistance, (with use of (R) dayton-walker and max step by step commands during completion)    Comments:   Ambulation Trial  Surface:level surface  Assistive Device: hemiwalker  Other Appliance: (L), dorsiflex assist, givmohr sling  Assistance: minimal assistance, moderate assistance, (Yuliet on first attempt, modA on second attempt.  Once fatigued has increased difficulty attending to gait sequence with increased impulsivity and lateral lean)  Distance: 40' + 30'  Gait Mechanics: Improved but ongoing (L) lateral lean requiring frequent VC and consistent physical  assist to correct. Narrow THERESE with step by step sequencing to slow gait speed and improved postural self correction. Comments:  Device utilized in front of patient with medial post in between feet resulting in improved THERESE and decreased lateral lean. Stair Mobility   Number of Steps: 6  Step Height: 6 inch  Hand Rails: requires (B) HR as patient utilizes unilateral (R) side HR while ascending/descending  Other Appliance: (L), givmohr sling  Assistance: contact guard assistance, minimal assistance (CGA x 9 steps, requires min A once fatigued for last 3 steps secondary to reduced foot clearance and increased impulsivity)  Comments:  Step to mechanics with max VC for sequence and and to slow aby. Wheelchair Mobility:  No w/c mobility completed on this date. Comments:   Balance  Static Sitting Balance: fair: maintains balance at CGA without use of UE support  Dynamic Sitting Balance: fair (-): maintains balance at CGA with use of UE support  Static Standing Balance: poor (+): requires min (A) to maintain balance  Dynamic Standing Balance: poor: requires mod (A) to maintain balance  Comments: Standing balance rated with hemiwalker use. Able to maintain static and dynamic stance at CGA with use of fixed bar support    Other Therapeutic Interventions   Seated stepper L3 without (L) UE use x 8 min to improve strength and cardiovascular endurance. (L) LE modified star drill completed with (R) hemiwalker support: 3 x 4 with Yuliet for lateral weight shift and balance stabilization during (L) LE stepping.       Functional Outcomes                 Cognition  Overall Cognitive Status: Impaired  Arousal/Alterness: appropriate responses to stimuli  Following Commands: follows one step commands with repetition, follows one step commands with increased time  Attention Span: difficulty attending to directions  Memory: decreased recall of biographical information, decreased short term memory  Safety Judgement: decreased awareness of need for safety  Problem Solving: assistance required to generate solutions, assistance required to implement solutions  Insights: decreased awareness of deficits  Initiation: requires cues for all  Sequencing: requires cues for all  Command Following:   impaired    Education  Barriers To Learning: cognition, emotional, and physical  Patient Education: patient educated on goals, PT role and benefits, plan of care, general safety, functional mobility training, proper use of assistive device/equipment, disease specific education, transfer training, educated on recommendation for 24 hr physical assistance upon discharge. Learning Assessment:  patient verbalizes understanding, would benefit from continued reinforcement    Assessment  Activity Tolerance: Good. Seated rest breaks provided for recovery. Impairments Requiring Therapeutic Intervention: decreased functional mobility, decreased ADL status, decreased ROM, decreased strength, decreased safety awareness, decreased cognition, decreased endurance, decreased balance, decreased coordination, decreased posture  Prognosis: fair  Clinical Assessment: Pt presenting with improved functional mobility on this date with significantly improved stability while using dayton-walker. As a result of improved stability with dayton-walker, patient completing ambulation and transfers with decreased physical assistance on this date. Pt demonstrates improved ability to sequence and correct lateral lean in response to verbal cueing, however has significant breakdown once fatigued partially due to increased impulsivity within tasks. Despite improvement pt continues to require assistance of 1 person for all standing based functional mobility at this time. Pt would benefit from continued skilled PT to promote functional independence.      Safety Interventions: patient left in chair, chair alarm in place, call light within reach, gait belt, patient at risk for falls, telesitter in use, and fall mats at bedside    Plan  Frequency: 5 x/week, 60 min/day  Current Treatment Recommendations: strengthening, ROM, balance training, functional mobility training, transfer training, gait training, stair training, endurance training, neuromuscular re-education, wheelchair mobility training, modalities, patient/caregiver education, ADL/self-care training, cognitive/perceptual training, and safety education    Goals  Patient Goals:  To return home and be able to move his (L) UE better  Short Term Goals:  Time Frame: 1-2 weeks  Patient will complete bed mobility at [facility] - goal met 2/23/2023  Patient will complete transfers at mod (A) of 1 - goal met 2/15/2023  Patient will ambulate 25 ft with use of LRAD at mod (A) of 1 - goal met 2/20/2023  Patient will ascend/descend 1 stairs with (B) handrail at mod (A) of 1 - goal met 2/20/2023  Patient will complete manual w/c propulsion 50 ft at SBA - goal met 2/22/2023  Long Term Goals:  Time Frame: 3 weeks  Patient will complete bed mobility at modified independent   Patient will complete transfers at [facility]   Patient will ambulate 50 ft with use of LRAD at [facility]  Patient will ascend/descend 10 stairs with (L) ascending handrail at [place] [id number]  Patient will complete car transfer at [facility]     Therapy Session Time      Individual Group Co-treatment   Time In 0930       Time Out 1030       Minutes 60         Timed Code Treatment Minutes:  60    Total Treatment Minutes:  60       Electronically Signed By:   Violette Fairchild PT, DPT - [id number], 2/27/2023 11:48 AM

## 2023-02-27 NOTE — PLAN OF CARE
Problem: Discharge Planning  Goal: Discharge to home or other facility with appropriate resources  Outcome: Progressing  Flowsheets (Taken 2/26/2023 2000)  Discharge to home or other facility with appropriate resources: Identify barriers to discharge with patient and caregiver     Problem: Skin/Tissue Integrity  Goal: Absence of new skin breakdown  Description: 1. Monitor for areas of redness and/or skin breakdown  2. Assess vascular access sites hourly  3. Every 4-6 hours minimum:  Change oxygen saturation probe site  4. Every 4-6 hours:  If on nasal continuous positive airway pressure, respiratory therapy assess nares and determine need for appliance change or resting period.   Outcome: Progressing     Problem: Safety - Adult  Goal: Free from fall injury  Outcome: Progressing  Flowsheets (Taken 2/26/2023 2334)  Free From Fall Injury: Instruct family/caregiver on patient safety     Problem: Pain  Goal: Verbalizes/displays adequate comfort level or baseline comfort level  Outcome: Progressing  Flowsheets  Taken 2/26/2023 1845 by Joana Concepcion, RN  Verbalizes/displays adequate comfort level or baseline comfort level:   Encourage patient to monitor pain and request assistance   Assess pain using appropriate pain scale  Taken 2/26/2023 0955 by Joana Concepcion, RN  Verbalizes/displays adequate comfort level or baseline comfort level:   Encourage patient to monitor pain and request assistance   Assess pain using appropriate pain scale     Problem: Nutrition Deficit:  Goal: Optimize nutritional status  Outcome: Progressing     Problem: ABCDS Injury Assessment  Goal: Absence of physical injury  Outcome: Progressing  Flowsheets (Taken 2/26/2023 2334)  Absence of Physical Injury: Implement safety measures based on patient assessment

## 2023-02-28 PROCEDURE — 1280000000 HC REHAB R&B

## 2023-02-28 PROCEDURE — 6370000000 HC RX 637 (ALT 250 FOR IP): Performed by: PHYSICAL MEDICINE & REHABILITATION

## 2023-02-28 PROCEDURE — 97129 THER IVNTJ 1ST 15 MIN: CPT

## 2023-02-28 PROCEDURE — 97530 THERAPEUTIC ACTIVITIES: CPT

## 2023-02-28 PROCEDURE — 97110 THERAPEUTIC EXERCISES: CPT

## 2023-02-28 PROCEDURE — 97112 NEUROMUSCULAR REEDUCATION: CPT

## 2023-02-28 PROCEDURE — 97130 THER IVNTJ EA ADDL 15 MIN: CPT

## 2023-02-28 PROCEDURE — 97116 GAIT TRAINING THERAPY: CPT

## 2023-02-28 PROCEDURE — 6360000002 HC RX W HCPCS: Performed by: PHYSICAL MEDICINE & REHABILITATION

## 2023-02-28 PROCEDURE — 97535 SELF CARE MNGMENT TRAINING: CPT

## 2023-02-28 RX ADMIN — TRAZODONE HYDROCHLORIDE 50 MG: 50 TABLET ORAL at 20:46

## 2023-02-28 RX ADMIN — TAMSULOSIN HYDROCHLORIDE 0.4 MG: 0.4 CAPSULE ORAL at 07:51

## 2023-02-28 RX ADMIN — CLOPIDOGREL BISULFATE 75 MG: 75 TABLET ORAL at 07:51

## 2023-02-28 RX ADMIN — STANDARDIZED SENNA CONCENTRATE AND DOCUSATE SODIUM 2 TABLET: 8.6; 5 TABLET ORAL at 20:49

## 2023-02-28 RX ADMIN — STANDARDIZED SENNA CONCENTRATE AND DOCUSATE SODIUM 2 TABLET: 8.6; 5 TABLET ORAL at 07:51

## 2023-02-28 RX ADMIN — LISINOPRIL 5 MG: 5 TABLET ORAL at 07:51

## 2023-02-28 RX ADMIN — SERTRALINE 25 MG: 50 TABLET, FILM COATED ORAL at 07:51

## 2023-02-28 RX ADMIN — ATORVASTATIN CALCIUM 40 MG: 40 TABLET, FILM COATED ORAL at 20:46

## 2023-02-28 RX ADMIN — ENOXAPARIN SODIUM 40 MG: 100 INJECTION SUBCUTANEOUS at 07:52

## 2023-02-28 ASSESSMENT — PAIN SCALES - GENERAL
PAINLEVEL_OUTOF10: 0
PAINLEVEL_OUTOF10: 0

## 2023-02-28 NOTE — PROGRESS NOTES
Assessment completed. Patient up in bed, alert and oriented x 4 with some forgetfulness, and able to make all his needs known. C/o pain to LUE with movement. VSS. Incontinent of urine at times. Medications administered as ordered. POC and education reviewed with patient and mutually agreed upon. All needs met at this time. Call light in reach. Will continue to monitor.

## 2023-02-28 NOTE — PROGRESS NOTES
[facility]   Phone: [phone]    Speech Therapy    [] Initial Evaluation            [x] Daily Treatment Note         [] Discharge Summary      Patient: Emilio Romero   : 1955   MRN: [id number]   Date of Service:  2023  Admitting Diagnosis: Acute ischemic stroke [facility])  Current Admission Summary: 22-year-old male who was admitted on  with left-sided weakness for greater than 2 days. CT of the head with suggestion of possible NPH. CTA with less than 50% stenosis of the right ICA. MRI of the brain revealed subacute ischemia on the right basal ganglia. Echo with normal EF and negative bubble study. Neurology evaluated and suggested aspirin and Plavix for 21 days then aspirin only in addition to Lipitor. He was evaluated by therapy and suggested to continue in an inpatient setting prior to returning home. He reports no BM for multiple days. Past Medical History:  has a past medical history of CVA (cerebral vascular accident) ([address] and HTN (hypertension). Past Surgical History:  has a past surgical history that includes Finger surgery (Right); Hernia repair; fracture surgery; and hernia repair. Precautions/Restrictions: high fall risk, modified diet      Pre-Admission Information   Living Status: Pt lives with his brother, independent prior to admission. Occupation/School: Retired last year. 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   Medication Management: :  [x]Primary   []Secondary []No  Finance Management: [x]Primary   []Secondary []No  Active :   [x]Yes         []No  H[facility]:    [facility]  Vision:    Vision Corrective Device: wears glasses at all times      Subjective  General: Pt alert and cooperative throughout sessions despite reported frustration with tasks. Pt in wheelchair upon SLP entry, moved to SLP office to complete treatment during first session.    Pain: Did not state    Safety Interventions: patient left in chair, chair alarm in place, call light within reach, patient at risk for falls, and telesitter in use      Therapeutic Interventions:     Session 1:   Dysphagia: Severity: Mild  and Moderate   Goal not targeted this session. Motor Speech/ Voice:  Severity: Mild and Moderate   Goal not targeted this session. Cognition: Severity: Mild   Abstract reasoning/thought organization: naming items given change in attributes   - 90% (9/10) independently   - pt benefited from reinforcement of chosen items and additional models provided for question    Immediate recall: immediate recall of doctor appointment   - 75% (3/4) with mod cues and repetition of scenario   - errors related to decreased divided attention to task   - direct education of internal memory strategies provided (I.e., WRAP strategies)    Alternating attention: alternating attention between number and letter path   - 80% (16/20) with min-mod cues to recall instruction and identify appropriate order of letters  - pt with moderately extended time to complete task       Additional Interventions:        Session 2:     Dysphagia: Severity: Mild and Moderate   Goal not targeted this session. Motor Speech/ Voice: Severity: Mild  and Moderate   Goal not targeted this session.     Cognition: Severity: Mild   Deductive reasoning: responsive naming given 4 attributes   - pt with 100% accuracy independently naming items    Immediate recall: immediate recall of doctor appointment   - 100% (4/4) with mod cues and repetition of scenario   - pt required multiple repetitions of task     Delayed recall: 5-minute delayed recall of doctor appointment  - 50% accuracy given mod-max cues and repetition of scenario  - pt provided with multiple choice options to correctly identify appointment information    Alternating attention: alternating attention between number and shapes path   - 62.5% (10/16) accuracy with mod-max cues to recall instruction and identify appropriate order of shapes and numbers  - pt required 45 minutes to work through task  - pt did not complete all 20 steps within the task due to frustration       Additional Interventions:          Education  Barriers To Learning: cognition and hearing  Patient Education: Provided education regarding role of SLP, results of assessment, recommendations and general speech pathology plan of care. Learning Assessment: Pt requires ongoing learning     Assessment  Impairments Requiring Therapeutic Intervention: Cognitive-Linguistic Deficits  and Oropharyngeal Dysphagia   Prognosis: good    Clinical Assessment:  Pt presents with mild L perioral weakness; improving dysarthria and oropharyngeal dysphagia. Pt continuing to tolerate regular diet without consistency restrictions. Speech intelligibility and articulation precision is improving. Pt also presents with moderate cognitive-linguistic deficits characterized by deficits in orientation, attention, executive functioning, short-term memory, and visuospatial functioning. Pt with improving thought organization and attention given min cues. Pt benefited from direct instruction of internal memory strategies this date but requires assistance for implementation. Recommend SLP intervention for safe return to prior level of independence. Diet Solids Recommendation:   Liquid Consistency Recommendation:   Recommended Form of Meds:   Regular texture diet     Thin liquids    No straws  Meds whole with water       Recommended Compensatory Swallowing Strategies: Upright as possible with all PO intake , Small bites/sips , Eat/feed slowly, No Straws      Plan  Frequency: 60 minutes/day; 5 days per week, as tolerated, until goals met, or discharged from ARU.   Therapeutic Interventions: Diet Tolerance Monitoring , Patient/Family Education , Instrumental assessment of swallow function (Modified Barium Swallow Study)  Speech / Motor Planning / Voice intervention , Cognitive-Linguistic intervention , and Patient/ Family education   Discharge Recommendations: Home with assistance and TBD   Continued SLP at Discharge: Yes     Goals  Patient Goals: Pt reports his goal is to get home. Time Frame: 14-21 days    Pt will tolerate recommended diet and advanced trials with no overt s/s of aspiration. GOAL MET (will continue to target diet tolerance monitoring)   Pt will improve oral motor strength and ROM for coordinated and alternating motions, via graded tasks to improve speech back to baseline level as subjectively rated by SLP/pt and family. ongoing  Pt will improve executive function for multifactor task completion via graded tasks to 80% accuracy ongoing  Patient will complete functional problem-solving tasks for daily situations with 80% accuracy or given min cues. ongoing  Pt will improve attention to detail for graded complex information to 80%, for improved recall and retention of newly learned information  ongoing      Therapy Session Time      Session 1 Session 2   Time In 0930 1245   Time Out 1000 1315   Time Code Minutes 30 30   Individual Minutes 30 30     Timed Code Treatment Minutes: 60  Total Treatment Minutes:  60    Electronically Signed By:   Last Schroeder M.A. CCC-SLP SSandraPSandra [id number]  Speech-Language Pathologist   2/28/2023 11:46 AM     The speech-language pathologist was present, directed the patient's care, made skilled judgment and was responsible for assessment and treatment.     Qamar Herrera.,  Speech-Language Pathology

## 2023-02-28 NOTE — PROGRESS NOTES
[facility]   Phone: [phone]    Physical Therapy    [] Initial Evaluation            [x] Daily Treatment Note         [] Discharge Summary      Patient: Danny Tolbert   : 1955   MRN: [id number]   Date of Service:  2023  Admitting Diagnosis: Acute ischemic stroke [facility])  Current Admission Summary: 55-year-old male who was admitted on  with left-sided weakness for greater than 2 days. CT of the head with suggestion of possible NPH. CTA with less than 50% stenosis of the right ICA. MRI of the brain revealed subacute ischemia on the right basal ganglia. Echo with normal EF and negative bubble study. Neurology evaluated and suggested aspirin and Plavix for 21 days then aspirin only in addition to Lipitor. He was evaluated by therapy and suggested to continue in an inpatient setting prior to returning home. He reports no BM for multiple days. Past Medical History:  has a past medical history of CVA (cerebral vascular accident) ([address] and HTN (hypertension). Past Surgical History:  has a past surgical history that includes Finger surgery (Right); Hernia repair; fracture surgery; and hernia repair.   Discharge Recommendations: 24 hr physical assistance with ongoing skilled therapy services  DME Required For Discharge: DME to be determined at next level of care, currently utilizing dayton-walker  Precautions/Restrictions: high fall risk, (L) hemiparetic shoulder  Weight Bearing Restrictions: no restrictions  [] Right Upper Extremity  [] Left Upper Extremity [] Right Lower Extremity  [] Left Lower Extremity     Required Braces/Orthotics: no braces required   [] Right  [] Left  Positional Restrictions:no positional restrictions    Pre-Admission Information   Lives With: brother Rohan Pickup)                Type of Home: house  Home Layout: two level, bedroom in the basement, 10 steps with 1 handrail on the L  Home Access:  2 step to enter without rails Bathroom Layout: tub/shower unit  Bathroom Equipment: grab bars in shower  Toilet Height: elevated height  Home Equipment: no prior equipment  Transfer Assistance: Independent without use of device  Ambulation Assistance:Independent without use of device  ADL Assistance: independent with all ADL's   IADL Assistance: independent with homemaking tasks  Active :        [x] Yes                 [] 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 Occupation: sales  Hobbies: Fraxion   Recent Falls: chart reports had several falls that caused this admission (about 5 falls)     Examination   Vision:   Vision Corrective Device: wears glasses for distance  Hearing:   WFL      Subjective  General:  Pt seated in recliner on arrival. Agreeable to PT. Session begins with donning of Giv-antolin sling to (L) UE for support during unilateral supported mobility. Pain: 0/10  Pain Interventions: not applicable       Functional Mobility  Bed Mobility  Bed mobility not completed on this date. Comments:   Transfers  Sit to stand transfer: minimal assistance, (CGA/min (A) to hemiwalker, CGA to fixed (R) HR)  Stand to sit transfer: contact guard assistance  Stand pivot transfer: contact guard assistance, (w/c <=> seated stepperwithout device use towards (R) side, VC to achieve full stand prior to turning)  Stand step transfer: moderate assistance, (with use of (R) dayton-walker and max step by step commands during completion)    Comments:   Ambulation Trial  Surface:level surface  Assistive Device: hemiwalker  Other Appliance: (L), givmohr sling  Assistance: moderate assistance, (ranges from min/modA. Once fatigued has increased difficulty attending to gait sequence with increased impulsivity and lateral lean)  Distance: 80'  Gait Mechanics: Improved but ongoing (L) lateral lean requiring frequent VC and consistent physical assist to correct.   Narrow THERESE with step by step sequencing to slow gait speed and improved postural self correction. Comments:  Device utilized in front of patient with medial post in between feet resulting in improved THERESE and decreased lateral lean. ** Above ambulation trial completed in non-slip socks. Prior to completion attempted ambulation with shoes, unable to appropriately clear (L) foot resulting in termination of attempt. Attempted shoes + DF assist but terminated attempt quickly secondary to increased (L) knee flexion in stance phase as a result of DF assist.      Stair Mobility   Number of Steps: 3 (max number not attempted)  Step Height: 6 inch  Hand Rails: requires (B) HR as patient utilizes unilateral (R) side HR while ascending/descending  Other Appliance: (L), givmohr sling  Assistance: contact guard assistance   Comments:  Step to mechanics with max VC for sequence and and to slow aby. Wheelchair Mobility:  No w/c mobility completed on this date. Comments:   Balance  Static Sitting Balance: fair: maintains balance at CGA without use of UE support  Dynamic Sitting Balance: fair (-): maintains balance at CGA with use of UE support  Static Standing Balance: poor (+): requires min (A) to maintain balance  Dynamic Standing Balance: poor: requires mod (A) to maintain balance  Comments: Standing balance rated with hemiwalker use. Able to maintain static and dynamic stance at [facility] with use of fixed bar support    Other Therapeutic Interventions   Alternating 4\" toe taps for facilitation of lateral weight shifting prior to ambulation. Seated stepper L3 without (L) UE use x 8 min to improve strength and cardiovascular endurance. Standing (L) LE exercises completed with (B) UE support on ballet bar 1 x 10 ea: hip abduction, hip extension, hip marching.     Functional Outcomes                 Cognition  Overall Cognitive Status: Impaired  Arousal/Alterness: appropriate responses to stimuli  Following Commands: follows one step commands with repetition, follows one step commands with increased time  Attention Span: difficulty attending to directions  Memory: decreased recall of biographical information, decreased short term memory  Safety Judgement: decreased awareness of need for safety  Problem Solving: assistance required to generate solutions, assistance required to implement solutions  Insights: decreased awareness of deficits  Initiation: requires cues for all  Sequencing: requires cues for all  Command Following:   impaired    Education  Barriers To Learning: cognition, emotional, and physical  Patient Education: patient educated on goals, PT role and benefits, plan of care, general safety, functional mobility training, proper use of assistive device/equipment, disease specific education, transfer training, educated on recommendation for 24 hr physical assistance upon discharge. Learning Assessment:  patient verbalizes understanding, would benefit from continued reinforcement    Assessment  Activity Tolerance: Good. Seated rest breaks provided for recovery. Impairments Requiring Therapeutic Intervention: decreased functional mobility, decreased ADL status, decreased ROM, decreased strength, decreased safety awareness, decreased cognition, decreased endurance, decreased balance, decreased coordination, decreased posture  Prognosis: fair  Clinical Assessment: Pt presenting with improved static standing balance and postural correction with use of dayton-walker. Pt demonstrates significant increase in max ambulation distance with use of dayton-walker, but continues to require extensive VC for sequence and physical assistance for balance stabilization. DF assist discontinued secondary to increased knee flexion during (L) LE stance both at rest and during ambulation. Pt would benefit from continued skilled PT to promote functional independence.      Safety Interventions: patient left in chair, chair alarm in place, call light within reach, gait belt, patient at risk for falls, telesitter in use, and fall mats at bedside    Plan  Frequency: 5 x/week, 60 min/day  Current Treatment Recommendations: strengthening, ROM, balance training, functional mobility training, transfer training, gait training, stair training, endurance training, neuromuscular re-education, wheelchair mobility training, modalities, patient/caregiver education, ADL/self-care training, cognitive/perceptual training, and safety education    Goals  Patient Goals:  To return home and be able to move his (L) UE better  Short Term Goals:  Time Frame: 1-2 weeks  Patient will complete bed mobility at [facility] - goal met 2/23/2023  Patient will complete transfers at mod (A) of 1 - goal met 2/15/2023  Patient will ambulate 25 ft with use of LRAD at mod (A) of 1 - goal met 2/20/2023  Patient will ascend/descend 1 stairs with (B) handrail at mod (A) of 1 - goal met 2/20/2023  Patient will complete manual w/c propulsion 50 ft at [facility] - goal met 2/22/2023  Long Term Goals:  Time Frame: 3 weeks  Patient will complete bed mobility at modified independent   Patient will complete transfers at [facility]   Patient will ambulate 50 ft with use of LRAD at [facility]  Patient will ascend/descend 10 stairs with (L) ascending handrail at [facility]  Patient will complete car transfer at [facility]     Therapy Session Time      Individual Group Co-treatment   Time In 0930       Time Out 1030       Minutes 60         Timed Code Treatment Minutes:  60    Total Treatment Minutes:  60       Electronically Signed By:     Clara Horan PT, DPT - [id number], 2/28/2023 12:14 PM

## 2023-02-28 NOTE — PROGRESS NOTES
Jayden Mat[place]ton  2/28/2023  [id number]    Chief Complaint: Acute ischemic stroke [facility])    Subjective:   No overnight events. No current complaints. Affect remains low. ROS: No CP, SOB, dyspnea    Objective:  Patient Vitals for the past 24 hrs:   BP Temp Temp src Pulse Resp SpO2   02/28/23 0745 126/73 97.9 °F (36.6 °C) Oral 63 16 98 %   02/27/23 1930 125/81 97.7 °F (36.5 °C) Oral 65 16 97 %       Gen: No distress, pleasant. Resting in bedside chair  HEENT: Normocephalic, atraumatic  CV: Regular rate and rhythm. No MRG   Resp: No respiratory distress. CTAB   Abd: Soft, nontender nondistended  Ext: No edema  Neuro: Alert, oriented, appropriately interactive. Left hemiparesis LUE 0/4. Negative gilliam    Laboratory data: Available via EMR. Therapy progress:    PT    Supine to Sit: Partial/moderate assistance  Sit to Supine: Dependent   Sit to Stand: Partial/moderate assistance  Chair/Bed to Chair Transfer: Partial/moderate assistance  Car Transfer:    Ambulation 10 ft: Partial/moderate assistance  Ambulation 50 ft: Partial/moderate assistance  Ambulation 150 ft:    Stairs - 1 Step: Partial/moderate assistance  Stairs - 4 Step: Partial/moderate assistance  Stairs - 12 Step: Partial/moderate assistance    OT    Eating: Setup or clean-up assistance  Oral Hygiene: Setup or clean-up assistance  Bathing: Partial/moderate assistance  Upper Body Dressing: Partial/moderate assistance  Lower Body Dressing: Partial/moderate assistance  Toilet Transfer: Substantial/maximal assistance  Toilet Hygiene: Substantial/maximal assistance    Speech Therapy    Pt presents with moderate L perioral weakness resulting in dysarthria and oropharyngeal dysphagia. Instrumental assessment (FEES) completed 2/10/22, see report for assessment summary. Pt continuing to tolerate diet without any consistency restrictions.  Pt with limited carry over of information between sessions and day to day which limits his responses to therapy and results in frustration and reported depression. Pt also presents with moderate cognitive-linguistic deficits characterized by deficits in orientation, attention, executive functioning, short-term memory, and visuospatial functioning. Pt increasing sustained attention to task with intermittent SLP support. Pt benefits from increased cueing and reinforcement of strategies to complete tasks appropriately. Recommend SLP intervention for safe return to prior level of independence. Body mass index is 23.88 kg/m². Assessment:  Patient Active Problem List   Diagnosis    Acute ischemic stroke [facility])    Expressive aphasia    Dysarthria    Ataxia    Primary hypertension    Tobacco abuse    Noncompliance    Left hemip[address]is [address] [address]       Plan:   Acute right basal ganglia infarct: DAPT for 21 days (2/25) , Lipitor. PT/OT/SLP. Started SSRI for motor recovery - increase     Dysphagia: Dysphagia diet, SLP     HTN: started lisinopril 5     Tobacco use: education provided. No supplement currently    Meatus trauma: held lovenox. Resolved. UA without significant findings for infection     Bowels: Per protocol   Bladder: Per protocol   Sleep: Trazodone scheduled  Pain: Tylenol, tramadol as needed  DVT PPx: Lovenox  SOPHIA: 3/2 - SNF    Nia Boyd MD  2/28/2023, 9:28 AM    * This document was created using dictation software. While all precautions were taken to ensure accuracy, errors may have occurred. Please disregard any typographical errors.

## 2023-02-28 NOTE — PROGRESS NOTES
[facility]   Phone: [phone]    Occupational Therapy    [] Initial Evaluation            [x] Daily Treatment Note         [] Discharge Summary      Patient: Barney Dunn   : 1955   MRN: [id number]   Date of Service:  2023    Admitting Diagnosis:  Acute ischemic stroke [facility])  Current Admission Summary: 76-year-old male who was admitted on  with left-sided weakness for greater than 2 days. CT of the head with suggestion of possible NPH. CTA with less than 50% stenosis of the right ICA. MRI of the brain revealed subacute ischemia on the right basal ganglia. Echo with normal EF and negative bubble study. Neurology evaluated and suggested aspirin and Plavix for 21 days then aspirin only in addition to Lipitor. He was evaluated by therapy and suggested to continue in an inpatient setting prior to returning home. He reports no BM for multiple days. Past Medical History:  has a past medical history of CVA (cerebral vascular accident) ([address] and HTN (hypertension). Past Surgical History:  has a past surgical history that includes Finger surgery (Right); Hernia repair; fracture surgery; and hernia repair.     Discharge Recommendations: Continued OT with / assistance    DME Required For Discharge: rolling walker, wheelchair, tub transfer bench, hand-held shower head, grab bars - toilet, grab bars - shower    Precautions/Restrictions: high fall risk, up as tolerated, reg diet    Pre-Admission Information   Lives With: brother Rocco Nazario)                Type of Home: house  Home Layout: two level, bedroom in the basement, 10 steps with 1 handrail on the L  Home Access:  2 step to enter without rails   Bathroom Layout: tub/shower unit  Bathroom Equipment: grab bars in shower  Toilet Height: elevated height  Home Equipment: no prior equipment  Transfer Assistance: Independent without use of device  Ambulation Assistance:Independent without use of device  ADL Assistance: independent with all ADL's   IADL Assistance: independent with homemaking tasks  Active :        [x] Yes                 [] 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 Occupation: sales  Hobbies: fishing, reading, boating  Recent Falls: chart reports had several falls that caused this admission (about 5 falls)     Subjective  General: Pt seated upright in recliner, agreeable to OT tx with no chair alarm. Pt eating breakfast.   Pain: 3/10 pain in R shoulder  Pain Interventions: patient denies pain interventions     Activities of Daily Living  Basic Activities of Daily Living  Feeding: supervision  Feeding Comments: cues to take time for swallowing and not to rush eating despite time constraints to prevent choking  Grooming: setup assistance  Grooming Comments: seated in wheelchair with set-up assistance for toothpaste on toothbrush  Upper Extremity Dressing: minimal assistance requires verbal cueing Increased time to complete task Comment: min A to doff jacket, min A to don long sleeve shirt  Lower Extremity Dressing: moderate assistance Comment: max A for shoes, mod A for socks, min A for brief and min A for pants  Dressing Comments: seated in wheelchair with use of bed rail to stand as needed with CGA/min A for standing balance, OT collecting clothing d/t time    Instrumental Activities of Daily Living  No IADL completed on this date. Functional Mobility  Bed Mobility  Bed mobility not completed on this date. Transfers  Sit to stand transfer:contact guard assistance  Stand to sit transfer: contact guard assistance  Stand pivot transfer: minimal assistance  Comments: no cues for hand placement  Functional Mobility:  Sitting Balance: supervision. Sitting Balance Comment: SUP for dressing  Standing Balance: contact guard assistance, minimal assistance.     Standing Balance Comment: CGA with brief periods of min A for standing balance during dressing with no upper extremity support, with unilateral hand support-CGA  Functional Mobility: .  minimal assistance  Functional Mobility Activity: to/from bathroom  Functional Mobility Device Use: wheelchair  Comments: pt pulling on furniture for WC mobility  Second Session  Pt seated upright in recliner upon OT arrival, agreeable to therapy and requesting to use the restroom. Pt pleasant and cooperative throughout. Activities of Daily Living  Basic Activities of Daily Living  Toileting: moderate assistance. Toileting Comments: mod A standing balance for pants up/down, per pt choice, he sat on BSC to urinate on toilet, pt unable to urinate  Comments: improved balance awareness  Functional Mobility  Bed Mobility  Sit to Supine: minimal assistance  Transfers  Sit to stand transfer:minimal assistance, moderate assistance  Stand to sit transfer: minimal assistance, moderate assistance  Stand pivot transfer: moderate assistance  Toilet transfer: minimal assistance  Toilet transfer equipment: standard toilet, grab bars, transfers from w/c  Functional Mobility:  Sitting Balance: stand by assistance. Sitting Balance Comment: during toileting  Standing Balance: minimal assistance, moderate assistance. Standing Balance Comment: during toileting  No functional mobility completed on this date secondary to time constraints. Therapeutic Activity  While seated in wheelchair, pt completed AAROM UE skates on LUE for shoulder flexion/extension and elbow flexion/extension to improve LUE AROM and strength for ADLs of choice. Pt with good elbow flexion noted for shoulder flexion however with limited active shoulder flexion. Pt with limited LUE tricep activation however good elbow flexion noted. Tactile stimulation via tapping provided to facilitate tricep during elbow extension.     While seated in wheelchair, pt completed sets of 10x active on LUE elbow flexion using vibrating wand to provide tactile stimulation. Pt provided with max cues to improve attention and facilitate performance. Pt with limited LUE tricep activation even with vibrating wand. Pt became frustrated with no tricep activation, therefore pt provided with emotional support. During all neuro-reeducation, pt required min to mod verbal cues for posture and positioning as pt often times slouching or moving trunk rather than activating muscles when cued. Pt left in recliner chair with all needs met, call light within reach, and chair alarm activated.        Cognition  Overall Cognitive Status: Impaired  Arousal/Alterness: appropriate responses to stimuli  Following Commands: inconsistently follows commands  Attention Span: attends with cues to redirect, difficulty attending to directions, difficulty dividing attention  Memory: decreased recall of precautions, decreased recall of recent events, decreased short term memory  Safety Judgement: decreased awareness of need for assistance, decreased awareness of need for safety  Problem Solving: assistance required to generate solutions, assistance required to implement solutions, decreased awareness of errors, assistance required to identify errors made, assistance required to correct errors made  Insights: not aware of deficits  Initiation: requires cues for some  Sequencing: requires cues for all  Comments: impulsive at times, deficits in motor execution and motor control  Orientation:    oriented to person, oriented to time, and oriented to situation  Command Following:   impaired  Follows one step directions with repetition and increased time  Difficulties following directions with \"Left/Right\", I.e., use R arm to do X  Education  Barriers To Learning: cognition and language  Patient Education: patient educated on goals, OT role and benefits, plan of care, precautions, ADL adaptive strategies, IADL safety, proper use of assistive device/equipment, adaptive device training, energy conservation, orientation, family education, pressure relief, transfer training, discharge recommendations  Learning Assessment:  patient verbalizes understanding, would benefit from continued reinforcement, patient will require reinforcement due to cognitive deficits  Assessment  Activity Tolerance: Good  Impairments Requiring Therapeutic Intervention: decreased functional mobility, decreased ADL status, decreased ROM, decreased strength, decreased safety awareness, decreased cognition, decreased endurance, decreased sensation, decreased balance, decreased IADL, decreased fine motor control, decreased coordination, increased pain, decreased postureeducated on recommendation for 24 hr physical assistance upon discharge  Prognosis: good  Clinical Assessment: Pt progressing very well towards goals at this time as pt's standing balance and awareness to lateral lean improve significantly at this date. Despite improvement, pt continues to require assistance of 1 to complete ADLs, functional mobility, and functional transfers.  Recommend continued skilled OT service upon d/c to improve safety and independence with ADLs and all occupational pursuits. Continue POC.  Safety Interventions: patient left in chair, chair alarm in place, call light within reach, patient at risk for falls, telesitter in use, and family/caregiver present    Plan  Frequency: 5 x/week, 60 min/day  Current Treatment Recommendations: strengthening, ROM, balance training, functional mobility training, transfer training, stair training, endurance training, neuromuscular re-education, wheelchair mobility training, modalities, patient/caregiver education, ADL/self-care training, IADL training, home management training, cognitive reorientation, home exercise program, safety education, equipment evaluation/education, and positioning  Goals  Patient Goals: \"to get this working\" in re to LUE   Short Term Goals:  Time Frame: in 10 days  Patient will complete upper body  bathing at min A while seated. -MET 2/20  Patient will complete lower body bathing at min A while seated and standing as needed. -MET 2/20  Patient will complete upper body dressing at min A. Patient will complete lower body dressing at mod A using AE as needed. -MET 2/27  Patient will complete toileting at min A with GB as needed. Patient will complete a toilet transfer with min A with GB as needed. -MET 2/20  Patient will complete a visual assessment to complete occupational profile. -MET 2/20  Long Term Goals:  Time Frame: in 21 days  Patient will complete upper body bathing at supervision while seated. Patient will complete lower body bathing at supervision while seated and standing as needed. Patient will complete upper body dressing at set-up assistance. Patient will complete lower body dressing at supervision using AE as needed. Patient will complete toileting at supervision with GB as needed. Patient will complete grooming at supervision while in stance. Patient will complete functional transfers at supervision using LRAD. Patient will complete functional mobility at supervision using LRAD. Patient will increase functional sitting balance to mod I for improved ADL completion. Patient will increase functional standing balance to SUP for improved ADL completion.      No LTG met 2/23    Therapy Session Time  First Session   Individual Group Co-treatment   Time In 830     Time Out 900     Minutes 30       Second Session   Individual Group Co-treatment   Time In 1315     Time Out 1355     Minutes 40       Timed Code Treatment Minutes: 11+38    Total Treatment Minutes:  70    Electronically Signed By: OLGA Vasques/MIKE, [id number]

## 2023-02-28 NOTE — PLAN OF CARE
Problem: Discharge Planning  Goal: Discharge to home or other facility with appropriate resources  2/28/2023 1020 by Deborah Alarcon RN  Outcome: Progressing  2/27/2023 2142 by Deborah Alarcon RN  Outcome: Progressing     Problem: Skin/Tissue Integrity  Goal: Absence of new skin breakdown  Description: 1. Monitor for areas of redness and/or skin breakdown  2. Assess vascular access sites hourly  3. Every 4-6 hours minimum:  Change oxygen saturation probe site  4. Every 4-6 hours:  If on nasal continuous positive airway pressure, respiratory therapy assess nares and determine need for appliance change or resting period.   2/28/2023 1020 by Deborah Alarcon RN  Outcome: Progressing  2/27/2023 2142 by Deborah Alarcon RN  Outcome: Progressing     Problem: Safety - Adult  Goal: Free from fall injury  2/28/2023 1020 by Deborah Alarcon RN  Outcome: Progressing  2/27/2023 2142 by Deborah Alarcon RN  Outcome: Progressing     Problem: Pain  Goal: Verbalizes/displays adequate comfort level or baseline comfort level  2/28/2023 1020 by Deborah Alarcon RN  Outcome: Progressing  2/27/2023 2142 by Deborah Alarcon RN  Outcome: Progressing     Problem: Nutrition Deficit:  Goal: Optimize nutritional status  2/28/2023 1020 by Deborah Alarcon RN  Outcome: Progressing  2/27/2023 2142 by Deborah Alarcon RN  Outcome: Progressing     Problem: ABCDS Injury Assessment  Goal: Absence of physical injury  2/28/2023 1020 by Deborah Alarcon RN  Outcome: Progressing  2/27/2023 2142 by Deborah Alarcon RN  Outcome: Progressing

## 2023-02-28 NOTE — PLAN OF CARE
Problem: Discharge Planning  Goal: Discharge to home or other facility with appropriate resources  2/27/2023 2142 by Casandra Solis RN  Outcome: Progressing  2/27/2023 1022 by Casandra Solis RN  Outcome: Progressing     Problem: Skin/Tissue Integrity  Goal: Absence of new skin breakdown  Description: 1. Monitor for areas of redness and/or skin breakdown  2. Assess vascular access sites hourly  3. Every 4-6 hours minimum:  Change oxygen saturation probe site  4. Every 4-6 hours:  If on nasal continuous positive airway pressure, respiratory therapy assess nares and determine need for appliance change or resting period.   2/27/2023 2142 by Casandra Solis RN  Outcome: Progressing  2/27/2023 1022 by Casandra Solis RN  Outcome: Progressing     Problem: Safety - Adult  Goal: Free from fall injury  2/27/2023 2142 by Casandra Solis RN  Outcome: Progressing  2/27/2023 1022 by Casandra Solis RN  Outcome: Progressing     Problem: Pain  Goal: Verbalizes/displays adequate comfort level or baseline comfort level  2/27/2023 2142 by Casandra Solis RN  Outcome: Progressing  2/27/2023 1022 by Casandra Solis RN  Outcome: Progressing     Problem: Nutrition Deficit:  Goal: Optimize nutritional status  2/27/2023 2142 by Casandra Solis RN  Outcome: Progressing  2/27/2023 1022 by Casandra Solis RN  Outcome: Progressing     Problem: ABCDS Injury Assessment  Goal: Absence of physical injury  2/27/2023 2142 by Casandra Solis RN  Outcome: Progressing  2/27/2023 1022 by Casandra Solis RN  Outcome: Progressing

## 2023-03-01 PROCEDURE — 92507 TX SP LANG VOICE COMM INDIV: CPT

## 2023-03-01 PROCEDURE — 97530 THERAPEUTIC ACTIVITIES: CPT

## 2023-03-01 PROCEDURE — 97129 THER IVNTJ 1ST 15 MIN: CPT

## 2023-03-01 PROCEDURE — 97535 SELF CARE MNGMENT TRAINING: CPT

## 2023-03-01 PROCEDURE — 97116 GAIT TRAINING THERAPY: CPT

## 2023-03-01 PROCEDURE — 6360000002 HC RX W HCPCS: Performed by: PHYSICAL MEDICINE & REHABILITATION

## 2023-03-01 PROCEDURE — 97130 THER IVNTJ EA ADDL 15 MIN: CPT

## 2023-03-01 PROCEDURE — 1280000000 HC REHAB R&B

## 2023-03-01 PROCEDURE — 6370000000 HC RX 637 (ALT 250 FOR IP): Performed by: PHYSICAL MEDICINE & REHABILITATION

## 2023-03-01 RX ADMIN — TAMSULOSIN HYDROCHLORIDE 0.4 MG: 0.4 CAPSULE ORAL at 09:50

## 2023-03-01 RX ADMIN — ATORVASTATIN CALCIUM 40 MG: 40 TABLET, FILM COATED ORAL at 20:44

## 2023-03-01 RX ADMIN — LISINOPRIL 5 MG: 5 TABLET ORAL at 09:50

## 2023-03-01 RX ADMIN — TRAZODONE HYDROCHLORIDE 50 MG: 50 TABLET ORAL at 20:44

## 2023-03-01 RX ADMIN — STANDARDIZED SENNA CONCENTRATE AND DOCUSATE SODIUM 2 TABLET: 8.6; 5 TABLET ORAL at 09:50

## 2023-03-01 RX ADMIN — STANDARDIZED SENNA CONCENTRATE AND DOCUSATE SODIUM 2 TABLET: 8.6; 5 TABLET ORAL at 20:44

## 2023-03-01 RX ADMIN — SERTRALINE 50 MG: 50 TABLET, FILM COATED ORAL at 09:50

## 2023-03-01 RX ADMIN — CLOPIDOGREL BISULFATE 75 MG: 75 TABLET ORAL at 09:50

## 2023-03-01 RX ADMIN — ENOXAPARIN SODIUM 40 MG: 100 INJECTION SUBCUTANEOUS at 09:48

## 2023-03-01 ASSESSMENT — PAIN SCALES - GENERAL
PAINLEVEL_OUTOF10: 0

## 2023-03-01 NOTE — PATIENT CARE CONFERENCE
[facility]  Inpatient Rehabilitation  Weekly Team Conference Note    Patient Name: Fred Gilman        MRN: [id number]    : 1955  (69 y.o.)  Gender: male           The team conference for this patient was held on 3/2/2023 at 9:00am and led by:  Ami Walden MD    CASE MANAGEMENT:  Assessment:   Patient is a 69year old male who admitted to ARU on 2023 with the admitting diagnosis of Acute ischemic stroke ([address] Patient resides at home with his brother and has several supportive siblings. Patient continues to benefit from skilled PT/OT/SLP. Patient will need 24 hour assistance and on going skilled therapy services at discharge. Patient anticipates discharging to a skilled nursing facility. PHYSICAL THERAPY:    Bed Mobility  Supine to Sit: modified independent  Sit to Supine: modified independent  Rolling Left: modified independent  Rolling Right: modified independent  Scooting: modified independent  Comments: Bed flat with use of under mattress hand rail. Transfers  Sit to stand transfer: contact guard assistance, (including completions to dayton walker and fixed (R) HR. Requires ongoing cueing for hand placement and (R) weight shift to self maintain balance)  Stand to sit transfer: contact guard assistance  Stand pivot transfer: contact guard assistance, (completed without device leading towards (R) UE including w/c <=> bed)  Stand step transfer: moderate assistance, (with use of dayton walker, VC for sequence)  Car transfer: contact guard assistance, (without device use. Utilizes supported door for sit <=> stand and turning)    Comments:   Ambulation Trial 1  Surface:level surface  Assistive Device: hemiwalker  Other Appliance: (L), givmohr sling  Assistance: moderate assistance, (ranges from min/modA.   Once fatigued has increased difficulty attending to gait sequence with increased impulsivity and lateral lean)  Distance: 155'  Gait Mechanics: Improved but ongoing (L) lateral lean requiring frequent VC and consistent physical assist to correct. Narrow THERESE with step by step sequencing to slow gait speed and improved postural self correction. Comments:  Device utilized in front of patient with medial post in between feet resulting in improved THERESE and decreased lateral lean. Patient initially self advances device, progressing to therapist asssist once fatigued secondary to difficulty sequencing gait mechanics. Ambulation Trial 2  Surface:carpet surface  Assistive Device: hemiwalker  Other Appliance: (L), givmohr sling  Assistance: moderate assistance, (ranges from min/modA)  Distance: 10'  Gait Mechanics: Unchanged from level surface  Stair Mobility   Number of Steps: 12  Step Height: 6 inch  Hand Rails: requires (B) HR as patient utilizes unilateral (R) side HR while ascending/descending  Other Appliance: (L), givmohr sling  Assistance: contact guard assistance   Comments:  Step to mechanics with max VC for sequence and and to slow aby. VC to correct posture between each step. Wheelchair Mobility:  Chair: manual  Surface: level surface  Method: (R) LE and (L) LE  Distance: 150 ft  Assistance: stand by assistance  Comments: extensive VC for sequence of turning. Increased time for task completion with inconsistent use and placement of (L) LE    QM:  Roll Left and Right  Assistance Needed: Independent  Comment: Unable to roll (L) secondary to (L) hemiparetic shoulder with poor awareness  CARE Score: 6  Discharge Goal: Independent  Sit to Lying  Assistance Needed: Independent  CARE Score: 6  Discharge Goal: Independent  Lying to Sitting on Side of Bed  Assistance Needed: Independent  CARE Score: 6  Discharge Goal: Independent  Sit to Stand  Assistance Needed: Supervision or touching assistance  Comment:  To fixed bar or dayton-walker with cueing for posture correction  CARE Score: 4  Discharge Goal: Independent  Chair/Bed-to-Chair Transfer  Assistance Needed: Supervision or touching assistance  Comment: Stand pivot transfer completed to (R) with VC to completely stand prior to turning  CARE Score: 4  Discharge Goal: Independent  Car Transfer  Assistance Needed: Supervision or touching assistance  Comment: without device  Reason if not Attempted: Not attempted due to medical condition or safety concerns  CARE Score: 4  Discharge Goal: Independent  Walk 10 Feet  Assistance Needed: Partial/moderate assistance  CARE Score: 3  Discharge Goal: Independent  Walk 50 Feet with Two Turns  Assistance Needed: Partial/moderate assistance  Reason if not Attempted: Not attempted due to medical condition or safety concerns  CARE Score: 3  Discharge Goal: Independent  Walk 150 Feet  Assistance Needed: Partial/moderate assistance  Reason if not Attempted: Not attempted due to medical condition or safety concerns  CARE Score: 3  Discharge Goal: Independent  Walking 10 Feet on Uneven Surfaces  Assistance Needed: Partial/moderate assistance  Reason if not Attempted: Not attempted due to medical condition or safety concerns  CARE Score: 3  Discharge Goal: Independent  1 Step (Curb)  Assistance Needed: Supervision or touching assistance  Reason if not Attempted: Not attempted due to medical condition or safety concerns  CARE Score: 4  Discharge Goal: Supervision or touching assistance  4 Steps  Assistance Needed: Supervision or touching assistance  Reason if not Attempted: Not attempted due to medical condition or safety concerns  CARE Score: 4  Discharge Goal: Supervision or touching assistance  12 Steps  Assistance Needed: Supervision or touching assistance  Reason if not Attempted: Not attempted due to medical condition or safety concerns  CARE Score: 4  Discharge Goal: Supervision or touching assistance  Picking Up Object  Assistance Needed: Partial/moderate assistance  Reason if not Attempted: Not attempted due to medical condition or safety concerns  CARE Score: 3  Discharge Goal: Independent  Wheelchair Ability  Uses a Wheelchair and/or Scooter?: Yes    Goals:                   Patient Goals: To return home and be able to move his (L) UE better  Short Term Goals:  Time Frame: 1-2 weeks  Patient will complete bed mobility at [facility] - goal met 2/23/2023  Patient will complete transfers at mod (A) of 1 - goal met 2/15/2023  Patient will ambulate 25 ft with use of LRAD at mod (A) of 1 - goal met 2/20/2023  Patient will ascend/descend 1 stairs with (B) handrail at mod (A) of 1 - goal met 2/20/2023  Patient will complete manual w/c propulsion 50 ft at [facility] - goal met 2/22/2023  Long Term Goals:  Time Frame: 3 weeks  Patient will complete bed mobility at modified independent - goal met 3/1/2023  Patient will complete transfers at [facility] not met  Patient will ambulate 50 ft with use of LRAD at [facility] not met  Patient will ascend/descend 10 stairs with (L) ascending handrail at [facility] not met   Patient will complete car transfer at [facility] not met               These goals were reviewed with this patient at the time of assessment and Mariana De La Rosa is in agreement. Plan of Care: Pt to be seen 5 out of 7 days per week per ARU protocol ( 60 minutes with PT)                     SPEECH THERAPY:    Diet Level:ADULT DIET; Regular    Assessment: Impressions  Diagnosis: Pt presents with mild L perioral weakness; improving dysarthria and oropharyngeal dysphagia. Pt continuing to tolerate regular diet without consistency restrictions. Speech intelligibility and articulation precision is improving despite reduced breath support and vocal intensity. Pt also presents with moderate cognitive-linguistic deficits characterized by deficits in orientation, attention, executive functioning, short-term memory, and visuospatial functioning.  Pt with improving thought organization and attention given min-mod cues throughout discourse and moderately complex cognitive tasks. Recommend SLP intervention for safe return to prior level of independence. Goals:  Time Frame: 14-21 days  Pt will tolerate recommended diet and advanced trials with no overt s/s of aspiration. GOAL MET (will continue to target diet tolerance monitoring)   Pt will improve oral motor strength and ROM for coordinated and alternating motions, via graded tasks to improve speech back to baseline level as subjectively rated by SLP/pt and family. GOAL NOT MET; continues to progress   Pt will improve executive function for multifactor task completion via graded tasks to 80% accuracy GOAL NOT MET; continues to progress   Patient will complete functional problem-solving tasks for daily situations with 80% accuracy or given min cues.  GOAL NOT MET; continues to progress   Pt will improve attention to detail for graded complex information to 80%, for improved recall and retention of newly learned information  GOAL NOT MET; continues to progress     Plan of Care:  Pt to be seen 5 out of 7 days per week per ARU protocol ( 60 minutes with SLP)    OCCUPATIONAL THERAPY:    ADL: Grooming: setup assistance  Grooming Comments: seated in wheelchair with set-up assistance for toothpaste on toothbrush, pt encouraged to complete grooming while in stance vs. seated to improve activity tolerance and endurance  Upper Extremity Bathing: stand by assistance  Lower Extremity Bathing: minimal assistance   Bathing Comments: bathing completed while seated in shower chair with use of hand held shower head, standing with CGA using GB, assistance for buttocks while in stance, may benefit from a use of long handled sponge  Upper Extremity Dressing: minimal assistance requires verbal cueing Increased time to complete task Comment: assistance for threading L arm in sleeve, SUP to doff shirt  Lower Extremity Dressing: moderate assistance Comment: max A for shoes, mod A for socks, min A for brief and min A for pants  Dressing Comments: seated in wheelchair, assistance for L sock, L shoe, threading L leg, mod A for standing balance, pt re-educated on dayton-dressing techniques prior and during task however cannot carryover learned information     Toileting: minimal assistance.    Toileting Comments: min A standing balance for pants up/down, BM while seated on toilet seat, able to lean forward to the L to wipe buttocks     Toilet Transfers:Toilet transfer: minimal assistance  Toilet transfer equipment: standard bedside commode, grab bars, transfers from w/c     Tub/ShowerTransfers:Shower transfer: contact guard assistance  Shower transfer equipment: shower seat with back  Comments: no cues for hand placement    QM:  Eating  Assistance Needed: Setup or clean-up assistance  CARE Score: 5  Discharge Goal: Independent  Oral Hygiene  Assistance Needed: Setup or clean-up assistance  Comment: seated in wheelchair  CARE Score: 5  Discharge Goal: Supervision or touching assistance  Toileting Hygiene  Assistance needed: Partial/moderate assistance  Reason if not Attempted: Patient refused  CARE Score: 3  Discharge Goal: Independent  Toilet Transfer  Assistance needed: Partial/moderate assistance  CARE Score: 3  Discharge Goal: Supervision or touching assistance  Shower/Bathe Self  Assistance Needed: Partial/moderate assistance  CARE Score: 3  Discharge Goal: Supervision or touching assistance  Upper Body Dressing  Assistance Needed: Partial/moderate assistance  CARE Score: 3  Discharge Goal: Independent  Lower Body Dressing  Assistance Needed: Partial/moderate assistance  CARE Score: 3  Discharge Goal: Independent  Putting On/Taking Off Footwear  Assistance Needed: Partial/moderate assistance  CARE Score: 3  Discharge Goal: Set-up or clean-up assistance    Goals:             Short Term Goals:  Time Frame: in 10 days  Patient will complete upper body bathing at min A while seated.-MET 2/20  Patient will complete lower body bathing at min A while seated  and standing as needed. -MET 2/20  Patient will complete upper body dressing at min A. -MET 3/1  Patient will complete lower body dressing at mod A using AE as needed. -MET 2/27  Patient will complete toileting at min A with GB as needed. -MET 3/1  Patient will complete a toilet transfer with min A with GB as needed. -MET 2/20  Patient will complete a visual assessment to complete occupational profile. -MET 2/20  Long Term Goals:  Time Frame: in 21 days  Patient will complete upper body bathing at supervision while seated. -NOT MET 3/1  Patient will complete lower body bathing at supervision while seated and standing as needed. -NOT MET 3/1  Patient will complete upper body dressing at set-up assistance. -NOT MET 3/1  Patient will complete lower body dressing at supervision using AE as needed. -NOT MET 3/1   Patient will complete toileting at supervision with GB as needed. -NOT MET 3/1  Patient will complete grooming at supervision while in stance. -NOT MET 3/1  Patient will complete functional transfers at supervision using LRAD. -NOT MET 3/1  Patient will complete functional mobility at supervision using LRAD. -NOT MET 3/1  Patient will increase functional sitting balance to mod I for improved ADL completion. -NOT MET 3/1  Patient will increase functional standing balance to SUP for improved ADL completion. -NOT MET 3/1  These goals were reviewed with this patient at the time of assessment and Andrea Owens is in agreement    Plan of Care:  Pt to be seen 5 out of 7 days per week per ARU protocol ( 60 minutes with OT)     NUTRITION:  Weight: 161 lb 11.2 oz (73.3 kg) / Body mass index is 23.88 kg/m². Diet Order:Regular    Supplements:NA    PO intake greater than 50% of meals recently. Family also brings food in from home. Please see nutrition note for details.     NURSING:    Risk for Readmission: 14%    Good Fall Risk Score: 75  Wounds/Incisions/Ulcers: None  Medication Review: Reviewed daily with patient  Pain: Managed with and without medications  Consultations/Labs/X-rays:    Labs: BMP & CBC every Monday & Thursday    Patient/Family Education provided by team:    Discharge Plan   Estimated Length of Stay:0 days  Destination: skilled nursing facility  Pass:No  Services at Discharge: Continued Skilled PT/OT  Equipment at Discharge: TBD at next level of care. Currently requires wheelchair and dayton-walker, hospital bed, shower chair, GB in shower and around toilet  Factors facilitating achievement of predicted outcomes: Caregiver support, Cooperative, and Pleasant  Barriers to the achievement of predicted outcomes: Limited family support, No caregiver support, Confusion, Impulsivity, Limited safety awareness, Decreased endurance, Decreased sensation, Decreased proprioception, Upper extremity weakness, and Impaired vision    Patient Goals: To return home and be able to move his (L) UE better    Interdisciplinary Individualized Plan of Care Review of Previous Week:    Medical and functional progress towards goals:  Pt doing well medically, labs/vitals stable, ready for discharge to SNF. Pt progressing with insight but still impaired. Sit to/from stand transfers increasingly independent, static balance improving, increased ambulation distance, fluctuates in required level of assistance for mobility. ADLs progressing, and pt progressed to a regular diet. Barriers towards progress:  Impaired balance, decreased cognition, decreased retention, impaired activity tolerance, LUE flaccid  Interventions to address Barriers:  PT focus on balance training, SLP working with pt on improving cognition and memory, PT/OT work on improving pt's activity tolerance, OT focusing on LUE neuro reeducation.   Goals still appropriate:  Yes  Modifications to goals: None  Continue Current Plan of Care:  Yes  Modifications to Plan of Care: None    Rehab Team Members in attendance for Team Conference:  MILEY Mitchell, LSW    Manoj Hernández, JERRELL, LD    Nereyda García, OTR/L    Duc Edge, PT, DPT    Fanny Najera M.A., CCC-SLP    YAZMIN BellN, RN, Gl. Femi Goodwin PT, DPT,     I have led the above team conference and agree with all decisions made, and approve the established interdisciplinary plan of care as documented within the medical record of Allie Banks.     Neville Avitia MD   Electronically signed by Neville Avitia MD on 3/2/2023 at 9:36 AM

## 2023-03-01 NOTE — PROGRESS NOTES
[facility] [address] Department   Phone: [phone]    Physical Therapy    [] Initial Evaluation            [x] Daily Treatment Note         [x] Discharge Summary      Patient: Pino Angel   : 1955   MRN: [id number]   Date of Service:  3/1/2023  Admitting Diagnosis: Acute ischemic stroke [facility])  Current Admission Summary: 68-year-old male who was admitted on  with left-sided weakness for greater than 2 days. CT of the head with suggestion of possible NPH. CTA with less than 50% stenosis of the right ICA. MRI of the brain revealed subacute ischemia on the right basal ganglia. Echo with normal EF and negative bubble study. Neurology evaluated and suggested aspirin and Plavix for 21 days then aspirin only in addition to Lipitor. He was evaluated by therapy and suggested to continue in an inpatient setting prior to returning home. He reports no BM for multiple days. Past Medical History:  has a past medical history of CVA (cerebral vascular accident) ([address] and HTN (hypertension). Past Surgical History:  has a past surgical history that includes Finger surgery (Right); Hernia repair; fracture surgery; and hernia repair.   Discharge Recommendations: 24 hr physical assistance with ongoing skilled therapy services  DME Required For Discharge: DME to be determined at next level of care, currently utilizing dayton-walker  Precautions/Restrictions: high fall risk, (L) hemiparetic shoulder  Weight Bearing Restrictions: no restrictions  [] Right Upper Extremity  [] Left Upper Extremity [] Right Lower Extremity  [] Left Lower Extremity     Required Braces/Orthotics: no braces required   [] Right  [] Left  Positional Restrictions:no positional restrictions    Pre-Admission Information   Lives With: brother Bharath Mcqueen)                Type of Home: house  Home Layout: two level, bedroom in the basement, 10 steps with 1 handrail on the L  Home Access:  2 step to enter without rails Bathroom Layout: tub/shower unit  Bathroom Equipment: grab bars in shower  Toilet Height: elevated height  Home Equipment: no prior equipment  Transfer Assistance: Independent without use of device  Ambulation Assistance:Independent without use of device  ADL Assistance: independent with all ADL's   IADL Assistance: independent with homemaking tasks  Active :        [x] Yes                 [] 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 Occupation: sales  Hobbies: [employer]   Recent Falls: chart reports had several falls that caused this admission (about 5 falls)     Examination   Vision:   Vision Corrective Device: wears glasses for distance  Hearing:   WFL      Subjective  General:  Pt seated in recliner on arrival. Agreeable to PT. Session begins with donning of Giv-antolin sling to (L) UE for support during unilateral supported mobility. Pain: 0/10  Pain Interventions: not applicable       Functional Mobility  Bed Mobility  Supine to Sit: modified independent  Sit to Supine: modified independent  Rolling Left: modified independent  Rolling Right: modified independent  Scooting: modified independent  Comments: Bed flat with use of under mattress hand rail. Transfers  Sit to stand transfer: contact guard assistance, (including completions to dayton walker and fixed (R) HR. Requires ongoing cueing for hand placement and (R) weight shift to self maintain balance)  Stand to sit transfer: contact guard assistance  Stand pivot transfer: contact guard assistance, (completed without device leading towards (R) UE including w/c <=> bed)  Stand step transfer: moderate assistance, (with use of dayton walker, VC for sequence)  Car transfer: contact guard assistance, (without device use.   Utilizes supported door for sit <=> stand and turning)    Comments:   Ambulation Trial 1  Surface:level surface  Assistive Device: hemiwalker  Other Appliance: (L), givmohr sling  Assistance: moderate assistance, (ranges from min/modA. Once fatigued has increased difficulty attending to gait sequence with increased impulsivity and lateral lean)  Distance: 155'  Gait Mechanics: Improved but ongoing (L) lateral lean requiring frequent VC and consistent physical assist to correct. Narrow THERESE with step by step sequencing to slow gait speed and improved postural self correction. Comments:  Device utilized in front of patient with medial post in between feet resulting in improved THERESE and decreased lateral lean. Patient initially self advances device, progressing to therapist asssist once fatigued secondary to difficulty sequencing gait mechanics. Ambulation Trial 2  Surface:carpet surface  Assistive Device: [employer] Groupwalker  Other Appliance: (L), givmohr sling  Assistance: moderate assistance, (ranges from min/modA)  Distance: 10'  Gait Mechanics: Unchanged from level surface  Stair Mobility   Number of Steps: 12  Step Height: 6 inch  Hand Rails: requires (B) HR as patient utilizes unilateral (R) side HR while ascending/descending  Other Appliance: (L), givmohr sling  Assistance: contact guard assistance   Comments:  Step to mechanics with max VC for sequence and and to slow aby. VC to correct posture between each step. Wheelchair Mobility:  Chair: manual  Surface: level surface  Method: (R) LE and (L) LE  Distance: 150 ft  Assistance: stand by assistance  Comments: extensive VC for sequence of turning.   Increased time for task completion with inconsistent use and placement of (L) LE  Balance  Static Sitting Balance: fair: maintains balance at CGA without use of UE support  Dynamic Sitting Balance: fair (-): maintains balance at CGA with use of UE support  Static Standing Balance: fair (-): maintains balance at CGA with use of UE support  Dynamic Standing Balance: poor: requires mod (A) to maintain balance  Patient completes object retrieval from floor in standing position at moderate assistance, (completed with dayton-walker and reacher. Assistance to stabilize (L) lateral lean during object retrieval)  Comments: Standing balance rated with hemiwalker use. Able to maintain static and dynamic stance at CGA with use of fixed bar support    Cognition  Overall Cognitive Status: Impaired  Arousal/Alterness: appropriate responses to stimuli  Following Commands: follows one step commands with repetition, follows one step commands with increased time  Attention Span: difficulty attending to directions  Memory: decreased recall of biographical information, decreased short term memory  Safety Judgement: decreased awareness of need for safety  Problem Solving: assistance required to generate solutions, assistance required to implement solutions  Insights: decreased awareness of deficits  Initiation: requires cues for all  Sequencing: requires cues for all  Command Following:   impaired    Education  Barriers To Learning: cognition, emotional, and physical  Patient Education: patient educated on goals, PT role and benefits, plan of care, general safety, functional mobility training, proper use of assistive device/equipment, disease specific education, transfer training, educated on recommendation for 24 hr physical assistance upon discharge. Learning Assessment:  patient verbalizes understanding, would benefit from continued reinforcement    Assessment  Activity Tolerance: Good. Seated rest breaks provided for recovery. Impairments Requiring Therapeutic Intervention: decreased functional mobility, decreased ADL status, decreased ROM, decreased strength, decreased safety awareness, decreased cognition, decreased endurance, decreased balance, decreased coordination, decreased posture  Prognosis: fair  Clinical Assessment: Pt made significant progress during therapy stay but was unable to meet prior baseline independent status.   At evaluation patient was dependent assist of 2 for all mobility including inability to maintain upright sitting posture. At time of discharge patient has progressed to assist of 1 for all functional mobility including transfers/ambulation/stair mobility. Patient remains impulsive and has increased difficulty sequencing tasks once fatigued. Patient has progressed to [facility] for mobility with fixed (R) HR support but continues to require min/mod A for mobility with dayton-walker. Plan is for patient to transition to SNF at this time for continued therapy prior to goal of returning home. Pt would benefit from continued skilled PT to promote functional independence. Safety Interventions: patient left in chair, chair alarm in place, call light within reach, gait belt, patient at risk for falls, telesitter in use, and fall mats at bedside    Plan  Frequency: 5 x/week, 60 min/day  Current Treatment Recommendations: strengthening, ROM, balance training, functional mobility training, transfer training, gait training, stair training, endurance training, neuromuscular re-education, wheelchair mobility training, modalities, patient/caregiver education, ADL/self-care training, cognitive/perceptual training, and safety education    Goals  Patient Goals:  To return home and be able to move his (L) UE better  Short Term Goals:  Time Frame: 1-2 weeks  Patient will complete bed mobility at [facility] - goal met 2/23/2023  Patient will complete transfers at mod (A) of 1 - goal met 2/15/2023  Patient will ambulate 25 ft with use of LRAD at mod (A) of 1 - goal met 2/20/2023  Patient will ascend/descend 1 stairs with (B) handrail at mod (A) of 1 - goal met 2/20/2023  Patient will complete manual w/c propulsion 50 ft at [facility] - goal met 2/22/2023  Long Term Goals:  Time Frame: 3 weeks  Patient will complete bed mobility at modified independent - goal met 3/1/2023  Patient will complete transfers at modified independent - goal not met  Patient will ambulate 50 ft with use of LRAD at modified independent - goal not met  Patient will ascend/descend 10 stairs with (L) ascending handrail at [facility] - goal not met   Patient will complete car transfer at [facility] - goal not met    Therapy Session Time      Individual Group Co-treatment   Time In 0830       Time Out 0940       Minutes 70         Timed Code Treatment Minutes:  70    Total Treatment Minutes:  70       Electronically Signed By:     Jose Pizarro PT, DPNEETA - [id number], 3/1/2023 9:52 AM

## 2023-03-01 NOTE — CARE COORDINATION
TYLER/LAURA spoke with Wilfredo Humphreys @ [address] [place] [address]. Pre-cert not obtained yet. Will notify when pre-cert is approved. GEOVANNY will follow.     Electronically signed by MILEY Gaitan on 3/1/2023 at 4:07 PM

## 2023-03-01 NOTE — PROGRESS NOTES
Jayden Becerra  3/1/2023  [id number]    Chief Complaint: Acute ischemic stroke [facility])    Subjective:   No overnight events. No current complaints. Affect remains low. Participating in therapy but slow progress. ROS: No CP, SOB, dyspnea    Objective:  Patient Vitals for the past 24 hrs:   BP Temp Temp src Pulse Resp SpO2   02/28/23 2040 123/68 97.9 °F (36.6 °C) Oral 56 16 96 %       Gen: No distress, pleasant. Resting in bedside chair  HEENT: Normocephalic, atraumatic  CV: Regular rate and rhythm. No MRG   Resp: No respiratory distress. CTAB   Abd: Soft, nontender nondistended  Ext: No edema  Neuro: Alert, oriented, appropriately interactive. Left hemiparesis LUE 0/4. Negative gilliam    Laboratory data: Available via EMR. Therapy progress:    PT    Supine to Sit: Partial/moderate assistance  Sit to Supine: Dependent   Sit to Stand: Partial/moderate assistance  Chair/Bed to Chair Transfer: Partial/moderate assistance  Car Transfer:    Ambulation 10 ft: Partial/moderate assistance  Ambulation 50 ft: Partial/moderate assistance  Ambulation 150 ft:    Stairs - 1 Step: Supervision or touching assistance  Stairs - 4 Step: Partial/moderate assistance  Stairs - 12 Step: Partial/moderate assistance    OT    Eating: Setup or clean-up assistance  Oral Hygiene: Setup or clean-up assistance  Bathing: Partial/moderate assistance  Upper Body Dressing: Partial/moderate assistance  Lower Body Dressing: Partial/moderate assistance  Toilet Transfer: Substantial/maximal assistance  Toilet Hygiene: Substantial/maximal assistance    Speech Therapy    Pt presents with mild L perioral weakness; improving dysarthria and oropharyngeal dysphagia. Pt continuing to tolerate regular diet without consistency restrictions. Speech intelligibility and articulation precision is improving.  Pt also presents with moderate cognitive-linguistic deficits characterized by deficits in orientation, attention, executive functioning, short-term memory, and visuospatial functioning. Pt with improving thought organization and attention given min cues. Pt benefited from direct instruction of internal memory strategies this date but requires assistance for implementation. Recommend SLP intervention for safe return to prior level of independence. Body mass index is 23.88 kg/m². Assessment:  Patient Active Problem List   Diagnosis    Acute ischemic stroke [facility])    Expressive aphasia    Dysarthria    Ataxia    Primary hypertension    Tobacco abuse    Noncompliance    Left hemiparesis ([address]       Plan:   Acute right basal ganglia infarct: DAPT for 21 days (2/25) , Lipitor. PT/OT/SLP. Started SSRI for motor recovery     Dysphagia: Dysphagia diet, SLP     HTN: started lisinopril 5     Tobacco use: education provided. No supplement currently    Meatus trauma: held lovenox. Resolved. UA without significant findings for infection     Bowels: Per protocol   Bladder: Per protocol   Sleep: Trazodone scheduled  Pain: Tylenol, tramadol as needed  DVT PPx: Lovenox  SOPHIA: 3/2 - [facility]    Dispo: Prep d/c for tomorrow. Betty Pierson MD  3/1/2023, 8:59 AM    * This document was created using dictation software. While all precautions were taken to ensure accuracy, errors may have occurred. Please disregard any typographical errors.

## 2023-03-01 NOTE — DISCHARGE INSTRUCTIONS
We hope your stay on rehab has exceeded your expectations. Once again the entire Acute Rehab Staff at [facility] wish to thank you for allowing us the privilege to care for you. A few days after you are discharged from Rehab, you will receive a survey [employer]) in the mail. This is a nationally distributed survey sent to thousands of rehab patients throughout the nation. It is very important to the staff and Dr. Connor Ardon to receive feedback based on your experience on the Rehab Unit. Thank you, we wish you good health always,         Acute Rehab Team      Hospital Preference:     [facility] [address] Diagnosis/Conditions    _______________________ (free text)    Emergency Contact:    ________________________________________Phone#________________________      Advanced Directives:    Code Status: ?  [x]  Full Code  ? []  DNR  ? []  [facility]  ? []  [facility] - Arrest    (as of date of discharge:  _________)      Medical POA: ?  []   Yes ______________________________ ?   []   No                                       (Name and phone number)                     Living Will:   ?   []   Yes    ?  []   No        Insurance Information:    _______________________ (free text)      Individual Responsible  for the coordination of the discharge/follow up:    ______________________________________________________    Functional Status:    VISUAL DEFICITS:    Yes [x]  No  []       If yes, assisted device:   Wears Glasses Yes []  No  []  Wears Contacts  Yes []  No  []  Legally Blind Yes []  No  []    HEARING DEFICITS:    Yes []  No  [x]       If yes, assisted device:   Wears Hearing Aids Yes []  No  []  Pocket Talker  Yes []  No  []       Physical Therapist & Contact #:  OccupationalTherapist & Contact #:  OLGA Freeman/MIKE, [id number] [phone]   Speech Therapist & Contact #:       Activities of Daily Living:     ADL's - Adaptive Equipment used shower chair, grab bars around toilet, grab bars around shower, hand held shower head.      []  Independent ? []  Modified Independent ? []  Supervision                [x]  Minimal Assistance ? [x]  Moderate Assistance ? [x]  Maximal Assistance     Comments: Yun Glover requires fluctuating assistance levels for ADLs depending on task demand and attention. Yun Glover should put on his clothing using vic-dressing techniques. Driving Restriction:      [x]  YES   [] NO Patient should contact primary care physician for referral for  evaluation prior to returning to driving. Mobility:     Ambulation: Device: Vic-walker with device in front of patient. GivMohr sling to (L) UE     []  Independent ? []  Modified Independent ? []  Supervision                []  Minimal Assistance ? [x]  Moderate Assistance ? []  Maximal Assistance     Stairs:      Number of stairs: 12    Handrails:    [] Right   [] Left      [x] Bilateral (R) UE use while ascending/descending   []  Independent ? []  Modified Independent ? []  Supervision                [x]  CGA/Minimal Assistance ? []  Moderate Assistance ? []  Maximal Assistance     Wheelchair:     ? []  Independent ? []  Modified Independent ? [x]  Supervision  (with bilateral LE use)               []  Minimal Assistance  ? []  Moderate Assistance ? []  Maximal Assistance       Current Diet Consistency:?       [x]  Regular   [] Soft and Bite-Sized  ? [] Minced and Moist     ?[]  Puree     Current Liquid Level:?         [x] Thin Liquids     [] Mildly Thick (Nectar) ?     [] Moderately Thick (Honey)    [] Pudding Thick    [] Glena Catlin Water Protocol     Dietary Restrictions:?      [x]  General Diet   []  Tube Feed, w/ Diet   []  Tube Feed, NPO   []  Carb Control   []  Cardiac   []  Renal    []  Other:

## 2023-03-01 NOTE — DISCHARGE INSTR - COC
Continuity of Care Form    Patient Name: Rabia Victoria   :  1955  MRN:  [id number]    Admit date:  2023  Discharge date:  3/2/23    Code Status Order: Full Code   Advance Directives:     Admitting Physician:  Emily Champion MD  PCP: Shoshana Kaur DO    Discharging Nurse: CHUCKY Bell, [facility] Unit/Room#: ZTW-0615/0624-36  Discharging Unit Phone Number: [phone]    Emergency Contact:   Extended Emergency Contact Information  Primary Emergency Contact: Haily Smith. Phone: [phone]  Relation: Brother/Sister  Preferred language: English   needed? No  Secondary Emergency Contact: Todd Sheppard  Home Phone: [phone]  Relation: Other    Past Surgical History:  Past Surgical History:   Procedure Laterality Date    FINGER SURGERY Right     FRACTURE SURGERY      HERNIA REPAIR      HERNIA REPAIR         Immunization History: There is no immunization history on file for this patient. Active Problems:  Patient Active Problem List   Diagnosis Code    Acute ischemic stroke ([facility] I63.9    Expressive aphasia R47.01    Dysarthria R47.1    Ataxia R27.0    Primary hypertension I10    Tobacco abuse Z72.0    Noncompliance Z91.199    Left hemiparesis ([address] G81.94       Isolation/Infection:   Isolation            No Isolation          Patient Infection Status       None to display            Nurse Assessment:  Last Vital Signs: /76   Pulse 72   Temp 97.5 °F (36.4 °C)   Resp 16   Ht 5' 9\" (1.753 m)   Wt 161 lb 11.2 oz (73.3 kg)   SpO2 99%   BMI 23.88 kg/m²     Last documented pain score (0-10 scale): Pain Level: 0  Last Weight:   Wt Readings from Last 1 Encounters:   23 161 lb 11.2 oz (73.3 kg)     Mental Status:  oriented, alert, and forgetful with new information    IV Access:  - None    Nursing Mobility/ADLs: leans to left when standing up. Moves before staff is ready when transfer.   Need cues  Walking   Dependent  Transfer Dependent  Bathing  Assisted  Dressing  Assisted  Toileting  Assisted  Feeding  Assisted  Med Admin  Assisted  Med Delivery   whole    Wound Care Documentation and Therapy:        Elimination:  Continence: Bowel: Yes  Bladder: Yes  Urinary Catheter: None   Colostomy/Ileostomy/Ileal Conduit: No       Date of Last BM: 3/2/23 loose    Intake/Output Summary (Last 24 hours) at 3/1/2023 1428  Last data filed at 3/1/2023 0954  Gross per 24 hour   Intake 1020 ml   Output 825 ml   Net 195 ml     I/O last 3 completed shifts: In: 0 [P.O.:1380]  Out: [address] [Urine:1350]    Safety Concerns: At Risk for Falls and Aspiration Risk    Impairments/Disabilities:      Paralysis - left arm    Nutrition Therapy:  Current Nutrition Therapy:   - Oral Diet:  General    Routes of Feeding: Oral  Liquids: Thin Liquids. Need encouragement to drink. The pt forgets to drink. Daily Fluid Restriction: no  Last Modified Barium Swallow with Video (Video Swallowing Test): not done    Treatments at the Time of Hospital Discharge:   Respiratory Treatments: no  Oxygen Therapy:  is not on home oxygen therapy.   Ventilator:    - No ventilator support    Rehab Therapies: Physical Therapy, Occupational Therapy, and Speech/Language Therapy  Weight Bearing Status/Restrictions: No weight bearing restrictions  Other Medical Equipment (for information only, NOT a DME order):  none  Other Treatments: none    Patient's personal belongings (please select all that are sent with patient):  Glasses    RN SIGNATURE:  Electronically signed by Sharon Albert RN on 3/2/23 at 9:59 AM EST    CASE MANAGEMENT/SOCIAL WORK SECTION    Inpatient Status Date: 2/7/2023    Readmission Risk Assessment Score:14%  Readmission Risk              Risk of Unplanned Readmission:  13           Discharging to Facility/ Agency   Name: [facility]  Address: [address] [place] [address]. [place] [address]  Fax: [phone]      / signature: Electronically signed by MILEY Gamino on 3/1/2023 at 2:31 PM     PHYSICIAN SECTION    Prognosis: Good    Condition at Discharge: Stable    Rehab Potential (if transferring to Rehab): Fair    Recommended Labs or Other Treatments After Discharge: PT/OT/SLP/RN    Physician Certification: I certify the above information and transfer of Barney Dunn  is necessary for the continuing treatment of the diagnosis listed and that he requires SNFfor greater 30 days.      Update Admission H&P: No change in H&P    PHYSICIAN SIGNATURE:  Electronically signed by Dina Morrison MD on 3/2/23 at 9:32 AM EST

## 2023-03-01 NOTE — PROGRESS NOTES
[facility] [address]   Phone: [phone]    Occupational Therapy    [] Initial Evaluation            [] Daily Treatment Note         [x] Discharge Summary      Patient: Mariana De La Rosa   : 1955   MRN: [id number]   Date of Service:  3/1/2023    Admitting Diagnosis:  Acute ischemic stroke [facility])  Current Admission Summary: 77-year-old male who was admitted on  with left-sided weakness for greater than 2 days. CT of the head with suggestion of possible NPH. CTA with less than 50% stenosis of the right ICA. MRI of the brain revealed subacute ischemia on the right basal ganglia. Echo with normal EF and negative bubble study. Neurology evaluated and suggested aspirin and Plavix for 21 days then aspirin only in addition to Lipitor. He was evaluated by therapy and suggested to continue in an inpatient setting prior to returning home. He reports no BM for multiple days. Past Medical History:  has a past medical history of CVA (cerebral vascular accident) ([address] and HTN (hypertension). Past Surgical History:  has a past surgical history that includes Finger surgery (Right); Hernia repair; fracture surgery; and hernia repair.     Discharge Recommendations: Continued OT with 24/ assistance    DME Required For Discharge: rolling walker, wheelchair, tub transfer bench, hand-held shower head, grab bars - toilet, grab bars - shower    Precautions/Restrictions: high fall risk, up as tolerated, reg diet    Pre-Admission Information   Lives With: brother Watson Kimball)                Type of Home: house  Home Layout: two level, bedroom in the basement, 10 steps with 1 handrail on the L  Home Access:  2 step to enter without rails   Bathroom Layout: tub/shower unit  Bathroom Equipment: grab bars in shower  Toilet Height: elevated height  Home Equipment: no prior equipment  Transfer Assistance: Independent without use of device  Ambulation Assistance:Independent without use of device  ADL Assistance: independent with all ADL's   IADL Assistance: independent with homemaking tasks  Active :        [x] Yes                 [] 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 Occupation: sales  Hobbies: fishing, reading, boating  Recent Falls: chart reports had several falls that caused this admission (about 5 falls)     Subjective  General: Pt seated upright in w/c, agreeable to OT tx and shower. No pain noted. Activities of Daily Living  Basic Activities of Daily Living  Grooming: setup assistance  Grooming Comments: seated in wheelchair with set-up assistance for toothpaste on toothbrush, pt encouraged to complete grooming while in stance vs. seated to improve activity tolerance and endurance  Upper Extremity Bathing: stand by assistance  Lower Extremity Bathing: minimal assistance   Bathing Comments: bathing completed while seated in shower chair with use of hand held shower head, standing with CGA using GB, assistance for buttocks while in stance, may benefit from a use of long handled sponge  Upper Extremity Dressing: minimal assistance requires verbal cueing Increased time to complete task Comment: assistance for threading L arm in sleeve, SUP to doff shirt  Lower Extremity Dressing: moderate assistance Comment: max A for shoes, mod A for socks, min A for brief and min A for pants  Dressing Comments: seated in wheelchair, assistance for L sock, L shoe, threading L leg, mod A for standing balance, pt re-educated on dayton-dressing techniques prior and during task however cannot carryover learned information     Toileting: minimal assistance. Toileting Comments: min A standing balance for pants up/down, BM while seated on toilet seat, able to lean forward to the L to wipe buttocks  Instrumental Activities of Daily Living  No IADL completed on this date.   Functional Mobility  Bed Mobility  Sit to Supine: stand by assistance  Scooting: stand by assistance  Comments: impulsive, cues for pacing, assistance for positioning LUE during all bed mobility  Transfers  Sit to stand transfer:contact guard assistance, minimal assistance  Stand to sit transfer: contact guard assistance, minimal assistance  Stand pivot transfer: minimal assistance  Toilet transfer: minimal assistance  Toilet transfer equipment: standard bedside commode, grab bars, transfers from w/c  Shower transfer: contact guard assistance  Shower transfer equipment: shower seat with back  Comments: no cues for hand placement  Functional Mobility:  Sitting Balance: stand by assistance. Sitting Balance Comment: cues for sitting balance while eyes were closed in the shower, improved when pt re-opened his eyes   Standing Balance: contact guard assistance, minimal assistance, moderate assistance.     Standing Balance Comment: fluctuating assistance levels during ADL depending on attention, task demand, and if pt had unilateral hand support  Functional Mobility: .  minimal assistance  Functional Mobility Activity: to/from bathroom  Functional Mobility Device Use: wheelchair  Comments: pt pulling on furniture for WC mobility    Cognition  Overall Cognitive Status: Impaired  Arousal/Alterness: appropriate responses to stimuli  Following Commands: inconsistently follows commands  Attention Span: attends with cues to redirect, difficulty attending to directions, difficulty dividing attention  Memory: decreased recall of precautions, decreased recall of recent events, decreased short term memory  Safety Judgement: decreased awareness of need for assistance, decreased awareness of need for safety  Problem Solving: assistance required to generate solutions, assistance required to implement solutions, decreased awareness of errors, assistance required to identify errors made, assistance required to correct errors made  Insights: not aware of deficits  Initiation: requires cues for some  Sequencing: requires cues for all  Comments: impulsive at times, deficits in motor execution and motor control  Orientation:    oriented to person, oriented to time, and oriented to situation  Command Following:   impaired  Follows one step directions with repetition and increased time  Difficulties following directions with \"Left/Right\", I.e., use R arm to do X  Education  Barriers To Learning: cognition and language  Patient Education: patient educated on goals, OT role and benefits, plan of care, precautions, ADL adaptive strategies, IADL safety, proper use of assistive device/equipment, adaptive device training, energy conservation, orientation, family education, pressure relief, transfer training, discharge recommendations  Learning Assessment:  patient verbalizes understanding, would benefit from continued reinforcement, patient will require reinforcement due to cognitive deficits  Assessment  Activity Tolerance: Good  Impairments Requiring Therapeutic Intervention: decreased functional mobility, decreased ADL status, decreased ROM, decreased strength, decreased safety awareness, decreased cognition, decreased endurance, decreased sensation, decreased balance, decreased IADL, decreased fine motor control, decreased coordination, increased pain, decreased postureeducated on recommendation for 24 hr physical assistance upon discharge  Prognosis: good  Clinical Assessment: Pt progressing very well towards goals. Despite improvement, pt continues to require assistance of 1 to complete ADLs, functional mobility, and functional transfers. Pt continues to require verbal cues for dayton-dressing techniques d/t poor carryover of learned information. Recommend continued skilled OT service upon d/c to improve safety and independence with ADLs and all occupational pursuits.    Safety Interventions: patient left in chair, chair alarm in place, call light within reach, patient at risk for falls, telesitter in use, and family/caregiver present    Plan  Frequency: 5 x/week, 60 min/day  Current Treatment Recommendations: strengthening, ROM, balance training, functional mobility training, transfer training, stair training, endurance training, neuromuscular re-education, wheelchair mobility training, modalities, patient/caregiver education, ADL/self-care training, IADL training, home management training, cognitive reorientation, home exercise program, safety education, equipment evaluation/education, and positioning  Goals  Patient Goals: \"to get this working\" in re to LUE   Short Term Goals:  Time Frame: in 10 days  Patient will complete upper body bathing at min A while seated. -MET 2/20  Patient will complete lower body bathing at min A while seated and standing as needed. -MET 2/20  Patient will complete upper body dressing at min A. -MET 3/1  Patient will complete lower body dressing at mod A using AE as needed. -MET 2/27  Patient will complete toileting at min A with GB as needed. -MET 3/1  Patient will complete a toilet transfer with min A with GB as needed. -MET 2/20  Patient will complete a visual assessment to complete occupational profile. -MET 2/20  Long Term Goals:  Time Frame: in 21 days  Patient will complete upper body bathing at supervision while seated. -NOT MET 3/1  Patient will complete lower body bathing at supervision while seated and standing as needed. -NOT MET 3/1  Patient will complete upper body dressing at set-up assistance. -NOT MET 3/1  Patient will complete lower body dressing at supervision using AE as needed. -NOT MET 3/1   Patient will complete toileting at supervision with GB as needed. -NOT MET 3/1  Patient will complete grooming at supervision while in stance.   -NOT MET 3/1  Patient will complete functional transfers at supervision using LRAD. -NOT MET 3/1  Patient will complete functional mobility at supervision using LRAD. -NOT MET 3/1  Patient will increase functional sitting balance to mod I for improved ADL completion. -NOT MET 3/1  Patient will increase functional standing balance to SUP for improved ADL completion.  -NOT MET 3/1    Therapy Session Time   Individual Group Co-treatment   Time In 1000     Time Out 1108     Minutes 68       Timed Code Treatment Minutes: 68    Total Treatment Minutes:  68    Electronically Signed By: Babak Hawkins OTR/L, [id number]

## 2023-03-01 NOTE — PROGRESS NOTES
[facility]   Phone: [phone]    Speech Therapy    [] Initial Evaluation            [x] Daily Treatment Note         [x] Discharge Summary      Patient: Yash Lockett   : 1955   MRN: [id number]   Date of Service:  3/1/2023  Admitting Diagnosis: Acute ischemic stroke [facility])  Current Admission Summary: 77-year-old male who was admitted on  with left-sided weakness for greater than 2 days. CT of the head with suggestion of possible NPH. CTA with less than 50% stenosis of the right ICA. MRI of the brain revealed subacute ischemia on the right basal ganglia. Echo with normal EF and negative bubble study. Neurology evaluated and suggested aspirin and Plavix for 21 days then aspirin only in addition to Lipitor. He was evaluated by therapy and suggested to continue in an inpatient setting prior to returning home. He reports no BM for multiple days. Past Medical History:  has a past medical history of CVA (cerebral vascular accident) ([address] and HTN (hypertension). Past Surgical History:  has a past surgical history that includes Finger surgery (Right); Hernia repair; fracture surgery; and hernia repair. Precautions/Restrictions: high fall risk, modified diet      Pre-Admission Information   Living Status: Pt lives with his brother, independent prior to admission. Occupation/School: Retired last year. 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   Medication Management: :  [x]Primary   []Secondary []No  Finance Management: [x]Primary   []Secondary []No  Active :   [x]Yes         []No  [facility]  Vision:    Vision Corrective Device: wears glasses at all times      Subjective  General: Pt alert and cooperative throughout sessions. Pt reported he is \"grumpy as always,\" but is ready to move on for follow-up care.     Pain: Did not state    Safety Interventions: patient left in chair, chair alarm in place, call light within reach, patient at risk for falls, and telesitter in use      Therapeutic Interventions:     Session 1:   Dysphagia: Severity: Mild   Pt consuming breakfast meal consisting of regular solids and mixed consistencies (pancakes, eggs, and cereal with milk)  Diet tolerance monitoring   - tolerated all consistencies with slow but adequate mastication   - no overt indication of residue in the left sulcus  - no overt s/s of aspiration noted     Motor Speech/ Voice:  Severity: Mild and Moderate   Minimal pairs articulation task  - 90% intelligible labial, interdental, and alveolar minimal pairs    Burke passage  - 75% intelligible throughout passage  - decreased intelligibility related to poor breath support coordination and decreased volume   - self rated speech at 60% of his baseline; acknowledged improvements since initial evaluation     Cognition: Severity: Mild   Spaced Retrieval: recalling name of next facility in 5 min intervals  - 100% accuracy initiating location of external aid including name of facility   - 0% accuracy recalling name of facility without use of external aid    Executive function (planning): establishing goals    - pt required max cues to establish potential personal goals for follow-up care   - mod cues to initiate questions regarding skilled nursing facility   - pt dependent upon SLP to further review location and amenities of the facility     Additional Interventions:        Session 2:     Dysphagia: Severity: Mild   Goal not targeted this session.      Motor Speech/ Voice: Severity: Mild  and Moderate   Reading passage  - pt with 75% intelligibility characterized by decreased vocal loudness, decreased breath coordination, and decreased articulatory precision     Spontaneous discourse:   - pt with 80% intelligibility in conversation but with decreased vocal loudness, decreased breath coordination, and reduced articulatory precision  - pt required continuous cueing to increase volume in conversation     Cognition: Severity: Mild    Attention: card deck activity  - pt with 92% (49/52) accuracy alternating attention between various card activities with mod cues for initiation and to sustain attention to accurately complete task     Executive function (sequencing): organizing card deck  - pt with 92% accuracy appropriately sequencing cards in numerical order   - pt with intermittent errors organizing (red and black, suit) - independently self corrected     Thought organization: topic maintenance in 3 minute discourse   - pt with consistent topic maintenance, but with reduced information throughout discourse   - pt required mod cues to continue providing information throughout discourse    Additional Interventions:          Education  Barriers To Learning: cognition and hearing  Patient Education: Provided education regarding role of SLP, results of assessment, recommendations and general speech pathology plan of care. Learning Assessment: Pt requires ongoing learning     Assessment  Impairments Requiring Therapeutic Intervention: Cognitive-Linguistic Deficits  and Oropharyngeal Dysphagia   Prognosis: good    Clinical Assessment:  Pt presents with mild L perioral weakness; improving dysarthria and oropharyngeal dysphagia. Pt continuing to tolerate regular diet without consistency restrictions. Speech intelligibility and articulation precision is improving despite reduced breath support and vocal intensity. Pt also presents with moderate cognitive-linguistic deficits characterized by deficits in orientation, attention, executive functioning, short-term memory, and visuospatial functioning. Pt with improving thought organization and attention given min-mod cues throughout discourse and moderately complex cognitive tasks. Recommend SLP intervention for safe return to prior level of independence.        Diet Solids Recommendation:   Liquid Consistency Recommendation:   Recommended Form of Meds: Regular texture diet     Thin liquids    No straws  Meds whole with water       Recommended Compensatory Swallowing Strategies: Upright as possible with all PO intake , Small bites/sips , Eat/feed slowly, No Straws      Plan  Frequency: 60 minutes/day; 5 days per week, as tolerated, until goals met, or discharged from ARU. Therapeutic Interventions: Diet Tolerance Monitoring , Patient/Family Education , Instrumental assessment of swallow function (Modified Barium Swallow Study)  Speech / Motor Planning / Voice intervention , Cognitive-Linguistic intervention , and Patient/ Family education   Discharge Recommendations: SNF  Continued SLP at Discharge: Yes     Goals  Patient Goals: Pt reports his goal is to get home. Time Frame: 14-21 days    Pt will tolerate recommended diet and advanced trials with no overt s/s of aspiration. GOAL MET (will continue to target diet tolerance monitoring)   Pt will improve oral motor strength and ROM for coordinated and alternating motions, via graded tasks to improve speech back to baseline level as subjectively rated by SLP/pt and family. GOAL NOT MET; continues to progress   Pt will improve executive function for multifactor task completion via graded tasks to 80% accuracy GOAL NOT MET; continues to progress   Patient will complete functional problem-solving tasks for daily situations with 80% accuracy or given min cues. oGOAL NOT MET; continues to progress   Pt will improve attention to detail for graded complex information to 80%, for improved recall and retention of newly learned information  GOAL NOT MET; continues to progress       Therapy Session Time      Session 1 Session 2   Time In 0800 1315   Time Out 0830 1345   Time Code Minutes 10 20   Individual Minutes 30 30     Timed Code Treatment Minutes: 30  Total Treatment Minutes:  60    Electronically Signed By:   Noemí Galaviz M.A.  CCC-SLP SSandraPSandra [id number]  Speech-Language Pathologist   3/1/2023 8:18 AM     The speech-language pathologist was present, directed the patient's care, made skilled judgment and was responsible for assessment and treatment.     Alicia Hawkins.,  Speech-Language Pathology

## 2023-03-02 VITALS
OXYGEN SATURATION: 98 % | HEIGHT: 69 IN | WEIGHT: 161.7 LBS | DIASTOLIC BLOOD PRESSURE: 58 MMHG | BODY MASS INDEX: 23.95 KG/M2 | RESPIRATION RATE: 18 BRPM | TEMPERATURE: 97.7 F | SYSTOLIC BLOOD PRESSURE: 104 MMHG | HEART RATE: 70 BPM

## 2023-03-02 LAB
ANION GAP SERPL CALCULATED.3IONS-SCNC: 8 MMOL/L (ref 3–16)
BUN BLDV-MCNC: 20 MG/DL (ref 7–20)
CALCIUM SERPL-MCNC: 9.3 MG/DL (ref 8.3–10.6)
CHLORIDE BLD-SCNC: 104 MMOL/L (ref 99–110)
CO2: 25 MMOL/L (ref 21–32)
CREAT SERPL-MCNC: 0.8 MG/DL (ref 0.8–1.3)
GFR SERPL CREATININE-BSD FRML MDRD: >60 ML/MIN/{1.73_M2}
GLUCOSE BLD-MCNC: 95 MG/DL (ref 70–99)
HCT VFR BLD CALC: 39.5 % (ref 40.5–52.5)
HEMOGLOBIN: 13.2 G/DL (ref 13.5–17.5)
MCH RBC QN AUTO: 33.3 PG (ref 26–34)
MCHC RBC AUTO-ENTMCNC: 33.5 G/DL (ref 31–36)
MCV RBC AUTO: 99.4 FL (ref 80–100)
PDW BLD-RTO: 13.2 % (ref 12.4–15.4)
PLATELET # BLD: 292 K/UL (ref 135–450)
PMV BLD AUTO: 7.3 FL (ref 5–10.5)
POTASSIUM SERPL-SCNC: 4.1 MMOL/L (ref 3.5–5.1)
RBC # BLD: 3.97 M/UL (ref 4.2–5.9)
SODIUM BLD-SCNC: 137 MMOL/L (ref 136–145)
WBC # BLD: 6.4 K/UL (ref 4–11)

## 2023-03-02 PROCEDURE — 85027 COMPLETE CBC AUTOMATED: CPT

## 2023-03-02 PROCEDURE — 80048 BASIC METABOLIC PNL TOTAL CA: CPT

## 2023-03-02 PROCEDURE — 36415 COLL VENOUS BLD VENIPUNCTURE: CPT

## 2023-03-02 PROCEDURE — 6370000000 HC RX 637 (ALT 250 FOR IP): Performed by: PHYSICAL MEDICINE & REHABILITATION

## 2023-03-02 PROCEDURE — 6360000002 HC RX W HCPCS: Performed by: PHYSICAL MEDICINE & REHABILITATION

## 2023-03-02 RX ORDER — SENNA AND DOCUSATE SODIUM 50; 8.6 MG/1; MG/1
2 TABLET, FILM COATED ORAL 2 TIMES DAILY
Qty: 60 TABLET | Refills: 0
Start: 2023-03-02

## 2023-03-02 RX ORDER — CLOPIDOGREL BISULFATE 75 MG/1
75 TABLET ORAL DAILY
Qty: 30 TABLET | Refills: 3
Start: 2023-03-03

## 2023-03-02 RX ORDER — TAMSULOSIN HYDROCHLORIDE 0.4 MG/1
0.4 CAPSULE ORAL DAILY
Qty: 30 CAPSULE | Refills: 3
Start: 2023-03-03

## 2023-03-02 RX ORDER — ATORVASTATIN CALCIUM 40 MG/1
40 TABLET, FILM COATED ORAL NIGHTLY
Qty: 30 TABLET | Refills: 3
Start: 2023-03-02

## 2023-03-02 RX ORDER — LISINOPRIL 5 MG/1
5 TABLET ORAL DAILY
Qty: 30 TABLET | Refills: 3
Start: 2023-03-03

## 2023-03-02 RX ADMIN — STANDARDIZED SENNA CONCENTRATE AND DOCUSATE SODIUM 2 TABLET: 8.6; 5 TABLET ORAL at 09:29

## 2023-03-02 RX ADMIN — LISINOPRIL 5 MG: 5 TABLET ORAL at 09:29

## 2023-03-02 RX ADMIN — TAMSULOSIN HYDROCHLORIDE 0.4 MG: 0.4 CAPSULE ORAL at 09:29

## 2023-03-02 RX ADMIN — ENOXAPARIN SODIUM 40 MG: 100 INJECTION SUBCUTANEOUS at 09:29

## 2023-03-02 RX ADMIN — SERTRALINE 50 MG: 50 TABLET, FILM COATED ORAL at 09:30

## 2023-03-02 RX ADMIN — CLOPIDOGREL BISULFATE 75 MG: 75 TABLET ORAL at 09:29

## 2023-03-02 NOTE — PROGRESS NOTES
[facility] Discharge Assessment    Transportation  \"Has lack of transportation kept you from medical appointments, meetings, work, or from getting things needed for daily living? \"Check all that apply:  [] A.  Yes, it has kept me from medical appointments or from getting my medications  [] B.  Yes, it has kept me from non-medical meetings, appointments, work, or from getting things that I need  [x] C.  No  [] X. Patient unable to respond  [] Y. Patient declines to respond    Provision of Current Reconciled Medication List to Subsequent Provider at Discharge  [] No, current reconciled medication list not provided to the subsequent provider.  [] Yes, current reconciled medication list provided to the subsequent provider. (**Select route of transmission below**)   [] Via Electronic Health Record   [] [employer]  [x] Verbal (e.g. in person, telephone, video conferencing)  [x] Paper-based (e.g. fax, copies, printouts)   [] Other Methods (e.g. texting, email, CDs)    Provision of Current Reconciled Medication List to Patient at Discharge  [] No, current reconciled medication list not provided to the patient, family and/or caregiver.   [] Yes, current reconciled medication list provided to the patient, family and/or caregiver.  (**Select route of transmission below**)   [] [address] Record (e.g., electronic access to patient portal)   [] [facility]  [x] Verbal (e.g. in person, telephone, video conferencing)  [] Paper-based (e.g. fax, copies, printouts)   [] Other Methods (e.g. texting, email, CDs)    Health Literacy  \"How often do you need to have someone help you when you read instructions, pamphlets, or other written material from your doctor or pharmacy? \"  []  0. Never  []  1. Rarely  [x]  2. Sometimes  []  3. Often  []  4. Always  []  8.   Patient unable to respond    BIMS - **Must be completed in the flowsheet at discharge prior to proceeding with Delirium Assessment**  [x] BIMS completed in flowsheet at discharge  [] BIMS not completed due to patient unable to give appropriate related answers, see Staff Assessment in flowsheet    Signs and Symptoms of Delirium  A. Acute Onset Mental Status Change - Is there evidence of an acute change in mental status from the patient's baseline? [x] 0. No  [] 1. Yes    B. Inattention - Did the patient have difficulty focusing attention, for example being easily distractible or having difficulty keeping track of what was being said?  []  0. Behavior not present  [x]  1. Behavior continuously present, does not fluctuate  []  2. Behavior present, fluctuates (comes and goes, changes in severity)    C. Disorganized thinking - Was the patient's thinking disorganized or incoherent (rambling or irrelevant conversation, unclear or illogical flow of ideas, or unpredictable switching from subject to subject)? [x]  0. Behavior not present  []  1. Behavior continuously present, does not fluctuate  []  2. Behavior present, fluctuates (comes and goes, changes in severity)    D. Altered level of consciousness - Did the patient have altered level of consciousness as indicated by any of the following criteria? Vigilant - startled easily to any sound or touch  Lethargic - repeatedly dozed off while being asked questions, but responded to voice or touch  Stuporous - very difficulty to arouse and keep aroused for the interview  Comatose - could not be aroused  [x]  0. Behavior not present  []  1. Behavior continuously present, does not fluctuate  []  2. Behavior present, fluctuates (comes and goes, changes in severity)    Mood    \"Over the last 2 weeks, have you been bothered by any of the following problems?\" 1. Symptom Presence    0 = No  1 = Yes  9 = No Response 2.  Symptom Frequency    0 = Never or 1 day  1 = 2-6 days (several days)  2 = 7-11 days (half or more of the days)  3 = 12-14 days (nearly every day)  **Leave blank if 'No Reponse'**      Enter scores in boxes    Column 1 Column 2   Little interest or pleasure in doing things   9 9   Feeling down, depressed, or hopeless   0 0   **If either A or B in column 2 is coded 2 or 3, CONTINUE asking the questions below. If not, END the interview. **     Trouble falling or staying asleep, or sleeping too much       Feeling tired or having little energy       Poor appetite or overeating       Feeling bad about yourself - or that you are a failure or have let yourself or your family down       Trouble concentrating on things, such as reading the newspaper or watching television       Moving or speaking so slowly that other people could have noticed. Or the opposite- being so fidgety or restless that you have been moving around a lot more than usual.       Thoughts that you would be better off dead, or of hurting yourself in some way. Total Severity: Add scores for all frequency responses in column 2 (possible score 0-27, or enter 99 if unable to complete (if symptom frequency (column 2) is blank for 3 or more items). 0     Social Isolation  \"How often do you feel lonely or isolated from those around you? \"  [] 0. Never  [] 1. Rarely  [] 2. Sometimes  [] 3. Often  [] 4. Always  [x] 7. Patient declines to respond  [] 8. Patient unable to respond    Pain Effect on Sleep  \"Over the past 5 days, how much of the time has pain made it hard for you to sleep at night? \"  []  0. Does not apply - I have not had any pain or hurting in the past 5 days  [x]  1. Rarely or not at all  []  2. Occasionally  []  3. Frequently  []  4. Almost constantly  []  8. Unable to answer    **If the patient answers \"0. Does not apply\" to this question, skip the next two \"Pain Effect. [place] [employer] \" questions**    Pain Interference with Therapy Activities  \"Over the past 5 days, how often have you limited your participation in rehabilitation therapy sessions due to pain? \"  []  0.   Does not apply - I have not received rehabilitation therapy in the past 5 days  []  1. Rarely or not at all  []  2. Occasionally  []  3. Frequently  []  4. Almost constantly  []  8. Unable to answer    Pain Interference with Day-to-Day Activities: \"Over the past 5 days, how often have you limited your day-to-day activities (excluding rehabilitation therapy session)? \"  []  1. Rarely or not at all  []  2. Occasionally  []  3. Frequently  []  4. Almost constantly  []  8. Unable to answer    Nutritional Approaches  Last 7 Days - Check all of the following nutritional approaches that were received within the last 7 days:  []  A. Parenteral/IV feeding  []  B. Feeding tube (e.g., nasogastric or abdominal (PEG))  []  C. Mechanically altered diet - requires change in texture of food or liquids (e.g., pureed food, thickened liquids)  []  D. Therapeutic diet (e.g., low salt, diabetic, low cholesterol)  [x]  Z. None of the above    At time of Discharge - Check all of the following nutritional approaches that were being received at discharge:  []  A. Parenteral/IV feeding (including IV fluids if needed for hydration, but not as part of dialysis/chemo)  []  B. Feeding tube (e.g., nasogastric or abdominal (PEG))  []  C. Mechanically altered diet - requires change in texture of food or liquids (e.g., pureed food, thickened liquids)  []  D. Therapeutic diet (e.g., low salt, diabetic, low cholesterol  [x]  Z. None of the above    High Risk Drug Classes:  Use and Indication    Is taking: Check if the pt is taking any medications by pharmacological classification, not how it is used, in the following classes  Indication noted:  If column 1 is checked, check if there is an indication noted for all meds in the drug class Is taking  (check all that apply) Indication noted (check all that apply)   Antipsychotic [] []   Anticoagulant [x] [x]   Antibiotic [] []   Opioid [x] [x]   Antiplatelet [x] [x]   Hypoglycemic (including insulin) [] []   None of the above []     Special Treatments, Procedures, and Programs    Check all of the following treatments, procedures, and programs that apply at discharge. At Discharge (check all that apply)   Cancer Treatments   A1. Chemotherapy []           A2. IV []           A3. Oral []           A10. Other []   B1. Radiation []   Respiratory Therapies   C1. Oxygen Therapy []           C2. Continuous (continuously for at least 14 hours per day) []           C3. Intermittent []           C4. High-concentration []   D1. Suctioning (Does not include oral suctioning) []           D2. Scheduled []           D3. As needed []   E1. Tracheostomy Care []   F1. Invasive Mechanical Ventilator (ventilator or respirator) []   G1. Non-invasive Mechanical Ventilator []           G2. BiPAP []           G3. CPAP []   Other   H1. IV Medications (Do not include sub Q pumps, flushes, Dextrose 50% or lactated ringers) []           H2. Vasoactive medications []           H3. Antibiotics []           H4. Anticoagulation []           H10. Other []   I1. Transfusions []   J1. Dialysis []           J2. Hemodialysis []           J3. Peritoneal dialysis []   O1. IV access (including a catheter in a vein) []           O2. Peripheral []           O3. Midline []           O4.  Central (PICC, tunneled, port) []      None of the above (select if no Cancer, Respiratory, or Other boxes are checked) [x]

## 2023-03-02 NOTE — PROGRESS NOTES
Pt's 3 siblings at bed side. The report given to a nurse at [facility]. The pt discharged via stratcher.

## 2023-03-02 NOTE — PLAN OF CARE
Problem: Discharge Planning  Goal: Discharge to home or other facility with appropriate resources  3/2/2023 0948 by Andreia Sebastian RN  Outcome: Completed     Problem: Skin/Tissue Integrity  Goal: Absence of new skin breakdown  Description: 1. Monitor for areas of redness and/or skin breakdown  2. Assess vascular access sites hourly  3. Every 4-6 hours minimum:  Change oxygen saturation probe site  4. Every 4-6 hours:  If on nasal continuous positive airway pressure, respiratory therapy assess nares and determine need for appliance change or resting period.   3/2/2023 0948 by Andreia Sebastian RN  Outcome: Completed     Problem: Safety - Adult  Goal: Free from fall injury  3/2/2023 0948 by Andreia Sebastian RN  Outcome: Completed     Problem: Pain  Goal: Verbalizes/displays adequate comfort level or baseline comfort level  3/2/2023 0948 by Andreia Sebastian RN  Outcome: Completed     Problem: Nutrition Deficit:  Goal: Optimize nutritional status  3/2/2023 0948 by Andreia Sebastian RN  Outcome: Completed     Problem: ABCDS Injury Assessment  Goal: Absence of physical injury  3/2/2023 0948 by Andreia Sebastian RN  Outcome: Completed

## 2023-03-02 NOTE — DISCHARGE SUMMARY
Physical Medicine & Rehabilitation  Discharge Summary     Patient Identification:  Jacob Edward  : 1955  Admit date: 2023  Discharge date:   3/2/23  Attending provider: Lian Jesus MD        Primary care provider: Maciel Gill DO     Discharge Diagnoses:   Patient Active Problem List   Diagnosis    Acute ischemic stroke [facility])    Expressive aphasia    Dysarthria    Ataxia    Primary hypertension    Tobacco abuse    Noncompliance    Left hemiparesis ([facility] [address]       Discharge Functional Status:      Physical therapy:  Supine to Sit: Independent  Sit to Supine: Independent      Sit to Stand: Supervision or touching assistance  Chair/Bed to Chair Transfer: Supervision or touching assistance  Car Transfer: Supervision or touching assistance     Ambulation 10 ft: Partial/moderate assistance  Ambulation 50 ft: Partial/moderate assistance  Ambulation 150 ft: Partial/moderate assistance  Stairs - 1 Step: Supervision or touching assistance  Stairs - 4 Step: Supervision or touching assistance  Stairs - 12 Step: Supervision or touching assistance    Occupational therapy:  Eating: Setup or clean-up assistance  Oral Hygiene: Setup or clean-up assistance  Bathing: Partial/moderate assistance  Upper Body Dressing: Partial/moderate assistance  Lower Body Dressing: Partial/moderate assistance     Toilet Transfer: Partial/moderate assistance  Toilet Hygiene: Partial/moderate assistance    Speech therapy:    Pt presents with mild L perioral weakness; improving dysarthria and oropharyngeal dysphagia. Pt continuing to tolerate regular diet without consistency restrictions. Speech intelligibility and articulation precision is improving despite reduced breath support and vocal intensity. Pt also presents with moderate cognitive-linguistic deficits characterized by deficits in orientation, attention, executive functioning, short-term memory, and visuospatial functioning.  Pt with improving thought organization and attention given min-mod cues throughout discourse and moderately complex cognitive tasks. Recommend SLP intervention for safe return to prior level of independence. Inpatient Rehabilitation Course:   Rabia Victoria is a 69 y.o. male admitted to inpatient rehabilitation on 2/7/2023 s/p Acute ischemic stroke ([facility] [address] The patient participated in an aggressive multidisciplinary inpatient rehabilitation program involving 3 hours of therapy per day, at least 5 days per week. He was progressing slowly in therapy and required continued therapies at the time of discharge. His medical rehab course was significant for below:    Acute right basal ganglia infarct: DAPT for 21 days (2/25) , Lipitor. PT/OT/SLP. Started SSRI for motor recovery. Continue this regimen at discharge. Dysphagia: Dysphagia diet advanced. Regular diet at discharge. SLP     HTN: started lisinopril 5. BP appropriate on med. Tobacco use: education provided. No supplement currently     Meatus trauma: held lovenox. Resolved. UA without significant findings for infection    Discharge Exam:  Patient Vitals for the past 24 hrs:   BP Temp Temp src Pulse Resp SpO2   03/02/23 0915 (!) 104/58 97.7 °F (36.5 °C) -- 70 18 98 %   03/01/23 2005 113/66 98.2 °F (36.8 °C) Oral 61 18 97 %   03/01/23 0945 113/76 97.5 °F (36.4 °C) -- 72 16 99 %     Gen: No distress, pleasant. Resting in bed  HEENT: Normocephalic, atraumatic  CV: Regular rate and rhythm. No MRG   Resp: No respiratory distress. CTAB   Abd: Soft, nontender nondistended  Ext: No edema  Neuro: Alert, oriented, appropriately interactive. Left hemiparesis LUE 0/4.  Negative gilliam    Significant Diagnostics:   Lab Results   Component Value Date    CREATININE 0.8 03/02/2023    BUN 20 03/02/2023     03/02/2023    K 4.1 03/02/2023     03/02/2023    CO2 25 03/02/2023       Lab Results   Component Value Date    WBC 6.4 03/02/2023    HGB 13.2 (L) 03/02/2023    HCT 39.5 (L) 03/02/2023    MCV 99.4 03/02/2023     03/02/2023       Disposition:  SNF in stable condition. Follow-up:  See after visit summary from hospitalization    Discharge Medications:     Medication List        START taking these medications      lisinopril 5 MG tablet  Commonly known as: PRINIVIL;ZESTRIL  Take 1 tablet by mouth daily  Start taking on: March 3, 2023     sennosides-docusate sodium 8.6-50 MG tablet  Commonly known as: SENOKOT-S  Take 2 tablets by mouth 2 times daily     sertraline 50 MG tablet  Commonly known as: ZOLOFT  Take 1 tablet by mouth daily  Start taking on: March 3, 2023     tamsulosin 0.4 MG capsule  Commonly known as: FLOMAX  Take 1 capsule by mouth daily  Start taking on: March 3, 2023            Edwin Ruben taking these medications      atorvastatin 40 MG tablet  Commonly known as: LIPITOR  Take 1 tablet by mouth nightly     clopidogrel 75 MG tablet  Commonly known as: PLAVIX  Take 1 tablet by mouth daily  Start taking on: March 3, 2023            STOP taking these medications      aspirin 81 MG chewable tablet               Where to Get Your Medications        Information about where to get these medications is not yet available    Ask your nurse or doctor about these medications  atorvastatin 40 MG tablet  clopidogrel 75 MG tablet  lisinopril 5 MG tablet  sennosides-docusate sodium 8.6-50 MG tablet  sertraline 50 MG tablet  tamsulosin 0.4 MG capsule         I spent over 35 minutes on this discharge encounter between counseling, coordination of care, and medication reconciliation. To comply with [address] [facility] R.II.[id number]:  Discharge order placed in advance to facilitate patient's discharge needs. Frank Ferrer MD    * This document was created using dictation software. While all precautions were taken to ensure accuracy, errors may have occurred. Please disregard any typographical errors.

## 2023-03-02 NOTE — CARE COORDINATION
Discharge note:      CM/SW has been notified of discharge. Patient noted to have the following needs at discharge. CM/SW has coordinated the following services: SNF      Discharge Destination: [address] [place] [address] [id number] [place] [address], [address]  Report: [phone]  Fax: [phone]     Transportation: [address] [facility]  ([phone])   Discharge Time: 5:00PM  AVS faxed and agency notified:  Nurse to call report to facility: Report: [phone]  Family advised of discharge and in agreement  HENS completed. All CM/SW needs met, will sign off.        Electronically signed by MILEY Delvalle on 3/2/2023 at 12:05 PM

## 2023-03-02 NOTE — CARE COORDINATION
SW contacted by RN stating that the ambulance packet was not on pt's chart. SW completed a discharge packet with ambulance transport form and delivered it to RN on the floor.       Electronically signed by MILEY Contreras LSW on 3/2/2023 at 5:46 PM

## 2023-03-21 ENCOUNTER — TELEPHONE (OUTPATIENT)
Dept: FAMILY MEDICINE CLINIC | Age: 68
End: 2023-03-21

## 2023-03-27 ENCOUNTER — TELEPHONE (OUTPATIENT)
Dept: FAMILY MEDICINE CLINIC | Age: 68
End: 2023-03-27

## 2023-03-27 SDOH — HEALTH STABILITY: PHYSICAL HEALTH: ON AVERAGE, HOW MANY MINUTES DO YOU ENGAGE IN EXERCISE AT THIS LEVEL?: 0 MIN

## 2023-03-27 SDOH — HEALTH STABILITY: PHYSICAL HEALTH: ON AVERAGE, HOW MANY DAYS PER WEEK DO YOU ENGAGE IN MODERATE TO STRENUOUS EXERCISE (LIKE A BRISK WALK)?: 0 DAYS

## 2023-03-27 NOTE — TELEPHONE ENCOUNTER
Hamzah Andres from [address] called to get home care orders from     These are for nursing, pt, and speech expressive aphasia       He was just in the hospital for a stroke     She would like a call for verbal orders if possible at [phone]    That is a secure line      Please advise asap

## 2023-03-28 ENCOUNTER — OFFICE VISIT (OUTPATIENT)
Dept: FAMILY MEDICINE CLINIC | Age: 68
End: 2023-03-28
Payer: MEDICARE

## 2023-03-28 VITALS
SYSTOLIC BLOOD PRESSURE: 112 MMHG | DIASTOLIC BLOOD PRESSURE: 69 MMHG | BODY MASS INDEX: 21.98 KG/M2 | WEIGHT: 145 LBS | HEART RATE: 58 BPM | HEIGHT: 68 IN

## 2023-03-28 DIAGNOSIS — Z91.81 AT HIGH RISK FOR FALLS: ICD-10-CM

## 2023-03-28 DIAGNOSIS — I10 PRIMARY HYPERTENSION: ICD-10-CM

## 2023-03-28 DIAGNOSIS — I63.9 ACUTE ISCHEMIC STROKE (HCC): ICD-10-CM

## 2023-03-28 DIAGNOSIS — Z00.00 ENCOUNTER FOR MEDICAL EXAMINATION TO ESTABLISH CARE: Primary | ICD-10-CM

## 2023-03-28 DIAGNOSIS — Z72.0 TOBACCO ABUSE: ICD-10-CM

## 2023-03-28 DIAGNOSIS — G81.94 LEFT HEMIPARESIS (HCC): ICD-10-CM

## 2023-03-28 PROCEDURE — G8420 CALC BMI NORM PARAMETERS: HCPCS | Performed by: STUDENT IN AN ORGANIZED HEALTH CARE EDUCATION/TRAINING PROGRAM

## 2023-03-28 PROCEDURE — 90677 PCV20 VACCINE IM: CPT | Performed by: STUDENT IN AN ORGANIZED HEALTH CARE EDUCATION/TRAINING PROGRAM

## 2023-03-28 PROCEDURE — G8427 DOCREV CUR MEDS BY ELIG CLIN: HCPCS | Performed by: STUDENT IN AN ORGANIZED HEALTH CARE EDUCATION/TRAINING PROGRAM

## 2023-03-28 PROCEDURE — G0009 ADMIN PNEUMOCOCCAL VACCINE: HCPCS | Performed by: STUDENT IN AN ORGANIZED HEALTH CARE EDUCATION/TRAINING PROGRAM

## 2023-03-28 PROCEDURE — 1123F ACP DISCUSS/DSCN MKR DOCD: CPT | Performed by: STUDENT IN AN ORGANIZED HEALTH CARE EDUCATION/TRAINING PROGRAM

## 2023-03-28 PROCEDURE — G8484 FLU IMMUNIZE NO ADMIN: HCPCS | Performed by: STUDENT IN AN ORGANIZED HEALTH CARE EDUCATION/TRAINING PROGRAM

## 2023-03-28 PROCEDURE — 3074F SYST BP LT 130 MM HG: CPT | Performed by: STUDENT IN AN ORGANIZED HEALTH CARE EDUCATION/TRAINING PROGRAM

## 2023-03-28 PROCEDURE — 3017F COLORECTAL CA SCREEN DOC REV: CPT | Performed by: STUDENT IN AN ORGANIZED HEALTH CARE EDUCATION/TRAINING PROGRAM

## 2023-03-28 PROCEDURE — 3078F DIAST BP <80 MM HG: CPT | Performed by: STUDENT IN AN ORGANIZED HEALTH CARE EDUCATION/TRAINING PROGRAM

## 2023-03-28 PROCEDURE — 1111F DSCHRG MED/CURRENT MED MERGE: CPT | Performed by: STUDENT IN AN ORGANIZED HEALTH CARE EDUCATION/TRAINING PROGRAM

## 2023-03-28 PROCEDURE — 1036F TOBACCO NON-USER: CPT | Performed by: STUDENT IN AN ORGANIZED HEALTH CARE EDUCATION/TRAINING PROGRAM

## 2023-03-28 PROCEDURE — 99204 OFFICE O/P NEW MOD 45 MIN: CPT | Performed by: STUDENT IN AN ORGANIZED HEALTH CARE EDUCATION/TRAINING PROGRAM

## 2023-03-28 RX ORDER — TAMSULOSIN HYDROCHLORIDE 0.4 MG/1
0.4 CAPSULE ORAL DAILY
Qty: 90 CAPSULE | Refills: 1 | Status: SHIPPED | OUTPATIENT
Start: 2023-03-28

## 2023-03-28 RX ORDER — CLOPIDOGREL BISULFATE 75 MG/1
75 TABLET ORAL DAILY
Qty: 90 TABLET | Refills: 1 | Status: SHIPPED | OUTPATIENT
Start: 2023-03-28

## 2023-03-28 RX ORDER — LISINOPRIL 5 MG/1
5 TABLET ORAL DAILY
Qty: 90 TABLET | Refills: 1 | Status: SHIPPED | OUTPATIENT
Start: 2023-03-28

## 2023-03-28 RX ORDER — ATORVASTATIN CALCIUM 40 MG/1
40 TABLET, FILM COATED ORAL NIGHTLY
Qty: 90 TABLET | Refills: 1 | Status: SHIPPED | OUTPATIENT
Start: 2023-03-28

## 2023-03-28 ASSESSMENT — PATIENT HEALTH QUESTIONNAIRE - PHQ9
SUM OF ALL RESPONSES TO PHQ QUESTIONS 1-9: 0
SUM OF ALL RESPONSES TO PHQ QUESTIONS 1-9: 0
2. FEELING DOWN, DEPRESSED OR HOPELESS: 0
SUM OF ALL RESPONSES TO PHQ QUESTIONS 1-9: 0
SUM OF ALL RESPONSES TO PHQ QUESTIONS 1-9: 0
1. LITTLE INTEREST OR PLEASURE IN DOING THINGS: 0
SUM OF ALL RESPONSES TO PHQ9 QUESTIONS 1 & 2: 0

## 2023-03-28 NOTE — PROGRESS NOTES
[address] [facility] Note    Date: 3/28/2023      Assessment/Plan:     1. Encounter for medical examination to establish care  2. At high risk for falls  3. Left hemiparesis ([facility] [address]  4. Acute ischemic stroke ([facility] [address]  5. Primary hypertension Controlled, continue current management  6. Tobacco abuse- no longer smoking    Refilled meds for 3 month supply  Continue pt/ot/speech  If systolic bp 601-000 okay to give lisinopril, if under 120 okay to hold lisinopril  To establish care when he moves to [place] and will get other  items done there. Orders Placed This Encounter   Procedures    Pneumococcal, PCV20, PREVNAR 20, (age 25 yrs+), IM, PF     Orders Placed This Encounter   Medications    atorvastatin (LIPITOR) 40 MG tablet     Sig: Take 1 tablet by mouth nightly     Dispense:  90 tablet     Refill:  1    clopidogrel (PLAVIX) 75 MG tablet     Sig: Take 1 tablet by mouth daily     Dispense:  90 tablet     Refill:  1    lisinopril (PRINIVIL;ZESTRIL) 5 MG tablet     Sig: Take 1 tablet by mouth daily     Dispense:  90 tablet     Refill:  1    sertraline (ZOLOFT) 50 MG tablet     Sig: Take 1 tablet by mouth daily     Dispense:  90 tablet     Refill:  1    tamsulosin (FLOMAX) 0.4 MG capsule     Sig: Take 1 capsule by mouth daily     Dispense:  90 capsule     Refill:  1       Return if symptoms worsen or fail to improve. Discussed medication(s) risks, benefits, side effects, adverse reactions and interactions with patient. Patient voiced understanding. Subjective/Objective:   HPI  Chief Complaint   Patient presents with    New Patient     See Assessment/Plan for further HPI info    Patient is here for follow-up for a hospital stay for which she had an acute right basal ganglia infarct in February 2023.   After the hospital stay he was in rehab and now he is here and needs a 3-month supply of his medications as he will be moving to [place] to live with his

## 2023-03-29 ENCOUNTER — TELEPHONE (OUTPATIENT)
Dept: FAMILY MEDICINE CLINIC | Age: 68
End: 2023-03-29

## 2023-03-29 DIAGNOSIS — Z86.73 HISTORY OF CVA (CEREBROVASCULAR ACCIDENT): ICD-10-CM

## 2023-03-29 DIAGNOSIS — I63.9 ACUTE ISCHEMIC STROKE (HCC): Primary | ICD-10-CM

## 2023-03-29 DIAGNOSIS — G81.94 LEFT HEMIPARESIS (HCC): ICD-10-CM

## 2023-03-29 NOTE — TELEPHONE ENCOUNTER
Amisha Minor from [facility] called and stated this patient is actually moving to [place]    They are wondering if  can fax over orders for the patient to have:     Semi electric hospital bed and a standard wheelchair for his DME     This will go to [facility], their fax number is [phone] would like a call whether this is possible or not at her phone number:     [phone] Extension: [phone]

## 2023-03-30 PROBLEM — Z86.73 HISTORY OF CVA (CEREBROVASCULAR ACCIDENT): Status: ACTIVE | Noted: 2023-03-30

## 2023-03-31 NOTE — TELEPHONE ENCOUNTER
Orders placed.  Please print and fax to [facility], their fax number is [phone] and please call Rudolph Martinez back to let her know (phone in previous message)